# Patient Record
Sex: MALE | Race: BLACK OR AFRICAN AMERICAN | NOT HISPANIC OR LATINO | URBAN - METROPOLITAN AREA
[De-identification: names, ages, dates, MRNs, and addresses within clinical notes are randomized per-mention and may not be internally consistent; named-entity substitution may affect disease eponyms.]

---

## 2019-12-14 ENCOUNTER — INPATIENT (INPATIENT)
Facility: HOSPITAL | Age: 19
LOS: 4 days | Discharge: ANOTHER HOSPITAL | End: 2019-12-19
Attending: INTERNAL MEDICINE | Admitting: INTERNAL MEDICINE
Payer: MEDICAID

## 2019-12-14 VITALS
SYSTOLIC BLOOD PRESSURE: 128 MMHG | WEIGHT: 121.25 LBS | DIASTOLIC BLOOD PRESSURE: 72 MMHG | HEART RATE: 93 BPM | TEMPERATURE: 98 F | OXYGEN SATURATION: 93 % | RESPIRATION RATE: 20 BRPM

## 2019-12-14 LAB
ALBUMIN SERPL ELPH-MCNC: 4.2 G/DL — SIGNIFICANT CHANGE UP (ref 3.3–5)
ALP SERPL-CCNC: 68 U/L — SIGNIFICANT CHANGE UP (ref 40–120)
ALT FLD-CCNC: 29 U/L — SIGNIFICANT CHANGE UP (ref 12–78)
ANION GAP SERPL CALC-SCNC: 9 MMOL/L — SIGNIFICANT CHANGE UP (ref 5–17)
ANISOCYTOSIS BLD QL: SLIGHT — SIGNIFICANT CHANGE UP
APTT BLD: 31.8 SEC — SIGNIFICANT CHANGE UP (ref 28.5–37)
AST SERPL-CCNC: 203 U/L — HIGH (ref 15–37)
BASE EXCESS BLDA CALC-SCNC: -2.6 MMOL/L — LOW (ref -2–2)
BASOPHILS # BLD AUTO: 0 K/UL — SIGNIFICANT CHANGE UP (ref 0–0.2)
BASOPHILS NFR BLD AUTO: 0 % — SIGNIFICANT CHANGE UP (ref 0–2)
BILIRUB SERPL-MCNC: 4.2 MG/DL — HIGH (ref 0.2–1.2)
BLOOD GAS COMMENTS: SIGNIFICANT CHANGE UP
BLOOD GAS COMMENTS: SIGNIFICANT CHANGE UP
BLOOD GAS SOURCE: SIGNIFICANT CHANGE UP
BUN SERPL-MCNC: 18 MG/DL — SIGNIFICANT CHANGE UP (ref 7–23)
CALCIUM SERPL-MCNC: 9 MG/DL — SIGNIFICANT CHANGE UP (ref 8.5–10.1)
CHLORIDE SERPL-SCNC: 104 MMOL/L — SIGNIFICANT CHANGE UP (ref 96–108)
CK MB CFR SERPL CALC: <1 NG/ML — SIGNIFICANT CHANGE UP (ref 0.5–3.6)
CO2 SERPL-SCNC: 22 MMOL/L — SIGNIFICANT CHANGE UP (ref 22–31)
CREAT SERPL-MCNC: 0.66 MG/DL — SIGNIFICANT CHANGE UP (ref 0.5–1.3)
D DIMER BLD IA.RAPID-MCNC: HIGH NG/ML DDU
DACRYOCYTES BLD QL SMEAR: SLIGHT — SIGNIFICANT CHANGE UP
EOSINOPHIL # BLD AUTO: 0.18 K/UL — SIGNIFICANT CHANGE UP (ref 0–0.5)
EOSINOPHIL NFR BLD AUTO: 1 % — SIGNIFICANT CHANGE UP (ref 0–6)
GLUCOSE SERPL-MCNC: 88 MG/DL — SIGNIFICANT CHANGE UP (ref 70–99)
HCO3 BLDA-SCNC: 22 MMOL/L — SIGNIFICANT CHANGE UP (ref 21–29)
HCT VFR BLD CALC: 20.7 % — CRITICAL LOW (ref 39–50)
HGB BLD-MCNC: 7.7 G/DL — LOW (ref 13–17)
HOROWITZ INDEX BLDA+IHG-RTO: 28 — SIGNIFICANT CHANGE UP
HOWELL-JOLLY BOD BLD QL SMEAR: PRESENT — SIGNIFICANT CHANGE UP
INR BLD: 1.37 RATIO — HIGH (ref 0.88–1.16)
LYMPHOCYTES # BLD AUTO: 21 % — SIGNIFICANT CHANGE UP (ref 13–44)
LYMPHOCYTES # BLD AUTO: 3.74 K/UL — HIGH (ref 1–3.3)
MACROCYTES BLD QL: SLIGHT — SIGNIFICANT CHANGE UP
MANUAL SMEAR VERIFICATION: SIGNIFICANT CHANGE UP
MCHC RBC-ENTMCNC: 28.9 PG — SIGNIFICANT CHANGE UP (ref 27–34)
MCHC RBC-ENTMCNC: 37.2 GM/DL — HIGH (ref 32–36)
MCV RBC AUTO: 77.8 FL — LOW (ref 80–100)
MICROCYTES BLD QL: SLIGHT — SIGNIFICANT CHANGE UP
MONOCYTES # BLD AUTO: 0.89 K/UL — SIGNIFICANT CHANGE UP (ref 0–0.9)
MONOCYTES NFR BLD AUTO: 5 % — SIGNIFICANT CHANGE UP (ref 2–14)
NEUTROPHILS # BLD AUTO: 12.99 K/UL — HIGH (ref 1.8–7.4)
NEUTROPHILS NFR BLD AUTO: 73 % — SIGNIFICANT CHANGE UP (ref 43–77)
NRBC # BLD: 0 /100 — SIGNIFICANT CHANGE UP (ref 0–0)
NRBC # BLD: SIGNIFICANT CHANGE UP /100 WBCS (ref 0–0)
NT-PROBNP SERPL-SCNC: 65 PG/ML — SIGNIFICANT CHANGE UP (ref 0–125)
OVALOCYTES BLD QL SMEAR: SLIGHT — SIGNIFICANT CHANGE UP
PCO2 BLDA: 37 MMHG — SIGNIFICANT CHANGE UP (ref 32–46)
PH BLD: 7.38 — SIGNIFICANT CHANGE UP (ref 7.35–7.45)
PLAT MORPH BLD: NORMAL — SIGNIFICANT CHANGE UP
PLATELET # BLD AUTO: 209 K/UL — SIGNIFICANT CHANGE UP (ref 150–400)
PO2 BLDA: 133 MMHG — HIGH (ref 74–108)
POLYCHROMASIA BLD QL SMEAR: SLIGHT — SIGNIFICANT CHANGE UP
POTASSIUM SERPL-MCNC: 4.3 MMOL/L — SIGNIFICANT CHANGE UP (ref 3.5–5.3)
POTASSIUM SERPL-SCNC: 4.3 MMOL/L — SIGNIFICANT CHANGE UP (ref 3.5–5.3)
PROT SERPL-MCNC: 8.7 GM/DL — HIGH (ref 6–8.3)
PROTHROM AB SERPL-ACNC: 15.5 SEC — HIGH (ref 10–12.9)
RBC # BLD: 2.66 M/UL — LOW (ref 4.2–5.8)
RBC # FLD: 26.5 % — HIGH (ref 10.3–14.5)
RBC BLD AUTO: ABNORMAL
RETICS #: 137.5 K/UL — HIGH (ref 25–125)
RETICS/RBC NFR: 5.7 % — HIGH (ref 0.5–2.5)
SAO2 % BLDA: 98 % — HIGH (ref 92–96)
SICKLE CELLS BLD QL SMEAR: SIGNIFICANT CHANGE UP
SODIUM SERPL-SCNC: 135 MMOL/L — SIGNIFICANT CHANGE UP (ref 135–145)
TARGETS BLD QL SMEAR: SLIGHT — SIGNIFICANT CHANGE UP
TROPONIN I SERPL-MCNC: <.015 NG/ML — SIGNIFICANT CHANGE UP (ref 0.01–0.04)
WBC # BLD: 17.8 K/UL — HIGH (ref 3.8–10.5)
WBC # FLD AUTO: 17.8 K/UL — HIGH (ref 3.8–10.5)

## 2019-12-14 PROCEDURE — 71045 X-RAY EXAM CHEST 1 VIEW: CPT | Mod: 26

## 2019-12-14 PROCEDURE — 93010 ELECTROCARDIOGRAM REPORT: CPT

## 2019-12-14 PROCEDURE — 99291 CRITICAL CARE FIRST HOUR: CPT

## 2019-12-14 PROCEDURE — 71275 CT ANGIOGRAPHY CHEST: CPT | Mod: 26

## 2019-12-14 RX ORDER — MORPHINE SULFATE 50 MG/1
4 CAPSULE, EXTENDED RELEASE ORAL ONCE
Refills: 0 | Status: DISCONTINUED | OUTPATIENT
Start: 2019-12-14 | End: 2019-12-14

## 2019-12-14 RX ORDER — SODIUM CHLORIDE 9 MG/ML
1000 INJECTION, SOLUTION INTRAVENOUS ONCE
Refills: 0 | Status: COMPLETED | OUTPATIENT
Start: 2019-12-14 | End: 2019-12-14

## 2019-12-14 RX ORDER — SODIUM CHLORIDE 9 MG/ML
1000 INJECTION INTRAMUSCULAR; INTRAVENOUS; SUBCUTANEOUS ONCE
Refills: 0 | Status: COMPLETED | OUTPATIENT
Start: 2019-12-14 | End: 2019-12-14

## 2019-12-14 RX ORDER — ACETAMINOPHEN 500 MG
975 TABLET ORAL ONCE
Refills: 0 | Status: COMPLETED | OUTPATIENT
Start: 2019-12-14 | End: 2019-12-14

## 2019-12-14 RX ADMIN — MORPHINE SULFATE 4 MILLIGRAM(S): 50 CAPSULE, EXTENDED RELEASE ORAL at 22:00

## 2019-12-14 RX ADMIN — Medication 975 MILLIGRAM(S): at 20:20

## 2019-12-14 RX ADMIN — SODIUM CHLORIDE 1000 MILLILITER(S): 9 INJECTION, SOLUTION INTRAVENOUS at 22:56

## 2019-12-14 RX ADMIN — SODIUM CHLORIDE 1000 MILLILITER(S): 9 INJECTION INTRAMUSCULAR; INTRAVENOUS; SUBCUTANEOUS at 20:19

## 2019-12-14 RX ADMIN — SODIUM CHLORIDE 1000 MILLILITER(S): 9 INJECTION, SOLUTION INTRAVENOUS at 22:13

## 2019-12-14 RX ADMIN — Medication 975 MILLIGRAM(S): at 21:00

## 2019-12-14 RX ADMIN — SODIUM CHLORIDE 1000 MILLILITER(S): 9 INJECTION, SOLUTION INTRAVENOUS at 22:55

## 2019-12-14 RX ADMIN — MORPHINE SULFATE 4 MILLIGRAM(S): 50 CAPSULE, EXTENDED RELEASE ORAL at 23:25

## 2019-12-14 RX ADMIN — MORPHINE SULFATE 4 MILLIGRAM(S): 50 CAPSULE, EXTENDED RELEASE ORAL at 22:55

## 2019-12-14 RX ADMIN — SODIUM CHLORIDE 1000 MILLILITER(S): 9 INJECTION INTRAMUSCULAR; INTRAVENOUS; SUBCUTANEOUS at 21:52

## 2019-12-14 RX ADMIN — MORPHINE SULFATE 4 MILLIGRAM(S): 50 CAPSULE, EXTENDED RELEASE ORAL at 21:13

## 2019-12-14 RX ADMIN — SODIUM CHLORIDE 1000 MILLILITER(S): 9 INJECTION, SOLUTION INTRAVENOUS at 23:49

## 2019-12-14 NOTE — ED ADULT NURSE REASSESSMENT NOTE - NS ED NURSE REASSESS COMMENT FT1
patient complaining of CP increasing. RPT ecg ordered. Dr. Shipley made aware on multiple occasions. Awaiting to be seen. Cardiac monitor and O2LNC

## 2019-12-14 NOTE — ED PROVIDER NOTE - OBJECTIVE STATEMENT
20 yo M w/ acute cp, started yesterday, central, pt. also feels it in his ribs.  Pt. has a history of sickle cell, but states this is different from his sickle cell.  No inciting event, although pt. admits to recent return trip from [place]--seated on plane for extended period of time.  ROS: negative for fever, cough, headache, shortness of breath, abd pain, nausea, vomiting, diarrhea, rash, paresthesia, and weakness--all other systems reviewed are negative.   PMH: negative; Meds: Denies; SH: Denies smoking/drinking/drug use 20 yo M w/ acute cp, started yesterday, central, pt. also feels it in his ribs.  Pt. has a history of sickle cell, but states this is different from his sickle cell.  No inciting event, although pt. admits to recent return trip from [place]--seated on plane for extended period of time.  ROS: negative for fever, cough, headache, shortness of breath, abd pain, nausea, vomiting, diarrhea, rash, paresthesia, and weakness--all other systems reviewed are negative.   PMH: sickle cell; Meds: folic acid, b complex; SH: Denies smoking/drinking/drug use

## 2019-12-14 NOTE — ED PROVIDER NOTE - CARE PLAN
Principal Discharge DX:	Acute pulmonary embolism, unspecified pulmonary embolism type, unspecified whether acute cor pulmonale present Principal Discharge DX:	Hb-SS disease with crisis

## 2019-12-14 NOTE — ED ADULT TRIAGE NOTE - CHIEF COMPLAINT QUOTE
Patient states im having chest pain since yesterday, denies SOB. took pain killer and and antibiotics at 5pm. From [place], [place] in [place] on monday

## 2019-12-14 NOTE — ED PROVIDER NOTE - PRINCIPAL DIAGNOSIS
Acute pulmonary embolism, unspecified pulmonary embolism type, unspecified whether acute cor pulmonale present Hb-SS disease with crisis

## 2019-12-14 NOTE — ED PROVIDER NOTE - CLINICAL SUMMARY MEDICAL DECISION MAKING FREE TEXT BOX
20 yo M with acute chest pain, r/o PE, pneumo, doubt ACS, CHF  -basic labs, coags, trop, ckmb, dimer, ldh, retic, bnp, CTA chest, cxr, ekg, iv, bolus hydration, tylenol prn, morphine for pain, monitor  -f/u results, reeval

## 2019-12-14 NOTE — ED PROVIDER NOTE - PHYSICAL EXAMINATION
Vitals: hypoxic on RA in 80's, otherwise WNL  Gen: AAOx3, NAD, sitting uncomfortably in stretcher, non-toxic, family at bedside   Head: ncat, perrla, eomi b/l  Neck: supple, no lymphadenopathy, no midline deviation  Heart: rrr, no m/r/g  Lungs: CTA b/l, no rales/ronchi/wheezes  Abd: soft, nontender, non-distended, no rebound or guarding  Ext: no clubbing/cyanosis/edema  Neuro: sensation and muscle strength intact b/l, steady gait Vitals: hypoxic on RA in 80's, otherwise WNL  Gen: AAOx3, NAD, sitting uncomfortably in stretcher, non-toxic, family at bedside   Head: ncat, perrla, eomi b/l, mild scleral jaundice   Neck: supple, no lymphadenopathy, no midline deviation  Heart: rrr, no m/r/g  Lungs: CTA b/l, no rales/ronchi/wheezes  Abd: soft, nontender, non-distended, no rebound or guarding  Ext: no clubbing/cyanosis/edema  Neuro: sensation and muscle strength intact b/l, steady gait

## 2019-12-14 NOTE — ED ADULT NURSE NOTE - OBJECTIVE STATEMENT
Received in bed 14. AOX4. Patient states im having chest pain since yesterday, denies SOB. took pain killer and and antibiotics at 5pm. From [place], flight landed in  on monday

## 2019-12-15 DIAGNOSIS — D57.00 HB-SS DISEASE WITH CRISIS, UNSPECIFIED: ICD-10-CM

## 2019-12-15 DIAGNOSIS — Z29.9 ENCOUNTER FOR PROPHYLACTIC MEASURES, UNSPECIFIED: ICD-10-CM

## 2019-12-15 LAB
ALBUMIN SERPL ELPH-MCNC: 3.5 G/DL — SIGNIFICANT CHANGE UP (ref 3.3–5)
ALP SERPL-CCNC: 72 U/L — SIGNIFICANT CHANGE UP (ref 40–120)
ALT FLD-CCNC: 20 U/L — SIGNIFICANT CHANGE UP (ref 12–78)
ANION GAP SERPL CALC-SCNC: 8 MMOL/L — SIGNIFICANT CHANGE UP (ref 5–17)
AST SERPL-CCNC: 127 U/L — HIGH (ref 15–37)
BILIRUB SERPL-MCNC: 3.3 MG/DL — HIGH (ref 0.2–1.2)
BLD GP AB SCN SERPL QL: SIGNIFICANT CHANGE UP
BUN SERPL-MCNC: 7 MG/DL — SIGNIFICANT CHANGE UP (ref 7–23)
CALCIUM SERPL-MCNC: 9 MG/DL — SIGNIFICANT CHANGE UP (ref 8.5–10.1)
CHLORIDE SERPL-SCNC: 99 MMOL/L — SIGNIFICANT CHANGE UP (ref 96–108)
CO2 SERPL-SCNC: 23 MMOL/L — SIGNIFICANT CHANGE UP (ref 22–31)
CREAT SERPL-MCNC: 0.55 MG/DL — SIGNIFICANT CHANGE UP (ref 0.5–1.3)
FLU A RESULT: SIGNIFICANT CHANGE UP
FLU A RESULT: SIGNIFICANT CHANGE UP
FLUAV AG NPH QL: SIGNIFICANT CHANGE UP
FLUBV AG NPH QL: SIGNIFICANT CHANGE UP
GLUCOSE SERPL-MCNC: 111 MG/DL — HIGH (ref 70–99)
HCT VFR BLD CALC: 20.3 % — CRITICAL LOW (ref 39–50)
HGB BLD-MCNC: 7.6 G/DL — LOW (ref 13–17)
LDH SERPL L TO P-CCNC: 1419 U/L — HIGH (ref 50–242)
MCHC RBC-ENTMCNC: 29.8 PG — SIGNIFICANT CHANGE UP (ref 27–34)
MCHC RBC-ENTMCNC: 37.4 GM/DL — HIGH (ref 32–36)
MCV RBC AUTO: 79.6 FL — LOW (ref 80–100)
NRBC # BLD: 3 /100 WBCS — HIGH (ref 0–0)
PLATELET # BLD AUTO: 194 K/UL — SIGNIFICANT CHANGE UP (ref 150–400)
POTASSIUM SERPL-MCNC: 3.9 MMOL/L — SIGNIFICANT CHANGE UP (ref 3.5–5.3)
POTASSIUM SERPL-SCNC: 3.9 MMOL/L — SIGNIFICANT CHANGE UP (ref 3.5–5.3)
PROT SERPL-MCNC: 8 GM/DL — SIGNIFICANT CHANGE UP (ref 6–8.3)
RBC # BLD: 2.55 M/UL — LOW (ref 4.2–5.8)
RBC # BLD: 2.55 M/UL — LOW (ref 4.2–5.8)
RBC # FLD: 26.1 % — HIGH (ref 10.3–14.5)
RETICS #: 209 K/UL — HIGH (ref 25–125)
RETICS/RBC NFR: 8.2 % — HIGH (ref 0.5–2.5)
RSV RESULT: SIGNIFICANT CHANGE UP
RSV RNA RESP QL NAA+PROBE: SIGNIFICANT CHANGE UP
SODIUM SERPL-SCNC: 130 MMOL/L — LOW (ref 135–145)
WBC # BLD: 17.95 K/UL — HIGH (ref 3.8–10.5)
WBC # FLD AUTO: 17.95 K/UL — HIGH (ref 3.8–10.5)

## 2019-12-15 PROCEDURE — 99291 CRITICAL CARE FIRST HOUR: CPT

## 2019-12-15 PROCEDURE — 71045 X-RAY EXAM CHEST 1 VIEW: CPT | Mod: 26

## 2019-12-15 PROCEDURE — 99222 1ST HOSP IP/OBS MODERATE 55: CPT

## 2019-12-15 PROCEDURE — 12345: CPT | Mod: NC

## 2019-12-15 RX ORDER — CEFTRIAXONE 500 MG/1
1000 INJECTION, POWDER, FOR SOLUTION INTRAMUSCULAR; INTRAVENOUS EVERY 24 HOURS
Refills: 0 | Status: DISCONTINUED | OUTPATIENT
Start: 2019-12-15 | End: 2019-12-16

## 2019-12-15 RX ORDER — ENOXAPARIN SODIUM 100 MG/ML
40 INJECTION SUBCUTANEOUS DAILY
Refills: 0 | Status: DISCONTINUED | OUTPATIENT
Start: 2019-12-15 | End: 2019-12-19

## 2019-12-15 RX ORDER — ACETAMINOPHEN 500 MG
650 TABLET ORAL EVERY 6 HOURS
Refills: 0 | Status: DISCONTINUED | OUTPATIENT
Start: 2019-12-15 | End: 2019-12-19

## 2019-12-15 RX ORDER — MORPHINE SULFATE 50 MG/1
2 CAPSULE, EXTENDED RELEASE ORAL
Refills: 0 | Status: DISCONTINUED | OUTPATIENT
Start: 2019-12-15 | End: 2019-12-17

## 2019-12-15 RX ORDER — INFLUENZA VIRUS VACCINE 15; 15; 15; 15 UG/.5ML; UG/.5ML; UG/.5ML; UG/.5ML
0.5 SUSPENSION INTRAMUSCULAR ONCE
Refills: 0 | Status: DISCONTINUED | OUTPATIENT
Start: 2019-12-15 | End: 2019-12-19

## 2019-12-15 RX ORDER — ACETAMINOPHEN 500 MG
650 TABLET ORAL ONCE
Refills: 0 | Status: COMPLETED | OUTPATIENT
Start: 2019-12-15 | End: 2019-12-15

## 2019-12-15 RX ORDER — FOLIC ACID 0.8 MG
1 TABLET ORAL DAILY
Refills: 0 | Status: DISCONTINUED | OUTPATIENT
Start: 2019-12-15 | End: 2019-12-19

## 2019-12-15 RX ORDER — SODIUM CHLORIDE 9 MG/ML
1000 INJECTION, SOLUTION INTRAVENOUS
Refills: 0 | Status: DISCONTINUED | OUTPATIENT
Start: 2019-12-15 | End: 2019-12-16

## 2019-12-15 RX ADMIN — Medication 650 MILLIGRAM(S): at 05:52

## 2019-12-15 RX ADMIN — MORPHINE SULFATE 2 MILLIGRAM(S): 50 CAPSULE, EXTENDED RELEASE ORAL at 15:10

## 2019-12-15 RX ADMIN — Medication 650 MILLIGRAM(S): at 06:22

## 2019-12-15 RX ADMIN — MORPHINE SULFATE 2 MILLIGRAM(S): 50 CAPSULE, EXTENDED RELEASE ORAL at 19:53

## 2019-12-15 RX ADMIN — MORPHINE SULFATE 2 MILLIGRAM(S): 50 CAPSULE, EXTENDED RELEASE ORAL at 20:23

## 2019-12-15 RX ADMIN — MORPHINE SULFATE 2 MILLIGRAM(S): 50 CAPSULE, EXTENDED RELEASE ORAL at 14:55

## 2019-12-15 RX ADMIN — SODIUM CHLORIDE 150 MILLILITER(S): 9 INJECTION, SOLUTION INTRAVENOUS at 05:47

## 2019-12-15 RX ADMIN — MORPHINE SULFATE 2 MILLIGRAM(S): 50 CAPSULE, EXTENDED RELEASE ORAL at 08:05

## 2019-12-15 RX ADMIN — MORPHINE SULFATE 2 MILLIGRAM(S): 50 CAPSULE, EXTENDED RELEASE ORAL at 18:00

## 2019-12-15 RX ADMIN — MORPHINE SULFATE 2 MILLIGRAM(S): 50 CAPSULE, EXTENDED RELEASE ORAL at 04:39

## 2019-12-15 RX ADMIN — MORPHINE SULFATE 2 MILLIGRAM(S): 50 CAPSULE, EXTENDED RELEASE ORAL at 17:46

## 2019-12-15 RX ADMIN — MORPHINE SULFATE 2 MILLIGRAM(S): 50 CAPSULE, EXTENDED RELEASE ORAL at 07:49

## 2019-12-15 RX ADMIN — MORPHINE SULFATE 2 MILLIGRAM(S): 50 CAPSULE, EXTENDED RELEASE ORAL at 10:05

## 2019-12-15 RX ADMIN — MORPHINE SULFATE 2 MILLIGRAM(S): 50 CAPSULE, EXTENDED RELEASE ORAL at 09:50

## 2019-12-15 RX ADMIN — SODIUM CHLORIDE 150 MILLILITER(S): 9 INJECTION, SOLUTION INTRAVENOUS at 12:43

## 2019-12-15 RX ADMIN — SODIUM CHLORIDE 150 MILLILITER(S): 9 INJECTION, SOLUTION INTRAVENOUS at 18:33

## 2019-12-15 RX ADMIN — Medication 1 MILLIGRAM(S): at 18:33

## 2019-12-15 RX ADMIN — Medication 650 MILLIGRAM(S): at 15:18

## 2019-12-15 RX ADMIN — ENOXAPARIN SODIUM 40 MILLIGRAM(S): 100 INJECTION SUBCUTANEOUS at 18:32

## 2019-12-15 RX ADMIN — MORPHINE SULFATE 2 MILLIGRAM(S): 50 CAPSULE, EXTENDED RELEASE ORAL at 23:45

## 2019-12-15 RX ADMIN — MORPHINE SULFATE 2 MILLIGRAM(S): 50 CAPSULE, EXTENDED RELEASE ORAL at 03:35

## 2019-12-15 RX ADMIN — CEFTRIAXONE 100 MILLIGRAM(S): 500 INJECTION, POWDER, FOR SOLUTION INTRAMUSCULAR; INTRAVENOUS at 17:47

## 2019-12-15 RX ADMIN — MORPHINE SULFATE 2 MILLIGRAM(S): 50 CAPSULE, EXTENDED RELEASE ORAL at 12:39

## 2019-12-15 RX ADMIN — MORPHINE SULFATE 2 MILLIGRAM(S): 50 CAPSULE, EXTENDED RELEASE ORAL at 12:26

## 2019-12-15 RX ADMIN — Medication 650 MILLIGRAM(S): at 16:20

## 2019-12-15 NOTE — H&P ADULT - HISTORY OF PRESENT ILLNESS
Pt is a 18 y/o male w/sickle cell no pror acute chest and cant recall last crisis, had recently traveled to [place] returned 5 days ago, comes w/complain of chest and bone pain. pt doesn't report any sob, states bones hurt, did have decreased po intake for few days with travels.  pt denies any fever, chills, sob, palpitations, n/v/d/c/. in ed had CTA that was non contributory

## 2019-12-15 NOTE — H&P ADULT - NSHPLABSRESULTS_GEN_ALL_CORE
LABS:                        7.7    17.80 )-----------( 209      ( 14 Dec 2019 20:34 )             20.7     12-14    135  |  104  |  18  ----------------------------<  88  4.3   |  22  |  0.66    Ca    9.0      14 Dec 2019 20:34    TPro  8.7<H>  /  Alb  4.2  /  TBili  4.2<H>  /  DBili  x   /  AST  203<H>  /  ALT  29  /  AlkPhos  68  12-14    PT/INR - ( 14 Dec 2019 20:34 )   PT: 15.5 sec;   INR: 1.37 ratio         PTT - ( 14 Dec 2019 20:34 )  PTT:31.8 sec    CAPILLARY BLOOD GLUCOSE            RADIOLOGY & ADDITIONAL TESTS:    Imaging Personally Reviewed:  [ X] YES  [ ] NO

## 2019-12-15 NOTE — CHART NOTE - NSCHARTNOTEFT_GEN_A_CORE
RRT called for persistent fevers.  Pt seen at bedside, complains of chest pain with SOB.  Very anxious.  Vital signs stable during RRT; O2 sat 97-99% on O2 via VC.  Lung exam unremarkable.          Vital Signs Last 24 Hrs  T(C): 39.5 (15 Dec 2019 17:56), Max: 39.7 (15 Dec 2019 15:30)  T(F): 103.1 (15 Dec 2019 17:56), Max: 103.4 (15 Dec 2019 15:30)  HR: 115 (15 Dec 2019 17:56) (84 - 115)  BP: 133/79 (15 Dec 2019 17:56) (120/68 - 139/64)  BP(mean): --  RR: 24 (15 Dec 2019 17:56) (19 - 30)  SpO2: 96% (15 Dec 2019 17:56) (84% - 100%)          LABS:                        7.7    17.80 )-----------( 209      ( 14 Dec 2019 20:34 )             20.7     12-14    135  |  104  |  18  ----------------------------<  88  4.3   |  22  |  0.66    Ca    9.0      14 Dec 2019 20:34    TPro  8.7<H>  /  Alb  4.2  /  TBili  4.2<H>  /  DBili  x   /  AST  203<H>  /  ALT  29  /  AlkPhos  68  12-14    PT/INR - ( 14 Dec 2019 20:34 )   PT: 15.5 sec;   INR: 1.37 ratio         PTT - ( 14 Dec 2019 20:34 )  PTT:31.8 sec      RADIOLOGY & ADDITIONAL STUDIES:    CTA chest:  FINDINGS:   Pulmonary arteries: Normal. No pulmonary emboli.   Aorta: Unremarkable. No aortic aneurysm. No aortic dissection.   Lungs: Unremarkable. No consolidation. No masses.   Pleural space: Unremarkable. No pneumothorax. No pleural effusion.   Heart: Unremarkable. No cardiomegaly. No pericardial effusion.   Lymph nodes: Unremarkable. No enlarged lymph nodes.   Bones/joints: Unremarkable. No acute fracture.   Soft tissues: Unremarkable.     Plan:  -CTA imaging done withi last 24h.  Will repeat CXR now to ensure this is not evolving acute chest syndrome  -Rocephin started today by attending hospitalist  -Apply cooling blanket  -Follow blood Cx    Total critical care time spent: 30 minutes.

## 2019-12-15 NOTE — PATIENT PROFILE ADULT - NSPROGENSOURCEINFO_GEN_A_NUR
Vaccine Information Sheet, \"Influenza - Inactivated\"  given to Anoop Vargas, or parent/legal guardian of  Anoop Vargas and verbalized understanding. Patient responses:    Have you ever had a reaction to a flu vaccine? No  Are you able to eat eggs without adverse effects? Yes  Do you have any current illness? No  Have you ever had Guillian [place] Syndrome? No    Flu vaccine given per order. Please see immunization tab.
family/patient

## 2019-12-15 NOTE — H&P ADULT - NSHPPHYSICALEXAM_GEN_ALL_CORE
Vital Signs Last 24 Hrs  T(C): 38.2 (15 Dec 2019 03:29), Max: 38.2 (15 Dec 2019 03:29)  T(F): 100.7 (15 Dec 2019 03:29), Max: 100.7 (15 Dec 2019 03:29)  HR: 89 (15 Dec 2019 03:29) (84 - 95)  BP: 131/69 (15 Dec 2019 03:29) (120/81 - 131/69)  BP(mean): --  RR: 19 (15 Dec 2019 03:29) (19 - 30)  SpO2: 99% (15 Dec 2019 03:29) (84% - 100%)    PHYSICAL EXAM:    GENERAL: NAD, well-groomed, well-developed  HEAD:  Atraumatic, Normocephalic  EYES: EOMI, PERRLA, conjunctiva and sclera clear  ENMT: No tonsillar erythema, exudates, or enlargement; Moist mucous membranes, No lesions  NECK: Supple, No JVD, Normal thyroid  NERVOUS SYSTEM:  Alert & Oriented X3, Good concentration; Motor Strength 5/5 B/L upper and lower extremities; DTRs 2+ intact and symmetric  CHEST/LUNG: Clear to percussion bilaterally; No rales, rhonchi, wheezing, or rubs, mild point tenderness diffuse  HEART: Regular rate and rhythm; No rubs, or gallops, +S1,S2  ABDOMEN: Soft, Nontender, Nondistended; Bowel sounds present  EXTREMITIES:  2+ Peripheral Pulses, No clubbing, cyanosis, or edema  LYMPH: No cervical adenopathy  RECTAL: deferred  BREAST: No palpatble masses, skin no lesions   : deferred  SKIN: No rashes or lesions    IMPROVE VTE Individual Risk Assessment        RISK                                                          Points  [  ] Previous VTE                                                3  [  ] Thrombophilia                                             2  [  ] Lower limb paralysis                                    2        (unable to hold up >15 seconds)    [  ] Current Cancer                                             2         (within 6 months)  [x  ] Immobilization > 24 hrs                              1  [  ] ICU/CCU stay > 24 hours                            1  [  ] Age > 60                                                    1  IMPROVE VTE Score __1_______

## 2019-12-15 NOTE — PATIENT PROFILE ADULT - NSTRANSFERBELONGINGSDISPO_GEN_A_NUR
with patient FREE:[LAST:[Primary Care Physician],PHONE:[(   )    -],FAX:[(   )    -],ADDRESS:[[phone]]]

## 2019-12-15 NOTE — CHART NOTE - NSCHARTNOTEFT_GEN_A_CORE
18 y/o male w/sickle cell no prior acute chest and cant recall last crisis, had recently traveled to [place] returned 5 days ago, comes w/complain of chest and bone pain. pt doesn't report any sob, states bones hurt, did have decreased po intake for few days with travels.  pt denies any fever, chills, sob, palpitations, n/v/d/c/. in ed had CTA that was non contributory.  No PE, no infiltrate in CT  Now febrile  Blood cultures  Start Rocephin  Morphine for pain management  IVF

## 2019-12-16 LAB
ALBUMIN SERPL ELPH-MCNC: 2.5 G/DL — LOW (ref 3.3–5)
ALBUMIN SERPL ELPH-MCNC: 3.3 G/DL — SIGNIFICANT CHANGE UP (ref 3.3–5)
ALP SERPL-CCNC: 54 U/L — SIGNIFICANT CHANGE UP (ref 40–120)
ALP SERPL-CCNC: 67 U/L — SIGNIFICANT CHANGE UP (ref 40–120)
ALT FLD-CCNC: 16 U/L — SIGNIFICANT CHANGE UP (ref 12–78)
ALT FLD-CCNC: 18 U/L — SIGNIFICANT CHANGE UP (ref 12–78)
ANION GAP SERPL CALC-SCNC: 8 MMOL/L — SIGNIFICANT CHANGE UP (ref 5–17)
ANION GAP SERPL CALC-SCNC: 9 MMOL/L — SIGNIFICANT CHANGE UP (ref 5–17)
AST SERPL-CCNC: 100 U/L — HIGH (ref 15–37)
AST SERPL-CCNC: 68 U/L — HIGH (ref 15–37)
BASE EXCESS BLDA CALC-SCNC: -1 MMOL/L — SIGNIFICANT CHANGE UP (ref -2–2)
BASE EXCESS BLDA CALC-SCNC: -1.3 MMOL/L — SIGNIFICANT CHANGE UP (ref -2–2)
BASOPHILS # BLD AUTO: 0.05 K/UL — SIGNIFICANT CHANGE UP (ref 0–0.2)
BASOPHILS NFR BLD AUTO: 0.2 % — SIGNIFICANT CHANGE UP (ref 0–2)
BILIRUB SERPL-MCNC: 5 MG/DL — HIGH (ref 0.2–1.2)
BILIRUB SERPL-MCNC: 5.1 MG/DL — HIGH (ref 0.2–1.2)
BLD GP AB SCN SERPL QL: SIGNIFICANT CHANGE UP
BLOOD GAS COMMENTS: SIGNIFICANT CHANGE UP
BLOOD GAS SOURCE: SIGNIFICANT CHANGE UP
BUN SERPL-MCNC: 10 MG/DL — SIGNIFICANT CHANGE UP (ref 7–23)
BUN SERPL-MCNC: 15 MG/DL — SIGNIFICANT CHANGE UP (ref 7–23)
CALCIUM SERPL-MCNC: 7.5 MG/DL — LOW (ref 8.5–10.1)
CALCIUM SERPL-MCNC: 8.5 MG/DL — SIGNIFICANT CHANGE UP (ref 8.5–10.1)
CHLORIDE SERPL-SCNC: 106 MMOL/L — SIGNIFICANT CHANGE UP (ref 96–108)
CHLORIDE SERPL-SCNC: 98 MMOL/L — SIGNIFICANT CHANGE UP (ref 96–108)
CK MB BLD-MCNC: <1.4 % — SIGNIFICANT CHANGE UP (ref 0–3.5)
CK MB BLD-MCNC: <1.6 % — SIGNIFICANT CHANGE UP (ref 0–3.5)
CK MB CFR SERPL CALC: <1 NG/ML — SIGNIFICANT CHANGE UP (ref 0.5–3.6)
CK MB CFR SERPL CALC: <1 NG/ML — SIGNIFICANT CHANGE UP (ref 0.5–3.6)
CK SERPL-CCNC: 62 U/L — SIGNIFICANT CHANGE UP (ref 26–308)
CK SERPL-CCNC: 71 U/L — SIGNIFICANT CHANGE UP (ref 26–308)
CO2 SERPL-SCNC: 21 MMOL/L — LOW (ref 22–31)
CO2 SERPL-SCNC: 23 MMOL/L — SIGNIFICANT CHANGE UP (ref 22–31)
CREAT SERPL-MCNC: 0.6 MG/DL — SIGNIFICANT CHANGE UP (ref 0.5–1.3)
CREAT SERPL-MCNC: 0.62 MG/DL — SIGNIFICANT CHANGE UP (ref 0.5–1.3)
CULTURE RESULTS: SIGNIFICANT CHANGE UP
EOSINOPHIL # BLD AUTO: 0 K/UL — SIGNIFICANT CHANGE UP (ref 0–0.5)
EOSINOPHIL NFR BLD AUTO: 0 % — SIGNIFICANT CHANGE UP (ref 0–6)
GLUCOSE SERPL-MCNC: 116 MG/DL — HIGH (ref 70–99)
GLUCOSE SERPL-MCNC: 130 MG/DL — HIGH (ref 70–99)
HCO3 BLDA-SCNC: 21 MMOL/L — SIGNIFICANT CHANGE UP (ref 21–29)
HCO3 BLDA-SCNC: 22 MMOL/L — SIGNIFICANT CHANGE UP (ref 21–29)
HCO3 BLDA-SCNC: 22 MMOL/L — SIGNIFICANT CHANGE UP (ref 21–29)
HCT VFR BLD CALC: 19.6 % — CRITICAL LOW (ref 39–50)
HCT VFR BLD CALC: 21.4 % — LOW (ref 39–50)
HGB BLD-MCNC: 7.3 G/DL — LOW (ref 13–17)
HGB BLD-MCNC: 7.9 G/DL — LOW (ref 13–17)
HOROWITZ INDEX BLDA+IHG-RTO: 0.6 — SIGNIFICANT CHANGE UP
HOROWITZ INDEX BLDA+IHG-RTO: 100 — SIGNIFICANT CHANGE UP
HOROWITZ INDEX BLDA+IHG-RTO: 100 — SIGNIFICANT CHANGE UP
IMM GRANULOCYTES NFR BLD AUTO: 1 % — SIGNIFICANT CHANGE UP (ref 0–1.5)
LACTATE SERPL-SCNC: 1 MMOL/L — SIGNIFICANT CHANGE UP (ref 0.7–2)
LDH SERPL L TO P-CCNC: 1249 U/L — HIGH (ref 50–242)
LYMPHOCYTES # BLD AUTO: 2.46 K/UL — SIGNIFICANT CHANGE UP (ref 1–3.3)
LYMPHOCYTES # BLD AUTO: 9.6 % — LOW (ref 13–44)
MAGNESIUM SERPL-MCNC: 1.8 MG/DL — SIGNIFICANT CHANGE UP (ref 1.6–2.6)
MCHC RBC-ENTMCNC: 29.6 PG — SIGNIFICANT CHANGE UP (ref 27–34)
MCHC RBC-ENTMCNC: 29.6 PG — SIGNIFICANT CHANGE UP (ref 27–34)
MCHC RBC-ENTMCNC: 36.9 GM/DL — HIGH (ref 32–36)
MCHC RBC-ENTMCNC: 37.2 GM/DL — HIGH (ref 32–36)
MCV RBC AUTO: 79.4 FL — LOW (ref 80–100)
MCV RBC AUTO: 80.1 FL — SIGNIFICANT CHANGE UP (ref 80–100)
MONOCYTES # BLD AUTO: 2.4 K/UL — HIGH (ref 0–0.9)
MONOCYTES NFR BLD AUTO: 9.4 % — SIGNIFICANT CHANGE UP (ref 2–14)
NEUTROPHILS # BLD AUTO: 20.34 K/UL — HIGH (ref 1.8–7.4)
NEUTROPHILS NFR BLD AUTO: 79.8 % — HIGH (ref 43–77)
NRBC # BLD: 0 /100 WBCS — SIGNIFICANT CHANGE UP (ref 0–0)
NRBC # BLD: 2 /100 WBCS — HIGH (ref 0–0)
NT-PROBNP SERPL-SCNC: 62 PG/ML — SIGNIFICANT CHANGE UP (ref 0–125)
PCO2 BLDA: 32 MMHG — SIGNIFICANT CHANGE UP (ref 32–46)
PCO2 BLDA: 36 MMHG — SIGNIFICANT CHANGE UP (ref 32–46)
PCO2 BLDA: 38 MMHG — SIGNIFICANT CHANGE UP (ref 32–46)
PH BLD: 7.37 — SIGNIFICANT CHANGE UP (ref 7.35–7.45)
PH BLD: 7.41 — SIGNIFICANT CHANGE UP (ref 7.35–7.45)
PH BLD: 7.45 — SIGNIFICANT CHANGE UP (ref 7.35–7.45)
PHOSPHATE SERPL-MCNC: 2.4 MG/DL — LOW (ref 2.5–4.5)
PLATELET # BLD AUTO: 142 K/UL — LOW (ref 150–400)
PLATELET # BLD AUTO: 167 K/UL — SIGNIFICANT CHANGE UP (ref 150–400)
PO2 BLDA: 48 MMHG — CRITICAL LOW (ref 74–108)
PO2 BLDA: 77 MMHG — SIGNIFICANT CHANGE UP (ref 74–108)
PO2 BLDA: 84 MMHG — SIGNIFICANT CHANGE UP (ref 74–108)
POTASSIUM SERPL-MCNC: 3.9 MMOL/L — SIGNIFICANT CHANGE UP (ref 3.5–5.3)
POTASSIUM SERPL-MCNC: 4.2 MMOL/L — SIGNIFICANT CHANGE UP (ref 3.5–5.3)
POTASSIUM SERPL-SCNC: 3.9 MMOL/L — SIGNIFICANT CHANGE UP (ref 3.5–5.3)
POTASSIUM SERPL-SCNC: 4.2 MMOL/L — SIGNIFICANT CHANGE UP (ref 3.5–5.3)
PROCALCITONIN SERPL-MCNC: 3.86 NG/ML — HIGH (ref 0.02–0.1)
PROT SERPL-MCNC: 6.3 GM/DL — SIGNIFICANT CHANGE UP (ref 6–8.3)
PROT SERPL-MCNC: 7.9 GM/DL — SIGNIFICANT CHANGE UP (ref 6–8.3)
RAPID RVP RESULT: SIGNIFICANT CHANGE UP
RBC # BLD: 2.47 M/UL — LOW (ref 4.2–5.8)
RBC # BLD: 2.67 M/UL — LOW (ref 4.2–5.8)
RBC # FLD: 23 % — HIGH (ref 10.3–14.5)
RBC # FLD: 25.2 % — HIGH (ref 10.3–14.5)
SAO2 % BLDA: 65 % — LOW (ref 92–96)
SAO2 % BLDA: 91 % — LOW (ref 92–96)
SAO2 % BLDA: 93 % — SIGNIFICANT CHANGE UP (ref 92–96)
SODIUM SERPL-SCNC: 130 MMOL/L — LOW (ref 135–145)
SODIUM SERPL-SCNC: 135 MMOL/L — SIGNIFICANT CHANGE UP (ref 135–145)
SPECIMEN SOURCE: SIGNIFICANT CHANGE UP
TROPONIN I SERPL-MCNC: 0.12 NG/ML — HIGH (ref 0.01–0.04)
TROPONIN I SERPL-MCNC: 0.41 NG/ML — HIGH (ref 0.01–0.04)
WBC # BLD: 20.78 K/UL — HIGH (ref 3.8–10.5)
WBC # BLD: 25.51 K/UL — HIGH (ref 3.8–10.5)
WBC # FLD AUTO: 20.78 K/UL — HIGH (ref 3.8–10.5)
WBC # FLD AUTO: 25.51 K/UL — HIGH (ref 3.8–10.5)

## 2019-12-16 PROCEDURE — 99253 IP/OBS CNSLTJ NEW/EST LOW 45: CPT

## 2019-12-16 PROCEDURE — 99291 CRITICAL CARE FIRST HOUR: CPT | Mod: 25

## 2019-12-16 PROCEDURE — 71045 X-RAY EXAM CHEST 1 VIEW: CPT | Mod: 26,77

## 2019-12-16 PROCEDURE — 83020 HEMOGLOBIN ELECTROPHORESIS: CPT | Mod: 26

## 2019-12-16 PROCEDURE — 36556 INSERT NON-TUNNEL CV CATH: CPT

## 2019-12-16 PROCEDURE — 93010 ELECTROCARDIOGRAM REPORT: CPT

## 2019-12-16 PROCEDURE — 99233 SBSQ HOSP IP/OBS HIGH 50: CPT

## 2019-12-16 PROCEDURE — 71045 X-RAY EXAM CHEST 1 VIEW: CPT | Mod: 26

## 2019-12-16 RX ORDER — KETOROLAC TROMETHAMINE 30 MG/ML
30 SYRINGE (ML) INJECTION ONCE
Refills: 0 | Status: DISCONTINUED | OUTPATIENT
Start: 2019-12-16 | End: 2019-12-16

## 2019-12-16 RX ORDER — FENTANYL CITRATE 50 UG/ML
25 INJECTION INTRAVENOUS ONCE
Refills: 0 | Status: DISCONTINUED | OUTPATIENT
Start: 2019-12-16 | End: 2019-12-16

## 2019-12-16 RX ORDER — PIPERACILLIN AND TAZOBACTAM 4; .5 G/20ML; G/20ML
3.38 INJECTION, POWDER, LYOPHILIZED, FOR SOLUTION INTRAVENOUS EVERY 8 HOURS
Refills: 0 | Status: DISCONTINUED | OUTPATIENT
Start: 2019-12-16 | End: 2019-12-17

## 2019-12-16 RX ORDER — SODIUM CHLORIDE 9 MG/ML
1000 INJECTION, SOLUTION INTRAVENOUS
Refills: 0 | Status: DISCONTINUED | OUTPATIENT
Start: 2019-12-16 | End: 2019-12-16

## 2019-12-16 RX ORDER — IPRATROPIUM BROMIDE 0.2 MG/ML
500 SOLUTION, NON-ORAL INHALATION ONCE
Refills: 0 | Status: COMPLETED | OUTPATIENT
Start: 2019-12-16 | End: 2019-12-16

## 2019-12-16 RX ORDER — AZITHROMYCIN 500 MG/1
500 TABLET, FILM COATED ORAL EVERY 24 HOURS
Refills: 0 | Status: DISCONTINUED | OUTPATIENT
Start: 2019-12-17 | End: 2019-12-19

## 2019-12-16 RX ORDER — SODIUM CHLORIDE 9 MG/ML
1000 INJECTION INTRAMUSCULAR; INTRAVENOUS; SUBCUTANEOUS
Refills: 0 | Status: DISCONTINUED | OUTPATIENT
Start: 2019-12-16 | End: 2019-12-16

## 2019-12-16 RX ORDER — MAGNESIUM SULFATE 500 MG/ML
2 VIAL (ML) INJECTION ONCE
Refills: 0 | Status: COMPLETED | OUTPATIENT
Start: 2019-12-16 | End: 2019-12-16

## 2019-12-16 RX ORDER — ASPIRIN/CALCIUM CARB/MAGNESIUM 324 MG
162 TABLET ORAL ONCE
Refills: 0 | Status: COMPLETED | OUTPATIENT
Start: 2019-12-16 | End: 2019-12-16

## 2019-12-16 RX ORDER — PIPERACILLIN AND TAZOBACTAM 4; .5 G/20ML; G/20ML
3.38 INJECTION, POWDER, LYOPHILIZED, FOR SOLUTION INTRAVENOUS ONCE
Refills: 0 | Status: COMPLETED | OUTPATIENT
Start: 2019-12-16 | End: 2019-12-16

## 2019-12-16 RX ORDER — ASPIRIN/CALCIUM CARB/MAGNESIUM 324 MG
81 TABLET ORAL DAILY
Refills: 0 | Status: DISCONTINUED | OUTPATIENT
Start: 2019-12-17 | End: 2019-12-17

## 2019-12-16 RX ORDER — PANTOPRAZOLE SODIUM 20 MG/1
40 TABLET, DELAYED RELEASE ORAL DAILY
Refills: 0 | Status: DISCONTINUED | OUTPATIENT
Start: 2019-12-16 | End: 2019-12-18

## 2019-12-16 RX ORDER — IPRATROPIUM/ALBUTEROL SULFATE 18-103MCG
3 AEROSOL WITH ADAPTER (GRAM) INHALATION ONCE
Refills: 0 | Status: COMPLETED | OUTPATIENT
Start: 2019-12-16 | End: 2019-12-16

## 2019-12-16 RX ORDER — VANCOMYCIN HCL 1 G
VIAL (EA) INTRAVENOUS
Refills: 0 | Status: DISCONTINUED | OUTPATIENT
Start: 2019-12-16 | End: 2019-12-17

## 2019-12-16 RX ORDER — SODIUM CHLORIDE 9 MG/ML
1000 INJECTION INTRAMUSCULAR; INTRAVENOUS; SUBCUTANEOUS ONCE
Refills: 0 | Status: COMPLETED | OUTPATIENT
Start: 2019-12-16 | End: 2019-12-16

## 2019-12-16 RX ORDER — VANCOMYCIN HCL 1 G
1000 VIAL (EA) INTRAVENOUS ONCE
Refills: 0 | Status: COMPLETED | OUTPATIENT
Start: 2019-12-16 | End: 2019-12-16

## 2019-12-16 RX ORDER — VANCOMYCIN HCL 1 G
1000 VIAL (EA) INTRAVENOUS EVERY 8 HOURS
Refills: 0 | Status: DISCONTINUED | OUTPATIENT
Start: 2019-12-16 | End: 2019-12-17

## 2019-12-16 RX ORDER — KETOROLAC TROMETHAMINE 30 MG/ML
15 SYRINGE (ML) INJECTION EVERY 6 HOURS
Refills: 0 | Status: DISCONTINUED | OUTPATIENT
Start: 2019-12-16 | End: 2019-12-17

## 2019-12-16 RX ORDER — POTASSIUM PHOSPHATE, MONOBASIC POTASSIUM PHOSPHATE, DIBASIC 236; 224 MG/ML; MG/ML
15 INJECTION, SOLUTION INTRAVENOUS ONCE
Refills: 0 | Status: COMPLETED | OUTPATIENT
Start: 2019-12-16 | End: 2019-12-16

## 2019-12-16 RX ORDER — AZITHROMYCIN 500 MG/1
500 TABLET, FILM COATED ORAL ONCE
Refills: 0 | Status: COMPLETED | OUTPATIENT
Start: 2019-12-16 | End: 2019-12-16

## 2019-12-16 RX ORDER — AZITHROMYCIN 500 MG/1
TABLET, FILM COATED ORAL
Refills: 0 | Status: DISCONTINUED | OUTPATIENT
Start: 2019-12-16 | End: 2019-12-19

## 2019-12-16 RX ORDER — SODIUM CHLORIDE 9 MG/ML
1000 INJECTION, SOLUTION INTRAVENOUS ONCE
Refills: 0 | Status: COMPLETED | OUTPATIENT
Start: 2019-12-16 | End: 2019-12-16

## 2019-12-16 RX ORDER — FENTANYL CITRATE 50 UG/ML
50 INJECTION INTRAVENOUS ONCE
Refills: 0 | Status: DISCONTINUED | OUTPATIENT
Start: 2019-12-16 | End: 2019-12-16

## 2019-12-16 RX ORDER — CHLORHEXIDINE GLUCONATE 213 G/1000ML
1 SOLUTION TOPICAL
Refills: 0 | Status: DISCONTINUED | OUTPATIENT
Start: 2019-12-16 | End: 2019-12-19

## 2019-12-16 RX ADMIN — Medication 30 MILLIGRAM(S): at 07:45

## 2019-12-16 RX ADMIN — Medication 650 MILLIGRAM(S): at 05:17

## 2019-12-16 RX ADMIN — PIPERACILLIN AND TAZOBACTAM 200 GRAM(S): 4; .5 INJECTION, POWDER, LYOPHILIZED, FOR SOLUTION INTRAVENOUS at 06:49

## 2019-12-16 RX ADMIN — SODIUM CHLORIDE 150 MILLILITER(S): 9 INJECTION INTRAMUSCULAR; INTRAVENOUS; SUBCUTANEOUS at 11:29

## 2019-12-16 RX ADMIN — FENTANYL CITRATE 50 MICROGRAM(S): 50 INJECTION INTRAVENOUS at 18:13

## 2019-12-16 RX ADMIN — Medication 650 MILLIGRAM(S): at 23:22

## 2019-12-16 RX ADMIN — Medication 3 MILLILITER(S): at 16:44

## 2019-12-16 RX ADMIN — FENTANYL CITRATE 50 MICROGRAM(S): 50 INJECTION INTRAVENOUS at 18:08

## 2019-12-16 RX ADMIN — MORPHINE SULFATE 2 MILLIGRAM(S): 50 CAPSULE, EXTENDED RELEASE ORAL at 00:00

## 2019-12-16 RX ADMIN — PIPERACILLIN AND TAZOBACTAM 25 GRAM(S): 4; .5 INJECTION, POWDER, LYOPHILIZED, FOR SOLUTION INTRAVENOUS at 14:23

## 2019-12-16 RX ADMIN — MORPHINE SULFATE 2 MILLIGRAM(S): 50 CAPSULE, EXTENDED RELEASE ORAL at 02:25

## 2019-12-16 RX ADMIN — POTASSIUM PHOSPHATE, MONOBASIC POTASSIUM PHOSPHATE, DIBASIC 62.5 MILLIMOLE(S): 236; 224 INJECTION, SOLUTION INTRAVENOUS at 18:34

## 2019-12-16 RX ADMIN — MORPHINE SULFATE 2 MILLIGRAM(S): 50 CAPSULE, EXTENDED RELEASE ORAL at 05:10

## 2019-12-16 RX ADMIN — Medication 250 MILLIGRAM(S): at 21:04

## 2019-12-16 RX ADMIN — SODIUM CHLORIDE 2000 MILLILITER(S): 9 INJECTION INTRAMUSCULAR; INTRAVENOUS; SUBCUTANEOUS at 07:06

## 2019-12-16 RX ADMIN — PIPERACILLIN AND TAZOBACTAM 25 GRAM(S): 4; .5 INJECTION, POWDER, LYOPHILIZED, FOR SOLUTION INTRAVENOUS at 22:05

## 2019-12-16 RX ADMIN — MORPHINE SULFATE 2 MILLIGRAM(S): 50 CAPSULE, EXTENDED RELEASE ORAL at 04:40

## 2019-12-16 RX ADMIN — FENTANYL CITRATE 25 MICROGRAM(S): 50 INJECTION INTRAVENOUS at 19:10

## 2019-12-16 RX ADMIN — Medication 650 MILLIGRAM(S): at 04:47

## 2019-12-16 RX ADMIN — MORPHINE SULFATE 2 MILLIGRAM(S): 50 CAPSULE, EXTENDED RELEASE ORAL at 02:58

## 2019-12-16 RX ADMIN — Medication 250 MILLIGRAM(S): at 08:16

## 2019-12-16 RX ADMIN — ENOXAPARIN SODIUM 40 MILLIGRAM(S): 100 INJECTION SUBCUTANEOUS at 11:26

## 2019-12-16 RX ADMIN — Medication 162 MILLIGRAM(S): at 19:54

## 2019-12-16 RX ADMIN — Medication 15 MILLIGRAM(S): at 23:47

## 2019-12-16 RX ADMIN — SODIUM CHLORIDE 1000 MILLILITER(S): 9 INJECTION, SOLUTION INTRAVENOUS at 23:29

## 2019-12-16 RX ADMIN — MORPHINE SULFATE 2 MILLIGRAM(S): 50 CAPSULE, EXTENDED RELEASE ORAL at 13:20

## 2019-12-16 RX ADMIN — AZITHROMYCIN 255 MILLIGRAM(S): 500 TABLET, FILM COATED ORAL at 16:58

## 2019-12-16 RX ADMIN — Medication 250 MILLIGRAM(S): at 13:15

## 2019-12-16 RX ADMIN — MORPHINE SULFATE 2 MILLIGRAM(S): 50 CAPSULE, EXTENDED RELEASE ORAL at 15:15

## 2019-12-16 RX ADMIN — SODIUM CHLORIDE 150 MILLILITER(S): 9 INJECTION INTRAMUSCULAR; INTRAVENOUS; SUBCUTANEOUS at 06:20

## 2019-12-16 RX ADMIN — Medication 50 GRAM(S): at 18:11

## 2019-12-16 RX ADMIN — SODIUM CHLORIDE 150 MILLILITER(S): 9 INJECTION, SOLUTION INTRAVENOUS at 02:25

## 2019-12-16 RX ADMIN — Medication 30 MILLIGRAM(S): at 06:31

## 2019-12-16 RX ADMIN — Medication 30 MILLIGRAM(S): at 15:44

## 2019-12-16 RX ADMIN — Medication 650 MILLIGRAM(S): at 14:23

## 2019-12-16 RX ADMIN — Medication 30 MILLIGRAM(S): at 15:14

## 2019-12-16 RX ADMIN — Medication 100 MILLIGRAM(S): at 11:25

## 2019-12-16 RX ADMIN — Medication 1 MILLIGRAM(S): at 11:26

## 2019-12-16 RX ADMIN — MORPHINE SULFATE 2 MILLIGRAM(S): 50 CAPSULE, EXTENDED RELEASE ORAL at 15:07

## 2019-12-16 RX ADMIN — SODIUM CHLORIDE 2000 MILLILITER(S): 9 INJECTION INTRAMUSCULAR; INTRAVENOUS; SUBCUTANEOUS at 15:20

## 2019-12-16 RX ADMIN — SODIUM CHLORIDE 1000 MILLILITER(S): 9 INJECTION, SOLUTION INTRAVENOUS at 16:59

## 2019-12-16 RX ADMIN — SODIUM CHLORIDE 1 MILLILITER(S): 9 INJECTION INTRAMUSCULAR; INTRAVENOUS; SUBCUTANEOUS at 15:30

## 2019-12-16 RX ADMIN — PANTOPRAZOLE SODIUM 40 MILLIGRAM(S): 20 TABLET, DELAYED RELEASE ORAL at 17:45

## 2019-12-16 RX ADMIN — MORPHINE SULFATE 2 MILLIGRAM(S): 50 CAPSULE, EXTENDED RELEASE ORAL at 00:15

## 2019-12-16 RX ADMIN — Medication 15 MILLIGRAM(S): at 23:22

## 2019-12-16 RX ADMIN — MORPHINE SULFATE 2 MILLIGRAM(S): 50 CAPSULE, EXTENDED RELEASE ORAL at 13:01

## 2019-12-16 RX ADMIN — Medication 650 MILLIGRAM(S): at 14:30

## 2019-12-16 NOTE — CHART NOTE - NSCHARTNOTEFT_GEN_A_CORE
[facility]    Called by KARLOS Shaw that patient oxygen saturation was between 82-88%. Patient seen and examined at bedside, sitting comfortably, in no acute distress. ABG showed O2 saturation at 91%. Respiratory therapist spoke with Dr. Rodney who said patient will not keep bipap mask on, and to continue high flow oxygen, and to closely monitor oxygen saturation.

## 2019-12-16 NOTE — CHART NOTE - NSCHARTNOTEFT_GEN_A_CORE
Rapid response called overhead  Upon arrival patient tachycardic and tachypneic  Rapid response called for temperature 104    Patient is a 20 YO M who initially presented for sickle cell crisis  Patient complains of cough and chest pain, coughs up green mucus  Patient 91% on 4L O2 via NC, increased oxygen to 8 with good response.  Patient refusing to wear cooling blanket.  Had RRT called yesterday for similar complaints    Physical exam:  General: patient in no acute distress,  Cardio: S1/S2 +ve, rapid rate and regular rhythm, no M/G/R  Resp: decreased breath sounds on lower left side  GI: abdomen soft, nontender, non distended, no guarding, BS +ve x 4  Ext: no significant pedal edema  Neuro: CN 2-12 intact, no significant motor or sensory deficits.     Recent Vitals  T(C): 39.9 (12-16-19 @ 06:17), Max: 40.2 (12-15-19 @ 17:54)  HR: 120 (12-16-19 @ 06:17) (93 - 120)  BP: 141/84 (12-16-19 @ 06:17) (120/68 - 143/79)  RR: 18 (12-16-19 @ 06:17) (18 - 26)  SpO2: 97% (12-16-19 @ 06:17) (96% - 100%)                        7.6    17.95 )-----------( 194      ( 15 Dec 2019 20:03 )             20.3     12-15    130<L>  |  99  |  7   ----------------------------<  111<H>  3.9   |  23  |  0.55    Ca    9.0      15 Dec 2019 20:03    TPro  8.0  /  Alb  3.5  /  TBili  3.3<H>  /  DBili  x   /  AST  127<H>  /  ALT  20  /  AlkPhos  72  12-15    PT/INR - ( 14 Dec 2019 20:34 )   PT: 15.5 sec;   INR: 1.37 ratio         PTT - ( 14 Dec 2019 20:34 )  PTT:31.8 sec  LIVER FUNCTIONS - ( 15 Dec 2019 20:03 )  Alb: 3.5 g/dL / Pro: 8.0 gm/dL / ALK PHOS: 72 U/L / ALT: 20 U/L / AST: 127 U/L / GGT: x               Home Medications:        Sickle Cell Crisis - r/o acute chest syndrome    Patient continues to refuse cooling blanket, will attempt ice packs  Chest CT scan negative yesterday - will get stat CXR to assess for new opacities  Toradol given, am labs drawn  Received ceftriaxone yesterday, will start Vanco/Zosyn to cover HCAP

## 2019-12-16 NOTE — CONSULT NOTE ADULT - SUBJECTIVE AND OBJECTIVE BOX
Patient is a 19y old  Male who presents with a chief complaint of cp (16 Dec 2019 18:28)      HPI:  Pt is a 18 y/o male w/sickle cell no pror acute chest and cant recall last crisis, had recently traveled to [place] returned 5 days ago, comes w/complain of chest and bone pain. pt doesn't report any sob, states bones hurt, did have decreased po intake for few days with travels.  pt denies any fever, chills, sob, palpitations, n/v/d/c/. in ed had CTA that was non contributory (15 Dec 2019 03:43)      Allergies    No Known Allergies    Intolerances        MEDICATIONS  NEURO  Meds: acetaminophen   Tablet .. 650 milliGRAM(s) Oral every 6 hours PRN Temp greater or equal to 38C (100.4F), Mild Pain (1 - 3)  morphine  - Injectable 2 milliGRAM(s) IV Push every 2 hours PRN Moderate Pain (4 - 6)    RESPIRATORY  ABG - ( 16 Dec 2019 16:53 )  pH: x     /  pCO2: x     /  pO2: x     / HCO3: x     / Base Excess: x     /  SaO2: x       Lactate: 1.0              Meds: guaiFENesin   Syrup  (Sugar-Free) 100 milliGRAM(s) Oral every 6 hours PRN Cough    CARDIOVASCULAR  Meds:   GI/NUTRITION  Meds: pantoprazole  Injectable 40 milliGRAM(s) IV Push daily    GENITOURINARY  Meds: folic acid 1 milliGRAM(s) Oral daily  sodium chloride 0.9%. 1000 milliLiter(s) IV Continuous <Continuous>    HEMATOLOGIC  Meds: enoxaparin Injectable 40 milliGRAM(s) SubCutaneous daily    [x] VTE Prophylaxis  INFECTIOUS DISEASES  Meds: azithromycin  IVPB      influenza   Vaccine 0.5 milliLiter(s) IntraMuscular once  piperacillin/tazobactam IVPB.. 3.375 Gram(s) IV Intermittent every 8 hours  vancomycin  IVPB      vancomycin  IVPB 1000 milliGRAM(s) IV Intermittent every 8 hours    ENDOCRINE  CAPILLARY BLOOD GLUCOSE      POCT Blood Glucose.: 149 mg/dL (16 Dec 2019 14:58)  POCT Blood Glucose.: 119 mg/dL (16 Dec 2019 06:02)    Meds:   OTHER MEDICATIONS:  chlorhexidine 4% Liquid 1 Application(s) Topical <User Schedule>  :    Drug Dosing Weight    Weight (kg): 58.4 (15 Dec 2019 03:29)    PAST MEDICAL & SURGICAL HISTORY:  Sickle-cell disease  No significant past surgical history      FAMILY HISTORY:  No pertinent family history in first degree relatives      SOCIAL HISTORY:    ADVANCE DIRECTIVES:    REVIEW OF SYSTEMS:    CONSTITUTIONAL: No fever, weight loss, or fatigue  EYES: No eye pain, visual disturbances, or discharge  ENMT:  No difficulty hearing, tinnitus, vertigo; No sinus or throat pain  NECK: No pain or stiffness  BREASTS: No pain, masses, or nipple discharge  RESPIRATORY: No cough, wheezing, chills or hemoptysis; No shortness of breath  CARDIOVASCULAR: No chest pain, palpitations, dizziness, or leg swelling  GASTROINTESTINAL: No abdominal or epigastric pain. No nausea, vomiting, or hematemesis; No diarrhea or constipation. No melena or hematochezia.  GENITOURINARY: No dysuria, frequency, hematuria, or incontinence  NEUROLOGICAL: No headaches, memory loss, loss of strength, numbness, or tremors  SKIN: No itching, burning, rashes, or lesions   LYMPH NODES: No enlarged glands  ENDOCRINE: No heat or cold intolerance; No hair loss  MUSCULOSKELETAL: No joint pain or swelling; No muscle, back, or extremity pain  PSYCHIATRIC: No depression, anxiety, mood swings, or difficulty sleeping  HEME/LYMPH: No easy bruising, or bleeding gums  ALLERGY AND IMMUNOLOGIC: No hives or eczema      ICU Vital Signs Last 24 Hrs  T(C): 37.7 (16 Dec 2019 19:05), Max: 40 (16 Dec 2019 06:20)  T(F): 99.9 (16 Dec 2019 19:05), Max: 104 (16 Dec 2019 06:20)  HR: 112 (16 Dec 2019 19:13) (94 - 172)  BP: 132/81 (16 Dec 2019 19:00) (112/66 - 143/79)  BP(mean): 91 (16 Dec 2019 19:00) (78 - 96)  ABP: --  ABP(mean): --  RR: 24 (16 Dec 2019 19:13) (18 - 41)  SpO2: 94% (16 Dec 2019 19:13) (81% - 100%)      ABG - ( 16 Dec 2019 15:33 )  pH, Arterial: x     pH, Blood: 7.37  /  pCO2: 38    /  pO2: 48    / HCO3: 21    / Base Excess: x     /  SaO2: 65                  I&O's Detail    15 Dec 2019 07:01  -  16 Dec 2019 07:00  --------------------------------------------------------  IN:  Total IN: 0 mL    OUT:    Voided: 800 mL  Total OUT: 800 mL    Total NET: -800 mL      16 Dec 2019 07:01  -  16 Dec 2019 19:19  --------------------------------------------------------  IN:    IV PiggyBack: 550 mL    Lactated Ringers IV Bolus: 1000 mL    Oral Fluid: 720 mL    sodium chloride 0.9%.: 1000 mL  Total IN: 3270 mL    OUT:    Voided: 1600 mL  Total OUT: 1600 mL    Total NET: 1670 mL          PHYSICAL EXAM:    GENERAL: NAD, well-groomed, well-developed  HEAD:  Atraumatic, Normocephalic  EYES: EOMI, PERRLA, conjunctiva and sclera clear  ENMT: No tonsillar erythema, exudates, or enlargement; Moist mucous membranes, Good dentition, No lesions  NECK: Supple, No JVD, Normal thyroid  NERVOUS SYSTEM:  Alert & Oriented X3, Good concentration; Motor Strength 5/5 B/L upper and lower extremities; DTRs 2+ intact and symmetric  CHEST/LUNG: Clear to percussion bilaterally; No rales, rhonchi, wheezing, or rubs  HEART: Regular rate and rhythm; No murmurs, rubs, or gallops  ABDOMEN: Soft, Nontender, Nondistended; Bowel sounds present  EXTREMITIES:  2+ Peripheral Pulses, No clubbing, cyanosis, or edema  LYMPH: No lymphadenopathy noted  SKIN: No rashes or lesions    LABS:  CBC Full  -  ( 16 Dec 2019 16:53 )  WBC Count : 25.51 K/uL  RBC Count : 2.47 M/uL  Hemoglobin : 7.3 g/dL  Hematocrit : 19.6 %  Platelet Count - Automated : 142 K/uL  Mean Cell Volume : 79.4 fl  Mean Cell Hemoglobin : 29.6 pg  Mean Cell Hemoglobin Concentration : 37.2 gm/dL  Auto Neutrophil # : 20.34 K/uL  Auto Lymphocyte # : 2.46 K/uL  Auto Monocyte # : 2.40 K/uL  Auto Eosinophil # : 0.00 K/uL  Auto Basophil # : 0.05 K/uL  Auto Neutrophil % : 79.8 %  Auto Lymphocyte % : 9.6 %  Auto Monocyte % : 9.4 %  Auto Eosinophil % : 0.0 %  Auto Basophil % : 0.2 %    12-16    135  |  106  |  15  ----------------------------<  130<H>  3.9   |  21<L>  |  0.60    Ca    7.5<L>      16 Dec 2019 16:53  Phos  2.4     12-16  Mg     1.8     12-16    TPro  6.3  /  Alb  2.5<L>  /  TBili  5.0<H>  /  DBili  x   /  AST  68<H>  /  ALT  16  /  AlkPhos  54  12-16    CAPILLARY BLOOD GLUCOSE      POCT Blood Glucose.: 149 mg/dL (16 Dec 2019 14:58)    PT/INR - ( 14 Dec 2019 20:34 )   PT: 15.5 sec;   INR: 1.37 ratio         PTT - ( 14 Dec 2019 20:34 )  PTT:31.8 sec      EKG:    ECHO, US:    RADIOLOGY:    CRITICAL CARE TIME SPENT: Patient is a 19y old  Male who presents with a chief complaint of cp (16 Dec 2019 18:28)      HPI:  Pt is a 18 y/o male w/sickle cell no pror acute chest and cant recall last crisis, had recently traveled to [place] returned 5 days ago, comes w/complain of chest and bone pain. pt doesn't report any sob, states bones hurt, did have decreased po intake for few days with travels.  pt denies any fever, chills, sob, palpitations, n/v/d/c/. in ed had CTA that was non contributory (15 Dec 2019 03:43)    Interval events:   As per patient he lives in [place] and is visiting the [place] for the holidays.  Noted to be only on folic acid and B6 complex and antimalaria medications at home.  Noted to have no history of blood transfusions, no history of acute chest.  Reports last time hospitalized was 2 years ago with shortness of breath and diffuse pain.  Pt now with worsening infiltrate and s/p RRT this afternoon, now requiring hi flow O2 with increasing pain in the chest. Patient brought to ICU for exchange transfusion for acute chest syndrome.      Allergies    No Known Allergies    Intolerances  MEDICATIONS  NEURO  Meds: acetaminophen   Tablet .. 650 milliGRAM(s) Oral every 6 hours PRN Temp greater or equal to 38C (100.4F), Mild Pain (1 - 3)  morphine  - Injectable 2 milliGRAM(s) IV Push every 2 hours PRN Moderate Pain (4 - 6)    RESPIRATORY  ABG - ( 16 Dec 2019 16:53 )  pH: x     /  pCO2: x     /  pO2: x     / HCO3: x     / Base Excess: x     /  SaO2: x       Lactate: 1.0    Meds: guaiFENesin   Syrup  (Sugar-Free) 100 milliGRAM(s) Oral every 6 hours PRN Cough    CARDIOVASCULAR  Meds:   GI/NUTRITION  Meds: pantoprazole  Injectable 40 milliGRAM(s) IV Push daily    GENITOURINARY  Meds: folic acid 1 milliGRAM(s) Oral daily  sodium chloride 0.9%. 1000 milliLiter(s) IV Continuous <Continuous>    HEMATOLOGIC  Meds: enoxaparin Injectable 40 milliGRAM(s) SubCutaneous daily    [x] VTE Prophylaxis  INFECTIOUS DISEASES  Meds: azithromycin  IVPB      influenza   Vaccine 0.5 milliLiter(s) IntraMuscular once  piperacillin/tazobactam IVPB.. 3.375 Gram(s) IV Intermittent every 8 hours  vancomycin  IVPB      vancomycin  IVPB 1000 milliGRAM(s) IV Intermittent every 8 hours    ENDOCRINE  CAPILLARY BLOOD GLUCOSE    POCT Blood Glucose.: 149 mg/dL (16 Dec 2019 14:58)  POCT Blood Glucose.: 119 mg/dL (16 Dec 2019 06:02)    Meds:   OTHER MEDICATIONS:  chlorhexidine 4% Liquid 1 Application(s) Topical <User Schedule>  :    Drug Dosing Weight    Weight (kg): 58.4 (15 Dec 2019 03:29)    PAST MEDICAL & SURGICAL HISTORY:  Sickle-cell disease  No significant past surgical history      FAMILY HISTORY:  No pertinent family history in first degree relatives      SOCIAL HISTORY:    ADVANCE DIRECTIVES:    REVIEW OF SYSTEMS:    CONSTITUTIONAL: + fever, No weight loss, or fatigue  EYES: No eye pain, visual disturbances, or discharge  ENMT:  No difficulty hearing, tinnitus, vertigo; No sinus or throat pain  NECK: No pain or stiffness  BREASTS: No pain, masses, or nipple discharge  RESPIRATORY: + Shortness of breath, cough, wheezing, chills or hemoptysis;   CARDIOVASCULAR: + chest pain, No palpitations, dizziness, or leg swelling  GASTROINTESTINAL: No abdominal or epigastric pain. No nausea, vomiting, or hematemesis; No diarrhea or constipation. No melena or hematochezia.  NEUROLOGICAL: No headaches, memory loss, loss of strength, numbness, or tremors  SKIN: No itching, burning, rashes, or lesions   ENDOCRINE: No heat or cold intolerance; No hair loss  MUSCULOSKELETAL: No joint pain or swelling; No muscle, back, or extremity pain  PSYCHIATRIC: No depression, anxiety, mood swings, or difficulty sleeping  HEME/LYMPH: No easy bruising, or bleeding gums  ALLERGY AND IMMUNOLOGIC: No hives or eczema      ICU Vital Signs Last 24 Hrs  T(C): 37.7 (16 Dec 2019 19:05), Max: 40 (16 Dec 2019 06:20)  T(F): 99.9 (16 Dec 2019 19:05), Max: 104 (16 Dec 2019 06:20)  HR: 112 (16 Dec 2019 19:13) (94 - 172)  BP: 132/81 (16 Dec 2019 19:00) (112/66 - 143/79)  BP(mean): 91 (16 Dec 2019 19:00) (78 - 96)  ABP: --  ABP(mean): --  RR: 24 (16 Dec 2019 19:13) (18 - 41)  SpO2: 94% (16 Dec 2019 19:13) (81% - 100%)      ABG - ( 16 Dec 2019 15:33 )  pH, Arterial: x     pH, Blood: 7.37  /  pCO2: 38    /  pO2: 48    / HCO3: 21    / Base Excess: x     /  SaO2: 65        I&O's Detail    15 Dec 2019 07:01  -  16 Dec 2019 07:00  --------------------------------------------------------  IN:  Total IN: 0 mL    OUT:    Voided: 800 mL  Total OUT: 800 mL    Total NET: -800 mL      16 Dec 2019 07:01  -  16 Dec 2019 19:19  --------------------------------------------------------  IN:    IV PiggyBack: 550 mL    Lactated Ringers IV Bolus: 1000 mL    Oral Fluid: 720 mL    sodium chloride 0.9%.: 1000 mL  Total IN: 3270 mL    OUT:    Voided: 1600 mL  Total OUT: 1600 mL    Total NET: 1670 mL    PHYSICAL EXAM:    GENERAL: NAD, well-groomed, well-developed  EYES: EOMI, PERRLA, conjunctiva and sclera icteric  NECK: Supple, No JVD, Normal thyroid  CHEST/LUNG: Decreased breath sounds at bilateral bases, rales at bases.  Remains on Hi Flow 50L /50%  HEART: Regular rate and rhythm; No murmurs, rubs, or gallops  ABDOMEN: Soft, Nontender, Nondistended; Bowel sounds present  EXTREMITIES:  2+ Peripheral Pulses, No clubbing, cyanosis, or edema  LYMPH: No lymphadenopathy noted  SKIN: No rashes or lesions  NERVOUS SYSTEM:  Alert & Oriented X3, Good concentration; Motor Strength 5/5 B/L upper and lower extremities; DTRs 2+ intact and symmetric    LABS:  CBC Full  -  ( 16 Dec 2019 16:53 )  WBC Count : 25.51 K/uL  RBC Count : 2.47 M/uL  Hemoglobin : 7.3 g/dL  Hematocrit : 19.6 %  Platelet Count - Automated : 142 K/uL  Mean Cell Volume : 79.4 fl  Mean Cell Hemoglobin : 29.6 pg  Mean Cell Hemoglobin Concentration : 37.2 gm/dL  Auto Neutrophil # : 20.34 K/uL  Auto Lymphocyte # : 2.46 K/uL  Auto Monocyte # : 2.40 K/uL  Auto Eosinophil # : 0.00 K/uL  Auto Basophil # : 0.05 K/uL  Auto Neutrophil % : 79.8 %  Auto Lymphocyte % : 9.6 %  Auto Monocyte % : 9.4 %  Auto Eosinophil % : 0.0 %  Auto Basophil % : 0.2 %        135  |  106  |  15  ----------------------------<  130<H>  3.9   |  21<L>  |  0.60    Ca    7.5<L>      16 Dec 2019 16:53  Phos  2.4     12  Mg     1.8         TPro  6.3  /  Alb  2.5<L>  /  TBili  5.0<H>  /  DBili  x   /  AST  68<H>  /  ALT  16  /  AlkPhos  54      CAPILLARY BLOOD GLUCOSE      POCT Blood Glucose.: 149 mg/dL (16 Dec 2019 14:58)    PT/INR - ( 14 Dec 2019 20:34 )   PT: 15.5 sec;   INR: 1.37 ratio   PTT - ( 14 Dec 2019 20:34 )  PTT:31.8 sec  D-Dimer Assay, Quantitative (19 @ 20:34)    D-Dimer Assay, Quantitative: >00599: TYPE:(C=Critical, N=Notification, A=Abnormal) N    EK NSR incomplete bundle branch    ECHO, US:  bilateral lower dense infiltrates  LV grossly within normal limits; LV >RV size ~2:1;     RADIOLOGY:  < from: Xray Chest 1 View-PORTABLE IMMEDIATE (19 @ 19:37) >    EXAM:  XR CHEST PORTABLE IMMED 1V                          PROCEDURE DATE:  2019    INTERPRETATION:  Single AP view of the chest    Comparison:2019    Clinical Indication:s/p HD catheter    FINDINGS/  IMPRESSION:There is a right-sided hemodialysis catheter with the tip in   the SVC. There are increased moderate bilateral pleural effusions as well   as bilateral patchy airspace opacities, right greater than left. Heart   size cannot be accurately evaluated in this projection.    SUBHA BENOIT M.D., ATTENDING RADIOLOGIST  This document has been electronically signed. Dec 16 2019  7:51PM    < end of copied text >      CRITICAL CARE TIME SPENT: 70 Minutes

## 2019-12-16 NOTE — CONSULT NOTE ADULT - ASSESSMENT
Neuro:  CV:  Pulm:  GI:  Renal/Metabolic:  ID:  Heme:  Endo:  Skin:  Dispo: 19 year old male with sickle cell disease, on folic acid, and vitamin B1, from [place] for vacation arrived with increasing pain in chest and bones, noted to develop increasing cough, fever and worsening infiltrates on radiograph concerning for acute chest.  Noted to have rapid response for acute hypoxic respiratory failure now on hi flow O2.     Neuro: No acute issues, AAOx3.    CV: Troponemia - likely demand related given fever and increased work of breathing, will start Aspirin.  Trend trops, official echo in AM.    Pulm: Acute hypoxic respiratory failure 2/2 acute chest syndrome - awaiting exchange transfusion.  Pt requiring hi flow O2 50L and 50% at this time.  Titrate to SpO2 >92%.  Continue antibiotics currently.    GI: NPO  Renal/Metabolic: No acute issues.  K>4, Mg>2, Phos>3  ID: Multifocal pneumonia - Escalated to Vanc, Zosyn and Azithro, pending legionella, notable travel history, will require eval for malaria, dengue, HIV, which is currently pending.  Reviewed ID recs  Heme: Acute Chest Syndrome and Sickle Cell Crisis - continue to trend cbc and retic count, LDH elevated, with d-dimer severely elevated.  Pending exchange transfusion.  Discussed with [place] [facility] - currently awaiting for electrophoresis.  6units ordered for exchange transfusion.  Pain control with morphine  Endo: No acute issues  Dispo: Admit to ICU for acute chest syndrome requiring exchange transfusion and hi flow O2.    Attending Critical Care Time 60 Minutes

## 2019-12-16 NOTE — CHART NOTE - NSCHARTNOTEFT_GEN_A_CORE
18 y/o male w/sickle cell no prior acute chest and cant recall last crisis, had recently traveled to [place] returned 5 days ago, comes w/complain of chest and bone pain. pt doesn't report any sob, states bones hurt, did have decreased po intake for few days with travels. Pt denies any fever, chills, sob, palpitations, n/v/d/c/. in ed had CTA that was non contributory.    RRT called for high fever, tachy, desaturation.    Vaso- occlusive crisis:  - IVF  - Morphine Q 2 hrs  - Febrile  - No PE, no infiltrate in CT in ED, repeat CXR with B/L infiltrate   - Now hypoxic, place on 100 non re-breather.   - Continue Abx  - Follow Clx  - Tx to ICU for acute chest syndrome

## 2019-12-16 NOTE — PROGRESS NOTE ADULT - ASSESSMENT
18 y/o male w/sickle cell no prior acute chest and cant recall last crisis, had recently traveled to [place] returned 5 days ago, comes w/complain of chest and bone pain. pt doesn't report any sob, states bones hurt, did have decreased po intake for few days with travels. Pt denies any fever, chills, sob, palpitations, n/v/d/c/. in ed had CTA that was non contributory.      Vaso- occlusive crisis:  - IVF  - Morphine Q 2 hrs  - Febrile  - No PE, no infiltrate in CT in ED, repeat CXR with B/L infiltrate   - Now hypoxic, place on 100 non re-breather.   - Continue Abx  - Follow Clx  - Tx to ICU for acute chest syndrome

## 2019-12-16 NOTE — PROGRESS NOTE ADULT - SUBJECTIVE AND OBJECTIVE BOX
Patient is a 19y old  Male who presents with a chief complaint of cp (16 Dec 2019 16:36)      INTERVAL HPI/OVERNIGHT EVENTS:  Pt was seen and examined.  Pt in acute hypoxic resp failure, acute chest syndrome, tx to ICU.    MEDICATIONS  (STANDING):  azithromycin  IVPB      chlorhexidine 4% Liquid 1 Application(s) Topical <User Schedule>  enoxaparin Injectable 40 milliGRAM(s) SubCutaneous daily  folic acid 1 milliGRAM(s) Oral daily  influenza   Vaccine 0.5 milliLiter(s) IntraMuscular once  pantoprazole  Injectable 40 milliGRAM(s) IV Push daily  piperacillin/tazobactam IVPB.. 3.375 Gram(s) IV Intermittent every 8 hours  potassium phosphate IVPB 15 milliMole(s) IV Intermittent once  sodium chloride 0.9%. 1000 milliLiter(s) (150 mL/Hr) IV Continuous <Continuous>  vancomycin  IVPB      vancomycin  IVPB 1000 milliGRAM(s) IV Intermittent every 8 hours    MEDICATIONS  (PRN):  acetaminophen   Tablet .. 650 milliGRAM(s) Oral every 6 hours PRN Temp greater or equal to 38C (100.4F), Mild Pain (1 - 3)  guaiFENesin   Syrup  (Sugar-Free) 100 milliGRAM(s) Oral every 6 hours PRN Cough  morphine  - Injectable 2 milliGRAM(s) IV Push every 2 hours PRN Moderate Pain (4 - 6)      Allergies  No Known Allergies        Vital Signs Last 24 Hrs  T(C): 39.8 (16 Dec 2019 16:42), Max: 40 (16 Dec 2019 06:20)  T(F): 103.7 (16 Dec 2019 16:42), Max: 104 (16 Dec 2019 06:20)  HR: 125 (16 Dec 2019 18:00) (94 - 172)  BP: 142/79 (16 Dec 2019 18:00) (112/66 - 143/79)  BP(mean): 90 (16 Dec 2019 18:00) (78 - 96)  RR: 31 (16 Dec 2019 18:00) (18 - 41)  SpO2: 97% (16 Dec 2019 18:00) (81% - 100%)      PHYSICAL EXAM:  GENERAL: resp ditress  HEAD:  atraumatic  EYES: PERRLA  NERVOUS SYSTEM:  A, O x 3, non focal  CHEST/LUNG: + wheezing   HEART: tachycardia, regular  ABDOMEN: soft, non tender  EXTREMITIES:  no edema      LABS:                        7.3    25.51 )-----------( 142      ( 16 Dec 2019 16:53 )             19.6     12-16    135  |  106  |  15  ----------------------------<  130<H>  3.9   |  21<L>  |  0.60    Ca    7.5<L>      16 Dec 2019 16:53  Phos  2.4     12-16  Mg     1.8     12-16    TPro  6.3  /  Alb  2.5<L>  /  TBili  5.0<H>  /  DBili  x   /  AST  68<H>  /  ALT  16  /  AlkPhos  54  12-16    PT/INR - ( 14 Dec 2019 20:34 )   PT: 15.5 sec;   INR: 1.37 ratio         PTT - ( 14 Dec 2019 20:34 )  PTT:31.8 sec    CAPILLARY BLOOD GLUCOSE      POCT Blood Glucose.: 149 mg/dL (16 Dec 2019 14:58)  POCT Blood Glucose.: 119 mg/dL (16 Dec 2019 06:02)      Culture - Blood (collected 15 Dec 2019 11:58)  Source: .Blood Blood  Preliminary Report (16 Dec 2019 12:00):    No growth to date.    Culture - Blood (collected 15 Dec 2019 11:58)  Source: .Blood Blood  Preliminary Report (16 Dec 2019 12:00):    No growth to date.      RADIOLOGY & ADDITIONAL TESTS:    Imaging Personally Reviewed:  [ ] YES  [ ] NO    Consultant(s) Notes Reviewed:  [ ] YES  [ ] NO    Care Discussed with Consultants/Other Providers [ ] YES  [ ] NO

## 2019-12-16 NOTE — PROCEDURE NOTE - SUPERVISORY STATEMENT
Gonoscar: I have seen and examined the patient face to face.  I was present during the above procedure and  have reviewed and addended the procedure note.

## 2019-12-16 NOTE — CONSULT NOTE ADULT - SUBJECTIVE AND OBJECTIVE BOX
Infectious Diseases - Attending at Dr. Carey    HPI:  Pt is a 20 y/o male w/sickle cell no prior acute chest and cant recall last crisis, had recently traveled to [place] returned 5 days ago, comes w/complain of chest and bone pain. pt doesn't report any sob, states bones hurt, did have decreased po intake for few days with travels.  pt denies any fever, chills, sob, palpitations, n/v/d/c/. in ed had CTA that was non contributory (15 Dec 2019 03:43)  pt having high fever since yesterday and multiple RRTs called for fever with chills. Now coughing up yellow sputum c/o substernal chest pain .CXR shows worsening infiltrates p[t travelled from [place] last week ,no known sick contacts      PAST MEDICAL & SURGICAL HISTORY:  Sickle-cell disease  No significant past surgical history      Allergies    No Known Allergies    Intolerances        FAMILY HISTORY:  No h/o sickle cell in mother,father      SOCIAL HISTORY: no smoker    REVIEW OF SYSTEMS:    Constitutional: +fever/chills, fatigue  Eyes: No eye pain, visual disturbances, or discharge  ENT:  No difficulty hearing, tinnitus, vertigo; No sinus or throat pain  Neck: No pain or stiffness  Respiratory:+sputum/ cough,No  wheezing, hemoptysis  Cardiovascular: +chest pain, palpitations, shortness of breath, no dizziness or leg swelling  Gastrointestinal: No abdominal or epigastric pain. No nausea, vomiting or hematemesis; No diarrhea or constipation. No melena or hematochezia.  Genitourinary: No dysuria, frequency, hematuria or incontinence  Neurological: No headaches, memory loss, loss of strength, numbness or tremors  Skin: No itching, burning, rashes or lesions       MEDICATIONS  (STANDING):  albuterol/ipratropium for Nebulization. 3 milliLiter(s) Nebulizer once  chlorhexidine 4% Liquid 1 Application(s) Topical <User Schedule>  enoxaparin Injectable 40 milliGRAM(s) SubCutaneous daily  folic acid 1 milliGRAM(s) Oral daily  influenza   Vaccine 0.5 milliLiter(s) IntraMuscular once  ipratropium  for Nebulization. 500 MICROGram(s) Nebulizer once  lactated ringers. 1000 milliLiter(s) (999 mL/Hr) IV Continuous <Continuous>  pantoprazole  Injectable 40 milliGRAM(s) IV Push daily  piperacillin/tazobactam IVPB.. 3.375 Gram(s) IV Intermittent every 8 hours  sodium chloride 0.9%. 1000 milliLiter(s) (150 mL/Hr) IV Continuous <Continuous>  vancomycin  IVPB      vancomycin  IVPB 1000 milliGRAM(s) IV Intermittent every 8 hours    MEDICATIONS  (PRN):  acetaminophen   Tablet .. 650 milliGRAM(s) Oral every 6 hours PRN Temp greater or equal to 38C (100.4F), Mild Pain (1 - 3)  guaiFENesin   Syrup  (Sugar-Free) 100 milliGRAM(s) Oral every 6 hours PRN Cough  morphine  - Injectable 2 milliGRAM(s) IV Push every 2 hours PRN Moderate Pain (4 - 6)      Vital Signs Last 24 Hrs  T(C): 39.3 (16 Dec 2019 15:01), Max: 40.2 (15 Dec 2019 17:54)  T(F): 102.7 (16 Dec 2019 15:01), Max: 104.3 (15 Dec 2019 17:54)  HR: 137 (16 Dec 2019 15:17) (94 - 172)  BP: 128/67 (16 Dec 2019 15:17) (123/51 - 143/79)  BP(mean): --  RR: 23 (16 Dec 2019 15:17) (18 - 41)  SpO2: 100% (16 Dec 2019 15:17) (81% - 100%)    PHYSICAL EXAM:    Constitutional: NAD, well-groomed, well-developed  HEENT: PERRLA, EOMI, Normal Hearing, MMM  Neck: No LAD, No JVD  Back: Normal spine flexure, No CVA tenderness  Respiratory: CTAB/L  Cardiovascular: S1 and S2, RRR, no M/G/R  Gastrointestinal: BS+, soft, NT/ND  Extremities: No peripheral edema  Vascular: 2+ peripheral pulses  Neurological: A/O x 3, no focal deficits  Skin: No rashes      LABS:                        7.9    20.78 )-----------( 167      ( 16 Dec 2019 06:29 )             21.4     12-16    130<L>  |  98  |  10  ----------------------------<  116<H>  4.2   |  23  |  0.62    Ca    8.5      16 Dec 2019 06:29    TPro  7.9  /  Alb  3.3  /  TBili  5.1<H>  /  DBili  x   /  AST  100<H>  /  ALT  18  /  AlkPhos  67  12-16    PT/INR - ( 14 Dec 2019 20:34 )   PT: 15.5 sec;   INR: 1.37 ratio         PTT - ( 14 Dec 2019 20:34 )  PTT:31.8 sec      MICROBIOLOGY:  RECENT CULTURES:  12-15 .Blood Blood XXXX XXXX   No growth to date.          RADIOLOGY & ADDITIONAL STUDIES:  FINDINGS:   Pulmonary arteries: Normal. No pulmonary emboli.   Aorta: Unremarkable. No aortic aneurysm. No aortic dissection.   Lungs: Unremarkable. No consolidation. No masses.   Pleural space: Unremarkable. No pneumothorax. No pleural effusion.   Heart: Unremarkable. No cardiomegaly. No pericardial effusion.   Lymph nodes: Unremarkable. No enlarged lymph nodes.   Bones/joints: Unremarkable. No acute fracture.   Soft tissues: Unremarkable.     IMPRESSION:     No acute findings.    CXR  IMPRESSION: Developing moderate bilateral mid to lower lung zone airspace   disease and/or effusion.

## 2019-12-16 NOTE — CONSULT NOTE ADULT - ASSESSMENT
20 y/o male w/sickle cell no prior acute chest and cant recall last crisis, had recently traveled to [place] returned 5 days ago, comes w/complain of chest and bone pain. Pt seen today with   1.HIgh fever /sepsis : developing multilobar pneumonia throwing up and yellow sputum   CXR shows worsening B/.L lung infiltrates  also has acute chest syndrome due to sickle which is worsening   Ok to start vanco and zoysn   add legionella coverage with azithro   send sputum cs   legionella antigen  agree with transfer to ICU for acute chest   IV fluids  follow up on blood c/s   malaria HIV and dengue w/u as recently travelled from [place]    2.Sickle cell diseases with crisis and acute chest syndrome   management per ICU

## 2019-12-17 DIAGNOSIS — D57.1 SICKLE-CELL DISEASE WITHOUT CRISIS: ICD-10-CM

## 2019-12-17 LAB
ALBUMIN SERPL ELPH-MCNC: 1.9 G/DL — LOW (ref 3.3–5)
ALBUMIN SERPL ELPH-MCNC: 1.9 G/DL — LOW (ref 3.3–5)
ALBUMIN SERPL ELPH-MCNC: 2.1 G/DL — LOW (ref 3.3–5)
ALP SERPL-CCNC: 47 U/L — SIGNIFICANT CHANGE UP (ref 40–120)
ALP SERPL-CCNC: 56 U/L — SIGNIFICANT CHANGE UP (ref 40–120)
ALP SERPL-CCNC: 59 U/L — SIGNIFICANT CHANGE UP (ref 40–120)
ALT FLD-CCNC: 13 U/L — SIGNIFICANT CHANGE UP (ref 12–78)
ALT FLD-CCNC: 15 U/L — SIGNIFICANT CHANGE UP (ref 12–78)
ALT FLD-CCNC: 15 U/L — SIGNIFICANT CHANGE UP (ref 12–78)
ANION GAP SERPL CALC-SCNC: 3 MMOL/L — LOW (ref 5–17)
ANION GAP SERPL CALC-SCNC: 5 MMOL/L — SIGNIFICANT CHANGE UP (ref 5–17)
ANION GAP SERPL CALC-SCNC: 5 MMOL/L — SIGNIFICANT CHANGE UP (ref 5–17)
ANISOCYTOSIS BLD QL: SLIGHT — SIGNIFICANT CHANGE UP
APTT BLD: 30 SEC — SIGNIFICANT CHANGE UP (ref 27.5–36.3)
AST SERPL-CCNC: 53 U/L — HIGH (ref 15–37)
AST SERPL-CCNC: 61 U/L — HIGH (ref 15–37)
AST SERPL-CCNC: 69 U/L — HIGH (ref 15–37)
BASE EXCESS BLDA CALC-SCNC: -4 MMOL/L — LOW (ref -2–2)
BASE EXCESS BLDA CALC-SCNC: -4.1 MMOL/L — LOW (ref -2–2)
BASE EXCESS BLDA CALC-SCNC: -4.1 MMOL/L — LOW (ref -2–2)
BASE EXCESS BLDA CALC-SCNC: -5 MMOL/L — LOW (ref -2–2)
BASE EXCESS BLDA CALC-SCNC: -8.4 MMOL/L — LOW (ref -2–2)
BASOPHILS # BLD AUTO: 0 K/UL — SIGNIFICANT CHANGE UP (ref 0–0.2)
BASOPHILS NFR BLD AUTO: 0 % — SIGNIFICANT CHANGE UP (ref 0–2)
BILIRUB SERPL-MCNC: 4 MG/DL — HIGH (ref 0.2–1.2)
BILIRUB SERPL-MCNC: 4.4 MG/DL — HIGH (ref 0.2–1.2)
BILIRUB SERPL-MCNC: 4.5 MG/DL — HIGH (ref 0.2–1.2)
BLOOD GAS COMMENTS: SIGNIFICANT CHANGE UP
BLOOD GAS SOURCE: SIGNIFICANT CHANGE UP
BUN SERPL-MCNC: 15 MG/DL — SIGNIFICANT CHANGE UP (ref 7–23)
BUN SERPL-MCNC: 22 MG/DL — SIGNIFICANT CHANGE UP (ref 7–23)
BUN SERPL-MCNC: 26 MG/DL — HIGH (ref 7–23)
CALCIUM SERPL-MCNC: 7.3 MG/DL — LOW (ref 8.5–10.1)
CALCIUM SERPL-MCNC: 7.5 MG/DL — LOW (ref 8.5–10.1)
CALCIUM SERPL-MCNC: 7.7 MG/DL — LOW (ref 8.5–10.1)
CHLORIDE SERPL-SCNC: 106 MMOL/L — SIGNIFICANT CHANGE UP (ref 96–108)
CHLORIDE SERPL-SCNC: 107 MMOL/L — SIGNIFICANT CHANGE UP (ref 96–108)
CHLORIDE SERPL-SCNC: 107 MMOL/L — SIGNIFICANT CHANGE UP (ref 96–108)
CK MB BLD-MCNC: 1.2 % — SIGNIFICANT CHANGE UP (ref 0–3.5)
CK MB BLD-MCNC: <1.1 % — SIGNIFICANT CHANGE UP (ref 0–3.5)
CK MB CFR SERPL CALC: 1.1 NG/ML — SIGNIFICANT CHANGE UP (ref 0.5–3.6)
CK MB CFR SERPL CALC: <1 NG/ML — SIGNIFICANT CHANGE UP (ref 0.5–3.6)
CK SERPL-CCNC: 87 U/L — SIGNIFICANT CHANGE UP (ref 26–308)
CK SERPL-CCNC: 91 U/L — SIGNIFICANT CHANGE UP (ref 26–308)
CO2 SERPL-SCNC: 22 MMOL/L — SIGNIFICANT CHANGE UP (ref 22–31)
CO2 SERPL-SCNC: 24 MMOL/L — SIGNIFICANT CHANGE UP (ref 22–31)
CO2 SERPL-SCNC: 25 MMOL/L — SIGNIFICANT CHANGE UP (ref 22–31)
CREAT SERPL-MCNC: 0.89 MG/DL — SIGNIFICANT CHANGE UP (ref 0.5–1.3)
CREAT SERPL-MCNC: 1.64 MG/DL — HIGH (ref 0.5–1.3)
CREAT SERPL-MCNC: 1.65 MG/DL — HIGH (ref 0.5–1.3)
D DIMER BLD IA.RAPID-MCNC: 3049 NG/ML DDU — HIGH
ELLIPTOCYTES BLD QL SMEAR: SLIGHT — SIGNIFICANT CHANGE UP
EOSINOPHIL # BLD AUTO: 0 K/UL — SIGNIFICANT CHANGE UP (ref 0–0.5)
EOSINOPHIL NFR BLD AUTO: 0 % — SIGNIFICANT CHANGE UP (ref 0–6)
GLUCOSE SERPL-MCNC: 101 MG/DL — HIGH (ref 70–99)
GLUCOSE SERPL-MCNC: 105 MG/DL — HIGH (ref 70–99)
GLUCOSE SERPL-MCNC: 128 MG/DL — HIGH (ref 70–99)
GRAM STN FLD: SIGNIFICANT CHANGE UP
HCO3 BLDA-SCNC: 19 MMOL/L — LOW (ref 21–29)
HCO3 BLDA-SCNC: 20 MMOL/L — LOW (ref 21–29)
HCO3 BLDA-SCNC: 21 MMOL/L — SIGNIFICANT CHANGE UP (ref 21–29)
HCO3 BLDA-SCNC: 22 MMOL/L — SIGNIFICANT CHANGE UP (ref 21–29)
HCO3 BLDA-SCNC: 23 MMOL/L — SIGNIFICANT CHANGE UP (ref 21–29)
HCT VFR BLD CALC: 23.2 % — LOW (ref 39–50)
HCT VFR BLD CALC: 25.3 % — LOW (ref 39–50)
HCT VFR BLD CALC: 25.5 % — LOW (ref 39–50)
HGB A MFR BLD: 0 % — LOW (ref 95.8–98)
HGB A2 MFR BLD: 3.6 % — HIGH (ref 2–3.2)
HGB BLD-MCNC: 8.2 G/DL — LOW (ref 13–17)
HGB BLD-MCNC: 8.5 G/DL — LOW (ref 13–17)
HGB BLD-MCNC: 8.7 G/DL — LOW (ref 13–17)
HGB F MFR BLD: 2.2 % — HIGH (ref 0–1)
HGB S BLD QL: POSITIVE
HGB S MFR BLD: 94.2 % — HIGH
HIV 1+2 AB+HIV1 P24 AG SERPL QL IA: SIGNIFICANT CHANGE UP
HOROWITZ INDEX BLDA+IHG-RTO: 0.1 — SIGNIFICANT CHANGE UP
HOROWITZ INDEX BLDA+IHG-RTO: 100 — SIGNIFICANT CHANGE UP
HOROWITZ INDEX BLDA+IHG-RTO: 60 — SIGNIFICANT CHANGE UP
HYPOCHROMIA BLD QL: SLIGHT — SIGNIFICANT CHANGE UP
INR BLD: 1.26 RATIO — HIGH (ref 0.88–1.16)
INR BLD: 1.3 RATIO — HIGH (ref 0.88–1.16)
LACTATE SERPL-SCNC: 1.6 MMOL/L — SIGNIFICANT CHANGE UP (ref 0.7–2)
LEGIONELLA AG UR QL: NEGATIVE — SIGNIFICANT CHANGE UP
LYMPHOCYTES # BLD AUTO: 28 % — SIGNIFICANT CHANGE UP (ref 13–44)
LYMPHOCYTES # BLD AUTO: 5.42 K/UL — HIGH (ref 1–3.3)
MAGNESIUM SERPL-MCNC: 2.4 MG/DL — SIGNIFICANT CHANGE UP (ref 1.6–2.6)
MAGNESIUM SERPL-MCNC: 2.5 MG/DL — SIGNIFICANT CHANGE UP (ref 1.6–2.6)
MANUAL SMEAR VERIFICATION: SIGNIFICANT CHANGE UP
MCHC RBC-ENTMCNC: 27.5 PG — SIGNIFICANT CHANGE UP (ref 27–34)
MCHC RBC-ENTMCNC: 27.8 PG — SIGNIFICANT CHANGE UP (ref 27–34)
MCHC RBC-ENTMCNC: 28 PG — SIGNIFICANT CHANGE UP (ref 27–34)
MCHC RBC-ENTMCNC: 33.6 GM/DL — SIGNIFICANT CHANGE UP (ref 32–36)
MCHC RBC-ENTMCNC: 34.1 GM/DL — SIGNIFICANT CHANGE UP (ref 32–36)
MCHC RBC-ENTMCNC: 35.3 GM/DL — SIGNIFICANT CHANGE UP (ref 32–36)
MCV RBC AUTO: 78.6 FL — LOW (ref 80–100)
MCV RBC AUTO: 81.9 FL — SIGNIFICANT CHANGE UP (ref 80–100)
MCV RBC AUTO: 82 FL — SIGNIFICANT CHANGE UP (ref 80–100)
MONOCYTES # BLD AUTO: 0.58 K/UL — SIGNIFICANT CHANGE UP (ref 0–0.9)
MONOCYTES NFR BLD AUTO: 3 % — SIGNIFICANT CHANGE UP (ref 2–14)
MRSA PCR RESULT.: DETECTED
NEUTROPHILS # BLD AUTO: 13.36 K/UL — HIGH (ref 1.8–7.4)
NEUTROPHILS NFR BLD AUTO: 69 % — SIGNIFICANT CHANGE UP (ref 43–77)
NRBC # BLD: 0 /100 WBCS — SIGNIFICANT CHANGE UP (ref 0–0)
NRBC # BLD: 0 /100 WBCS — SIGNIFICANT CHANGE UP (ref 0–0)
NRBC # BLD: 0 /100 — SIGNIFICANT CHANGE UP (ref 0–0)
NRBC # BLD: SIGNIFICANT CHANGE UP /100 WBCS (ref 0–0)
PCO2 BLDA: 40 MMHG — SIGNIFICANT CHANGE UP (ref 32–46)
PCO2 BLDA: 44 MMHG — SIGNIFICANT CHANGE UP (ref 32–46)
PCO2 BLDA: 49 MMHG — HIGH (ref 32–46)
PCO2 BLDA: 51 MMHG — HIGH (ref 32–46)
PCO2 BLDA: 54 MMHG — HIGH (ref 32–46)
PH BLD: 7.19 — CRITICAL LOW (ref 7.35–7.45)
PH BLD: 7.25 — LOW (ref 7.35–7.45)
PH BLD: 7.28 — LOW (ref 7.35–7.45)
PH BLD: 7.31 — LOW (ref 7.35–7.45)
PH BLD: 7.32 — LOW (ref 7.35–7.45)
PHOSPHATE SERPL-MCNC: 2.9 MG/DL — SIGNIFICANT CHANGE UP (ref 2.5–4.5)
PHOSPHATE SERPL-MCNC: 4.3 MG/DL — SIGNIFICANT CHANGE UP (ref 2.5–4.5)
PLAT MORPH BLD: NORMAL — SIGNIFICANT CHANGE UP
PLATELET # BLD AUTO: 124 K/UL — LOW (ref 150–400)
PLATELET # BLD AUTO: 139 K/UL — LOW (ref 150–400)
PLATELET # BLD AUTO: 77 K/UL — LOW (ref 150–400)
PO2 BLDA: 110 MMHG — HIGH (ref 74–108)
PO2 BLDA: 60 MMHG — LOW (ref 74–108)
PO2 BLDA: 63 MMHG — LOW (ref 74–108)
PO2 BLDA: 67 MMHG — LOW (ref 74–108)
PO2 BLDA: 77 MMHG — SIGNIFICANT CHANGE UP (ref 74–108)
POIKILOCYTOSIS BLD QL AUTO: SLIGHT — SIGNIFICANT CHANGE UP
POLYCHROMASIA BLD QL SMEAR: SLIGHT — SIGNIFICANT CHANGE UP
POTASSIUM SERPL-MCNC: 5.3 MMOL/L — SIGNIFICANT CHANGE UP (ref 3.5–5.3)
POTASSIUM SERPL-MCNC: 5.6 MMOL/L — HIGH (ref 3.5–5.3)
POTASSIUM SERPL-MCNC: 5.6 MMOL/L — HIGH (ref 3.5–5.3)
POTASSIUM SERPL-SCNC: 5.3 MMOL/L — SIGNIFICANT CHANGE UP (ref 3.5–5.3)
POTASSIUM SERPL-SCNC: 5.6 MMOL/L — HIGH (ref 3.5–5.3)
POTASSIUM SERPL-SCNC: 5.6 MMOL/L — HIGH (ref 3.5–5.3)
PROCALCITONIN SERPL-MCNC: 4.69 NG/ML — HIGH (ref 0.02–0.1)
PROT SERPL-MCNC: 5.1 GM/DL — LOW (ref 6–8.3)
PROT SERPL-MCNC: 5.5 GM/DL — LOW (ref 6–8.3)
PROT SERPL-MCNC: 5.7 GM/DL — LOW (ref 6–8.3)
PROTHROM AB SERPL-ACNC: 14.2 SEC — HIGH (ref 10–12.9)
PROTHROM AB SERPL-ACNC: 14.7 SEC — HIGH (ref 10–12.9)
RBC # BLD: 2.95 M/UL — LOW (ref 4.2–5.8)
RBC # BLD: 2.95 M/UL — LOW (ref 4.2–5.8)
RBC # BLD: 3.09 M/UL — LOW (ref 4.2–5.8)
RBC # BLD: 3.11 M/UL — LOW (ref 4.2–5.8)
RBC # FLD: 17.5 % — HIGH (ref 10.3–14.5)
RBC # FLD: 18.5 % — HIGH (ref 10.3–14.5)
RBC # FLD: 18.8 % — HIGH (ref 10.3–14.5)
RBC BLD AUTO: ABNORMAL
RETICS #: 166.4 K/UL — HIGH (ref 25–125)
RETICS/RBC NFR: 5.6 % — HIGH (ref 0.5–2.5)
S AUREUS DNA NOSE QL NAA+PROBE: DETECTED
S PNEUM AG SER QL: SIGNIFICANT CHANGE UP
SAO2 % BLDA: 78 % — LOW (ref 92–96)
SAO2 % BLDA: 86 % — LOW (ref 92–96)
SAO2 % BLDA: 87 % — LOW (ref 92–96)
SAO2 % BLDA: 92 % — SIGNIFICANT CHANGE UP (ref 92–96)
SAO2 % BLDA: 97 % — HIGH (ref 92–96)
SCHISTOCYTES BLD QL AUTO: SLIGHT — SIGNIFICANT CHANGE UP
SICKLE CELLS BLD QL SMEAR: SLIGHT — SIGNIFICANT CHANGE UP
SMUDGE CELLS # BLD: PRESENT — SIGNIFICANT CHANGE UP
SODIUM SERPL-SCNC: 134 MMOL/L — LOW (ref 135–145)
SODIUM SERPL-SCNC: 134 MMOL/L — LOW (ref 135–145)
SODIUM SERPL-SCNC: 136 MMOL/L — SIGNIFICANT CHANGE UP (ref 135–145)
SOLUBILITY: POSITIVE
SPECIMEN SOURCE: SIGNIFICANT CHANGE UP
TROPONIN I SERPL-MCNC: 0.12 NG/ML — HIGH (ref 0.01–0.04)
TSH SERPL-MCNC: 4.62 UU/ML — HIGH (ref 0.36–3.74)
VANCOMYCIN TROUGH SERPL-MCNC: 7.7 UG/ML — LOW (ref 10–20)
WBC # BLD: 17.74 K/UL — HIGH (ref 3.8–10.5)
WBC # BLD: 19.36 K/UL — HIGH (ref 3.8–10.5)
WBC # BLD: 23.15 K/UL — HIGH (ref 3.8–10.5)
WBC # FLD AUTO: 17.74 K/UL — HIGH (ref 3.8–10.5)
WBC # FLD AUTO: 19.36 K/UL — HIGH (ref 3.8–10.5)
WBC # FLD AUTO: 23.15 K/UL — HIGH (ref 3.8–10.5)

## 2019-12-17 PROCEDURE — 99233 SBSQ HOSP IP/OBS HIGH 50: CPT

## 2019-12-17 PROCEDURE — 99292 CRITICAL CARE ADDL 30 MIN: CPT | Mod: 25

## 2019-12-17 PROCEDURE — 99291 CRITICAL CARE FIRST HOUR: CPT | Mod: 25

## 2019-12-17 PROCEDURE — 76937 US GUIDE VASCULAR ACCESS: CPT | Mod: 26

## 2019-12-17 PROCEDURE — 32556 INSERT CATH PLEURA W/O IMAGE: CPT

## 2019-12-17 PROCEDURE — 36556 INSERT NON-TUNNEL CV CATH: CPT

## 2019-12-17 PROCEDURE — 36620 INSERTION CATHETER ARTERY: CPT

## 2019-12-17 PROCEDURE — 83020 HEMOGLOBIN ELECTROPHORESIS: CPT | Mod: 26

## 2019-12-17 PROCEDURE — 71045 X-RAY EXAM CHEST 1 VIEW: CPT | Mod: 26

## 2019-12-17 PROCEDURE — 71045 X-RAY EXAM CHEST 1 VIEW: CPT | Mod: 26,77

## 2019-12-17 PROCEDURE — 31500 INSERT EMERGENCY AIRWAY: CPT

## 2019-12-17 RX ORDER — PROPOFOL 10 MG/ML
15 INJECTION, EMULSION INTRAVENOUS
Qty: 1000 | Refills: 0 | Status: DISCONTINUED | OUTPATIENT
Start: 2019-12-17 | End: 2019-12-19

## 2019-12-17 RX ORDER — SUCCINYLCHOLINE CHLORIDE 100 MG/5ML
100 SYRINGE (ML) INTRAVENOUS ONCE
Refills: 0 | Status: COMPLETED | OUTPATIENT
Start: 2019-12-17 | End: 2019-12-17

## 2019-12-17 RX ORDER — SODIUM CHLORIDE 9 MG/ML
500 INJECTION, SOLUTION INTRAVENOUS ONCE
Refills: 0 | Status: COMPLETED | OUTPATIENT
Start: 2019-12-17 | End: 2019-12-17

## 2019-12-17 RX ORDER — NOREPINEPHRINE BITARTRATE/D5W 8 MG/250ML
0.05 PLASTIC BAG, INJECTION (ML) INTRAVENOUS
Qty: 16 | Refills: 0 | Status: DISCONTINUED | OUTPATIENT
Start: 2019-12-17 | End: 2019-12-19

## 2019-12-17 RX ORDER — NOREPINEPHRINE BITARTRATE/D5W 8 MG/250ML
0.05 PLASTIC BAG, INJECTION (ML) INTRAVENOUS
Qty: 8 | Refills: 0 | Status: DISCONTINUED | OUTPATIENT
Start: 2019-12-17 | End: 2019-12-17

## 2019-12-17 RX ORDER — VANCOMYCIN HCL 1 G
1250 VIAL (EA) INTRAVENOUS EVERY 8 HOURS
Refills: 0 | Status: DISCONTINUED | OUTPATIENT
Start: 2019-12-17 | End: 2019-12-18

## 2019-12-17 RX ORDER — FENTANYL CITRATE 50 UG/ML
50 INJECTION INTRAVENOUS ONCE
Refills: 0 | Status: DISCONTINUED | OUTPATIENT
Start: 2019-12-17 | End: 2019-12-17

## 2019-12-17 RX ORDER — FENTANYL CITRATE 50 UG/ML
0.5 INJECTION INTRAVENOUS
Qty: 2500 | Refills: 0 | Status: DISCONTINUED | OUTPATIENT
Start: 2019-12-17 | End: 2019-12-19

## 2019-12-17 RX ORDER — ETOMIDATE 2 MG/ML
20 INJECTION INTRAVENOUS ONCE
Refills: 0 | Status: COMPLETED | OUTPATIENT
Start: 2019-12-17 | End: 2019-12-17

## 2019-12-17 RX ORDER — PROPOFOL 10 MG/ML
50 INJECTION, EMULSION INTRAVENOUS ONCE
Refills: 0 | Status: COMPLETED | OUTPATIENT
Start: 2019-12-17 | End: 2019-12-17

## 2019-12-17 RX ORDER — CHLORHEXIDINE GLUCONATE 213 G/1000ML
15 SOLUTION TOPICAL EVERY 12 HOURS
Refills: 0 | Status: DISCONTINUED | OUTPATIENT
Start: 2019-12-17 | End: 2019-12-19

## 2019-12-17 RX ORDER — SODIUM CHLORIDE 9 MG/ML
10 INJECTION INTRAMUSCULAR; INTRAVENOUS; SUBCUTANEOUS
Refills: 0 | Status: DISCONTINUED | OUTPATIENT
Start: 2019-12-17 | End: 2019-12-19

## 2019-12-17 RX ORDER — PROPOFOL 10 MG/ML
20 INJECTION, EMULSION INTRAVENOUS ONCE
Refills: 0 | Status: COMPLETED | OUTPATIENT
Start: 2019-12-17 | End: 2019-12-17

## 2019-12-17 RX ORDER — CEFEPIME 1 G/1
2000 INJECTION, POWDER, FOR SOLUTION INTRAMUSCULAR; INTRAVENOUS EVERY 8 HOURS
Refills: 0 | Status: DISCONTINUED | OUTPATIENT
Start: 2019-12-17 | End: 2019-12-19

## 2019-12-17 RX ADMIN — Medication 650 MILLIGRAM(S): at 09:15

## 2019-12-17 RX ADMIN — SODIUM CHLORIDE 1000 MILLILITER(S): 9 INJECTION, SOLUTION INTRAVENOUS at 02:15

## 2019-12-17 RX ADMIN — CHLORHEXIDINE GLUCONATE 15 MILLILITER(S): 213 SOLUTION TOPICAL at 05:39

## 2019-12-17 RX ADMIN — CEFEPIME 100 MILLIGRAM(S): 1 INJECTION, POWDER, FOR SOLUTION INTRAMUSCULAR; INTRAVENOUS at 16:05

## 2019-12-17 RX ADMIN — Medication 100 MILLIGRAM(S): at 00:48

## 2019-12-17 RX ADMIN — PROPOFOL 50 MILLIGRAM(S): 10 INJECTION, EMULSION INTRAVENOUS at 01:30

## 2019-12-17 RX ADMIN — FENTANYL CITRATE 50 MICROGRAM(S): 50 INJECTION INTRAVENOUS at 01:05

## 2019-12-17 RX ADMIN — Medication 5.47 MICROGRAM(S)/KG/MIN: at 02:42

## 2019-12-17 RX ADMIN — MORPHINE SULFATE 2 MILLIGRAM(S): 50 CAPSULE, EXTENDED RELEASE ORAL at 00:25

## 2019-12-17 RX ADMIN — ENOXAPARIN SODIUM 40 MILLIGRAM(S): 100 INJECTION SUBCUTANEOUS at 15:04

## 2019-12-17 RX ADMIN — PANTOPRAZOLE SODIUM 40 MILLIGRAM(S): 20 TABLET, DELAYED RELEASE ORAL at 15:04

## 2019-12-17 RX ADMIN — FENTANYL CITRATE 50 MICROGRAM(S): 50 INJECTION INTRAVENOUS at 00:50

## 2019-12-17 RX ADMIN — CEFEPIME 100 MILLIGRAM(S): 1 INJECTION, POWDER, FOR SOLUTION INTRAMUSCULAR; INTRAVENOUS at 23:34

## 2019-12-17 RX ADMIN — Medication 166.67 MILLIGRAM(S): at 23:35

## 2019-12-17 RX ADMIN — Medication 650 MILLIGRAM(S): at 00:30

## 2019-12-17 RX ADMIN — SODIUM CHLORIDE 1000 MILLILITER(S): 9 INJECTION, SOLUTION INTRAVENOUS at 01:30

## 2019-12-17 RX ADMIN — PROPOFOL 5.26 MICROGRAM(S)/KG/MIN: 10 INJECTION, EMULSION INTRAVENOUS at 23:35

## 2019-12-17 RX ADMIN — FENTANYL CITRATE 2.92 MICROGRAM(S)/KG/HR: 50 INJECTION INTRAVENOUS at 01:27

## 2019-12-17 RX ADMIN — ETOMIDATE 20 MILLIGRAM(S): 2 INJECTION INTRAVENOUS at 09:44

## 2019-12-17 RX ADMIN — AZITHROMYCIN 255 MILLIGRAM(S): 500 TABLET, FILM COATED ORAL at 18:14

## 2019-12-17 RX ADMIN — CHLORHEXIDINE GLUCONATE 1 APPLICATION(S): 213 SOLUTION TOPICAL at 08:50

## 2019-12-17 RX ADMIN — PIPERACILLIN AND TAZOBACTAM 25 GRAM(S): 4; .5 INJECTION, POWDER, LYOPHILIZED, FOR SOLUTION INTRAVENOUS at 05:39

## 2019-12-17 RX ADMIN — FENTANYL CITRATE 2.92 MICROGRAM(S)/KG/HR: 50 INJECTION INTRAVENOUS at 16:10

## 2019-12-17 RX ADMIN — PROPOFOL 5.26 MICROGRAM(S)/KG/MIN: 10 INJECTION, EMULSION INTRAVENOUS at 01:27

## 2019-12-17 RX ADMIN — Medication 2.74 MICROGRAM(S)/KG/MIN: at 23:34

## 2019-12-17 RX ADMIN — MORPHINE SULFATE 2 MILLIGRAM(S): 50 CAPSULE, EXTENDED RELEASE ORAL at 00:40

## 2019-12-17 RX ADMIN — FENTANYL CITRATE 50 MICROGRAM(S): 50 INJECTION INTRAVENOUS at 01:25

## 2019-12-17 RX ADMIN — FENTANYL CITRATE 50 MICROGRAM(S): 50 INJECTION INTRAVENOUS at 01:10

## 2019-12-17 RX ADMIN — Medication 166.67 MILLIGRAM(S): at 16:35

## 2019-12-17 RX ADMIN — Medication 650 MILLIGRAM(S): at 08:50

## 2019-12-17 RX ADMIN — CHLORHEXIDINE GLUCONATE 15 MILLILITER(S): 213 SOLUTION TOPICAL at 18:16

## 2019-12-17 RX ADMIN — PROPOFOL 50 MILLIGRAM(S): 10 INJECTION, EMULSION INTRAVENOUS at 00:50

## 2019-12-17 RX ADMIN — Medication 1 MILLIGRAM(S): at 15:06

## 2019-12-17 RX ADMIN — ETOMIDATE 20 MILLIGRAM(S): 2 INJECTION INTRAVENOUS at 00:49

## 2019-12-17 RX ADMIN — Medication 2.74 MICROGRAM(S)/KG/MIN: at 15:05

## 2019-12-17 RX ADMIN — PROPOFOL 20 MILLIGRAM(S): 10 INJECTION, EMULSION INTRAVENOUS at 01:30

## 2019-12-17 RX ADMIN — PROPOFOL 5.26 MICROGRAM(S)/KG/MIN: 10 INJECTION, EMULSION INTRAVENOUS at 01:30

## 2019-12-17 RX ADMIN — SODIUM CHLORIDE 10 MILLILITER(S): 9 INJECTION INTRAMUSCULAR; INTRAVENOUS; SUBCUTANEOUS at 15:32

## 2019-12-17 RX ADMIN — Medication 250 MILLIGRAM(S): at 05:39

## 2019-12-17 NOTE — PROGRESS NOTE ADULT - SUBJECTIVE AND OBJECTIVE BOX
Patient is a 19y old  Male who presents with a chief complaint of cp (17 Dec 2019 09:05)      INTERVAL HPI / OVERNIGHT EVENTS: events noted ,pt now in ICU s/p intubation and exchange transfusion     From ICU note: worsening tachypnea and shortness of breath at approx 0015.  Patient wanted to take BIPAP off, noted to become more anxious requesting hi flow instead.  Upon placing patient on hi flow, he was noted to have paradoxical breathing pattern with increasing use of accessory muscles and tachypnea up to 35 - 40 with saturations between 88-94.  Patient reports feeling more tired and becoming more fatigued.  Decision to intubate was made.    MEDICATIONS  (STANDING):  aspirin  chewable 81 milliGRAM(s) Oral daily  azithromycin  IVPB 500 milliGRAM(s) IV Intermittent every 24 hours  azithromycin  IVPB      cefepime   IVPB 2000 milliGRAM(s) IV Intermittent every 8 hours  chlorhexidine 0.12% Liquid 15 milliLiter(s) Oral Mucosa every 12 hours  chlorhexidine 4% Liquid 1 Application(s) Topical <User Schedule>  enoxaparin Injectable 40 milliGRAM(s) SubCutaneous daily  fentaNYL   Infusion. 0.5 MICROgram(s)/kG/Hr (2.92 mL/Hr) IV Continuous <Continuous>  folic acid 1 milliGRAM(s) Oral daily  influenza   Vaccine 0.5 milliLiter(s) IntraMuscular once  ketorolac   Injectable 15 milliGRAM(s) IV Push every 6 hours  norepinephrine Infusion 0.05 MICROgram(s)/kG/Min (2.737 mL/Hr) IV Continuous <Continuous>  pantoprazole  Injectable 40 milliGRAM(s) IV Push daily  propofol Infusion 15 MICROgram(s)/kG/Min (5.256 mL/Hr) IV Continuous <Continuous>  vancomycin  IVPB 1250 milliGRAM(s) IV Intermittent every 8 hours    MEDICATIONS  (PRN):  acetaminophen   Tablet .. 650 milliGRAM(s) Oral every 6 hours PRN Temp greater or equal to 38C (100.4F), Mild Pain (1 - 3)  guaiFENesin   Syrup  (Sugar-Free) 100 milliGRAM(s) Oral every 6 hours PRN Cough  sodium chloride 0.9% lock flush 10 milliLiter(s) IV Push every 1 hour PRN Pre/post blood products, medications, blood draw, and to maintain line patency      Vital Signs Last 24 Hrs  T(C): 36.4 (17 Dec 2019 15:27), Max: 39.8 (16 Dec 2019 16:42)  T(F): 97.6 (17 Dec 2019 15:27), Max: 103.7 (16 Dec 2019 16:42)  HR: 92 (17 Dec 2019 15:45) (79 - 175)  BP: 119/60 (17 Dec 2019 15:00) (74/58 - 173/109)  BP(mean): 73 (17 Dec 2019 15:00) (39 - 125)  RR: 30 (17 Dec 2019 15:45) (15 - 40)  SpO2: 100% (17 Dec 2019 15:00) (80% - 100%)    Review of systems:  cant be obtained as intubated and sedated        PHYSICAL EXAM:  General :NAD  Constitutional: thin built  Respiratory: CTAB/L  Cardiovascular: S1 and S2, RRR, no M/G/R  Gastrointestinal: BS+, soft, NT/ND  Extremities: No peripheral edema  Vascular: 2+ peripheral pulses  Skin: No rashes      LABS:                        8.7    19.36 )-----------( 124      ( 17 Dec 2019 13:21 )             25.5     12-17    136  |  106  |  22  ----------------------------<  105<H>  5.6<H>   |  25  |  1.65<H>    Ca    7.5<L>      17 Dec 2019 13:21  Phos  4.3     12-17  Mg     2.5     12-17    TPro  5.7<L>  /  Alb  2.1<L>  /  TBili  4.4<H>  /  DBili  x   /  AST  69<H>  /  ALT  15  /  AlkPhos  59  12-17          MICROBIOLOGY:  RECENT CULTURES:  12-16 .Blood peripheral blood smear XXXX XXXX   No Blood Parasites observed by giemsa stain  One negative set of blood smears does not rule out  the possibility of a parasitic infection.  A minimum of 3  specimens should be collected, at least 12-24 hours apart,  over a 36 hour time period.    12-15 .Blood Blood XXXX XXXX   No growth to date.          RADIOLOGY & ADDITIONAL STUDIES:    XRay   Heart magnified by technique.    Diffuse advanced infiltration throughout all lung fields again noted.    There is subcutaneous emphysema again seen in the neck.    Bilateral catheter chest tubes are noted which are new since study   earlier in the day. There is no visible pneumothorax at this time. Small   bilateral pneumothoraces were seen earlier.    Endotracheal tube, nasogastric tube and right jugular line remain similar   to prior.    Additionally there is a left subclavian line also now present.    IMPRESSION: As above.

## 2019-12-17 NOTE — PROGRESS NOTE ADULT - SUBJECTIVE AND OBJECTIVE BOX
Event note    Interval events:     Patient noted to have worsening tachypnea and shortness of breath at approx 0015.  Patient wanted to take BIPAP off, noted to become more anxious requesting hi flow instead.  Upon placing patient on hi flow, he was noted to have paradoxical breathing pattern with increasing use of accessory muscles and tachypnea up to 35 - 40 with saturations between 88-94.  Patient reports feeling more tired and becoming more fatigued.  Decision to intubate was made.  Of note, patient was pending PRBC exchange transfusion for acute chest syndrome after being admitted for sickle cell crisis.  This may possibly be triggered due to recent flight from [place] 4 days prior causing sickle crisis.  Pt never had been intubated prior or required transfusions previously, last sickle crisis was 2-3 years ago.  Patient and family updated on the need for intubation.  Patient was placed on max hiflow settings to pre-oxygenate, though notable to desaturate quickly with any exertion.  Patient was successfully intubated with two notable desaturations during intubation, with successful re-oxygenation.  Patient was placed on vent.     Called [facility] to follow up on pending PRBCs and exchange transfusion.  Currently pending PRBC from off site. [facility] team aware of need for exchange transfusion.      ICU Vital Signs Last 24 Hrs  T(C): 38.5 (17 Dec 2019 00:30), Max: 40 (16 Dec 2019 06:20)  T(F): 101.3 (17 Dec 2019 00:30), Max: 104 (16 Dec 2019 06:20)  HR: 136 (17 Dec 2019 00:30) (110 - 172)  BP: 145/88 (17 Dec 2019 00:25) (112/66 - 146/88)  BP(mean): 99 (17 Dec 2019 00:25) (78 - 99)  RR: 21 (17 Dec 2019 00:30) (18 - 41)  SpO2: 93% (17 Dec 2019 00:30) (81% - 100%)    Gen: intubated and sedated  HEENT: Intubated with ETT  CARD -s1s2, tachycardi, no M,G,R;   PULM - Diffuse coarse breath sounds  b/l, symmetric breath sounds; using accessory muscles prior to intubation  ABD -  +BS, ND, NT, soft, no guarding, no rebound, no masses;   BACK - no CVA tenderness, Normal  spine;   EXT - symmetric pulses, 2+ dp, capillary refill < 2 seconds, no clubbing, no cyanosis, no edema  NEURO - no focal neuro deficits, no slurred speech                          7.3    25.51 )-----------( 142      ( 16 Dec 2019 16:53 )             19.6   12-16    135  |  106  |  15  ----------------------------<  130<H>  3.9   |  21<L>  |  0.60    Ca    7.5<L>      16 Dec 2019 16:53  Phos  2.4     12-16  Mg     1.8     12-16    TPro  6.3  /  Alb  2.5<L>  /  TBili  5.0<H>  /  DBili  x   /  AST  68<H>  /  ALT  16  /  AlkPhos  54  12-16    ABG - ( 17 Dec 2019 02:19 ) Post intubation  pH, Arterial: x     pH, Blood: 7.32  /  pCO2: 40    /  pO2: 110   / HCO3: 20    / Base Excess: -5.0  /  SaO2: 97          19 year old male with sickle cell disease arrived with sickle cell crisis, noted to have acute decompensation and RRT notable for worsening infiltrates, brought to ICU for acute chest syndrome and acute hypoxic respiratory failure now failed hiflow and bipap requiring intubation with post intubation, P/F ratio notable for 110.      Plan:    - P/F ratio remains 110, concerning for ARDS.  Will institute low tidal volume and lung protective strategies  6cc/kg; increase PEEP as tolerated.  - Propofol and fentanyl for sedation; titrate to RASS -1 to -2 given work of breathing  - Will consider paralytics if work of breathing remains elevated despite sedation  - Pending exchange transfusion    Critical Care Time Spent 40 minutes

## 2019-12-17 NOTE — PROCEDURAL SAFETY CHECKLIST WITH OR WITHOUT SEDATION - NSPX2BRECORDED_GEN_ALL_CORE
insertion of arterial line
left subclavian triple lumen catheter
Intubation
placement of b/l chest tubes
insertion of central line catheter for exchange transfusion

## 2019-12-17 NOTE — PROCEDURE NOTE - NSTRACHPOSTINTU_RESP_A_CORE
Appropriate capnography/Chest excursion noted/Positive end tidal Co2 noted/Breath sounds bilateral/Breath sounds equal/Chest X-Ray
Positive end tidal Co2 noted/Breath sounds equal/Appropriate capnography/Breath sounds bilateral/Chest excursion noted/Chest X-Ray

## 2019-12-17 NOTE — CONSULT NOTE ADULT - PROBLEM SELECTOR RECOMMENDATION 9
- s/p exchange transfusion  - ICU care - weaning  - Antibiotics  - watch blood counts  - DVT prophylaxsis

## 2019-12-17 NOTE — PROGRESS NOTE ADULT - ASSESSMENT
18 y/o male w/sickle cell no prior acute chest and cant recall last crisis, had recently traveled to [place] returned 5 days ago, comes w/complain of chest and bone pain. Pt seen today with :    1.High fever /sepsis : developing multilobar pneumonia throwing up and yellow sputum   CXR shows worsening B/L lung infiltrates  also has acute chest syndrome due to sickle which is worsening   MRSA PCR + cont vanco ,increase the dose   change zosyn to cefepime as not likely to have anaerobic infection   cont azithro for now   f/u sputum c/s   legionella antigen  follow up on blood c/s so far neg  malaria neg  HIV neg  and dengue w/u as recently travelled from [place]    2.Sickle cell diseases with crisis and acute chest syndrome   s/p exchange transfusion       3.S/p B/L chest tube for pneumothoraces

## 2019-12-17 NOTE — CONSULT NOTE ADULT - SUBJECTIVE AND OBJECTIVE BOX
Reason for Consultation: SCD    HPI: Patient is a 19y Male seen on consultatioin for the evaluation and management of SCD    20 y/o male w/sickle cell no prior acute chest and cant recall last crisis, had recently traveled to [place] returned 5 days ago, comes w/complain of chest and bone pain. pt doesn't report any sob, states bones hurt, did have decreased po intake for few days with travels. Pt denies any fever, chills, sob, palpitations, n/v/d/c/. in ed had CTA that was non contributory.  Patient became hypoxic transferred to ICU and s/p Exchange Transfusion for presumed Acute Chest Syndrome    PAST MEDICAL & SURGICAL HISTORY:  Sickle-cell disease  No significant past surgical history        MEDICATIONS  (STANDING):  aspirin  chewable 81 milliGRAM(s) Oral daily  azithromycin  IVPB 500 milliGRAM(s) IV Intermittent every 24 hours  azithromycin  IVPB      chlorhexidine 0.12% Liquid 15 milliLiter(s) Oral Mucosa every 12 hours  chlorhexidine 4% Liquid 1 Application(s) Topical <User Schedule>  enoxaparin Injectable 40 milliGRAM(s) SubCutaneous daily  fentaNYL   Infusion. 0.5 MICROgram(s)/kG/Hr (2.92 mL/Hr) IV Continuous <Continuous>  folic acid 1 milliGRAM(s) Oral daily  influenza   Vaccine 0.5 milliLiter(s) IntraMuscular once  ketorolac   Injectable 15 milliGRAM(s) IV Push every 6 hours  norepinephrine Infusion 0.05 MICROgram(s)/kG/Min (5.475 mL/Hr) IV Continuous <Continuous>  pantoprazole  Injectable 40 milliGRAM(s) IV Push daily  piperacillin/tazobactam IVPB.. 3.375 Gram(s) IV Intermittent every 8 hours  propofol Infusion 15 MICROgram(s)/kG/Min (5.256 mL/Hr) IV Continuous <Continuous>  vancomycin  IVPB      vancomycin  IVPB 1000 milliGRAM(s) IV Intermittent every 8 hours    MEDICATIONS  (PRN):  acetaminophen   Tablet .. 650 milliGRAM(s) Oral every 6 hours PRN Temp greater or equal to 38C (100.4F), Mild Pain (1 - 3)  guaiFENesin   Syrup  (Sugar-Free) 100 milliGRAM(s) Oral every 6 hours PRN Cough  morphine  - Injectable 2 milliGRAM(s) IV Push every 2 hours PRN Moderate Pain (4 - 6)      Allergies    No Known Allergies    Intolerances        SOCIAL HISTORY:    Smoking Status: cannot ascertain  Alcohol: cannot ascertain  Marital Status: cannot ascertain  Occupation: cannot ascertain    FAMILY HISTORY:  No pertinent family history in first degree relatives      Vital Signs Last 24 Hrs  T(C): 38 (17 Dec 2019 07:45), Max: 39.8 (16 Dec 2019 16:42)  T(F): 100.4 (17 Dec 2019 07:45), Max: 103.7 (16 Dec 2019 16:42)  HR: 113 (17 Dec 2019 08:32) (110 - 175)  BP: 109/54 (17 Dec 2019 07:45) (75/45 - 173/109)  BP(mean): 66 (17 Dec 2019 07:45) (49 - 125)  RR: 26 (17 Dec 2019 07:45) (18 - 41)  SpO2: 100% (17 Dec 2019 08:32) (81% - 100%)    PHYSICAL EXAM:    general - intubated on ventilator  HEENT - ETT +  CVS - RRR  RS - AE B/L, decreased at bases  Abd - soft, NT  Ext - Pulses +        LABS:                        8.5    17.74 )-----------( 77       ( 17 Dec 2019 05:24 )             25.3     12-17    134<L>  |  107  |  15  ----------------------------<  128<H>  5.6<H>   |  24  |  0.89    Ca    7.3<L>      17 Dec 2019 05:24  Phos  2.9     12-17  Mg     2.4     12-17    TPro  5.1<L>  /  Alb  1.9<L>  /  TBili  4.5<H>  /  DBili  x   /  AST  53<H>  /  ALT  13  /  AlkPhos  47  12-17          Culture - Blood (collected 15 Dec 2019 11:58)  Source: .Blood Blood  Preliminary Report (16 Dec 2019 12:00):    No growth to date.    Culture - Blood (collected 15 Dec 2019 11:58)  Source: .Blood Blood  Preliminary Report (16 Dec 2019 12:00):    No growth to date.        RADIOLOGY & ADDITIONAL STUDIES:

## 2019-12-17 NOTE — PROCEDURE NOTE - NSPROCDETAILS_GEN_ALL_CORE
patient pre-oxygenated, tube inserted, placement confirmed
patient pre-oxygenated, tube inserted, placement confirmed/connected to ventilator
Seldinger technique/dressing applied/secured in place/thoracostomy tube placed percutaneously
sterile technique, catheter placed/sterile dressing applied/ultrasound guidance/guidewire recovered/lumen(s) aspirated and flushed
connected to a pressurized flush line/hemostasis with direct pressure, dressing applied/sutured in place/location identified, draped/prepped, sterile technique used, needle inserted/introduced/positive blood return obtained via catheter/Seldinger technique/all materials/supplies accounted for at end of procedure/ultrasound guidance
lumen(s) aspirated and flushed/sterile technique, catheter placed/ultrasound guidance/sterile dressing applied/guidewire recovered

## 2019-12-17 NOTE — PROCEDURE NOTE - ADDITIONAL PROCEDURE DETAILS
CXR: b/l pigtail cath with lung expansion
Patient in acute chest syndrome with bilateral pneumonia with acute hypoxic respiratory failure.  Patient was noted to have acute hypoxic episodes requiring hiflo with pO2 low previously on ABG.  Patient was noted to have increasing work of breathing.  Placed on hiflo prior to intubation max settings prior to intubation.  Patient was given fentanyl 50mcg, followed by propofol 50mg and subsequently etomidate 20mg and succinylcholine 100mg.  Glidescope was introduced and visualized cords, noted to be unable to pass ETT on first attempt.  Pt was noted to desaturated to 50s for <10secs, patient was bagged back to 100% within 30 sec and second attempt was made noted to quickly desaturate to 60s for < 5 sec, able to bag back up quickly to 100% within 15 seconds.  Third attempt made with 7.0 ETT and 4.0 glidescope blade, successfully with positive end tidal color change and bilateral breath sounds.  Chest xray ordered.  Propofol 50mg and fentanyl 50mcg given for sedation.  Pt placed on vent

## 2019-12-17 NOTE — PROCEDURE NOTE - NSPOSTCAREGUIDE_GEN_A_CORE
Verbal/written post procedure instructions were given to patient/caregiver
Care for catheter as per unit/ICU protocols
Care for catheter as per unit/ICU protocols
Verbal/written post procedure instructions were given to patient/caregiver
Care for catheter as per unit/ICU protocols
Care for catheter as per unit/ICU protocols

## 2019-12-17 NOTE — PROGRESS NOTE ADULT - ASSESSMENT
Pt is 18 yo M who lives in [facility] and is visiting the [place] for the holidays with h/o sickle cell disease (allegedly asymptomatic and never had blood transfusion) and malaria. Pt on Folic acid, B6 complex and antimalaria medications at home.  Pt presented 2 to chest and bone pain. Admitted 12/15 with sickle cell crisis. Initial CXR no infiltrate and then on 12/16 pt with bibasilar iniltrates on CXR.  In the afternoon RRT called 2 to fever, tachy and desaturation; pt transferred to the ICU. 12/17 early am pt tachypneic using accessory muscles and desat'ing; pt intubated. Post intubation/sedation/MV pt required to be started on Levophed. Shortly thereafter pt s/p exchange transfusion for working dx of acute chest syndrome. Repeat CXR c/w ARDS. This am on rounds pt biting (clenching down) his ETT and HR started to decrease. Bite block placed and pt difficult to bag; decision made to remove ETT and re-intubate pt. Pt desat'd and my 1st attempt was esophagus, 2nd attempt ETT placed in trach. Post intubation sats in the low 80s and CXR revealed b/l pnmotx; b/l pigtail cath's placed with re-expansion of the lungs. Initially pt on FiO2 100% PEEP 14; currently pt sat 98% on FiO2 60% PEEP 14. ICU dx: 1) Sickle cell anemia complicated by acute chest syndrome 2) ARDS 3) b/l pnmotx 4) Septic shock vs hypotension caused by sedation/PPV 5) SIABELL 2 to ATN    Resp: ARDS management/ Decrease TV and increase RR/ Increase PEEP and decrease FiO2/ Make adjusts based on ABG/ Follow pigtail drainage and on CXR  ID: Recommendations as per ID f/u  CVS: Levophed to maintain  Heme: s/p exchange transfusion/ F/u as per Hem  FEN: Hyperkalemia; follow K/ If pt cont to clinical improve start enteral feeds  Renal: Follow BUN/Cr and UO  Neuro: Cont sedation with Propofol and Fentanyl to keep pt in synch with MV  Social: Multiple family members updated by myself today    CCT: 70 min Pt is 18 yo M who lives in [facility] and is visiting the [place] for the holidays with h/o sickle cell disease (allegedly asymptomatic and never had blood transfusion) and malaria. Pt on Folic acid, B6 complex and antimalaria medications at home.  Pt presented 2 to chest and bone pain. Admitted 12/15 with sickle cell crisis. Initial CXR no infiltrate and then on 12/16 pt with bibasilar iniltrates on CXR.  In the afternoon RRT called 2 to fever, tachy and desaturation; pt transferred to the ICU. 12/17 early am pt tachypneic using accessory muscles and desat'ing; pt intubated. Post intubation/sedation/MV pt required to be started on Levophed. Shortly thereafter pt s/p exchange transfusion for working dx of acute chest syndrome. Repeat CXR c/w ARDS. This am on rounds pt biting (clenching down) his ETT and HR started to decrease. Bite block placed and pt difficult to bag; decision made to remove ETT and re-intubate pt. Pt desat'd and my 1st attempt was esophagus, 2nd attempt ETT placed in trach. Post intubation sats in the low 80s and CXR revealed b/l pnmotx; b/l pigtail cath's placed with re-expansion of the lungs. Initially pt on FiO2 100% PEEP 14; currently pt sat 98% on FiO2 60% PEEP 14. ICU dx: 1) Sickle cell anemia complicated by acute chest syndrome 2) ARDS 3) b/l pnmotx 4) Septic shock vs hypotension caused by sedation/PPV 5) ISABELL 2 to ATN    Resp: ARDS management/ Decrease TV and increase RR/ Increase PEEP and decrease FiO2/ Make adjusts based on ABG/ Follow pigtail drainage and on CXR  ID: Recommendations as per ID f/u  CVS: Levophed to maintain MAP >65  Heme: s/p exchange transfusion/ F/u as per Heme  FEN: Hyperkalemia; follow K/ If pt cont to clinically improve start enteral feeds  Renal: Follow BUN/Cr and UO  Neuro: Cont sedation with Propofol and Fentanyl to keep pt in synch with MV  Social: Multiple family members updated by myself today    CCT: 70 min

## 2019-12-17 NOTE — PROCEDURE NOTE - NSINFORMCONSENT_GEN_A_CORE
This was an emergent procedure.
Benefits, risks, and possible complications of procedure explained to patient/caregiver who verbalized understanding and gave written consent.
This was an emergent procedure.

## 2019-12-17 NOTE — PROGRESS NOTE ADULT - SUBJECTIVE AND OBJECTIVE BOX
HPI:  Pt is 18 yo M who lives in [place] and is visiting the [place] for the holidays with h/o sickle cell disease (allegedly asymptomatic and never had blood transfusion) and malaria. Pt on Folic acid, B6 complex and antimalaria medications at home.  Pt presented 2 to chest and bone pain. Admitted 12/15 with sickle cell crisis. Initial CXR no infiltrate and then on 12/16 pt with bibasilar iniltrates on CXR.  In the afternoon RRT called 2 to fever, tachy and desaturation; pt transferred to the ICU. 12/17 early am pt tachypneic using accessory muscles and desat'ing; pt intubated. Post intubation/sedation/MV pt required to be started on Levophed. Shortly thereafter pt s/p exchange transfusion for working dx of acute chest syndrome. Repeat CXR c/w ARDS. This am on rounds pt biting (clenching down) his ETT and HR started to decrease. Bite block placed and pt difficult to bag; decision made to remove ETT and re-intubate pt. Pt desat'd and my 1st attempt was esophagus, 2nd attempt ETT placed in trach. Post intubation sats in the low 80s and CXR revealed b/l pnmotx; b/l pigtail cath's placed with re-expansion of the lungs. Initially pt on FiO2 100% PEEP 14; currently pt sat 98% on FiO2 60% PEEP 14    ## Labs:  CBC:                        8.7    19.36 )-----------( 124      ( 17 Dec 2019 13:21 )             25.5     Chem:  12-17    136  |  106  |  22  ----------------------------<  105<H>  5.6<H>   |  25  |  1.65<H>    Ca    7.5<L>      17 Dec 2019 13:21  Phos  4.3     12-17  Mg     2.5     12-17    TPro  5.7<L>  /  Alb  2.1<L>  /  TBili  4.4<H>  /  DBili  x   /  AST  69<H>  /  ALT  15  /  AlkPhos  59  12-17    Coags:  PT/INR - ( 17 Dec 2019 13:21 )   PT: 14.7 sec;   INR: 1.30 ratio         PTT - ( 17 Dec 2019 13:21 )  PTT:30.0 sec  culture blood:  --  12-16 @ 17:08            culture sputum:     No Blood Parasites observed by giemsa stain  One negative set of blood smears does not rule out  the possibility of a parasitic infection.  A minimum of 3  specimens should be collected, at least 12-24 hours apart,  over a 36 hour time period.           culture urine:  -- 12-16 @ 17:08        ## Imaging:    ## Medications:  azithromycin  IVPB 500 milliGRAM(s) IV Intermittent every 24 hours  azithromycin  IVPB      cefepime   IVPB 2000 milliGRAM(s) IV Intermittent every 8 hours  vancomycin  IVPB 1250 milliGRAM(s) IV Intermittent every 8 hours    norepinephrine Infusion 0.05 MICROgram(s)/kG/Min IV Continuous <Continuous>    guaiFENesin   Syrup  (Sugar-Free) 100 milliGRAM(s) Oral every 6 hours PRN      enoxaparin Injectable 40 milliGRAM(s) SubCutaneous daily    pantoprazole  Injectable 40 milliGRAM(s) IV Push daily    acetaminophen   Tablet .. 650 milliGRAM(s) Oral every 6 hours PRN  fentaNYL   Infusion. 0.5 MICROgram(s)/kG/Hr IV Continuous <Continuous>  propofol Infusion 15 MICROgram(s)/kG/Min IV Continuous <Continuous>      ## Vitals:  T(C): 37.5 (12-17-19 @ 17:45), Max: 38.5 (12-17-19 @ 00:30)  HR: 97 (12-17-19 @ 17:45) (79 - 175)  BP: 120/60 (12-17-19 @ 17:00) (74/58 - 173/109)  BP(mean): 74 (12-17-19 @ 17:00) (39 - 125)  RR: 30 (12-17-19 @ 17:45) (15 - 40)  SpO2: 98% (12-17-19 @ 17:45) (80% - 100%)  Wt(kg): --  Vent: Mode: AC/ CMV (Assist Control/ Continuous Mandatory Ventilation), RR (machine): 30, RR (patient): 30, TV (machine): 360, FiO2: 60, PEEP: 14, PIP: 39  ABG: ABG - ( 17 Dec 2019 12:59 )  pH, Arterial: x     pH, Blood: 7.28  /  pCO2: 49    /  pO2: 63    / HCO3: 22    / Base Excess: -4.0  /  SaO2: 86                    12-16 @ 07:01  - 12-17 @ 07:00  --------------------------------------------------------  IN: 6543.1 mL / OUT: 3235 mL / NET: 3308.1 mL    12-17 @ 07:01  - 12-17 @ 18:01  --------------------------------------------------------  IN: 1923.5 mL / OUT: 215 mL / NET: 1708.5 mL          ## P/E:  Gen: lying comfortably in bed in no apparent distress  Mouth: (+) ETT  Neck: sq emphysema  Lungs: Coarse BS  Heart: Tachy  Abd: Soft/+BS  Ext: No edema  Neuro: Sedated    CENTRAL LINE: [x] YES [ ] NO  LOCATION: L subclav  DATE INSERTED: 12/17  REMOVE: [ ] YES [ ] NO      BRISA: [ ] YES [ ] NO    DATE INSERTED:  REMOVE:  [ ] YES [ ] NO      A-LINE:  [ x] YES [ ] NO  LOCATION:  L axillary DATE INSERTED: 12/17  REMOVE:  [ ] YES [ ] NO  EXPLAIN:    CODE STATUS: [x ] full code  [ ] DNR  [ ] DNI  [ ] [facility]  Goals of care discussion: [ ] yes

## 2019-12-17 NOTE — PROVIDER CONTACT NOTE (EICU) - ACTION/TREATMENT ORDERED:
-have placed order for chest x-ray post intubation as requested by bedsdie team. Bedside team to follow up images once x-ray complete.

## 2019-12-17 NOTE — PROCEDURE NOTE - NSSITEPREP_SKIN_A_CORE
Adherence to aseptic technique: hand hygiene prior to donning barriers (gown, gloves), don cap and mask, sterile drape over patient/chlorhexidine
chlorhexidine

## 2019-12-17 NOTE — CONSULT NOTE ADULT - REASON FOR ADMISSION
CP
T: 97.6 /95 HR 72 RR 25 SpO2 98 on BiPAP on Tele: multiple PVCs 8-13 beats of V Tach (polymorphic)

## 2019-12-17 NOTE — CHART NOTE - NSCHARTNOTEFT_GEN_A_CORE
Blood bank called at approx 0130 to inform us that matched blood has arrived at the hospital. [facility] called at [phone] at ~0131 to follow up re: transfusion nurse. Call back at 0149, writer informed that nurse will arrive within the hour.

## 2019-12-17 NOTE — PROCEDURE NOTE - NSINDICATIONS_GEN_A_CORE
pneumothorax/bilateral
respiratory distress/airway protection
respiratory distress/respiratory failure
Exchange transfusion; SCC/ACS tx/critical illness
arterial puncture to obtain ABG's/blood sampling/critical patient/monitoring purposes
venous access/emergency venous access/hemodynamic monitoring/hypertonic/irritant infusion/volume resuscitation/critical illness

## 2019-12-17 NOTE — PROCEDURE NOTE - NSTRACHINTUBMED_RESP_A_CORE
etomidate injectable/propofol injectable
propofol infusion/fentaNYL injectable/succinylcholine injectable

## 2019-12-18 LAB
ALBUMIN SERPL ELPH-MCNC: 2.4 G/DL — LOW (ref 3.3–5)
ALP SERPL-CCNC: 64 U/L — SIGNIFICANT CHANGE UP (ref 40–120)
ALT FLD-CCNC: 21 U/L — SIGNIFICANT CHANGE UP (ref 12–78)
ANION GAP SERPL CALC-SCNC: 6 MMOL/L — SIGNIFICANT CHANGE UP (ref 5–17)
AST SERPL-CCNC: 43 U/L — HIGH (ref 15–37)
BASE EXCESS BLDA CALC-SCNC: 0.2 MMOL/L — SIGNIFICANT CHANGE UP (ref -2–2)
BASOPHILS # BLD AUTO: 0.07 K/UL — SIGNIFICANT CHANGE UP (ref 0–0.2)
BASOPHILS NFR BLD AUTO: 0.3 % — SIGNIFICANT CHANGE UP (ref 0–2)
BILIRUB SERPL-MCNC: 3 MG/DL — HIGH (ref 0.2–1.2)
BLOOD GAS COMMENTS: SIGNIFICANT CHANGE UP
BLOOD GAS SOURCE: SIGNIFICANT CHANGE UP
BUN SERPL-MCNC: 23 MG/DL — SIGNIFICANT CHANGE UP (ref 7–23)
CALCIUM SERPL-MCNC: 7.7 MG/DL — LOW (ref 8.5–10.1)
CHLORIDE SERPL-SCNC: 104 MMOL/L — SIGNIFICANT CHANGE UP (ref 96–108)
CO2 SERPL-SCNC: 26 MMOL/L — SIGNIFICANT CHANGE UP (ref 22–31)
CREAT SERPL-MCNC: 1.3 MG/DL — SIGNIFICANT CHANGE UP (ref 0.5–1.3)
EOSINOPHIL # BLD AUTO: 0.58 K/UL — HIGH (ref 0–0.5)
EOSINOPHIL NFR BLD AUTO: 2.4 % — SIGNIFICANT CHANGE UP (ref 0–6)
GLUCOSE SERPL-MCNC: 111 MG/DL — HIGH (ref 70–99)
HAPTOGLOB SERPL-MCNC: 28 MG/DL — LOW (ref 34–200)
HAPTOGLOB SERPL-MCNC: <20 MG/DL — LOW (ref 34–200)
HCO3 BLDA-SCNC: 25 MMOL/L — SIGNIFICANT CHANGE UP (ref 21–29)
HCT VFR BLD CALC: 21.7 % — LOW (ref 39–50)
HGB BLD-MCNC: 7.6 G/DL — LOW (ref 13–17)
HOROWITZ INDEX BLDA+IHG-RTO: 50 — SIGNIFICANT CHANGE UP
IMM GRANULOCYTES NFR BLD AUTO: 0.8 % — SIGNIFICANT CHANGE UP (ref 0–1.5)
LDH SERPL L TO P-CCNC: 578 U/L — HIGH (ref 50–242)
LDH SERPL L TO P-CCNC: 813 U/L — HIGH (ref 50–242)
LYMPHOCYTES # BLD AUTO: 18 % — SIGNIFICANT CHANGE UP (ref 13–44)
LYMPHOCYTES # BLD AUTO: 4.3 K/UL — HIGH (ref 1–3.3)
MAGNESIUM SERPL-MCNC: 2.4 MG/DL — SIGNIFICANT CHANGE UP (ref 1.6–2.6)
MCHC RBC-ENTMCNC: 27.8 PG — SIGNIFICANT CHANGE UP (ref 27–34)
MCHC RBC-ENTMCNC: 35 GM/DL — SIGNIFICANT CHANGE UP (ref 32–36)
MCV RBC AUTO: 79.5 FL — LOW (ref 80–100)
MONOCYTES # BLD AUTO: 1.49 K/UL — HIGH (ref 0–0.9)
MONOCYTES NFR BLD AUTO: 6.2 % — SIGNIFICANT CHANGE UP (ref 2–14)
NEUTROPHILS # BLD AUTO: 17.32 K/UL — HIGH (ref 1.8–7.4)
NEUTROPHILS NFR BLD AUTO: 72.3 % — SIGNIFICANT CHANGE UP (ref 43–77)
NRBC # BLD: 0 /100 WBCS — SIGNIFICANT CHANGE UP (ref 0–0)
PCO2 BLDA: 44 MMHG — SIGNIFICANT CHANGE UP (ref 32–46)
PH BLD: 7.38 — SIGNIFICANT CHANGE UP (ref 7.35–7.45)
PHOSPHATE SERPL-MCNC: 3.5 MG/DL — SIGNIFICANT CHANGE UP (ref 2.5–4.5)
PLATELET # BLD AUTO: 150 K/UL — SIGNIFICANT CHANGE UP (ref 150–400)
PO2 BLDA: 85 MMHG — SIGNIFICANT CHANGE UP (ref 74–108)
POTASSIUM SERPL-MCNC: 4.7 MMOL/L — SIGNIFICANT CHANGE UP (ref 3.5–5.3)
POTASSIUM SERPL-SCNC: 4.7 MMOL/L — SIGNIFICANT CHANGE UP (ref 3.5–5.3)
PROT SERPL-MCNC: 5.4 GM/DL — LOW (ref 6–8.3)
RBC # BLD: 2.73 M/UL — LOW (ref 4.2–5.8)
RBC # BLD: 2.73 M/UL — LOW (ref 4.2–5.8)
RBC # FLD: 19 % — HIGH (ref 10.3–14.5)
RETICS #: 145.8 K/UL — HIGH (ref 25–125)
RETICS/RBC NFR: 5.3 % — HIGH (ref 0.5–2.5)
SAO2 % BLDA: 95 % — SIGNIFICANT CHANGE UP (ref 92–96)
SODIUM SERPL-SCNC: 136 MMOL/L — SIGNIFICANT CHANGE UP (ref 135–145)
T3 SERPL-MCNC: 38 NG/DL — LOW (ref 80–200)
T4 AB SER-ACNC: 4.2 UG/DL — LOW (ref 4.6–12)
VANCOMYCIN TROUGH SERPL-MCNC: 22.5 UG/ML — HIGH (ref 10–20)
WBC # BLD: 23.94 K/UL — HIGH (ref 3.8–10.5)
WBC # FLD AUTO: 23.94 K/UL — HIGH (ref 3.8–10.5)

## 2019-12-18 PROCEDURE — 71045 X-RAY EXAM CHEST 1 VIEW: CPT | Mod: 26

## 2019-12-18 PROCEDURE — 99291 CRITICAL CARE FIRST HOUR: CPT

## 2019-12-18 PROCEDURE — 99233 SBSQ HOSP IP/OBS HIGH 50: CPT

## 2019-12-18 RX ORDER — PANTOPRAZOLE SODIUM 20 MG/1
40 TABLET, DELAYED RELEASE ORAL DAILY
Refills: 0 | Status: DISCONTINUED | OUTPATIENT
Start: 2019-12-18 | End: 2019-12-19

## 2019-12-18 RX ORDER — CALCIUM GLUCONATE 100 MG/ML
2 VIAL (ML) INTRAVENOUS ONCE
Refills: 0 | Status: COMPLETED | OUTPATIENT
Start: 2019-12-18 | End: 2019-12-18

## 2019-12-18 RX ORDER — CALCIUM GLUCONATE 100 MG/ML
2 VIAL (ML) INTRAVENOUS ONCE
Refills: 0 | Status: DISCONTINUED | OUTPATIENT
Start: 2019-12-18 | End: 2019-12-18

## 2019-12-18 RX ADMIN — CHLORHEXIDINE GLUCONATE 15 MILLILITER(S): 213 SOLUTION TOPICAL at 17:52

## 2019-12-18 RX ADMIN — CEFEPIME 100 MILLIGRAM(S): 1 INJECTION, POWDER, FOR SOLUTION INTRAMUSCULAR; INTRAVENOUS at 23:29

## 2019-12-18 RX ADMIN — FENTANYL CITRATE 2.92 MICROGRAM(S)/KG/HR: 50 INJECTION INTRAVENOUS at 15:55

## 2019-12-18 RX ADMIN — PANTOPRAZOLE SODIUM 40 MILLIGRAM(S): 20 TABLET, DELAYED RELEASE ORAL at 12:00

## 2019-12-18 RX ADMIN — CEFEPIME 100 MILLIGRAM(S): 1 INJECTION, POWDER, FOR SOLUTION INTRAMUSCULAR; INTRAVENOUS at 08:48

## 2019-12-18 RX ADMIN — Medication 166.67 MILLIGRAM(S): at 08:51

## 2019-12-18 RX ADMIN — Medication 1 MILLIGRAM(S): at 12:00

## 2019-12-18 RX ADMIN — Medication 2.74 MICROGRAM(S)/KG/MIN: at 04:03

## 2019-12-18 RX ADMIN — CHLORHEXIDINE GLUCONATE 15 MILLILITER(S): 213 SOLUTION TOPICAL at 05:19

## 2019-12-18 RX ADMIN — FENTANYL CITRATE 2.92 MICROGRAM(S)/KG/HR: 50 INJECTION INTRAVENOUS at 03:31

## 2019-12-18 RX ADMIN — CHLORHEXIDINE GLUCONATE 1 APPLICATION(S): 213 SOLUTION TOPICAL at 06:56

## 2019-12-18 RX ADMIN — Medication 166.67 MILLIGRAM(S): at 16:03

## 2019-12-18 RX ADMIN — PROPOFOL 5.26 MICROGRAM(S)/KG/MIN: 10 INJECTION, EMULSION INTRAVENOUS at 14:44

## 2019-12-18 RX ADMIN — PROPOFOL 5.26 MICROGRAM(S)/KG/MIN: 10 INJECTION, EMULSION INTRAVENOUS at 04:00

## 2019-12-18 RX ADMIN — CEFEPIME 100 MILLIGRAM(S): 1 INJECTION, POWDER, FOR SOLUTION INTRAMUSCULAR; INTRAVENOUS at 15:54

## 2019-12-18 RX ADMIN — AZITHROMYCIN 255 MILLIGRAM(S): 500 TABLET, FILM COATED ORAL at 17:52

## 2019-12-18 RX ADMIN — PROPOFOL 5.26 MICROGRAM(S)/KG/MIN: 10 INJECTION, EMULSION INTRAVENOUS at 19:55

## 2019-12-18 RX ADMIN — Medication 100 GRAM(S): at 15:23

## 2019-12-18 RX ADMIN — Medication 2.74 MICROGRAM(S)/KG/MIN: at 16:03

## 2019-12-18 RX ADMIN — ENOXAPARIN SODIUM 40 MILLIGRAM(S): 100 INJECTION SUBCUTANEOUS at 12:00

## 2019-12-18 NOTE — PROGRESS NOTE ADULT - SUBJECTIVE AND OBJECTIVE BOX
HPI:  Pt is 18 yo M who lives in [facility] and is visiting the [place] with h/o sickle cell disease (allegedly asymptomatic and never had blood transfusion) and malaria. Pt on Folic acid, B6 complex and antimalaria medications at home.  Pt presented 2 to chest and bone pain. Admitted 12/15 with sickle cell crisis. Initial CXR no infiltrate and then on 12/16 pt with bibasilar iniltrates on CXR.  In the afternoon RRT called 2 to fever, tachy and desaturation; pt transferred to the ICU. 12/17 early am pt tachypneic using accessory muscles and desat'ing; pt intubated. Post intubation/sedation/MV pt required to be started on Levophed. Shortly thereafter pt s/p exchange transfusion for working dx of acute chest syndrome. Repeat CXR c/w ARDS. This am on rounds pt biting (clenching down) his ETT and HR started to decrease. Bite block placed and pt difficult to bag; decision made to remove ETT and re-intubate pt. Pt desat'd and my 1st attempt was esophagus, 2nd attempt ETT placed in trach. Post intubation sats in the low 80s and CXR revealed b/l pnmotx; b/l pigtail cath's placed with re-expansion of the lungs. Initially pt on FiO2 100% PEEP 14; currently pt sat 98% on FiO2 60% PEEP 14. ICU dx: 1) Sickle cell anemia complicated by acute chest syndrome 2) ARDS 3) b/l pnmotx 4) Septic shock vs hypotension caused by sedation/PPV 5) ISABELL 2 to ATN. Clinically pt is improving      ## Labs:  CBC:                        7.6    23.94 )-----------( 150      ( 18 Dec 2019 03:52 )             21.7     Chem:  12-18    136  |  104  |  23  ----------------------------<  111<H>  4.7   |  26  |  1.30    Ca    7.7<L>      18 Dec 2019 03:52  Phos  3.5     12-18  Mg     2.4     12-18    TPro  5.4<L>  /  Alb  2.4<L>  /  TBili  3.0<H>  /  DBili  x   /  AST  43<H>  /  ALT  21  /  AlkPhos  64  12-18    Coags:  PT/INR - ( 17 Dec 2019 21:13 )   PT: 14.2 sec;   INR: 1.26 ratio         PTT - ( 17 Dec 2019 13:21 )  PTT:30.0 sec  culture blood:  --  12-17 @ 11:36            culture sputum:     No growth           culture urine:  -- 12-17 @ 11:36        ## Imaging:    ## Medications:  azithromycin  IVPB 500 milliGRAM(s) IV Intermittent every 24 hours  azithromycin  IVPB      cefepime   IVPB 2000 milliGRAM(s) IV Intermittent every 8 hours  vancomycin  IVPB 1250 milliGRAM(s) IV Intermittent every 8 hours    norepinephrine Infusion 0.05 MICROgram(s)/kG/Min IV Continuous <Continuous>        enoxaparin Injectable 40 milliGRAM(s) SubCutaneous daily    pantoprazole   Suspension 40 milliGRAM(s) Oral daily    acetaminophen   Tablet .. 650 milliGRAM(s) Oral every 6 hours PRN  fentaNYL   Infusion. 0.5 MICROgram(s)/kG/Hr IV Continuous <Continuous>  propofol Infusion 15 MICROgram(s)/kG/Min IV Continuous <Continuous>      ## Vitals:  T(C): 36.1 (12-18-19 @ 15:43), Max: 38.2 (12-18-19 @ 04:45)  HR: 118 (12-18-19 @ 17:00) (86 - 118)  BP: 120/64 (12-18-19 @ 08:00) (111/53 - 134/73)  BP(mean): 75 (12-18-19 @ 08:00) (66 - 87)  RR: 30 (12-18-19 @ 17:00) (24 - 30)  SpO2: 90% (12-18-19 @ 17:00) (90% - 100%)  Wt(kg): --  Vent: Mode: AC/ CMV (Assist Control/ Continuous Mandatory Ventilation), RR (machine): 30, RR (patient): 30, TV (machine): 360, FiO2: 40, PEEP: 12, PIP: 40  ABG: ABG - ( 18 Dec 2019 07:56 )  pH, Arterial: x     pH, Blood: 7.38  /  pCO2: 44    /  pO2: 85    / HCO3: 25    / Base Excess: 0.2   /  SaO2: 95                    12-17 @ 07:01 - 12-18 @ 07:00  --------------------------------------------------------  IN: 6683.8 mL / OUT: 2123 mL / NET: 4560.8 mL    12-18 @ 07:01  - 12-18 @ 17:32  --------------------------------------------------------  IN: 1334.6 mL / OUT: 730 mL / NET: 604.6 mL          ## P/E:  Gen: lying comfortably in bed in no apparent distress  Mouth: (+) ETT  Neck: sq emphysema  Lungs: CTA/ Chest b/l pigtail caths  Heart: RRR  Abd: Soft/+BS  Ext: No edema  Neuro: Sedated    CENTRAL LINE: [x] YES [ ] NO  LOCATION: L subclav  DATE INSERTED: 12/17  REMOVE: [ ] YES [ ] NO      LEDEZMA: [ ] YES [ ] NO    DATE INSERTED:  REMOVE:  [ ] YES [ ] NO      A-LINE:  [ x] YES [ ] NO  LOCATION:  L axillary DATE INSERTED: 12/17  REMOVE:  [ ] YES [ ] NO  EXPLAIN:    CODE STATUS: [x ] full code  [ ] DNR  [ ] DNI  [ ] [facility]  Goals of care discussion: [ ] yes

## 2019-12-18 NOTE — ACUTE INTERFACILITY TRANSFER NOTE - HOSPITAL COURSE
19 year old male with HbSS from [place], (arrived 4 days prior to admission from [facility] for the holidays), arrived on 12/14/19 to [facility] for acute chest pain, reported to be different from his typical sickle cell pain.  Patient was noted to have chest xray and ct chest angio which were negative for acute findings.  Patient was admitted to telemetry for acute pain crisis and chest pain.  Patient was noted to have progressive fevers, and increasing respiratory distress with increasing oxygen requirements.      ICU was consulted after two rapid responses on  12/16/19 with increasing O2 requirements requiring hiflow O2 and BIPAP with concern for acute chest syndrome and chest xray now showing bilateral diffuse infiltrates.  While awaiting for red cell exchange transfusion, patient was noted to have increasing work of breathing with persistent hypoxia and increasing anxiety.  Patient was intubated on 12/16/19 prior to red cell exchange and started on lung protective strategies with low tidal volume and high PEEP.  Patient successfully received exchange transfusion with notable initial Hb electrophoresis of HbS of 94.2%.  Post red cell exchange transfusion HbS is currently pending.      On the morning of 12/17/19 during rounds, patient was biting (clenching down) his ETT and HR started to decrease. Bite block placed and pt difficult to bag; decision made to remove ETT and re-intubate pt. Pt desaturated and 1st attempt was esophagus, 2nd attempt ETT placed in trach. Post intubation saturated in the low 80s and CXR revealed bilateral pneumothorax and subsequent bilateral pigtail catheters placed with re-expansion of the lungs. Initially pt on FiO2 100% PEEP 14, which was weaned to FiO2 60% PEEP 14 with SpO2 98%.  Patient received plasma exchange on 12/17/19 starting at 2200, and tolerated procedure well with LDH, haptoglobin, d-dimer, bilirubin and Creatinine improving.  Given patient's persistent hypoxia and acute respiratory distress syndrome, remaining vent dependent,  plasma exchange on 12/18/19 was repeated.  Today patient is on RR 30, , PEEP 10, FIO2 60% with an ABG noted to be 7.38/44/85/95% on FiO2 50% at 0756 on 12/18/19.

## 2019-12-18 NOTE — PROGRESS NOTE ADULT - PROBLEM SELECTOR PLAN 1
- s/p exchange transfusion  - Patient remains ill d/w sister at bedside  - ICU care  - watch blood counts transfuse as necessary

## 2019-12-18 NOTE — DIETITIAN INITIAL EVALUATION ADULT. - ENTERAL
Start NGT feeding when medically feasible Vital AF 1.2 @ 30ml/hr to goal rate 60ml/ei=1569qf, 1728kcal & 108gm protein

## 2019-12-18 NOTE — PROGRESS NOTE ADULT - ASSESSMENT
Pt is 20 yo M who lives in [place] and is visiting the [place] for the holidays with h/o sickle cell disease (allegedly asymptomatic and never had blood transfusion) and malaria. Pt on Folic acid, B6 complex and antimalaria medications at home.  Pt presented 2 to chest and bone pain. Admitted 12/15 with sickle cell crisis. Initial CXR no infiltrate and then on 12/16 pt with bibasilar iniltrates on CXR.  In the afternoon RRT called 2 to fever, tachy and desaturation; pt transferred to the ICU. 12/17 early am pt tachypneic using accessory muscles and desat'ing; pt intubated. Post intubation/sedation/MV pt required to be started on Levophed. Shortly thereafter pt s/p exchange transfusion for working dx of acute chest syndrome. Repeat CXR c/w ARDS. This am on rounds pt biting (clenching down) his ETT and HR started to decrease. Bite block placed and pt difficult to bag; decision made to remove ETT and re-intubate pt. Pt desat'd and my 1st attempt was esophagus, 2nd attempt ETT placed in trach. Post intubation sats in the low 80s and CXR revealed b/l pnmotx; b/l pigtail cath's placed with re-expansion of the lungs. Initially pt on FiO2 100% PEEP 14; currently pt sat 98% on FiO2 60% PEEP 14. ICU dx: 1) Sickle cell anemia complicated by acute chest syndrome 2) ARDS 3) b/l pnmotx 4) Septic shock vs hypotension caused by sedation/PPV 5) ISABELL 2 to ATN. Clinically pt is improving    Resp: Cont ARDS management with current TV and RR/ PEEP decreased to 10 and FiO2 decreased to 40%/ Follow pigtail drainage and on CXR  ID: Recommendations as per ID f/u  CVS: Levophed requirement decreasing  Heme: s/p exchange transfusion and plasmapheresis/ F/u as per Heme  FEN: Start enteral feeds  Renal: Follow BUN/Cr and UO/ Cr decreasing and UO picking up  Neuro: Cont sedation with Propofol and Fentanyl to keep pt in synch with MV  Social: Sister and father at the bedside and updated/ Probable transfer to [facility] for co-management of pt's sickle cell anemia    CCT: 60 min

## 2019-12-18 NOTE — DIETITIAN INITIAL EVALUATION ADULT. - OTHER INFO
Pt visiting from [facility] & lives with family. Pt followed Regular diet PTA; Pt with good appetite & po intake PTA. Last BM ?  Diet hx; Breakfast; bread & eggs  Lunch & Dinner; ricer, beef or chicken stew (dislikes fish)    Pt with sickle cell presents with small henrietta pneumothorax and pneumomediastinum s/p henrietta chest tubes & NGT LWS. Pt with acute hypoxic resp failure intubated on mechanical vent support 12/17. Pt visiting from [facility] & lives with family. Pt followed Regular diet PTA; Pt with good appetite & po intake PTA. Last BM ?  Diet hx; Breakfast; bread & eggs  Lunch & Dinner; ricer, beef or chicken stew (dislikes fish)    Pt with sickle cell presents with small henrietta pneumothorax and pneumomediastinum s/p henrietta chest tubes & NGT LWS=50ml (12/18) Pt with acute hypoxic resp failure intubated on mechanical vent support 12/17. Would consider temporary nutrition support via NGT feeding when medically feasible.

## 2019-12-18 NOTE — DIETITIAN INITIAL EVALUATION ADULT. - PERTINENT MEDS FT
acetaminophen   Tablet .. PRN  azithromycin  IVPB  azithromycin  IVPB  cefepime   IVPB  chlorhexidine 0.12% Liquid  chlorhexidine 4% Liquid  enoxaparin Injectable  fentaNYL   Infusion.  folic acid  guaiFENesin   Syrup  (Sugar-Free) PRN  influenza   Vaccine  norepinephrine Infusion  pantoprazole  Injectable  propofol Infusion  sodium chloride 0.9% lock flush PRN  vancomycin  IVPB

## 2019-12-18 NOTE — DIETITIAN INITIAL EVALUATION ADULT. - PHYSICAL APPEARANCE
appears thin frame; BMI=19.3/other (specify) Nutrition focused physical exam conducted: Subcutaneous fat loss: [WNL ] Orbital fat pads region, [unable ]Buccal fat region, [WNL ]Triceps region,  [WNL ]Ribs region.  Muscle wasting: [WNL ]Temples region, [WNL ]Clavicle region, [WNL]Shoulder region, [unable ]Scapula region, [WNL ]Interosseous region,  [WNL ]thigh region, [WNL ]Calf region

## 2019-12-18 NOTE — DIETITIAN INITIAL EVALUATION ADULT. - PERTINENT LABORATORY DATA
12-18 Na 136 mmol/L Glu 111 mg/dL<H> K+ 4.7 mmol/L Cr 1.30 mg/dL BUN 23 mg/dL Phos 3.5 mg/dL Alb 2.4 g/dL<L> PAB n/a   Hgb 7.6 g/dL<L> Hct 21.7 %<L> HgA1C n/a    Glucose, Serum: 111 mg/dL <H>, WBC=23.94(  Glucose, Serum: 101 mg/dL <H>  Glucose, Serum: 105 mg/dL <H>   24Hr FS:

## 2019-12-18 NOTE — ACUTE INTERFACILITY TRANSFER NOTE - NS MD CONDITION AT TRANSFER FT
patient intubated, with bilateral chest tube in place( to suction) . With ongoing drips, Levophed, Fentanyl and Propofol. Sister at bedside.  Patient arousable to touch.

## 2019-12-18 NOTE — PROGRESS NOTE ADULT - SUBJECTIVE AND OBJECTIVE BOX
lying in bed, intubated on ventilator with Chest Tubes, sister at bedside    Vital Signs Last 24 Hrs  T(C): 37.6 (18 Dec 2019 07:00), Max: 38.3 (17 Dec 2019 13:00)  T(F): 99.7 (18 Dec 2019 07:00), Max: 100.9 (17 Dec 2019 13:00)  HR: 99 (18 Dec 2019 08:49) (79 - 158)  BP: 111/53 (18 Dec 2019 04:00) (74/58 - 134/73)  BP(mean): 66 (18 Dec 2019 04:00) (39 - 99)  RR: 30 (18 Dec 2019 07:00) (12 - 31)  SpO2: 100% (18 Dec 2019 08:49) (80% - 100%)    PHYSICAL EXAM:    general - intubated on ventilator  HEENT - ETT  CVS - RRR  RS - AE B/L  Abd - soft, NT  Ext - Pulses +        LABS:                        7.6    23.94 )-----------( 150      ( 18 Dec 2019 03:52 )             21.7     12-18    136  |  104  |  23  ----------------------------<  111<H>  4.7   |  26  |  1.30    Ca    7.7<L>      18 Dec 2019 03:52  Phos  3.5     12-18  Mg     2.4     12-18    TPro  5.4<L>  /  Alb  2.4<L>  /  TBili  3.0<H>  /  DBili  x   /  AST  43<H>  /  ALT  21  /  AlkPhos  64  12-18    PT/INR - ( 17 Dec 2019 21:13 )   PT: 14.2 sec;   INR: 1.26 ratio         PTT - ( 17 Dec 2019 13:21 )  PTT:30.0 sec      Culture - Sputum (collected 17 Dec 2019 11:36)  Source: .Sputum Sputum  Gram Stain (17 Dec 2019 16:40):    Few polymorphonuclear leukocytes per low power field    Rare Squamous epithelial cells per low power field    Rare Gram Negative Rods seen per oil power field    Culture - Blood (collected 15 Dec 2019 11:58)  Source: .Blood Blood  Preliminary Report (16 Dec 2019 12:00):    No growth to date.    Culture - Blood (collected 15 Dec 2019 11:58)  Source: .Blood Blood  Preliminary Report (16 Dec 2019 12:00):    No growth to date.        RADIOLOGY & ADDITIONAL STUDIES:

## 2019-12-18 NOTE — PROGRESS NOTE ADULT - ASSESSMENT
18 y/o male w/sickle cell no prior acute chest and cant recall last crisis, had recently traveled to [place] returned 5 days ago, comes w/complain of chest and bone pain. Pt seen today with :    1.High fever /sepsis : developing multilobar pneumonia throwing up and yellow sputum   CXR shows worsening B/L lung infiltrates  also has acute chest syndrome due to sickle which is worsening   MRSA PCR + cont vanco ,increase the dose   cont cefepime,vanco   cont azithro day 3 of 5  sputum c/s neg   legionella antigenneg  follow up on blood c/s so far neg  malaria neg  HIV neg  and dengue w/u as recently travelled from [place]    2.Sickle cell diseases with crisis and acute chest syndrome   s/p exchange transfusion       3.S/p B/L chest tube for pneumothoraces

## 2019-12-18 NOTE — PROGRESS NOTE ADULT - SUBJECTIVE AND OBJECTIVE BOX
Patient is a 19y old  Male who presents with a chief complaint of cp (18 Dec 2019 08:56)      INTERVAL HPI / OVERNIGHT EVENTS: s/p chest tube,intubated and sedated    MEDICATIONS  (STANDING):  azithromycin  IVPB 500 milliGRAM(s) IV Intermittent every 24 hours  azithromycin  IVPB      cefepime   IVPB 2000 milliGRAM(s) IV Intermittent every 8 hours  chlorhexidine 0.12% Liquid 15 milliLiter(s) Oral Mucosa every 12 hours  chlorhexidine 4% Liquid 1 Application(s) Topical <User Schedule>  enoxaparin Injectable 40 milliGRAM(s) SubCutaneous daily  fentaNYL   Infusion. 0.5 MICROgram(s)/kG/Hr (2.92 mL/Hr) IV Continuous <Continuous>  folic acid 1 milliGRAM(s) Oral daily  influenza   Vaccine 0.5 milliLiter(s) IntraMuscular once  norepinephrine Infusion 0.05 MICROgram(s)/kG/Min (2.737 mL/Hr) IV Continuous <Continuous>  pantoprazole   Suspension 40 milliGRAM(s) Oral daily  propofol Infusion 15 MICROgram(s)/kG/Min (5.256 mL/Hr) IV Continuous <Continuous>  vancomycin  IVPB 1250 milliGRAM(s) IV Intermittent every 8 hours    MEDICATIONS  (PRN):  acetaminophen   Tablet .. 650 milliGRAM(s) Oral every 6 hours PRN Temp greater or equal to 38C (100.4F), Mild Pain (1 - 3)  sodium chloride 0.9% lock flush 10 milliLiter(s) IV Push every 1 hour PRN Pre/post blood products, medications, blood draw, and to maintain line patency      Vital Signs Last 24 Hrs  T(C): 36.1 (18 Dec 2019 15:43), Max: 38.2 (18 Dec 2019 04:45)  T(F): 96.9 (18 Dec 2019 15:43), Max: 100.7 (18 Dec 2019 04:45)  HR: 115 (18 Dec 2019 16:29) (86 - 115)  BP: 120/64 (18 Dec 2019 08:00) (111/53 - 134/73)  BP(mean): 75 (18 Dec 2019 08:00) (66 - 87)  RR: 30 (18 Dec 2019 15:30) (27 - 30)  SpO2: 96% (18 Dec 2019 16:29) (94% - 100%)    Review of systems:  cant be obtained         PHYSICAL EXAM:  General :NAD  Constitutional:thin built  Respiratory: CTAB/L  Cardiovascular: S1 and S2, RRR, no M/G/R  Gastrointestinal: BS+, soft, NT/ND  Extremities: No peripheral edema  Vascular: 2+ peripheral pulses  Skin: both sides chest tube Left SCV central line      LABS:                        7.6    23.94 )-----------( 150      ( 18 Dec 2019 03:52 )             21.7     12-18    136  |  104  |  23  ----------------------------<  111<H>  4.7   |  26  |  1.30    Ca    7.7<L>      18 Dec 2019 03:52  Phos  3.5     12-18  Mg     2.4     12-18    TPro  5.4<L>  /  Alb  2.4<L>  /  TBili  3.0<H>  /  DBili  x   /  AST  43<H>  /  ALT  21  /  AlkPhos  64  12-18          MICROBIOLOGY:  RECENT CULTURES:  12-17 .Sputum Sputum XXXX   Few polymorphonuclear leukocytes per low power field  Rare Squamous epithelial cells per low power field  Rare Gram Negative Rods seen per oil power field   No growth    12-16 .Blood peripheral blood smear XXXX XXXX   No Blood Parasites observed by giemsa stain  One negative set of blood smears does not rule out  the possibility of a parasitic infection.  A minimum of 3  specimens should be collected, at least 12-24 hours apart,  over a 36 hour time period.    12-15 .Blood Blood XXXX XXXX   No growth to date.          RADIOLOGY & ADDITIONAL STUDIES:

## 2019-12-19 ENCOUNTER — INPATIENT (INPATIENT)
Facility: HOSPITAL | Age: 19
LOS: 11 days | Discharge: HOME | End: 2019-12-31
Attending: INTERNAL MEDICINE | Admitting: INTERNAL MEDICINE
Payer: MEDICAID

## 2019-12-19 VITALS — HEART RATE: 83 BPM | RESPIRATION RATE: 30 BRPM

## 2019-12-19 VITALS — OXYGEN SATURATION: 99 % | HEART RATE: 94 BPM

## 2019-12-19 DIAGNOSIS — D57.1 SICKLE-CELL DISEASE WITHOUT CRISIS: ICD-10-CM

## 2019-12-19 LAB
ALBUMIN SERPL ELPH-MCNC: 2.4 G/DL — LOW (ref 3.3–5)
ALBUMIN SERPL ELPH-MCNC: 2.8 G/DL — LOW (ref 3.3–5)
ALP SERPL-CCNC: 68 U/L — SIGNIFICANT CHANGE UP (ref 40–120)
ALP SERPL-CCNC: 80 U/L — SIGNIFICANT CHANGE UP (ref 40–120)
ALT FLD-CCNC: 11 U/L — SIGNIFICANT CHANGE UP (ref 4–41)
ALT FLD-CCNC: 17 U/L — SIGNIFICANT CHANGE UP (ref 12–78)
ANION GAP SERPL CALC-SCNC: 11 MMO/L — SIGNIFICANT CHANGE UP (ref 7–14)
ANION GAP SERPL CALC-SCNC: 7 MMOL/L — SIGNIFICANT CHANGE UP (ref 5–17)
ANISOCYTOSIS BLD QL: SLIGHT — SIGNIFICANT CHANGE UP
APTT BLD: 27 SEC — LOW (ref 27.5–36.3)
AST SERPL-CCNC: 34 U/L — SIGNIFICANT CHANGE UP (ref 4–40)
AST SERPL-CCNC: 39 U/L — HIGH (ref 15–37)
BASE EXCESS BLDA CALC-SCNC: 3.2 MMOL/L — SIGNIFICANT CHANGE UP
BASE EXCESS BLDA CALC-SCNC: 3.7 MMOL/L — SIGNIFICANT CHANGE UP
BASOPHILS # BLD AUTO: 0.02 K/UL — SIGNIFICANT CHANGE UP (ref 0–0.2)
BASOPHILS # BLD AUTO: 0.03 K/UL — SIGNIFICANT CHANGE UP (ref 0–0.2)
BASOPHILS NFR BLD AUTO: 0.1 % — SIGNIFICANT CHANGE UP (ref 0–2)
BASOPHILS NFR BLD AUTO: 0.2 % — SIGNIFICANT CHANGE UP (ref 0–2)
BASOPHILS NFR SPEC: 0 % — SIGNIFICANT CHANGE UP (ref 0–2)
BILIRUB DIRECT SERPL-MCNC: 1.47 MG/DL — HIGH (ref 0.05–0.2)
BILIRUB INDIRECT FLD-MCNC: 0.8 MG/DL — SIGNIFICANT CHANGE UP (ref 0.2–1)
BILIRUB SERPL-MCNC: 2.2 MG/DL — HIGH (ref 0.2–1.2)
BILIRUB SERPL-MCNC: 2.3 MG/DL — HIGH (ref 0.2–1.2)
BLASTS # FLD: 0 % — SIGNIFICANT CHANGE UP (ref 0–0)
BLD GP AB SCN SERPL QL: NEGATIVE — SIGNIFICANT CHANGE UP
BLOOD GAS ARTERIAL - FIO2: 50 — SIGNIFICANT CHANGE UP
BLOOD GAS ARTERIAL - FIO2: 50 — SIGNIFICANT CHANGE UP
BUN SERPL-MCNC: 16 MG/DL — SIGNIFICANT CHANGE UP (ref 7–23)
BUN SERPL-MCNC: 20 MG/DL — SIGNIFICANT CHANGE UP (ref 7–23)
CALCIUM SERPL-MCNC: 7.9 MG/DL — LOW (ref 8.5–10.1)
CALCIUM SERPL-MCNC: 8.1 MG/DL — LOW (ref 8.4–10.5)
CHLORIDE BLDA-SCNC: 104 MMOL/L — SIGNIFICANT CHANGE UP (ref 96–108)
CHLORIDE BLDA-SCNC: 105 MMOL/L — SIGNIFICANT CHANGE UP (ref 96–108)
CHLORIDE SERPL-SCNC: 101 MMOL/L — SIGNIFICANT CHANGE UP (ref 98–107)
CHLORIDE SERPL-SCNC: 104 MMOL/L — SIGNIFICANT CHANGE UP (ref 96–108)
CO2 SERPL-SCNC: 24 MMOL/L — SIGNIFICANT CHANGE UP (ref 22–31)
CO2 SERPL-SCNC: 28 MMOL/L — SIGNIFICANT CHANGE UP (ref 22–31)
CREAT SERPL-MCNC: 0.85 MG/DL — SIGNIFICANT CHANGE UP (ref 0.5–1.3)
CREAT SERPL-MCNC: 1 MG/DL — SIGNIFICANT CHANGE UP (ref 0.5–1.3)
CULTURE RESULTS: NO GROWTH — SIGNIFICANT CHANGE UP
CULTURE RESULTS: SIGNIFICANT CHANGE UP
EOSINOPHIL # BLD AUTO: 0.39 K/UL — SIGNIFICANT CHANGE UP (ref 0–0.5)
EOSINOPHIL # BLD AUTO: 0.54 K/UL — HIGH (ref 0–0.5)
EOSINOPHIL NFR BLD AUTO: 2.2 % — SIGNIFICANT CHANGE UP (ref 0–6)
EOSINOPHIL NFR BLD AUTO: 2.7 % — SIGNIFICANT CHANGE UP (ref 0–6)
EOSINOPHIL NFR FLD: 5.3 % — SIGNIFICANT CHANGE UP (ref 0–6)
FIBRINOGEN PPP-MCNC: 643.4 MG/DL — HIGH (ref 350–510)
FIBRINOGEN PPP-MCNC: 666.8 MG/DL — HIGH (ref 350–510)
GIANT PLATELETS BLD QL SMEAR: PRESENT — SIGNIFICANT CHANGE UP
GLUCOSE BLDA-MCNC: 90 MG/DL — SIGNIFICANT CHANGE UP (ref 70–99)
GLUCOSE BLDA-MCNC: 97 MG/DL — SIGNIFICANT CHANGE UP (ref 70–99)
GLUCOSE BLDC GLUCOMTR-MCNC: 85 MG/DL — SIGNIFICANT CHANGE UP (ref 70–99)
GLUCOSE SERPL-MCNC: 87 MG/DL — SIGNIFICANT CHANGE UP (ref 70–99)
GLUCOSE SERPL-MCNC: 89 MG/DL — SIGNIFICANT CHANGE UP (ref 70–99)
GRAM STN FLD: SIGNIFICANT CHANGE UP
GRAM STN SPT: SIGNIFICANT CHANGE UP
HCO3 BLDA-SCNC: 27 MMOL/L — HIGH (ref 22–26)
HCO3 BLDA-SCNC: 28 MMOL/L — HIGH (ref 22–26)
HCT VFR BLD CALC: 19.4 % — CRITICAL LOW (ref 39–50)
HCT VFR BLD CALC: 19.7 % — CRITICAL LOW (ref 39–50)
HCT VFR BLDA CALC: 19.8 % — CRITICAL LOW (ref 39–51)
HCT VFR BLDA CALC: 20.2 % — CRITICAL LOW (ref 39–51)
HEMOGLOBIN INTERPRETATION: SIGNIFICANT CHANGE UP
HGB A MFR BLD: 56.7 % — LOW
HGB A2 MFR BLD: 3.3 % — SIGNIFICANT CHANGE UP (ref 2.4–3.5)
HGB BLD-MCNC: 6.5 G/DL — CRITICAL LOW (ref 13–17)
HGB BLD-MCNC: 6.6 G/DL — CRITICAL LOW (ref 13–17)
HGB BLDA-MCNC: 6.3 G/DL — CRITICAL LOW (ref 13–17)
HGB BLDA-MCNC: 6.4 G/DL — CRITICAL LOW (ref 13–17)
HGB ELECT COMMENT: SIGNIFICANT CHANGE UP
HGB F MFR BLD: < 1 % — SIGNIFICANT CHANGE UP (ref 0–1.5)
HGB S BLD QL: POSITIVE — SIGNIFICANT CHANGE UP
HGB S MFR BLD: 39.2 % — HIGH (ref 0–0)
HOWELL-JOLLY BOD BLD QL SMEAR: PRESENT — SIGNIFICANT CHANGE UP
IMM GRANULOCYTES NFR BLD AUTO: 0.8 % — SIGNIFICANT CHANGE UP (ref 0–1.5)
IMM GRANULOCYTES NFR BLD AUTO: 1.2 % — SIGNIFICANT CHANGE UP (ref 0–1.5)
INR BLD: 1.04 — SIGNIFICANT CHANGE UP (ref 0.88–1.17)
LACTATE BLDA-SCNC: 0.9 MMOL/L — SIGNIFICANT CHANGE UP (ref 0.5–2)
LACTATE BLDA-SCNC: 0.9 MMOL/L — SIGNIFICANT CHANGE UP (ref 0.5–2)
LDH SERPL L TO P-CCNC: 366 U/L — HIGH (ref 135–225)
LYMPHOCYTES # BLD AUTO: 18.6 % — SIGNIFICANT CHANGE UP (ref 13–44)
LYMPHOCYTES # BLD AUTO: 19 % — SIGNIFICANT CHANGE UP (ref 13–44)
LYMPHOCYTES # BLD AUTO: 3.42 K/UL — HIGH (ref 1–3.3)
LYMPHOCYTES # BLD AUTO: 3.68 K/UL — HIGH (ref 1–3.3)
LYMPHOCYTES NFR SPEC AUTO: 18.7 % — SIGNIFICANT CHANGE UP (ref 13–44)
MACROCYTES BLD QL: SLIGHT — SIGNIFICANT CHANGE UP
MAGNESIUM SERPL-MCNC: 2.7 MG/DL — HIGH (ref 1.6–2.6)
MAGNESIUM SERPL-MCNC: 2.8 MG/DL — HIGH (ref 1.6–2.6)
MCHC RBC-ENTMCNC: 27.5 PG — SIGNIFICANT CHANGE UP (ref 27–34)
MCHC RBC-ENTMCNC: 27.8 PG — SIGNIFICANT CHANGE UP (ref 27–34)
MCHC RBC-ENTMCNC: 33.5 % — SIGNIFICANT CHANGE UP (ref 32–36)
MCHC RBC-ENTMCNC: 33.5 GM/DL — SIGNIFICANT CHANGE UP (ref 32–36)
MCV RBC AUTO: 82.1 FL — SIGNIFICANT CHANGE UP (ref 80–100)
MCV RBC AUTO: 82.9 FL — SIGNIFICANT CHANGE UP (ref 80–100)
METAMYELOCYTES # FLD: 0 % — SIGNIFICANT CHANGE UP (ref 0–1)
MICROCYTES BLD QL: SLIGHT — SIGNIFICANT CHANGE UP
MONOCYTES # BLD AUTO: 0.85 K/UL — SIGNIFICANT CHANGE UP (ref 0–0.9)
MONOCYTES # BLD AUTO: 0.85 K/UL — SIGNIFICANT CHANGE UP (ref 0–0.9)
MONOCYTES NFR BLD AUTO: 4.3 % — SIGNIFICANT CHANGE UP (ref 2–14)
MONOCYTES NFR BLD AUTO: 4.7 % — SIGNIFICANT CHANGE UP (ref 2–14)
MONOCYTES NFR BLD: 0 % — LOW (ref 2–9)
MYELOCYTES NFR BLD: 0 % — SIGNIFICANT CHANGE UP (ref 0–0)
NEUTROPHIL AB SER-ACNC: 68 % — SIGNIFICANT CHANGE UP (ref 43–77)
NEUTROPHILS # BLD AUTO: 13.1 K/UL — HIGH (ref 1.8–7.4)
NEUTROPHILS # BLD AUTO: 14.55 K/UL — HIGH (ref 1.8–7.4)
NEUTROPHILS NFR BLD AUTO: 72.8 % — SIGNIFICANT CHANGE UP (ref 43–77)
NEUTROPHILS NFR BLD AUTO: 73.4 % — SIGNIFICANT CHANGE UP (ref 43–77)
NEUTS BAND # BLD: 8 % — HIGH (ref 0–6)
NRBC # BLD: 2 /100 WBCS — HIGH (ref 0–0)
NRBC # BLD: 8 /100WBC — SIGNIFICANT CHANGE UP
NRBC # FLD: 0.32 K/UL — SIGNIFICANT CHANGE UP (ref 0–0)
NRBC FLD-RTO: 1.8 — SIGNIFICANT CHANGE UP
OTHER - HEMATOLOGY %: 0 — SIGNIFICANT CHANGE UP
PCO2 BLDA: 39 MMHG — SIGNIFICANT CHANGE UP (ref 35–48)
PCO2 BLDA: 44 MMHG — SIGNIFICANT CHANGE UP (ref 35–48)
PH BLDA: 7.42 PH — SIGNIFICANT CHANGE UP (ref 7.35–7.45)
PH BLDA: 7.46 PH — HIGH (ref 7.35–7.45)
PHOSPHATE SERPL-MCNC: 2.4 MG/DL — LOW (ref 2.5–4.5)
PHOSPHATE SERPL-MCNC: 2.9 MG/DL — SIGNIFICANT CHANGE UP (ref 2.5–4.5)
PLATELET # BLD AUTO: 177 K/UL — SIGNIFICANT CHANGE UP (ref 150–400)
PLATELET # BLD AUTO: 182 K/UL — SIGNIFICANT CHANGE UP (ref 150–400)
PLATELET COUNT - ESTIMATE: NORMAL — SIGNIFICANT CHANGE UP
PMV BLD: 10.6 FL — SIGNIFICANT CHANGE UP (ref 7–13)
PO2 BLDA: 139 MMHG — HIGH (ref 83–108)
PO2 BLDA: 99 MMHG — SIGNIFICANT CHANGE UP (ref 83–108)
POIKILOCYTOSIS BLD QL AUTO: SIGNIFICANT CHANGE UP
POLYCHROMASIA BLD QL SMEAR: SIGNIFICANT CHANGE UP
POTASSIUM BLDA-SCNC: 3.5 MMOL/L — SIGNIFICANT CHANGE UP (ref 3.4–4.5)
POTASSIUM BLDA-SCNC: 3.7 MMOL/L — SIGNIFICANT CHANGE UP (ref 3.4–4.5)
POTASSIUM SERPL-MCNC: 3.8 MMOL/L — SIGNIFICANT CHANGE UP (ref 3.5–5.3)
POTASSIUM SERPL-MCNC: 4.3 MMOL/L — SIGNIFICANT CHANGE UP (ref 3.5–5.3)
POTASSIUM SERPL-SCNC: 3.8 MMOL/L — SIGNIFICANT CHANGE UP (ref 3.5–5.3)
POTASSIUM SERPL-SCNC: 4.3 MMOL/L — SIGNIFICANT CHANGE UP (ref 3.5–5.3)
PROMYELOCYTES # FLD: 0 % — SIGNIFICANT CHANGE UP (ref 0–0)
PROT SERPL-MCNC: 5.4 G/DL — LOW (ref 6–8.3)
PROT SERPL-MCNC: 5.9 GM/DL — LOW (ref 6–8.3)
PROTHROM AB SERPL-ACNC: 11.6 SEC — SIGNIFICANT CHANGE UP (ref 9.8–13.1)
RBC # BLD: 2.34 M/UL — LOW (ref 4.2–5.8)
RBC # BLD: 2.4 M/UL — LOW (ref 4.2–5.8)
RBC # FLD: 20.5 % — HIGH (ref 10.3–14.5)
RBC # FLD: 20.7 % — HIGH (ref 10.3–14.5)
RETICS #: 280 K/UL — HIGH (ref 25–125)
RETICS/RBC NFR: 12.7 % — HIGH (ref 0.5–2.5)
RH IG SCN BLD-IMP: POSITIVE — SIGNIFICANT CHANGE UP
RH IG SCN BLD-IMP: POSITIVE — SIGNIFICANT CHANGE UP
SAO2 % BLDA: 97.6 % — SIGNIFICANT CHANGE UP (ref 95–99)
SAO2 % BLDA: 99.4 % — HIGH (ref 95–99)
SICKLE CELLS BLD QL SMEAR: SLIGHT — SIGNIFICANT CHANGE UP
SMUDGE CELLS # BLD: PRESENT — SIGNIFICANT CHANGE UP
SODIUM BLDA-SCNC: 137 MMOL/L — SIGNIFICANT CHANGE UP (ref 136–146)
SODIUM BLDA-SCNC: 138 MMOL/L — SIGNIFICANT CHANGE UP (ref 136–146)
SODIUM SERPL-SCNC: 136 MMOL/L — SIGNIFICANT CHANGE UP (ref 135–145)
SODIUM SERPL-SCNC: 139 MMOL/L — SIGNIFICANT CHANGE UP (ref 135–145)
SOLUBILITY: POSITIVE — SIGNIFICANT CHANGE UP
SPECIMEN SOURCE: SIGNIFICANT CHANGE UP
TARGETS BLD QL SMEAR: SIGNIFICANT CHANGE UP
VANCOMYCIN FLD-MCNC: 12.1 UG/ML — SIGNIFICANT CHANGE UP
VANCOMYCIN FLD-MCNC: 17.7 UG/ML — SIGNIFICANT CHANGE UP
VARIANT LYMPHS # BLD: 0 % — SIGNIFICANT CHANGE UP
WBC # BLD: 17.99 K/UL — HIGH (ref 3.8–10.5)
WBC # BLD: 19.81 K/UL — HIGH (ref 3.8–10.5)
WBC # FLD AUTO: 17.99 K/UL — HIGH (ref 3.8–10.5)
WBC # FLD AUTO: 19.81 K/UL — HIGH (ref 3.8–10.5)

## 2019-12-19 PROCEDURE — 93306 TTE W/DOPPLER COMPLETE: CPT | Mod: 26

## 2019-12-19 PROCEDURE — 99253 IP/OBS CNSLTJ NEW/EST LOW 45: CPT | Mod: GC

## 2019-12-19 PROCEDURE — 99291 CRITICAL CARE FIRST HOUR: CPT

## 2019-12-19 RX ORDER — CEFEPIME 1 G/1
2000 INJECTION, POWDER, FOR SOLUTION INTRAMUSCULAR; INTRAVENOUS EVERY 8 HOURS
Refills: 0 | Status: DISCONTINUED | OUTPATIENT
Start: 2019-12-19 | End: 2019-12-22

## 2019-12-19 RX ORDER — CHLORHEXIDINE GLUCONATE 213 G/1000ML
15 SOLUTION TOPICAL EVERY 12 HOURS
Refills: 0 | Status: DISCONTINUED | OUTPATIENT
Start: 2019-12-19 | End: 2019-12-22

## 2019-12-19 RX ORDER — VANCOMYCIN HCL 1 G
VIAL (EA) INTRAVENOUS
Refills: 0 | Status: DISCONTINUED | OUTPATIENT
Start: 2019-12-19 | End: 2019-12-22

## 2019-12-19 RX ORDER — FENTANYL CITRATE 50 UG/ML
3 INJECTION INTRAVENOUS
Qty: 2500 | Refills: 0 | Status: DISCONTINUED | OUTPATIENT
Start: 2019-12-19 | End: 2019-12-22

## 2019-12-19 RX ORDER — AZITHROMYCIN 500 MG/1
500 TABLET, FILM COATED ORAL DAILY
Refills: 0 | Status: DISCONTINUED | OUTPATIENT
Start: 2019-12-19 | End: 2019-12-20

## 2019-12-19 RX ORDER — VANCOMYCIN HCL 1 G
1250 VIAL (EA) INTRAVENOUS EVERY 12 HOURS
Refills: 0 | Status: DISCONTINUED | OUTPATIENT
Start: 2019-12-19 | End: 2019-12-22

## 2019-12-19 RX ORDER — PROPOFOL 10 MG/ML
45 INJECTION, EMULSION INTRAVENOUS
Qty: 1000 | Refills: 0 | Status: DISCONTINUED | OUTPATIENT
Start: 2019-12-19 | End: 2019-12-19

## 2019-12-19 RX ORDER — POTASSIUM PHOSPHATE, MONOBASIC POTASSIUM PHOSPHATE, DIBASIC 236; 224 MG/ML; MG/ML
15 INJECTION, SOLUTION INTRAVENOUS ONCE
Refills: 0 | Status: COMPLETED | OUTPATIENT
Start: 2019-12-19 | End: 2019-12-19

## 2019-12-19 RX ORDER — MIDAZOLAM HYDROCHLORIDE 1 MG/ML
4 INJECTION, SOLUTION INTRAMUSCULAR; INTRAVENOUS ONCE
Refills: 0 | Status: DISCONTINUED | OUTPATIENT
Start: 2019-12-19 | End: 2019-12-19

## 2019-12-19 RX ORDER — CHLORHEXIDINE GLUCONATE 213 G/1000ML
1 SOLUTION TOPICAL
Refills: 0 | Status: DISCONTINUED | OUTPATIENT
Start: 2019-12-19 | End: 2019-12-24

## 2019-12-19 RX ORDER — HEPARIN SODIUM 5000 [USP'U]/ML
5000 INJECTION INTRAVENOUS; SUBCUTANEOUS EVERY 8 HOURS
Refills: 0 | Status: DISCONTINUED | OUTPATIENT
Start: 2019-12-19 | End: 2019-12-31

## 2019-12-19 RX ORDER — SODIUM CHLORIDE 9 MG/ML
1000 INJECTION, SOLUTION INTRAVENOUS
Refills: 0 | Status: DISCONTINUED | OUTPATIENT
Start: 2019-12-19 | End: 2019-12-19

## 2019-12-19 RX ORDER — PROPOFOL 10 MG/ML
45 INJECTION, EMULSION INTRAVENOUS
Qty: 1000 | Refills: 0 | Status: DISCONTINUED | OUTPATIENT
Start: 2019-12-19 | End: 2019-12-22

## 2019-12-19 RX ORDER — ENOXAPARIN SODIUM 100 MG/ML
40 INJECTION SUBCUTANEOUS DAILY
Refills: 0 | Status: DISCONTINUED | OUTPATIENT
Start: 2019-12-19 | End: 2019-12-19

## 2019-12-19 RX ORDER — PROPOFOL 10 MG/ML
45 INJECTION, EMULSION INTRAVENOUS
Qty: 500 | Refills: 0 | Status: DISCONTINUED | OUTPATIENT
Start: 2019-12-19 | End: 2019-12-19

## 2019-12-19 RX ORDER — ACETAMINOPHEN 500 MG
1000 TABLET ORAL ONCE
Refills: 0 | Status: COMPLETED | OUTPATIENT
Start: 2019-12-19 | End: 2019-12-19

## 2019-12-19 RX ORDER — HEPARIN SODIUM 5000 [USP'U]/ML
5000 INJECTION INTRAVENOUS; SUBCUTANEOUS EVERY 8 HOURS
Refills: 0 | Status: DISCONTINUED | OUTPATIENT
Start: 2019-12-19 | End: 2019-12-19

## 2019-12-19 RX ORDER — SODIUM CHLORIDE 9 MG/ML
1000 INJECTION, SOLUTION INTRAVENOUS
Refills: 0 | Status: DISCONTINUED | OUTPATIENT
Start: 2019-12-19 | End: 2019-12-20

## 2019-12-19 RX ORDER — NOREPINEPHRINE BITARTRATE/D5W 8 MG/250ML
0.18 PLASTIC BAG, INJECTION (ML) INTRAVENOUS
Qty: 16 | Refills: 0 | Status: DISCONTINUED | OUTPATIENT
Start: 2019-12-19 | End: 2019-12-21

## 2019-12-19 RX ORDER — VANCOMYCIN HCL 1 G
1250 VIAL (EA) INTRAVENOUS ONCE
Refills: 0 | Status: COMPLETED | OUTPATIENT
Start: 2019-12-19 | End: 2019-12-19

## 2019-12-19 RX ADMIN — AZITHROMYCIN 255 MILLIGRAM(S): 500 TABLET, FILM COATED ORAL at 17:21

## 2019-12-19 RX ADMIN — CHLORHEXIDINE GLUCONATE 1 APPLICATION(S): 213 SOLUTION TOPICAL at 13:17

## 2019-12-19 RX ADMIN — Medication 10.41 MICROGRAM(S)/KG/MIN: at 20:27

## 2019-12-19 RX ADMIN — FENTANYL CITRATE 100 MICROGRAM(S): 50 INJECTION INTRAVENOUS at 20:56

## 2019-12-19 RX ADMIN — POTASSIUM PHOSPHATE, MONOBASIC POTASSIUM PHOSPHATE, DIBASIC 62.5 MILLIMOLE(S): 236; 224 INJECTION, SOLUTION INTRAVENOUS at 09:29

## 2019-12-19 RX ADMIN — SODIUM CHLORIDE 75 MILLILITER(S): 9 INJECTION, SOLUTION INTRAVENOUS at 09:29

## 2019-12-19 RX ADMIN — FENTANYL CITRATE 18.51 MICROGRAM(S)/KG/HR: 50 INJECTION INTRAVENOUS at 20:26

## 2019-12-19 RX ADMIN — HEPARIN SODIUM 5000 UNIT(S): 5000 INJECTION INTRAVENOUS; SUBCUTANEOUS at 22:08

## 2019-12-19 RX ADMIN — Medication 400 MILLIGRAM(S): at 20:27

## 2019-12-19 RX ADMIN — CHLORHEXIDINE GLUCONATE 15 MILLILITER(S): 213 SOLUTION TOPICAL at 17:20

## 2019-12-19 RX ADMIN — MIDAZOLAM HYDROCHLORIDE 4 MILLIGRAM(S): 1 INJECTION, SOLUTION INTRAMUSCULAR; INTRAVENOUS at 20:28

## 2019-12-19 RX ADMIN — FENTANYL CITRATE 100 MICROGRAM(S): 50 INJECTION INTRAVENOUS at 20:26

## 2019-12-19 RX ADMIN — CEFEPIME 100 MILLIGRAM(S): 1 INJECTION, POWDER, FOR SOLUTION INTRAMUSCULAR; INTRAVENOUS at 09:28

## 2019-12-19 RX ADMIN — HEPARIN SODIUM 5000 UNIT(S): 5000 INJECTION INTRAVENOUS; SUBCUTANEOUS at 13:17

## 2019-12-19 RX ADMIN — Medication 166.67 MILLIGRAM(S): at 22:08

## 2019-12-19 RX ADMIN — CEFEPIME 100 MILLIGRAM(S): 1 INJECTION, POWDER, FOR SOLUTION INTRAMUSCULAR; INTRAVENOUS at 17:20

## 2019-12-19 RX ADMIN — Medication 166.67 MILLIGRAM(S): at 10:51

## 2019-12-19 NOTE — CONSULT NOTE ADULT - SUBJECTIVE AND OBJECTIVE BOX
Hematology Consult Note    HPI:  20 y/o male w/sickle cell no prior acute chest, last crisis 2-3 years ago, transferred from [facility] to [facility] on 12/18/19 for management of acute chest syndrome 2/2 sickle cell disease. History obtained from chart (has two MRNs) and from primary team as patient was intubated. Per chart, Mr. Morrison initially presented on 12/14/19, had recently returned from [facility] ~12/9/19, complaining of chest and bone pain to the ER provider. Denied any SOB, stating that his bones hurt; did endorse decreased PO intake for few days with travels. Denied any fevers, chills, SOB, palpitations, N/V/D/C; CTA in ED was negative for PE or consolidation. Patient was admitted to the medicine service and placed on telemetry for acute pain crisis and chest pain.  Patient was noted to have progressive fevers, and increasing respiratory distress with increasing oxygen requirements.  The MICU was consulted after two rapid responses on  12/16/19 with increasing O2 requirements requiring hi-flow O2 and BIPAP with concern for acute chest syndrome and chest xray now showing bilateral diffuse infiltrates.  While awaiting for red cell exchange transfusion, patient was noted to have increasing work of breathing with persistent hypoxia and increasing anxiety.  Patient was intubated on 12/16/19 prior to red cell exchange and started on lung protective strategies with low tidal volume and high PEEP.  Patient successfully received exchange transfusion with notable initial Hb electrophoresis of HbS of 94.2%.     On the morning of 12/17/19 during rounds, patient was biting (clenching down) his ETT and HR started to decrease. Bite block placed and pt difficult to bag; decision made to remove ETT and re-intubate pt. Pt desaturated and 1st attempt was esophagus, 2nd attempt ETT placed in trach. Post intubation saturated in the low 80s and CXR revealed bilateral pneumothorax and subsequent bilateral pigtail catheters placed with re-expansion of the lungs. Initially pt on FiO2 100% PEEP 14, which was weaned to FiO2 60% PEEP 14 with SpO2 98%.  Patient received plasma exchange on 12/17/19 starting at 2200, and tolerated procedure well with LDH, haptoglobin, d-dimer, bilirubin and Creatinine improving.  Given patient's persistent hypoxia and acute respiratory distress syndrome, remaining vent dependent, plasma exchange on 12/18/19 was repeated.  Transfer to [facility] was arranged for continuing management of acute chest and pneumothorax. On day of transfer (12/18/19) vitals were RR 30, , PEEP 10, FIO2 60% with an ABG noted to be 7.38/44/85/95% on FiO2 50% at ~8:00 AM.    PAST MEDICAL & SURGICAL HISTORY:  Sickle cell disease      FAMILY HISTORY: Unable to obtain from patient (intubated)    MEDICATIONS  (STANDING):  azithromycin  IVPB 500 milliGRAM(s) IV Intermittent daily  cefepime   IVPB 2000 milliGRAM(s) IV Intermittent every 8 hours  chlorhexidine 0.12% Liquid 15 milliLiter(s) Oral Mucosa every 12 hours  chlorhexidine 4% Liquid 1 Application(s) Topical <User Schedule>  fentaNYL   Infusion 3 MICROgram(s)/kG/Hr (18.51 mL/Hr) IV Continuous <Continuous>  heparin  Injectable 5000 Unit(s) SubCutaneous every 8 hours  lactated ringers. 1000 milliLiter(s) (75 mL/Hr) IV Continuous <Continuous>  norepinephrine Infusion 0.18 MICROgram(s)/kG/Min (10.412 mL/Hr) IV Continuous <Continuous>  propofol Infusion 45 MICROgram(s)/kG/Min (16.659 mL/Hr) IV Continuous <Continuous>  vancomycin  IVPB 1250 milliGRAM(s) IV Intermittent every 12 hours  vancomycin  IVPB        MEDICATIONS  (PRN):      Allergies: No Known Allergies / Intolerances    SOCIAL HISTORY: unable to obtain    REVIEW OF SYSTEMS: unable to obtain    Height (cm): 180.3 (12-19 @ 05:01)  Weight (kg): 61.7 (12-19 @ 05:01)  BMI (kg/m2): 19 (12-19 @ 05:01)  BSA (m2): 1.79 (12-19 @ 05:01)      ICU Vital Signs Last 24 Hrs  T(C): 37.6 (19 Dec 2019 08:00), Max: 37.7 (19 Dec 2019 05:01)  T(F): 99.7 (19 Dec 2019 08:00), Max: 99.8 (19 Dec 2019 05:01)  HR: 87 (19 Dec 2019 11:00) (83 - 100)  BP: 113/64 (19 Dec 2019 07:00) (113/64 - 129/80)  BP(mean): 78 (19 Dec 2019 07:00) (78 - 94)  ABP: 107/63 (19 Dec 2019 11:00) (101/57 - 122/67)  ABP(mean): 80 (19 Dec 2019 11:00) (74 - 87)  RR: 30 (19 Dec 2019 11:00) (30 - 30)  SpO2: 100% (19 Dec 2019 11:00) (99% - 100%)      GENERAL: Intubated, on sedation but arousable, follows simple commands but unable to communicate  HEAD:  Atraumatic, Normocephalic  EYES: conjunctiva and sclera clear  NECK: Supple, No JVD  CHEST/LUNG: Clear to auscultation bilaterally; No wheeze; palpable subcutaneous emphysema near chest tube site  HEART: Regular rate and rhythm; No murmurs, rubs, or gallops  ABDOMEN: Soft, Nontender, Nondistended; Bowel sounds present  EXTREMITIES:  2+ Peripheral Pulses, No clubbing, cyanosis, or edema  NEUROLOGY: non-focal  SKIN: No rashes or lesions                          6.5    17.99 )-----------( 182      ( 19 Dec 2019 06:30 )             19.4       12-19    136  |  101  |  16  ----------------------------<  89  3.8   |  24  |  0.85    Ca    8.1<L>      19 Dec 2019 06:30  Phos  2.4     12-19  Mg     2.7     12-19    TPro  5.4<L>  /  Alb  2.8<L>  /  TBili  2.2<H>  /  DBili  x   /  AST  34  /  ALT  11  /  AlkPhos  68  12-19      Phosphorus Level, Serum: 2.4 mg/dL (12-19 @ 06:30)  Magnesium, Serum: 2.7 mg/dL (12-19 @ 06:30)  Lactate Dehydrogenase, Serum: 366 U/L (12-19 @ 06:30)      Labs w/u From [place] reviewed:    Hemoglobin electrophoresis on 12/17/19  H A%  0.0  H A2% 3.6  H F%  2.2  H S%  94.2    Initial CBC on 12/14/10:  WBC 17.8 (12.99 N), Hgb 7.7, plt 209; MCV 77.8; RBC 2.66  Peripheral smear showed: Moderate sickle cells; Murry-Jolly Bodies; Slight target cells; Slight tear drops    Reticulocyte count 137.5 (5.7%)    Blood gas on 12/14/19: 7.38 / 37 / 133 / 22 / 98%    Blood cultures from 12/15/19 negative    RVP negative  MRSA screen +    HIV negative  ?Dengue work up pending  Malaria negative    [place] [facility]:    CTA on 12/14/19: no acute findings (no pulmonary emboli, no consolidation, no masses, no effusions)    CXR 12/15/19: Developing moderate bilateral mid to lower lung zone airspace   disease and/or effusion.     CXR 12/16/19: Developing moderate bilateral mid to lower lung zone airspace   disease and/or effusion.     CXR 12/16/19: There is a right-sided hemodialysis catheter with the tip in   the SVC. There are increased moderate bilateral pleural effusions as well   as bilateral patchy airspace opacities, right greater than left. Heart   size cannot be accurately evaluated in this projection.    CXR 12/17/19:  There is an endotracheal tube with the tip seen two vertebral   bodies above the aishwarya. There is an NG tube seen with the tip in the   stomach. There is a right internal jugular central line with the tip in   the region of the SVC. There are bilateral diffuse patchy airspace   opacities as well as moderate bilateral pleural effusions, right greater   than left, with associated atelectasis and/or pneumonia. Multiple   air-containing small bowel loops in the left hemiabdomen.    CXR 12/17/19: There are new small bilateral pneumothoraces as well as   pneumomediastinum. Air is seen tracking up into the subcutaneous tissues   of the neck. Subcutaneous emphysema overlies the clavicles bilaterally.   There are increased bilateral patchy airspace opacities with near   complete opacification of the lungs. Small bilateral pleural effusions.   Lines and tubes are unchanged.    CXR 12/17/19: Patient has sepsis and ARDS. Patient has bilateral chest tubes.  Heart magnified by technique.  Diffuse advanced infiltration throughout all lung fields again noted.  There is subcutaneous emphysema again seen in the neck.  Bilateral catheter chest tubes are noted which are new since study   earlier in the day. There is no visible pneumothorax at this time. Small   bilateral pneumothoraces were seen earlier.  Endotracheal tube, nasogastric tube and right jugular line remain similar   to prior.  Additionally there is a left subclavian line also now present.    CXR 12/18/19: Right lung infiltration is stable.  Small right pleural effusion now present.  Minimal improvement in left lung infiltration.

## 2019-12-19 NOTE — CONSULT NOTE ADULT - ATTENDING COMMENTS
Pt seen in consultation. Transferred from [facility] after exchange transfusion for acute chest, admitted with painful crisis, the developed ACS. HGB EP shows 30% plus S, has sickle beta 0 thal. Intubated and sedated, family at bedside. Reviewed smear, has many blister cells and may have some hemolysis from G6PD deficiency. I agree with Dr Burnham' assessment and plan., except I don't feel another exchange is required, simple exchange will get the HGB s to <30%, the goal. He has bilateral pigtail chest tubes for pneumothorax.

## 2019-12-19 NOTE — H&P ADULT - ASSESSMENT
20 y/o M from [facility] visiting [place] the [place] with PMH sickle cell disease (prior asymptomatic, no hx of blood transfusion) and malaria on anti-malarials at home who p/w chest and bone pain c/b acute chest syndrome s/p intubation for hypoxic respiratory failure s/p RBC exchange transfusion x1 and plasma exchange x2 c/b b/l PTX/pneumomediastinum s/p b/l pigtail catheters txferred to [facility] for further management and heme consultation.   #Neuro  -Intubated and sedated    #Resp  Hypoxic resp failure intubated found to have b/l pneumothoraces s/p b/l pigtail catheters  -MRSA PCR; vanc 1g q12  -c/w cefipime and azithro for acute chest  -pneumomediastinum; will monitor and consult surg if necessary    #CV  -mild elevated trops likely due to demand ischemia  -will cont to monitor    #GI  -NPO  -no active issues    #ID  -c/w cefipime and azithro for acute chest    #Renal    #Heme    #Endo    #DVT PPx    #GOC/Ethics 18 y/o M from [facility] [place] the [place] with PMH sickle cell disease (prior asymptomatic, no hx of blood transfusion) and malaria on anti-malarials at home who p/w chest and bone pain c/b acute chest syndrome s/p intubation for hypoxic respiratory failure s/p RBC exchange transfusion x1 and plasma exchange x2 c/b b/l PTX/pneumomediastinum s/p b/l pigtail catheters txferred to [facility] for further management and heme consultation.   #Neuro  -Intubated and sedated    #Resp  Hypoxic resp failure intubated found to have b/l pneumothoraces s/p b/l pigtail catheters  -MRSA PCR; vanc 1g q12  -c/w cefipime and azithro for acute chest  -pneumomediastinum; will monitor and consult surg if necessary    #CV  - on levo  -mild elevated trops likely due to demand ischemia  -will cont to monitor    #GI  -NPO  -no active issues    #ID  -c/w cefipime and azithro for acute chest    #Renal  Mild ISABELL likely pre renal  -SCr 1 and trending down; baseline 0.6      #Heme  Sickle cell anemia c/b acute chest   -Hb 6.6; baseline 7-8, pending exchange transfusion  -heme consulted  -will cont to monitor    #Endo    #DVT PPx  -SQH    #GOC/Ethics 20 y/o M from [facility] [place] the [place] with PMH sickle cell disease (prior asymptomatic, no hx of blood transfusion) and malaria on anti-malarials at home who p/w chest and bone pain c/b acute chest syndrome s/p intubation for hypoxic respiratory failure s/p RBC exchange transfusion x1 and plasma exchange x2 c/b b/l PTX/pneumomediastinum s/p b/l pigtail catheters txferred to [facility] for further management and heme consultation.   #Neuro  -Intubated and sedated    #Resp  Hypoxic resp failure intubated found to have b/l pneumothoraces s/p b/l pigtail catheters  -MRSA PCR; vanc 1g q12  -c/w cefipime and azithro for acute chest  -pneumomediastinum; will monitor and consult surg if necessary    #CV  - on levo  -mild elevated trops likely due to demand ischemia  -will cont to monitor    #GI  -NPO  -no active issues    #ID  -c/w cefipime and azithro for acute chest    #Renal  Mild ISABELL likely pre renal  -SCr 1 and trending down; baseline 0.6    #Heme  Sickle cell anemia c/b acute chest   -IVF, folic acid   -Hb 6.6; baseline 7-8, pending exchange transfusion  -heme consulted  -will cont to monitor    #Endo  -No active issues    #DVT PPx  -SQH    #GOC/Ethics 18 y/o M from [facility] visiting [place] the [place] with PMH sickle cell disease (prior asymptomatic, no hx of blood transfusion) and malaria on anti-malarials at home who p/w chest and bone pain c/b acute chest syndrome s/p intubation for hypoxic respiratory failure s/p RBC exchange transfusion x1 and plasma exchange x2 c/b b/l PTX/pneumomediastinum s/p b/l pigtail catheters txferred to [facility] for further management and heme consultation.   #Neuro  -Intubated and sedated    #Resp  Hypoxic resp failure intubated found to have b/l pneumothoraces s/p b/l pigtail catheters  -MRSA PCR; vanc 1g q12  -c/w cefipime and azithro for acute chest  -pneumomediastinum; will monitor and consult surg if necessary    #CV  - on levo  -mild elevated trops likely due to demand ischemia  -will cont to monitor    #GI  -NPO  -no active issues    #ID  -c/w cefipime and azithro for acute chest  -Leukocytosis;12/15 blood cx neg    #Renal  Mild ISABELL likely pre renal  -SCr 1 and trending down; baseline 0.6    #Heme  Sickle cell anemia c/b acute chest   -IVF, folic acid   -Hb 6.6; baseline 7-8, pending exchange transfusion  - blood smear negative  -heme consulted  -will cont to monitor    #Endo  -No active issues    #DVT PPx  -SQH    #GOC/Ethics 18 y/o M from [facility] [place] the [place] with PMH sickle cell disease (prior asymptomatic, no hx of blood transfusion) and malaria on anti-malarials at home who p/w chest and bone pain c/b acute chest syndrome s/p intubation for hypoxic respiratory failure s/p RBC exchange transfusion x1 and plasma exchange x2 c/b b/l PTX/pneumomediastinum s/p b/l pigtail catheters txferred to [facility] for further management and heme consultation.   #Neuro  -Intubated and sedated    #Resp  Hypoxic resp failure intubated found to have b/l pneumothoraces s/p b/l pigtail catheters  -c/w vanc 1g [id number] and azithro for acute chest  -clamp trial  - f/u abg and adjust as needed  -pneumomediastinum; will monitor and consult surg if necessary    #CV  Hypotension 2/2 sepsis vs vasoplegia due to sedative meds  -on levo  -mild elevated trops likely due to demand ischemia  -will cont to monitor    #GI  -NPO  -no active issues    #ID  -c/w vanc, cefipime and azithro for acute chest  -Leukocytosis;12/15 blood cx neg  - f/u blood cx    #Renal  Mild ISABELL likely pre renal  -SCr 1 and trending down; baseline 0.6    #Heme  Sickle cell anemia c/b acute chest s/p 1rbc and 2 plasma exchange transfusions w/ improvement in HbS%  -IVF, folic acid   -Hb 6.6; baseline 7-8, pending exchange transfusion  - blood smear negative  -heme consulted  -will cont to monitor    #Endo  -No active issues    #DVT PPx  -SQH    #GOC/Ethics
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
## 2019-12-19 NOTE — H&P ADULT - NSHPPHYSICALEXAM_GEN_ALL_CORE
General appearance: intubated and sedated    Eyes: anicteric sclerae, PERRLA   HENT: Atraumatic; oropharynx clear with moist mucous membranes    Neck: Trachea midline   Pulm: Lungs clear to auscultation bilaterally   CV: Regular Rhythm and Rate; Normal S1, S2; No murmurs, rubs, or gallops   Abdomen: soft, mildly firm, crepitus on palpation diffusely   Extremities: No peripheral edema    Skin: Normal temperature, turgor and texture; no rash, ulcers or subcutaneous nodules

## 2019-12-19 NOTE — CONSULT NOTE ADULT - ASSESSMENT
20 y/o male w/sickle cell no prior acute chest transferred from [facility] to [facility] on 12/18/19 for management of acute chest syndrome 2/2 sickle cell disease, course complicated by bilateral pneumothoraces.    #Sickle cell disease c/b acute chest  -monitor CBC w/ differential, reticulocyte count, bilirubin, haptoglobin  -please consult blood bank; Hgb 6.5, may require additional exchange transfusions vs simple transfusion  -please repeat Hgb electrophoresis STAT  -Suspect will likely need repeat exchange transfusion given degree of pulmonary involvement; per primary team, blood bank consulted, following; Hgb S% goal <30%. Would have goal for Hgb of at least 10 g/dL, would consider a simple transfusion if unable to get exchange transfusion  -will review peripheral blood smear, request from lab  -judicious IV hydration with 1/2 NS in setting of possible superimposed ARDS, monitor for fluid overload  -when extubated, please order and encourage incentive spirometry use, ensure adequate pain control  -blood cultures from outside hospital negative, and no consolidation found on initial CT of the chest, so suspicion for underlying lower respiratory infection low; however, would continue to treat empirically for PNA at this time  -DVT ppx (consider lovenox instead of HepSQ as renal function allows)    Will follow    Bharathi Burnham MD, MPH  Hematology / Oncology Fellow, PGY5   p  20 y/o male w/sickle cell no prior acute chest transferred from [facility] to [facility] on 12/18/19 for management of acute chest syndrome 2/2 sickle cell disease, course complicated by bilateral pneumothoraces.    #Sickle cell disease c/b acute chest  -monitor CBC w/ differential, reticulocyte count, bilirubin, haptoglobin  -please consult blood bank; Hgb 6.5, may require additional exchange transfusions vs simple transfusion  -please repeat Hgb electrophoresis STAT  -Suspect will likely need repeat exchange transfusion given degree of pulmonary involvement; per primary team, blood bank consulted, following; Hgb S% goal <30%. Would have goal for Hgb of at least 10 g/dL, would consider a simple transfusion if unable to get exchange transfusion  -peripheral blood smear reviewed; showing multiple sickle cells, microspherocytes, blister cells, and some target cells. 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  -judicious IV hydration with 1/2 NS in setting of possible superimposed ARDS, monitor for fluid overload  -when extubated, please order and encourage incentive spirometry use, ensure adequate pain control  -blood cultures from outside hospital negative, and no consolidation found on initial CT of the chest, so suspicion for underlying lower respiratory infection low; however, would continue to treat empirically for PNA at this time  -DVT ppx (consider lovenox instead of HepSQ as renal function allows)    Will follow    Bharathi Burnham MD, MPH  Hematology / Oncology Fellow, PGY5   p

## 2019-12-19 NOTE — H&P ADULT - HISTORY OF PRESENT ILLNESS
20 y/o male w/sickle cell no prior acute chest, last crises 2-3 years ago had recently traveled to [facility] [place] returned 5 days ago, presented to [place] ED w/complain of chest and bone pain. Patient doesn't report any sob, states bones hurt, did have decreased po intake for few days with travels.  Patient denied any fever, chills, sob, palpitations, n/v/d/c/. Of note, Pt never had been intubated prior or required transfusions previously, last sickle crisis was 2-3 years ago.    In ED, patient had a CTA that was noncontributory. RRT on 12/16, patient found to be in hypoxic resp failure, sat 91% on 4L NC. Initial CT chest on admission neg, was broadened to Vanc/zosyn from CTX with CXR showing worsening bilateral infiltrates c/f acute chest. On night of 12/16, Patient developed fever tachycardia further desat requiring NRB c/f vaso-occlusive crises. Patient was transferred to CCU for management of acute chest. Patient became hypoxic despite hiflow and bipap and was intubated on 12/17 AM. Initially pt on FiO2 100% PEEP 14; currently pt sat 98% on FiO2 60% PEEP 14.  Patient subsequently received plasma exchange on 12/17, and tolerated procedure well with LDH, haptoglobin, d-dimer, bilirubin and Creatinine improving. P/F ratio found to be 110, concerning for ARDS. After initial intubation, on am rounds pt biting (clenching down) his ETT and HR started to decrease. Bite block placed and patient was difficult to bag; decision made to remove ETT and re-intubate pt. Pt desat'd and 1st attempt was esophagus, 2nd attempt ETT placed in trach. Post intubation sats in the low 80s and CXR revealed b/l ptx; b/l pigtail catheters placed with re-expansion of the lungs. Given patient's persistent hypoxia and acute respiratory distress syndrome, remaining vent dependent, plasma exchange on 12/18/19 was repeated.  Patient transferred to [facility] for further management of sickle cell crisis.

## 2019-12-19 NOTE — H&P ADULT - ATTENDING COMMENTS
20 yo with sickle cell dz, no h/o acute chest, last pain crisis 3y ago, traveled from [place] recently and presented with acute pain crisis at OSH.  Progressed to acute chest with acute hypoxic respiratory failure requiring intubation, bilat infiltrates, ARDS.  Received RBC exchange on 12/17 and plasma exchanges 12/17 and 12/18.  HgS 94% prior to RBC exchange.  Repeat post RBC exchange is pending.  Bilat Chest tubes in place for bilat PTX on 12/18 - to suction  CTA negative for PE.  Being treated with norepi, propofol, fentanyl, vanco, azithro, cefipime.  PCR MRSA+ nares  Currently not paralyzed, vent A/C 30/30, 360/50%, +12  ABG pending  chest tubes to suction, minimal output over last 12 hours  POCUS L ant small area of no lung slide anteriorly. R completely lung sliding    Sickle cell Acute chest  - f/u post RBC exchange HgbS %; further RBC exchange depends on results  - for poss plasma exchange today     acute hypoxic respiratory failure, ARDS improving, shock  - f/u ABG  - continue lung protective vent settings  - continue cefipime, vanco, azithro  - norepi    bilat PTX; R side fully expanded, L with small area without lung sliding  - cxr this morning  - CTubes to suction    very guarded  cc time 50 min excluding time spent on POCUS

## 2019-12-20 LAB
ALBUMIN SERPL ELPH-MCNC: 2.6 G/DL — LOW (ref 3.3–5)
ALP SERPL-CCNC: 78 U/L — SIGNIFICANT CHANGE UP (ref 40–120)
ALT FLD-CCNC: 11 U/L — SIGNIFICANT CHANGE UP (ref 4–41)
ANION GAP SERPL CALC-SCNC: 14 MMO/L — SIGNIFICANT CHANGE UP (ref 7–14)
APTT BLD: 31.6 SEC — SIGNIFICANT CHANGE UP (ref 27.5–36.3)
AST SERPL-CCNC: 40 U/L — SIGNIFICANT CHANGE UP (ref 4–40)
BASE EXCESS BLDA CALC-SCNC: 1.6 MMOL/L — SIGNIFICANT CHANGE UP
BASOPHILS # BLD AUTO: 0.02 K/UL — SIGNIFICANT CHANGE UP (ref 0–0.2)
BASOPHILS NFR BLD AUTO: 0.1 % — SIGNIFICANT CHANGE UP (ref 0–2)
BILIRUB DIRECT SERPL-MCNC: 1.4 MG/DL — HIGH (ref 0.1–0.2)
BILIRUB SERPL-MCNC: 1.7 MG/DL — HIGH (ref 0.2–1.2)
BILIRUB SERPL-MCNC: 2 MG/DL — HIGH (ref 0.2–1.2)
BUN SERPL-MCNC: 11 MG/DL — SIGNIFICANT CHANGE UP (ref 7–23)
CALCIUM SERPL-MCNC: 8.1 MG/DL — LOW (ref 8.4–10.5)
CHLORIDE BLDA-SCNC: 107 MMOL/L — SIGNIFICANT CHANGE UP (ref 96–108)
CHLORIDE SERPL-SCNC: 105 MMOL/L — SIGNIFICANT CHANGE UP (ref 98–107)
CO2 SERPL-SCNC: 21 MMOL/L — LOW (ref 22–31)
CREAT SERPL-MCNC: 0.69 MG/DL — SIGNIFICANT CHANGE UP (ref 0.5–1.3)
CULTURE RESULTS: SIGNIFICANT CHANGE UP
CULTURE RESULTS: SIGNIFICANT CHANGE UP
DAT C3-SP REAG RBC QL: NEGATIVE — SIGNIFICANT CHANGE UP
DAT POLY-SP REAG RBC QL: NEGATIVE — SIGNIFICANT CHANGE UP
DENV IGG+IGM PNL SER IA: <1 ISR — SIGNIFICANT CHANGE UP
DENV IGM SER-ACNC: 1.07 ISR — SIGNIFICANT CHANGE UP
DIRECT COOMBS IGG: NEGATIVE — SIGNIFICANT CHANGE UP
EOSINOPHIL # BLD AUTO: 0.5 K/UL — SIGNIFICANT CHANGE UP (ref 0–0.5)
EOSINOPHIL NFR BLD AUTO: 2.9 % — SIGNIFICANT CHANGE UP (ref 0–6)
FIBRINOGEN PPP-MCNC: 741.3 MG/DL — HIGH (ref 350–510)
GLUCOSE BLDA-MCNC: 95 MG/DL — SIGNIFICANT CHANGE UP (ref 70–99)
GLUCOSE SERPL-MCNC: 94 MG/DL — SIGNIFICANT CHANGE UP (ref 70–99)
HAPTOGLOB SERPL-MCNC: 27 MG/DL — LOW (ref 34–200)
HCO3 BLDA-SCNC: 26 MMOL/L — SIGNIFICANT CHANGE UP (ref 22–26)
HCT VFR BLD CALC: 24.3 % — LOW (ref 39–50)
HCT VFR BLDA CALC: 25 % — LOW (ref 39–51)
HEMOGLOBIN INTERPRETATION: SIGNIFICANT CHANGE UP
HGB A MFR BLD: 62.9 % — LOW (ref 95.8–98)
HGB A MFR BLD: 64.6 % — LOW
HGB A2 MFR BLD: 3 % — SIGNIFICANT CHANGE UP (ref 2–3.2)
HGB A2 MFR BLD: 3.1 % — SIGNIFICANT CHANGE UP (ref 2.4–3.5)
HGB BLD-MCNC: 8.1 G/DL — LOW (ref 13–17)
HGB BLDA-MCNC: 8 G/DL — LOW (ref 13–17)
HGB F MFR BLD: < 1 % — SIGNIFICANT CHANGE UP (ref 0–1.5)
HGB S MFR BLD: 31.6 % — HIGH (ref 0–0)
HGB S MFR BLD: 34.1 % — HIGH
IMM GRANULOCYTES NFR BLD AUTO: 0.6 % — SIGNIFICANT CHANGE UP (ref 0–1.5)
INR BLD: 1.04 — SIGNIFICANT CHANGE UP (ref 0.88–1.17)
LACTATE BLDA-SCNC: 0.7 MMOL/L — SIGNIFICANT CHANGE UP (ref 0.5–2)
LYMPHOCYTES # BLD AUTO: 24 % — SIGNIFICANT CHANGE UP (ref 13–44)
LYMPHOCYTES # BLD AUTO: 4.18 K/UL — HIGH (ref 1–3.3)
MCHC RBC-ENTMCNC: 26.6 PG — LOW (ref 27–34)
MCHC RBC-ENTMCNC: 33.3 % — SIGNIFICANT CHANGE UP (ref 32–36)
MCV RBC AUTO: 79.9 FL — LOW (ref 80–100)
MONOCYTES # BLD AUTO: 0.9 K/UL — SIGNIFICANT CHANGE UP (ref 0–0.9)
MONOCYTES NFR BLD AUTO: 5.2 % — SIGNIFICANT CHANGE UP (ref 2–14)
NEUTROPHILS # BLD AUTO: 11.7 K/UL — HIGH (ref 1.8–7.4)
NEUTROPHILS NFR BLD AUTO: 67.2 % — SIGNIFICANT CHANGE UP (ref 43–77)
NRBC # FLD: 0.29 K/UL — SIGNIFICANT CHANGE UP (ref 0–0)
NRBC FLD-RTO: 1.7 — SIGNIFICANT CHANGE UP
PCO2 BLDA: 37 MMHG — SIGNIFICANT CHANGE UP (ref 35–48)
PH BLDA: 7.45 PH — SIGNIFICANT CHANGE UP (ref 7.35–7.45)
PLATELET # BLD AUTO: 186 K/UL — SIGNIFICANT CHANGE UP (ref 150–400)
PMV BLD: 10.1 FL — SIGNIFICANT CHANGE UP (ref 7–13)
PO2 BLDA: 135 MMHG — HIGH (ref 83–108)
POTASSIUM BLDA-SCNC: 3.1 MMOL/L — LOW (ref 3.4–4.5)
POTASSIUM SERPL-MCNC: 3.2 MMOL/L — LOW (ref 3.5–5.3)
POTASSIUM SERPL-SCNC: 3.2 MMOL/L — LOW (ref 3.5–5.3)
PROT SERPL-MCNC: 5.6 G/DL — LOW (ref 6–8.3)
PROTHROM AB SERPL-ACNC: 11.6 SEC — SIGNIFICANT CHANGE UP (ref 9.8–13.1)
RBC # BLD: 3.04 M/UL — LOW (ref 4.2–5.8)
RBC # FLD: 19.6 % — HIGH (ref 10.3–14.5)
RETICS #: 269 K/UL — HIGH (ref 25–125)
RETICS/RBC NFR: 8.9 % — HIGH (ref 0.5–2.5)
SAO2 % BLDA: 99.7 % — HIGH (ref 95–99)
SODIUM BLDA-SCNC: 140 MMOL/L — SIGNIFICANT CHANGE UP (ref 136–146)
SODIUM SERPL-SCNC: 140 MMOL/L — SIGNIFICANT CHANGE UP (ref 135–145)
SPECIMEN SOURCE: SIGNIFICANT CHANGE UP
SPECIMEN SOURCE: SIGNIFICANT CHANGE UP
VANCOMYCIN TROUGH SERPL-MCNC: 13.6 UG/ML — SIGNIFICANT CHANGE UP (ref 10–20)
WBC # BLD: 17.41 K/UL — HIGH (ref 3.8–10.5)
WBC # FLD AUTO: 17.41 K/UL — HIGH (ref 3.8–10.5)

## 2019-12-20 PROCEDURE — 99232 SBSQ HOSP IP/OBS MODERATE 35: CPT | Mod: GC

## 2019-12-20 PROCEDURE — 99291 CRITICAL CARE FIRST HOUR: CPT

## 2019-12-20 RX ORDER — POTASSIUM CHLORIDE 20 MEQ
10 PACKET (EA) ORAL
Refills: 0 | Status: COMPLETED | OUTPATIENT
Start: 2019-12-20 | End: 2019-12-20

## 2019-12-20 RX ORDER — POTASSIUM CHLORIDE 20 MEQ
40 PACKET (EA) ORAL ONCE
Refills: 0 | Status: COMPLETED | OUTPATIENT
Start: 2019-12-20 | End: 2019-12-20

## 2019-12-20 RX ORDER — SODIUM CHLORIDE 9 MG/ML
1000 INJECTION, SOLUTION INTRAVENOUS
Refills: 0 | Status: COMPLETED | OUTPATIENT
Start: 2019-12-20 | End: 2019-12-21

## 2019-12-20 RX ORDER — FENTANYL CITRATE 50 UG/ML
100 INJECTION INTRAVENOUS ONCE
Refills: 0 | Status: DISCONTINUED | OUTPATIENT
Start: 2019-12-20 | End: 2019-12-19

## 2019-12-20 RX ORDER — MIDAZOLAM HYDROCHLORIDE 1 MG/ML
4 INJECTION, SOLUTION INTRAMUSCULAR; INTRAVENOUS ONCE
Refills: 0 | Status: DISCONTINUED | OUTPATIENT
Start: 2019-12-20 | End: 2019-12-20

## 2019-12-20 RX ORDER — FENTANYL CITRATE 50 UG/ML
50 INJECTION INTRAVENOUS ONCE
Refills: 0 | Status: DISCONTINUED | OUTPATIENT
Start: 2019-12-20 | End: 2019-12-20

## 2019-12-20 RX ORDER — POLYETHYLENE GLYCOL 3350 17 G/17G
17 POWDER, FOR SOLUTION ORAL
Refills: 0 | Status: DISCONTINUED | OUTPATIENT
Start: 2019-12-20 | End: 2019-12-26

## 2019-12-20 RX ADMIN — Medication 166.67 MILLIGRAM(S): at 22:34

## 2019-12-20 RX ADMIN — Medication 100 MILLIEQUIVALENT(S): at 14:50

## 2019-12-20 RX ADMIN — FENTANYL CITRATE 50 MICROGRAM(S): 50 INJECTION INTRAVENOUS at 23:11

## 2019-12-20 RX ADMIN — PROPOFOL 16.66 MICROGRAM(S)/KG/MIN: 10 INJECTION, EMULSION INTRAVENOUS at 22:35

## 2019-12-20 RX ADMIN — POLYETHYLENE GLYCOL 3350 17 GRAM(S): 17 POWDER, FOR SOLUTION ORAL at 17:34

## 2019-12-20 RX ADMIN — CHLORHEXIDINE GLUCONATE 1 APPLICATION(S): 213 SOLUTION TOPICAL at 14:54

## 2019-12-20 RX ADMIN — CEFEPIME 100 MILLIGRAM(S): 1 INJECTION, POWDER, FOR SOLUTION INTRAMUSCULAR; INTRAVENOUS at 09:38

## 2019-12-20 RX ADMIN — CEFEPIME 100 MILLIGRAM(S): 1 INJECTION, POWDER, FOR SOLUTION INTRAMUSCULAR; INTRAVENOUS at 03:52

## 2019-12-20 RX ADMIN — CEFEPIME 100 MILLIGRAM(S): 1 INJECTION, POWDER, FOR SOLUTION INTRAMUSCULAR; INTRAVENOUS at 17:34

## 2019-12-20 RX ADMIN — HEPARIN SODIUM 5000 UNIT(S): 5000 INJECTION INTRAVENOUS; SUBCUTANEOUS at 14:54

## 2019-12-20 RX ADMIN — HEPARIN SODIUM 5000 UNIT(S): 5000 INJECTION INTRAVENOUS; SUBCUTANEOUS at 05:20

## 2019-12-20 RX ADMIN — MIDAZOLAM HYDROCHLORIDE 4 MILLIGRAM(S): 1 INJECTION, SOLUTION INTRAMUSCULAR; INTRAVENOUS at 06:07

## 2019-12-20 RX ADMIN — Medication 100 MILLIEQUIVALENT(S): at 15:38

## 2019-12-20 RX ADMIN — CHLORHEXIDINE GLUCONATE 15 MILLILITER(S): 213 SOLUTION TOPICAL at 05:20

## 2019-12-20 RX ADMIN — FENTANYL CITRATE 18.51 MICROGRAM(S)/KG/HR: 50 INJECTION INTRAVENOUS at 22:35

## 2019-12-20 RX ADMIN — AZITHROMYCIN 255 MILLIGRAM(S): 500 TABLET, FILM COATED ORAL at 17:34

## 2019-12-20 RX ADMIN — CHLORHEXIDINE GLUCONATE 15 MILLILITER(S): 213 SOLUTION TOPICAL at 17:34

## 2019-12-20 RX ADMIN — Medication 1000 MILLIGRAM(S): at 00:00

## 2019-12-20 RX ADMIN — Medication 10.41 MICROGRAM(S)/KG/MIN: at 22:35

## 2019-12-20 RX ADMIN — Medication 166.67 MILLIGRAM(S): at 09:38

## 2019-12-20 RX ADMIN — SODIUM CHLORIDE 75 MILLILITER(S): 9 INJECTION, SOLUTION INTRAVENOUS at 22:36

## 2019-12-20 RX ADMIN — SODIUM CHLORIDE 75 MILLILITER(S): 9 INJECTION, SOLUTION INTRAVENOUS at 05:19

## 2019-12-20 RX ADMIN — FENTANYL CITRATE 50 MICROGRAM(S): 50 INJECTION INTRAVENOUS at 22:45

## 2019-12-20 RX ADMIN — Medication 100 MILLIEQUIVALENT(S): at 13:52

## 2019-12-20 RX ADMIN — HEPARIN SODIUM 5000 UNIT(S): 5000 INJECTION INTRAVENOUS; SUBCUTANEOUS at 22:35

## 2019-12-20 NOTE — PROGRESS NOTE ADULT - ASSESSMENT
20 y/o M from [facility] [place] the [place] the [place] with PMH sickle cell disease (prior asymptomatic, no hx of blood transfusion) and malaria on anti-malarials at home who p/w chest and bone pain c/b acute chest syndrome s/p intubation for hypoxic respiratory failure s/p RBC exchange transfusion x1 and plasma exchange x2 c/b b/l PTX/pneumomediastinum s/p b/l pigtail catheters txferred to [facility] for further management and heme consultation.   #Neuro  - AOx3 at baseline, no hx of CVA/TIA, seizure, or structural lesions  -Intubated and sedated  - Fentanyl 3mcg/kg/hr and Propofol 45mcg/kg/hr  - RASS goal of -3 to -4    #Resp  Hypoxic resp failure intubated found to have b/l pneumothoraces s/p b/l pigtail catheters  - Current vent settings: , RR 30, FiO2 50, PEEP 10  - Repeat ABG 7.46/39/139  -c/w vanc 1g q12 cefipime and azithro for acute chest  - bilateral chest tubes to suction   -pneumomediastinum, continue to monitor    #CV  Hypotension 2/2 sepsis vs vasoplegia due to sedative meds  -on levo  -mild elevated trops likely due to demand ischemia  -will cont to monitor    #GI  -NPO  -no active issues    #ID  -c/w vanc, cefipime and azithro for acute chest  -Leukocytosis;12/15 blood cx neg  - f/u blood cx    #Renal  Mild ISABELL likely pre renal  -SCr 1 and trending down; baseline 0.6    #Heme  Sickle cell anemia c/b acute chest s/p 1rbc and 2 plasma exchange transfusions w/ improvement in HbS%  -IVF, folic acid   -Hb 6.6; baseline 7-8, pending exchange transfusion  - blood smear negative  -heme consulted  -will cont to monitor    #Endo  -No active issues    #DVT PPx  -SQH    #GOC/Ethics

## 2019-12-20 NOTE — PROGRESS NOTE ADULT - ASSESSMENT
18 y/o male w/sickle cell no prior acute chest transferred from [facility] to [facility] on 12/18/19 for management of acute chest syndrome 2/2 sickle cell disease, course complicated by bilateral pneumothoraces.    #Sickle cell disease c/b acute chest  -monitor CBC w/ differential, reticulocyte count, bilirubin, haptoglobin  -Hgb improved, would not transfuse additional blood at this time. HgbS near target goal  -peripheral blood smear reviewed on 12/19/19; showing multiple sickle cells, microspherocytes, blister cells, and some target cells. 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  -judicious IV hydration with 1/2 NS in setting of possible superimposed ARDS, monitor for fluid overload  -when extubated, please order and encourage incentive spirometry use, ensure adequate pain control  -blood cultures from outside hospital negative, and no consolidation found on initial CT of the chest, so suspicion for underlying lower respiratory infection low; however, would continue to treat empirically for PNA at this time  -DVT ppx (consider lovenox instead of HepSQ as renal function allows)    Case discussed with MICU. Please call back with any questions.    Bharathi Burnham MD, MPH  Hematology / Oncology Fellow, PGY5   p

## 2019-12-20 NOTE — PROGRESS NOTE ADULT - ATTENDING COMMENTS
Mr. Morrison was seen during hematology consult followup rounds today. He continues on sedation and is intubated. He has bilateral pigtail catheter was for pneumothoraces. He is currently on 35% oxygen. His hemoglobin S. level was 39% before arriving here and he has received at least 2 units of blood. It is likely that his hemoglobin S. level is below 30% which is the goal for exchange transfusion. I agree with the assessment and plan as stated above the note from Dr. Burnham.

## 2019-12-20 NOTE — PROGRESS NOTE ADULT - SUBJECTIVE AND OBJECTIVE BOX
COVERING RESIDENT: LILLIANA MILLER (PGY-1) | [facility] [phone] | [facility] [phone]    INTERVAL HPI/OVERNIGHT EVENTS:    SUBJECTIVE: Patient seen and examined at bedside.     CONSTITUTIONAL: No weakness, fevers or chills  EYES/ENT: No visual changes;  No vertigo or throat pain   NECK: No pain or stiffness  RESPIRATORY: No cough, wheezing, hemoptysis; No shortness of breath  CARDIOVASCULAR: No chest pain or palpitations  GASTROINTESTINAL: No abdominal or epigastric pain. No nausea, vomiting, or hematemesis; No diarrhea or constipation. No melena or hematochezia.  GENITOURINARY: No dysuria, frequency or hematuria  NEUROLOGICAL: No numbness or weakness  SKIN: No itching, rashes    OBJECTIVE:    VITAL SIGNS:  ICU Vital Signs Last 24 Hrs  T(C): 36.6 (20 Dec 2019 03:00), Max: 38.8 (19 Dec 2019 20:00)  T(F): 97.8 (20 Dec 2019 03:00), Max: 101.8 (19 Dec 2019 20:00)  HR: 75 (20 Dec 2019 06:51) (64 - 106)  BP: --  BP(mean): --  ABP: 118/80 (20 Dec 2019 06:00) (98/56 - 152/92)  ABP(mean): 97 (20 Dec 2019 06:00) (71 - 116)  RR: 30 (20 Dec 2019 06:00) (30 - 30)  SpO2: 100% (20 Dec 2019 06:51) (97% - 100%)    Mode: AC/ CMV (Assist Control/ Continuous Mandatory Ventilation), RR (machine): 30, TV (machine): 360, FiO2: 40, PEEP: 10, MAP: 16, PIP: 31    12-19 @ 07:01  -  12-20 @ 07:00  --------------------------------------------------------  IN: 4518.3 mL / OUT: 2675 mL / NET: 1843.3 mL      CAPILLARY BLOOD GLUCOSE          PHYSICAL EXAM:    General: NAD  HEENT: NC/AT; PERRL, clear conjunctiva  Neck: supple  Respiratory: CTA b/l  Cardiovascular: +S1/S2; RRR  Abdomen: soft, NT/ND; +BS x4  Extremities: WWP, 2+ peripheral pulses b/l; no LE edema  Skin: normal color and turgor; no rash  Neurological:    MEDICATIONS:  MEDICATIONS  (STANDING):  azithromycin  IVPB 500 milliGRAM(s) IV Intermittent daily  cefepime   IVPB 2000 milliGRAM(s) IV Intermittent every 8 hours  chlorhexidine 0.12% Liquid 15 milliLiter(s) Oral Mucosa every 12 hours  chlorhexidine 4% Liquid 1 Application(s) Topical <User Schedule>  fentaNYL   Infusion 3 MICROgram(s)/kG/Hr (18.51 mL/Hr) IV Continuous <Continuous>  heparin  Injectable 5000 Unit(s) SubCutaneous every 8 hours  norepinephrine Infusion 0.18 MICROgram(s)/kG/Min (10.412 mL/Hr) IV Continuous <Continuous>  propofol Infusion 45 MICROgram(s)/kG/Min (16.659 mL/Hr) IV Continuous <Continuous>  sodium chloride 0.45%. 1000 milliLiter(s) (75 mL/Hr) IV Continuous <Continuous>  vancomycin  IVPB 1250 milliGRAM(s) IV Intermittent every 12 hours  vancomycin  IVPB        MEDICATIONS  (PRN):      ALLERGIES:  Allergies    No Known Allergies    Intolerances        LABS:                        8.1    17.41 )-----------( 186      ( 20 Dec 2019 03:40 )             24.3     12-19    136  |  101  |  16  ----------------------------<  89  3.8   |  24  |  0.85    Ca    8.1<L>      19 Dec 2019 06:30  Phos  2.4     12-19  Mg     2.7     12-19    TPro  x   /  Alb  x   /  TBili  2.0<H>  /  DBili  1.4<H>  /  AST  x   /  ALT  x   /  AlkPhos  x   12-20    PT/INR - ( 20 Dec 2019 03:40 )   PT: 11.6 SEC;   INR: 1.04          PTT - ( 20 Dec 2019 03:40 )  PTT:31.6 SEC      RADIOLOGY & ADDITIONAL TESTS: Reviewed.

## 2019-12-20 NOTE — PROGRESS NOTE ADULT - ATTENDING COMMENTS
Agree with above.  Patient seen and examined. Chart reviewed.    19 year old man sickle cell crisis, acute chest, hypoxic respiratory failure, ARDS, bilateral pneumothorax s/p chest tube bilateral chest tubes, ISABELL that this improving transferred from [facility] s/p 1 red cell exchange and 2 plasma exchanges so far.  Hgb S 31% down from 39% yesterday will transfuse 1 unit of packed cells    - responds and follows commands with lightened sedation but becomes discordant with vent  - ARDS much improved  - POCUS shows right pneumothorax resolved, will put chest tube to water seal. Left chest has lung point anteriorly will leave chest tube to suction  - continue ABx follow up cultures    case discussed with sister at bedside Agree with above.  Patient seen and examined. Chart reviewed.    19 year old man sickle cell crisis, acute chest, hypoxic respiratory failure, ARDS, bilateral pneumothorax s/p chest tube bilateral chest tubes, ISABELL that this improving transferred from [facility] s/p 1 red cell exchange and 2 plasma exchanges so far.  Hgb S 31% down from 39% yesterday and 94% upon admission at [place] will transfuse 1 unit of packed cells    - responds and follows commands with lightened sedation but becomes discordant with vent  - ARDS much improved  - POCUS shows right pneumothorax resolved, will put chest tube to water seal. Left chest has lung point anteriorly will leave chest tube to suction  - continue ABx follow up cultures    case discussed with sister at bedside

## 2019-12-20 NOTE — PROGRESS NOTE ADULT - SUBJECTIVE AND OBJECTIVE BOX
Hematology Oncology Follow-up    INTERVAL HPI/OVERNIGHT EVENTS:  Unable to get ROS, patient intubated, sedated.    Review of Systems: Unable to get ROS, patient intubated.    VITAL SIGNS:  T(F): 98.8 (12-20-19 @ 16:00)  HR: 70 (12-20-19 @ 16:00)  BP: --  RR: 30 (12-20-19 @ 16:00)  SpO2: 100% (12-20-19 @ 16:00)  Wt(kg): --    12-19-19 @ 07:01  -  12-20-19 @ 07:00  --------------------------------------------------------  IN: 4518.3 mL / OUT: 2675 mL / NET: 1843.3 mL    12-20-19 @ 07:01  -  12-20-19 @ 17:33  --------------------------------------------------------  IN: 2370 mL / OUT: 940 mL / NET: 1430 mL        PHYSICAL EXAM:    GENERAL: Intubated, on sedation, less arousable, unable to communicate or follow commands  HEAD:  Atraumatic, Normocephalic  EYES: conjunctiva and sclera clear  NECK: Supple, No JVD  CHEST/LUNG: Clear to auscultation bilaterally; No wheeze; palpable subcutaneous emphysema near chest tube site  HEART: Regular rate and rhythm; No murmurs, rubs, or gallops; ?pericardial rub  ABDOMEN: Soft, Nontender, Nondistended; Bowel sounds present  EXTREMITIES:  2+ Peripheral Pulses, No clubbing, cyanosis, or edema  NEUROLOGY: non-focal  SKIN: No rashes or lesions    MEDICATIONS  (STANDING):  azithromycin  IVPB 500 milliGRAM(s) IV Intermittent daily  cefepime   IVPB 2000 milliGRAM(s) IV Intermittent every 8 hours  chlorhexidine 0.12% Liquid 15 milliLiter(s) Oral Mucosa every 12 hours  chlorhexidine 4% Liquid 1 Application(s) Topical <User Schedule>  fentaNYL   Infusion 3 MICROgram(s)/kG/Hr (18.51 mL/Hr) IV Continuous <Continuous>  heparin  Injectable 5000 Unit(s) SubCutaneous every 8 hours  norepinephrine Infusion 0.18 MICROgram(s)/kG/Min (10.412 mL/Hr) IV Continuous <Continuous>  polyethylene glycol 3350 17 Gram(s) Oral two times a day  propofol Infusion 45 MICROgram(s)/kG/Min (16.659 mL/Hr) IV Continuous <Continuous>  sodium chloride 0.45%. 1000 milliLiter(s) (75 mL/Hr) IV Continuous <Continuous>  vancomycin  IVPB 1250 milliGRAM(s) IV Intermittent every 12 hours  vancomycin  IVPB        MEDICATIONS  (PRN):      No Known Allergies      LABS:                        8.1    17.41 )-----------( 186      ( 20 Dec 2019 03:40 )             24.3     12-20    140  |  105  |  11  ----------------------------<  94  3.2<L>   |  21<L>  |  0.69    Ca    8.1<L>      20 Dec 2019 08:25  Phos  2.4     12-19  Mg     2.7     12-19    TPro  5.6<L>  /  Alb  2.6<L>  /  TBili  1.7<H>  /  DBili  x   /  AST  40  /  ALT  11  /  AlkPhos  78  12-20    PT/INR - ( 20 Dec 2019 03:40 )   PT: 11.6 SEC;   INR: 1.04          PTT - ( 20 Dec 2019 03:40 )  PTT:31.6 SEC Haptoglobin, Serum: 27 mg/dL (12-20 @ 03:40)  Fibrinogen Assay: 741.3 mg/dL (12-20 @ 03:40)    Bilirubin Direct, Serum: 1.4 <H> (12-20-19 @ 03:40)    RADIOLOGY & ADDITIONAL TESTS:  Reviewed

## 2019-12-21 LAB
ALBUMIN SERPL ELPH-MCNC: 2.5 G/DL — LOW (ref 3.3–5)
ALP SERPL-CCNC: 106 U/L — SIGNIFICANT CHANGE UP (ref 40–120)
ALT FLD-CCNC: 10 U/L — SIGNIFICANT CHANGE UP (ref 4–41)
ANION GAP SERPL CALC-SCNC: 13 MMO/L — SIGNIFICANT CHANGE UP (ref 7–14)
APPEARANCE UR: CLEAR — SIGNIFICANT CHANGE UP
AST SERPL-CCNC: 42 U/L — HIGH (ref 4–40)
BASE EXCESS BLDA CALC-SCNC: -0.4 MMOL/L — SIGNIFICANT CHANGE UP
BASOPHILS # BLD AUTO: 0.03 K/UL — SIGNIFICANT CHANGE UP (ref 0–0.2)
BASOPHILS NFR BLD AUTO: 0.3 % — SIGNIFICANT CHANGE UP (ref 0–2)
BILIRUB DIRECT SERPL-MCNC: 0.7 MG/DL — HIGH (ref 0.1–0.2)
BILIRUB SERPL-MCNC: 1.2 MG/DL — SIGNIFICANT CHANGE UP (ref 0.2–1.2)
BILIRUB SERPL-MCNC: 1.2 MG/DL — SIGNIFICANT CHANGE UP (ref 0.2–1.2)
BILIRUB UR-MCNC: NEGATIVE — SIGNIFICANT CHANGE UP
BLOOD UR QL VISUAL: NEGATIVE — SIGNIFICANT CHANGE UP
BUN SERPL-MCNC: 9 MG/DL — SIGNIFICANT CHANGE UP (ref 7–23)
CALCIUM SERPL-MCNC: 8.1 MG/DL — LOW (ref 8.4–10.5)
CHLORIDE BLDA-SCNC: 106 MMOL/L — SIGNIFICANT CHANGE UP (ref 96–108)
CHLORIDE SERPL-SCNC: 105 MMOL/L — SIGNIFICANT CHANGE UP (ref 98–107)
CO2 SERPL-SCNC: 19 MMOL/L — LOW (ref 22–31)
COLOR SPEC: YELLOW — SIGNIFICANT CHANGE UP
CREAT SERPL-MCNC: 0.69 MG/DL — SIGNIFICANT CHANGE UP (ref 0.5–1.3)
EOSINOPHIL # BLD AUTO: 0.54 K/UL — HIGH (ref 0–0.5)
EOSINOPHIL NFR BLD AUTO: 4.5 % — SIGNIFICANT CHANGE UP (ref 0–6)
FIBRINOGEN PPP-MCNC: 720 MG/DL — HIGH (ref 350–510)
GLUCOSE BLDA-MCNC: 92 MG/DL — SIGNIFICANT CHANGE UP (ref 70–99)
GLUCOSE SERPL-MCNC: 95 MG/DL — SIGNIFICANT CHANGE UP (ref 70–99)
GLUCOSE UR-MCNC: NEGATIVE — SIGNIFICANT CHANGE UP
HAPTOGLOB SERPL-MCNC: < 20 MG/DL — LOW (ref 34–200)
HCO3 BLDA-SCNC: 24 MMOL/L — SIGNIFICANT CHANGE UP (ref 22–26)
HCT VFR BLD CALC: 25.5 % — LOW (ref 39–50)
HCT VFR BLDA CALC: 26.8 % — LOW (ref 39–51)
HGB BLD-MCNC: 8.6 G/DL — LOW (ref 13–17)
HGB BLDA-MCNC: 8.6 G/DL — LOW (ref 13–17)
IMM GRANULOCYTES NFR BLD AUTO: 0.6 % — SIGNIFICANT CHANGE UP (ref 0–1.5)
KETONES UR-MCNC: NEGATIVE — SIGNIFICANT CHANGE UP
LACTATE BLDA-SCNC: 0.9 MMOL/L — SIGNIFICANT CHANGE UP (ref 0.5–2)
LDH SERPL L TO P-CCNC: 367 U/L — HIGH (ref 135–225)
LEUKOCYTE ESTERASE UR-ACNC: NEGATIVE — SIGNIFICANT CHANGE UP
LYMPHOCYTES # BLD AUTO: 26.1 % — SIGNIFICANT CHANGE UP (ref 13–44)
LYMPHOCYTES # BLD AUTO: 3.12 K/UL — SIGNIFICANT CHANGE UP (ref 1–3.3)
MAGNESIUM SERPL-MCNC: 2.5 MG/DL — SIGNIFICANT CHANGE UP (ref 1.6–2.6)
MCHC RBC-ENTMCNC: 27.1 PG — SIGNIFICANT CHANGE UP (ref 27–34)
MCHC RBC-ENTMCNC: 33.7 % — SIGNIFICANT CHANGE UP (ref 32–36)
MCV RBC AUTO: 80.4 FL — SIGNIFICANT CHANGE UP (ref 80–100)
MONOCYTES # BLD AUTO: 1.05 K/UL — HIGH (ref 0–0.9)
MONOCYTES NFR BLD AUTO: 8.8 % — SIGNIFICANT CHANGE UP (ref 2–14)
NEUTROPHILS # BLD AUTO: 7.16 K/UL — SIGNIFICANT CHANGE UP (ref 1.8–7.4)
NEUTROPHILS NFR BLD AUTO: 59.7 % — SIGNIFICANT CHANGE UP (ref 43–77)
NITRITE UR-MCNC: NEGATIVE — SIGNIFICANT CHANGE UP
NRBC # FLD: 0.35 K/UL — SIGNIFICANT CHANGE UP (ref 0–0)
NRBC FLD-RTO: 2.9 — SIGNIFICANT CHANGE UP
PCO2 BLDA: 41 MMHG — SIGNIFICANT CHANGE UP (ref 35–48)
PH BLDA: 7.39 PH — SIGNIFICANT CHANGE UP (ref 7.35–7.45)
PH UR: 6.5 — SIGNIFICANT CHANGE UP (ref 5–8)
PHOSPHATE SERPL-MCNC: 4.9 MG/DL — HIGH (ref 2.5–4.5)
PLATELET # BLD AUTO: 220 K/UL — SIGNIFICANT CHANGE UP (ref 150–400)
PMV BLD: 10.9 FL — SIGNIFICANT CHANGE UP (ref 7–13)
PO2 BLDA: 105 MMHG — SIGNIFICANT CHANGE UP (ref 83–108)
POTASSIUM BLDA-SCNC: 3.7 MMOL/L — SIGNIFICANT CHANGE UP (ref 3.4–4.5)
POTASSIUM SERPL-MCNC: 3.8 MMOL/L — SIGNIFICANT CHANGE UP (ref 3.5–5.3)
POTASSIUM SERPL-SCNC: 3.8 MMOL/L — SIGNIFICANT CHANGE UP (ref 3.5–5.3)
PROT SERPL-MCNC: 5.9 G/DL — LOW (ref 6–8.3)
PROT UR-MCNC: 10 — SIGNIFICANT CHANGE UP
RBC # BLD: 3.17 M/UL — LOW (ref 4.2–5.8)
RBC # FLD: 19.5 % — HIGH (ref 10.3–14.5)
RETICS #: 279 K/UL — HIGH (ref 25–125)
RETICS/RBC NFR: 8.8 % — HIGH (ref 0.5–2.5)
SAO2 % BLDA: 97.9 % — SIGNIFICANT CHANGE UP (ref 95–99)
SODIUM BLDA-SCNC: 139 MMOL/L — SIGNIFICANT CHANGE UP (ref 136–146)
SODIUM SERPL-SCNC: 137 MMOL/L — SIGNIFICANT CHANGE UP (ref 135–145)
SP GR SPEC: 1.01 — SIGNIFICANT CHANGE UP (ref 1–1.04)
UROBILINOGEN FLD QL: NORMAL — SIGNIFICANT CHANGE UP
WBC # BLD: 11.97 K/UL — HIGH (ref 3.8–10.5)
WBC # FLD AUTO: 11.97 K/UL — HIGH (ref 3.8–10.5)

## 2019-12-21 PROCEDURE — 99291 CRITICAL CARE FIRST HOUR: CPT

## 2019-12-21 RX ORDER — FENTANYL CITRATE 50 UG/ML
1 INJECTION INTRAVENOUS
Refills: 0 | Status: DISCONTINUED | OUTPATIENT
Start: 2019-12-21 | End: 2019-12-22

## 2019-12-21 RX ORDER — ACETAMINOPHEN 500 MG
1000 TABLET ORAL ONCE
Refills: 0 | Status: DISCONTINUED | OUTPATIENT
Start: 2019-12-21 | End: 2019-12-21

## 2019-12-21 RX ORDER — SODIUM CHLORIDE 9 MG/ML
1000 INJECTION, SOLUTION INTRAVENOUS
Refills: 0 | Status: DISCONTINUED | OUTPATIENT
Start: 2019-12-21 | End: 2019-12-23

## 2019-12-21 RX ORDER — ACETAMINOPHEN 500 MG
650 TABLET ORAL EVERY 6 HOURS
Refills: 0 | Status: DISCONTINUED | OUTPATIENT
Start: 2019-12-21 | End: 2019-12-22

## 2019-12-21 RX ORDER — IBUPROFEN 200 MG
600 TABLET ORAL ONCE
Refills: 0 | Status: COMPLETED | OUTPATIENT
Start: 2019-12-21 | End: 2019-12-21

## 2019-12-21 RX ORDER — DEXMEDETOMIDINE HYDROCHLORIDE IN 0.9% SODIUM CHLORIDE 4 UG/ML
0.2 INJECTION INTRAVENOUS
Qty: 200 | Refills: 0 | Status: DISCONTINUED | OUTPATIENT
Start: 2019-12-21 | End: 2019-12-22

## 2019-12-21 RX ADMIN — FENTANYL CITRATE 1 PATCH: 50 INJECTION INTRAVENOUS at 19:20

## 2019-12-21 RX ADMIN — Medication 650 MILLIGRAM(S): at 20:09

## 2019-12-21 RX ADMIN — POLYETHYLENE GLYCOL 3350 17 GRAM(S): 17 POWDER, FOR SOLUTION ORAL at 05:30

## 2019-12-21 RX ADMIN — FENTANYL CITRATE 3 MICROGRAM(S)/KG/HR: 50 INJECTION INTRAVENOUS at 05:00

## 2019-12-21 RX ADMIN — Medication 650 MILLIGRAM(S): at 21:00

## 2019-12-21 RX ADMIN — FENTANYL CITRATE 3 MICROGRAM(S)/KG/HR: 50 INJECTION INTRAVENOUS at 20:17

## 2019-12-21 RX ADMIN — SODIUM CHLORIDE 75 MILLILITER(S): 9 INJECTION, SOLUTION INTRAVENOUS at 21:42

## 2019-12-21 RX ADMIN — HEPARIN SODIUM 5000 UNIT(S): 5000 INJECTION INTRAVENOUS; SUBCUTANEOUS at 21:39

## 2019-12-21 RX ADMIN — SODIUM CHLORIDE 75 MILLILITER(S): 9 INJECTION, SOLUTION INTRAVENOUS at 20:11

## 2019-12-21 RX ADMIN — Medication 166.67 MILLIGRAM(S): at 10:19

## 2019-12-21 RX ADMIN — FENTANYL CITRATE 1 PATCH: 50 INJECTION INTRAVENOUS at 17:04

## 2019-12-21 RX ADMIN — CEFEPIME 100 MILLIGRAM(S): 1 INJECTION, POWDER, FOR SOLUTION INTRAMUSCULAR; INTRAVENOUS at 03:29

## 2019-12-21 RX ADMIN — PROPOFOL 16.66 MICROGRAM(S)/KG/MIN: 10 INJECTION, EMULSION INTRAVENOUS at 20:10

## 2019-12-21 RX ADMIN — Medication 10.41 MICROGRAM(S)/KG/MIN: at 09:43

## 2019-12-21 RX ADMIN — CHLORHEXIDINE GLUCONATE 15 MILLILITER(S): 213 SOLUTION TOPICAL at 17:06

## 2019-12-21 RX ADMIN — SODIUM CHLORIDE 75 MILLILITER(S): 9 INJECTION, SOLUTION INTRAVENOUS at 09:43

## 2019-12-21 RX ADMIN — CEFEPIME 100 MILLIGRAM(S): 1 INJECTION, POWDER, FOR SOLUTION INTRAMUSCULAR; INTRAVENOUS at 09:42

## 2019-12-21 RX ADMIN — FENTANYL CITRATE 18.51 MICROGRAM(S)/KG/HR: 50 INJECTION INTRAVENOUS at 20:11

## 2019-12-21 RX ADMIN — CEFEPIME 100 MILLIGRAM(S): 1 INJECTION, POWDER, FOR SOLUTION INTRAMUSCULAR; INTRAVENOUS at 17:36

## 2019-12-21 RX ADMIN — DEXMEDETOMIDINE HYDROCHLORIDE IN 0.9% SODIUM CHLORIDE 3.08 MICROGRAM(S)/KG/HR: 4 INJECTION INTRAVENOUS at 17:00

## 2019-12-21 RX ADMIN — HEPARIN SODIUM 5000 UNIT(S): 5000 INJECTION INTRAVENOUS; SUBCUTANEOUS at 05:30

## 2019-12-21 RX ADMIN — Medication 600 MILLIGRAM(S): at 23:22

## 2019-12-21 RX ADMIN — DEXMEDETOMIDINE HYDROCHLORIDE IN 0.9% SODIUM CHLORIDE 3.08 MICROGRAM(S)/KG/HR: 4 INJECTION INTRAVENOUS at 20:10

## 2019-12-21 RX ADMIN — HEPARIN SODIUM 5000 UNIT(S): 5000 INJECTION INTRAVENOUS; SUBCUTANEOUS at 14:33

## 2019-12-21 RX ADMIN — FENTANYL CITRATE 18.51 MICROGRAM(S)/KG/HR: 50 INJECTION INTRAVENOUS at 09:42

## 2019-12-21 RX ADMIN — PROPOFOL 16.66 MICROGRAM(S)/KG/MIN: 10 INJECTION, EMULSION INTRAVENOUS at 09:43

## 2019-12-21 RX ADMIN — CHLORHEXIDINE GLUCONATE 15 MILLILITER(S): 213 SOLUTION TOPICAL at 05:30

## 2019-12-21 RX ADMIN — CHLORHEXIDINE GLUCONATE 1 APPLICATION(S): 213 SOLUTION TOPICAL at 14:33

## 2019-12-21 RX ADMIN — Medication 166.67 MILLIGRAM(S): at 22:08

## 2019-12-21 RX ADMIN — POLYETHYLENE GLYCOL 3350 17 GRAM(S): 17 POWDER, FOR SOLUTION ORAL at 17:05

## 2019-12-21 NOTE — PROGRESS NOTE ADULT - SUBJECTIVE AND OBJECTIVE BOX
COVERING RESIDENT: LILLIANA MILLER (PGY-1) | [facility] [phone] | [facility] [phone]    INTERVAL HPI/OVERNIGHT EVENTS:    SUBJECTIVE: Patient seen and examined at bedside.     CONSTITUTIONAL: No weakness, fevers or chills  EYES/ENT: No visual changes;  No vertigo or throat pain   NECK: No pain or stiffness  RESPIRATORY: No cough, wheezing, hemoptysis; No shortness of breath  CARDIOVASCULAR: No chest pain or palpitations  GASTROINTESTINAL: No abdominal or epigastric pain. No nausea, vomiting, or hematemesis; No diarrhea or constipation. No melena or hematochezia.  GENITOURINARY: No dysuria, frequency or hematuria  NEUROLOGICAL: No numbness or weakness  SKIN: No itching, rashes    OBJECTIVE:    VITAL SIGNS:  ICU Vital Signs Last 24 Hrs  T(C): 38.3 (21 Dec 2019 04:00), Max: 38.3 (21 Dec 2019 04:00)  T(F): 101 (21 Dec 2019 04:00), Max: 101 (21 Dec 2019 04:00)  HR: 75 (21 Dec 2019 06:55) (67 - 95)  BP: --  BP(mean): --  ABP: 107/67 (21 Dec 2019 06:00) (86/50 - 121/79)  ABP(mean): 83 (21 Dec 2019 06:00) (64 - 97)  RR: 30 (21 Dec 2019 06:00) (30 - 30)  SpO2: 98% (21 Dec 2019 06:55) (97% - 100%)    Mode: AC/ CMV (Assist Control/ Continuous Mandatory Ventilation), RR (machine): 30, TV (machine): 360, FiO2: 35, PEEP: 5, ITime: 1, MAP: 11, PIP: 25    12-20 @ 07:01  -  12-21 @ 07:00  --------------------------------------------------------  IN: 4772 mL / OUT: 2750 mL / NET: 2022 mL      CAPILLARY BLOOD GLUCOSE          PHYSICAL EXAM:    General: NAD  HEENT: NC/AT; PERRL, clear conjunctiva  Neck: supple  Respiratory: CTA b/l  Cardiovascular: +S1/S2; RRR  Abdomen: soft, NT/ND; +BS x4  Extremities: WWP, 2+ peripheral pulses b/l; no LE edema  Skin: normal color and turgor; no rash  Neurological:    MEDICATIONS:  MEDICATIONS  (STANDING):  cefepime   IVPB 2000 milliGRAM(s) IV Intermittent every 8 hours  chlorhexidine 0.12% Liquid 15 milliLiter(s) Oral Mucosa every 12 hours  chlorhexidine 4% Liquid 1 Application(s) Topical <User Schedule>  fentaNYL   Infusion 3 MICROgram(s)/kG/Hr (18.51 mL/Hr) IV Continuous <Continuous>  heparin  Injectable 5000 Unit(s) SubCutaneous every 8 hours  norepinephrine Infusion 0.18 MICROgram(s)/kG/Min (10.412 mL/Hr) IV Continuous <Continuous>  polyethylene glycol 3350 17 Gram(s) Oral two times a day  propofol Infusion 45 MICROgram(s)/kG/Min (16.659 mL/Hr) IV Continuous <Continuous>  sodium chloride 0.45%. 1000 milliLiter(s) (75 mL/Hr) IV Continuous <Continuous>  vancomycin  IVPB 1250 milliGRAM(s) IV Intermittent every 12 hours  vancomycin  IVPB        MEDICATIONS  (PRN):  acetaminophen    Suspension .. 650 milliGRAM(s) Oral every 6 hours PRN Temp greater or equal to 38C (100.4F)      ALLERGIES:  Allergies    No Known Allergies    Intolerances        LABS:                        8.6    11.97 )-----------( 220      ( 21 Dec 2019 03:00 )             25.5     12-21    137  |  105  |  9   ----------------------------<  95  3.8   |  19<L>  |  0.69    Ca    8.1<L>      21 Dec 2019 03:00  Phos  4.9     12-21  Mg     2.5     12-21    TPro  5.9<L>  /  Alb  2.5<L>  /  TBili  1.2  /  DBili  0.7<H>  /  AST  42<H>  /  ALT  10  /  AlkPhos  106  12-21    PT/INR - ( 20 Dec 2019 03:40 )   PT: 11.6 SEC;   INR: 1.04          PTT - ( 20 Dec 2019 03:40 )  PTT:31.6 SEC      RADIOLOGY & ADDITIONAL TESTS: Reviewed. COVERING RESIDENT: LILLIANA MILLER (PGY-1) | [facility] [phone] | [facility] [phone]    INTERVAL HPI/OVERNIGHT EVENTS: Extra 50mg Fentanyl given for intolerance on vent. Fever to 101, resolved with Tylenol.    SUBJECTIVE: Patient seen and examined at bedside. Patient opening eyes with slight eye tracking, but not following commands or moving extremities.  ROS: Unable to assess 2/2 sedation.    OBJECTIVE:    VITAL SIGNS:  ICU Vital Signs Last 24 Hrs  T(C): 38.3 (21 Dec 2019 04:00), Max: 38.3 (21 Dec 2019 04:00)  T(F): 101 (21 Dec 2019 04:00), Max: 101 (21 Dec 2019 04:00)  HR: 75 (21 Dec 2019 06:55) (67 - 95)  BP: --  BP(mean): --  ABP: 107/67 (21 Dec 2019 06:00) (86/50 - 121/79)  ABP(mean): 83 (21 Dec 2019 06:00) (64 - 97)  RR: 30 (21 Dec 2019 06:00) (30 - 30)  SpO2: 98% (21 Dec 2019 06:55) (97% - 100%)    Mode: AC/ CMV (Assist Control/ Continuous Mandatory Ventilation), RR (machine): 30, TV (machine): 360, FiO2: 35, PEEP: 5, ITime: 1, MAP: 11, PIP: 25    12-20 @ 07:01  -  12-21 @ 07:00  --------------------------------------------------------  IN: 4772 mL / OUT: 2750 mL / NET: 2022 mL      CAPILLARY BLOOD GLUCOSE          PHYSICAL EXAM:    General: NAD  HEENT: NC/AT; PERRL, clear conjunctiva  Neck: supple  Respiratory: CTA b/l  Cardiovascular: +S1/S2; RRR  Abdomen: soft, NT/ND; +BS x4  Extremities: WWP, 2+ peripheral pulses b/l; no LE edema  Skin: normal color and turgor; no rash  Neurological:    MEDICATIONS:  MEDICATIONS  (STANDING):  cefepime   IVPB 2000 milliGRAM(s) IV Intermittent every 8 hours  chlorhexidine 0.12% Liquid 15 milliLiter(s) Oral Mucosa every 12 hours  chlorhexidine 4% Liquid 1 Application(s) Topical <User Schedule>  fentaNYL   Infusion 3 MICROgram(s)/kG/Hr (18.51 mL/Hr) IV Continuous <Continuous>  heparin  Injectable 5000 Unit(s) SubCutaneous every 8 hours  norepinephrine Infusion 0.18 MICROgram(s)/kG/Min (10.412 mL/Hr) IV Continuous <Continuous>  polyethylene glycol 3350 17 Gram(s) Oral two times a day  propofol Infusion 45 MICROgram(s)/kG/Min (16.659 mL/Hr) IV Continuous <Continuous>  sodium chloride 0.45%. 1000 milliLiter(s) (75 mL/Hr) IV Continuous <Continuous>  vancomycin  IVPB 1250 milliGRAM(s) IV Intermittent every 12 hours  vancomycin  IVPB        MEDICATIONS  (PRN):  acetaminophen    Suspension .. 650 milliGRAM(s) Oral every 6 hours PRN Temp greater or equal to 38C (100.4F)      ALLERGIES:  Allergies    No Known Allergies    Intolerances        LABS:                        8.6    11.97 )-----------( 220      ( 21 Dec 2019 03:00 )             25.5     12-21    137  |  105  |  9   ----------------------------<  95  3.8   |  19<L>  |  0.69    Ca    8.1<L>      21 Dec 2019 03:00  Phos  4.9     12-21  Mg     2.5     12-21    TPro  5.9<L>  /  Alb  2.5<L>  /  TBili  1.2  /  DBili  0.7<H>  /  AST  42<H>  /  ALT  10  /  AlkPhos  106  12-21    PT/INR - ( 20 Dec 2019 03:40 )   PT: 11.6 SEC;   INR: 1.04          PTT - ( 20 Dec 2019 03:40 )  PTT:31.6 SEC      RADIOLOGY & ADDITIONAL TESTS: Reviewed.

## 2019-12-21 NOTE — PROGRESS NOTE ADULT - ATTENDING COMMENTS
Sickle cell crisis/acute chest syndrome/hypoxemic respiratory failure requiring intubation complicated by bilateral pneumothoraces with chest tubes placed bilaterally. Right chest tube on water seal - no air leak and no evidence of pneumothorax on POCUS. Left chest tube shows lung point in apex but with no air leak - placed on water seal with stable lung point. Weaning trials as tolerated.

## 2019-12-21 NOTE — PROGRESS NOTE ADULT - ASSESSMENT
20 y/o M from [facility] [place] the [place] the [place] with PMH sickle cell disease (prior asymptomatic, no hx of blood transfusion) and malaria on anti-malarials at home who p/w chest and bone pain c/b acute chest syndrome s/p intubation for hypoxic respiratory failure s/p RBC exchange transfusion x1 and plasma exchange x2 c/b b/l PTX/pneumomediastinum s/p b/l pigtail catheters txferred to [facility] for further management and heme consultation.   #Neuro  - AOx3 at baseline, no hx of CVA/TIA, seizure, or structural lesions  -Intubated and sedated  - Fentanyl 3mcg/kg/hr and Propofol 45mcg/kg/hr  - RASS goal of -3 to -4    #Resp  Hypoxic resp failure intubated found to have b/l pneumothoraces s/p b/l pigtail catheters  - Current vent settings: , RR 30, FiO2 50, PEEP 10  - Repeat ABG 7.46/39/139  -c/w vanc 1g q12 cefipime and azithro for acute chest  - bilateral chest tubes to suction   -pneumomediastinum, continue to monitor    #CV  Hypotension 2/2 sepsis vs vasoplegia due to sedative meds  -on levo  -mild elevated trops likely due to demand ischemia  -will cont to monitor    #GI  -NPO  -no active issues    #ID  -c/w vanc, cefipime and azithro for acute chest  -Leukocytosis;12/15 blood cx neg  - f/u blood cx    #Renal  Mild ISABELL likely pre renal  -SCr 1 and trending down; baseline 0.6    #Heme  Sickle cell anemia c/b acute chest s/p 1rbc and 2 plasma exchange transfusions w/ improvement in HbS%  -IVF, folic acid   -Hb 6.6; baseline 7-8, pending exchange transfusion  - blood smear negative  -heme consulted  -will cont to monitor    #Endo  -No active issues    #DVT PPx  -SQH    #GOC/Ethics 18 y/o M from [facility] visiting [place] with PMH sickle cell disease (prior asymptomatic, no hx of blood transfusion) and malaria on anti-malarials at home who p/w chest and bone pain c/b acute chest syndrome s/p intubation for hypoxic respiratory failure s/p RBC exchange transfusion x1 and plasma exchange x2 c/b b/l PTX/pneumomediastinum s/p b/l pigtail catheters txferred to [facility] for further management and heme consultation.   #Neuro  - AOx3 at baseline, no hx of CVA/TIA, seizure, or structural lesions  -Intubated and sedated  -Started on Precedex while weaning down on Propofol  - DC propofol when Precedex at max  - Fentanyl patch added for analgesia    #Resp  Hypoxic resp failure intubated found to have b/l pneumothoraces s/p b/l pigtail catheters  - Current vent settings: , RR 30, FiO2 35, PEEP 5  - Repeat ABG 7.39/41/105 (P:F ratio 300)  -c/w vanc 1g q12 cefipime 2gq8hr  - Azithro DC'd 12/20  - Both chest tubes now to water seal; POCUS showing resolved PTX on right, and minimal lung point on left; repeat US after left chest tube off suction still pending  - Pneumomediastinum improving, continue to monitor    #CV  Hypotension 2/2 sepsis vs vasoplegia due to sedative meds  - can DC A-line if good cuff pressure  -mild elevated trops likely due to demand ischemia  -will cont to monitor    #GI  -Tube feeds on hold for possible extubation  -no active issues    #ID  -c/w vanc, cefipime as above  -Leukocytosis improving 17-->12;\  - 12/15 blood cx NGTD (from [place] admission)  - urine legionella negative (from [facility] adm[facility])    #Renal  Mild ISABELL likely pre renal  -Improving  - trend BMP    #Heme  Sickle cell anemia c/b acute chest s/p 1rbc and 2 plasma exchange transfusions w/ improvement in HbS%  -IVF, folic acid   -Hb now 8.6 s/p 3U PRBC's  - Most recent electrophoresis with HgbS 31.6%; repeat pending  -Heme recs appreciated: HgbS goal < 30% as per Heme and [facility]  -Spoke with Dr. Singer from [facility] on 12/21 regarding need for future plasma exchange. Reviewed labs trends, including Bilirubin, platelet trend, and Hgb trend, and current HgbS trend on electrophoresis. Upon review of labs with Dr. Singer, patient deemed no longer in need of future transfusions, including plasma exchange. Cam Melgar now    #Endo  -No active issues    #DVT PPx  -SQH    #GOC/Ethics  - full code

## 2019-12-22 LAB
ALBUMIN SERPL ELPH-MCNC: 2.3 G/DL — LOW (ref 3.3–5)
ALP SERPL-CCNC: 121 U/L — HIGH (ref 40–120)
ALT FLD-CCNC: 11 U/L — SIGNIFICANT CHANGE UP (ref 4–41)
ANION GAP SERPL CALC-SCNC: 10 MMO/L — SIGNIFICANT CHANGE UP (ref 7–14)
AST SERPL-CCNC: 41 U/L — HIGH (ref 4–40)
BASOPHILS # BLD AUTO: 0.03 K/UL — SIGNIFICANT CHANGE UP (ref 0–0.2)
BASOPHILS NFR BLD AUTO: 0.3 % — SIGNIFICANT CHANGE UP (ref 0–2)
BILIRUB SERPL-MCNC: 0.9 MG/DL — SIGNIFICANT CHANGE UP (ref 0.2–1.2)
BUN SERPL-MCNC: 11 MG/DL — SIGNIFICANT CHANGE UP (ref 7–23)
CALCIUM SERPL-MCNC: 8.2 MG/DL — LOW (ref 8.4–10.5)
CHLORIDE SERPL-SCNC: 107 MMOL/L — SIGNIFICANT CHANGE UP (ref 98–107)
CO2 SERPL-SCNC: 20 MMOL/L — LOW (ref 22–31)
CREAT SERPL-MCNC: 0.71 MG/DL — SIGNIFICANT CHANGE UP (ref 0.5–1.3)
EOSINOPHIL # BLD AUTO: 0.66 K/UL — HIGH (ref 0–0.5)
EOSINOPHIL NFR BLD AUTO: 6.9 % — HIGH (ref 0–6)
GLUCOSE SERPL-MCNC: 96 MG/DL — SIGNIFICANT CHANGE UP (ref 70–99)
HCT VFR BLD CALC: 29.7 % — LOW (ref 39–50)
HGB BLD-MCNC: 9.7 G/DL — LOW (ref 13–17)
IMM GRANULOCYTES NFR BLD AUTO: 1 % — SIGNIFICANT CHANGE UP (ref 0–1.5)
LYMPHOCYTES # BLD AUTO: 3.03 K/UL — SIGNIFICANT CHANGE UP (ref 1–3.3)
LYMPHOCYTES # BLD AUTO: 31.6 % — SIGNIFICANT CHANGE UP (ref 13–44)
MAGNESIUM SERPL-MCNC: 2.3 MG/DL — SIGNIFICANT CHANGE UP (ref 1.6–2.6)
MCHC RBC-ENTMCNC: 27.1 PG — SIGNIFICANT CHANGE UP (ref 27–34)
MCHC RBC-ENTMCNC: 32.7 % — SIGNIFICANT CHANGE UP (ref 32–36)
MCV RBC AUTO: 83 FL — SIGNIFICANT CHANGE UP (ref 80–100)
MONOCYTES # BLD AUTO: 1.18 K/UL — HIGH (ref 0–0.9)
MONOCYTES NFR BLD AUTO: 12.3 % — SIGNIFICANT CHANGE UP (ref 2–14)
NEUTROPHILS # BLD AUTO: 4.59 K/UL — SIGNIFICANT CHANGE UP (ref 1.8–7.4)
NEUTROPHILS NFR BLD AUTO: 47.9 % — SIGNIFICANT CHANGE UP (ref 43–77)
NRBC # FLD: 0.45 K/UL — SIGNIFICANT CHANGE UP (ref 0–0)
NRBC FLD-RTO: 4.7 — SIGNIFICANT CHANGE UP
PHOSPHATE SERPL-MCNC: 4.6 MG/DL — HIGH (ref 2.5–4.5)
PLATELET # BLD AUTO: 313 K/UL — SIGNIFICANT CHANGE UP (ref 150–400)
PMV BLD: 10.1 FL — SIGNIFICANT CHANGE UP (ref 7–13)
POTASSIUM SERPL-MCNC: 4.2 MMOL/L — SIGNIFICANT CHANGE UP (ref 3.5–5.3)
POTASSIUM SERPL-SCNC: 4.2 MMOL/L — SIGNIFICANT CHANGE UP (ref 3.5–5.3)
PROT SERPL-MCNC: 5.8 G/DL — LOW (ref 6–8.3)
RBC # BLD: 3.58 M/UL — LOW (ref 4.2–5.8)
RBC # FLD: 19.7 % — HIGH (ref 10.3–14.5)
SODIUM SERPL-SCNC: 137 MMOL/L — SIGNIFICANT CHANGE UP (ref 135–145)
WBC # BLD: 9.59 K/UL — SIGNIFICANT CHANGE UP (ref 3.8–10.5)
WBC # FLD AUTO: 9.59 K/UL — SIGNIFICANT CHANGE UP (ref 3.8–10.5)

## 2019-12-22 PROCEDURE — 99291 CRITICAL CARE FIRST HOUR: CPT

## 2019-12-22 PROCEDURE — 74019 RADEX ABDOMEN 2 VIEWS: CPT | Mod: 26

## 2019-12-22 PROCEDURE — 71045 X-RAY EXAM CHEST 1 VIEW: CPT | Mod: 26

## 2019-12-22 RX ORDER — FENTANYL CITRATE 50 UG/ML
1 INJECTION INTRAVENOUS
Refills: 0 | Status: DISCONTINUED | OUTPATIENT
Start: 2019-12-22 | End: 2019-12-26

## 2019-12-22 RX ORDER — METOCLOPRAMIDE HCL 10 MG
10 TABLET ORAL ONCE
Refills: 0 | Status: COMPLETED | OUTPATIENT
Start: 2019-12-22 | End: 2019-12-22

## 2019-12-22 RX ORDER — LIDOCAINE 4 G/100G
1 CREAM TOPICAL DAILY
Refills: 0 | Status: DISCONTINUED | OUTPATIENT
Start: 2019-12-22 | End: 2019-12-30

## 2019-12-22 RX ORDER — NALOXEGOL OXALATE 12.5 MG/1
12.5 TABLET, FILM COATED ORAL DAILY
Refills: 0 | Status: DISCONTINUED | OUTPATIENT
Start: 2019-12-22 | End: 2019-12-26

## 2019-12-22 RX ORDER — PIPERACILLIN AND TAZOBACTAM 4; .5 G/20ML; G/20ML
3.38 INJECTION, POWDER, LYOPHILIZED, FOR SOLUTION INTRAVENOUS EVERY 8 HOURS
Refills: 0 | Status: COMPLETED | OUTPATIENT
Start: 2019-12-22 | End: 2019-12-27

## 2019-12-22 RX ORDER — SENNA PLUS 8.6 MG/1
2 TABLET ORAL AT BEDTIME
Refills: 0 | Status: DISCONTINUED | OUTPATIENT
Start: 2019-12-22 | End: 2019-12-23

## 2019-12-22 RX ORDER — KETOROLAC TROMETHAMINE 30 MG/ML
15 SYRINGE (ML) INJECTION ONCE
Refills: 0 | Status: DISCONTINUED | OUTPATIENT
Start: 2019-12-22 | End: 2019-12-22

## 2019-12-22 RX ORDER — LIDOCAINE 4 G/100G
2 CREAM TOPICAL DAILY
Refills: 0 | Status: DISCONTINUED | OUTPATIENT
Start: 2019-12-22 | End: 2019-12-22

## 2019-12-22 RX ORDER — PIPERACILLIN AND TAZOBACTAM 4; .5 G/20ML; G/20ML
3.38 INJECTION, POWDER, LYOPHILIZED, FOR SOLUTION INTRAVENOUS ONCE
Refills: 0 | Status: COMPLETED | OUTPATIENT
Start: 2019-12-22 | End: 2019-12-22

## 2019-12-22 RX ORDER — ONDANSETRON 8 MG/1
4 TABLET, FILM COATED ORAL ONCE
Refills: 0 | Status: COMPLETED | OUTPATIENT
Start: 2019-12-22 | End: 2019-12-22

## 2019-12-22 RX ORDER — ACETAMINOPHEN 500 MG
1000 TABLET ORAL EVERY 8 HOURS
Refills: 0 | Status: DISCONTINUED | OUTPATIENT
Start: 2019-12-22 | End: 2019-12-23

## 2019-12-22 RX ADMIN — LIDOCAINE 1 PATCH: 4 CREAM TOPICAL at 19:41

## 2019-12-22 RX ADMIN — DEXMEDETOMIDINE HYDROCHLORIDE IN 0.9% SODIUM CHLORIDE 3.08 MICROGRAM(S)/KG/HR: 4 INJECTION INTRAVENOUS at 07:43

## 2019-12-22 RX ADMIN — CHLORHEXIDINE GLUCONATE 15 MILLILITER(S): 213 SOLUTION TOPICAL at 05:43

## 2019-12-22 RX ADMIN — FENTANYL CITRATE 18.51 MICROGRAM(S)/KG/HR: 50 INJECTION INTRAVENOUS at 07:43

## 2019-12-22 RX ADMIN — HEPARIN SODIUM 5000 UNIT(S): 5000 INJECTION INTRAVENOUS; SUBCUTANEOUS at 22:00

## 2019-12-22 RX ADMIN — POLYETHYLENE GLYCOL 3350 17 GRAM(S): 17 POWDER, FOR SOLUTION ORAL at 05:43

## 2019-12-22 RX ADMIN — CEFEPIME 100 MILLIGRAM(S): 1 INJECTION, POWDER, FOR SOLUTION INTRAMUSCULAR; INTRAVENOUS at 10:03

## 2019-12-22 RX ADMIN — FENTANYL CITRATE 1 PATCH: 50 INJECTION INTRAVENOUS at 19:40

## 2019-12-22 RX ADMIN — Medication 600 MILLIGRAM(S): at 00:11

## 2019-12-22 RX ADMIN — HEPARIN SODIUM 5000 UNIT(S): 5000 INJECTION INTRAVENOUS; SUBCUTANEOUS at 13:34

## 2019-12-22 RX ADMIN — PROPOFOL 16.66 MICROGRAM(S)/KG/MIN: 10 INJECTION, EMULSION INTRAVENOUS at 07:43

## 2019-12-22 RX ADMIN — ONDANSETRON 4 MILLIGRAM(S): 8 TABLET, FILM COATED ORAL at 20:39

## 2019-12-22 RX ADMIN — Medication 15 MILLIGRAM(S): at 15:30

## 2019-12-22 RX ADMIN — CHLORHEXIDINE GLUCONATE 1 APPLICATION(S): 213 SOLUTION TOPICAL at 10:04

## 2019-12-22 RX ADMIN — LIDOCAINE 1 PATCH: 4 CREAM TOPICAL at 11:55

## 2019-12-22 RX ADMIN — Medication 1000 MILLIGRAM(S): at 20:42

## 2019-12-22 RX ADMIN — LIDOCAINE 1 PATCH: 4 CREAM TOPICAL at 22:58

## 2019-12-22 RX ADMIN — NALOXEGOL OXALATE 12.5 MILLIGRAM(S): 12.5 TABLET, FILM COATED ORAL at 22:58

## 2019-12-22 RX ADMIN — CEFEPIME 100 MILLIGRAM(S): 1 INJECTION, POWDER, FOR SOLUTION INTRAMUSCULAR; INTRAVENOUS at 02:19

## 2019-12-22 RX ADMIN — FENTANYL CITRATE 1 PATCH: 50 INJECTION INTRAVENOUS at 14:35

## 2019-12-22 RX ADMIN — HEPARIN SODIUM 5000 UNIT(S): 5000 INJECTION INTRAVENOUS; SUBCUTANEOUS at 05:43

## 2019-12-22 RX ADMIN — PIPERACILLIN AND TAZOBACTAM 25 GRAM(S): 4; .5 INJECTION, POWDER, LYOPHILIZED, FOR SOLUTION INTRAVENOUS at 22:30

## 2019-12-22 RX ADMIN — Medication 10 MILLIGRAM(S): at 13:34

## 2019-12-22 RX ADMIN — POLYETHYLENE GLYCOL 3350 17 GRAM(S): 17 POWDER, FOR SOLUTION ORAL at 17:50

## 2019-12-22 RX ADMIN — Medication 166.67 MILLIGRAM(S): at 10:03

## 2019-12-22 RX ADMIN — FENTANYL CITRATE 1 PATCH: 50 INJECTION INTRAVENOUS at 07:42

## 2019-12-22 RX ADMIN — PIPERACILLIN AND TAZOBACTAM 200 GRAM(S): 4; .5 INJECTION, POWDER, LYOPHILIZED, FOR SOLUTION INTRAVENOUS at 20:30

## 2019-12-22 RX ADMIN — FENTANYL CITRATE 1 PATCH: 50 INJECTION INTRAVENOUS at 14:30

## 2019-12-22 RX ADMIN — SODIUM CHLORIDE 75 MILLILITER(S): 9 INJECTION, SOLUTION INTRAVENOUS at 19:47

## 2019-12-22 RX ADMIN — Medication 15 MILLIGRAM(S): at 14:42

## 2019-12-22 RX ADMIN — Medication 1000 MILLIGRAM(S): at 20:13

## 2019-12-22 RX ADMIN — Medication 1 ENEMA: at 21:41

## 2019-12-22 RX ADMIN — SODIUM CHLORIDE 75 MILLILITER(S): 9 INJECTION, SOLUTION INTRAVENOUS at 07:44

## 2019-12-22 RX ADMIN — SENNA PLUS 2 TABLET(S): 8.6 TABLET ORAL at 21:41

## 2019-12-22 NOTE — PROGRESS NOTE ADULT - SUBJECTIVE AND OBJECTIVE BOX
INTERVAL HPI/OVERNIGHT EVENTS:    SUBJECTIVE: Patient seen and examined at bedside.     CONSTITUTIONAL: No weakness, fevers or chills  EYES/ENT: No visual changes;  No vertigo or throat pain   NECK: No pain or stiffness  RESPIRATORY: No cough, wheezing, hemoptysis; No shortness of breath  CARDIOVASCULAR: No chest pain or palpitations  GASTROINTESTINAL: No abdominal or epigastric pain. No nausea, vomiting, or hematemesis; No diarrhea or constipation. No melena or hematochezia.  GENITOURINARY: No dysuria, frequency or hematuria  NEUROLOGICAL: No numbness or weakness  SKIN: No itching, rashes    OBJECTIVE:    VITAL SIGNS:  ICU Vital Signs Last 24 Hrs  T(C): 37.7 (22 Dec 2019 04:00), Max: 39.1 (22 Dec 2019 00:00)  T(F): 99.9 (22 Dec 2019 04:00), Max: 102.4 (22 Dec 2019 00:00)  HR: 65 (22 Dec 2019 06:48) (65 - 94)  BP: 119/78 (22 Dec 2019 06:00) (103/71 - 123/75)  BP(mean): 90 (22 Dec 2019 06:00) (74 - 92)  ABP: 111/70 (21 Dec 2019 19:00) (102/62 - 125/84)  ABP(mean): 86 (21 Dec 2019 19:00) (77 - 102)  RR: 30 (22 Dec 2019 06:00) (24 - 30)  SpO2: 98% (22 Dec 2019 06:48) (95% - 100%)    Mode: AC/ CMV (Assist Control/ Continuous Mandatory Ventilation), RR (machine): 30, TV (machine): 360, FiO2: 35, PEEP: 5, MAP: 11, PIP: 25     @ 07:01  -   @ 07:00  --------------------------------------------------------  IN: 4026.6 mL / OUT: 3490 mL / NET: 536.6 mL      CAPILLARY BLOOD GLUCOSE          PHYSICAL EXAM:    General: NAD  HEENT: NC/AT; PERRL, clear conjunctiva  Neck: supple  Respiratory: CTA b/l  Cardiovascular: +S1/S2; RRR  Abdomen: soft, NT/ND; +BS x4  Extremities: WWP, 2+ peripheral pulses b/l; no LE edema  Skin: normal color and turgor; no rash  Neurological:    MEDICATIONS:  MEDICATIONS  (STANDING):  cefepime   IVPB 2000 milliGRAM(s) IV Intermittent every 8 hours  chlorhexidine 0.12% Liquid 15 milliLiter(s) Oral Mucosa every 12 hours  chlorhexidine 4% Liquid 1 Application(s) Topical <User Schedule>  dexMEDEtomidine Infusion 0.2 MICROgram(s)/kG/Hr (3.085 mL/Hr) IV Continuous <Continuous>  dextrose 5% + sodium chloride 0.45%. 1000 milliLiter(s) (75 mL/Hr) IV Continuous <Continuous>  fentaNYL   Infusion 3 MICROgram(s)/kG/Hr (18.51 mL/Hr) IV Continuous <Continuous>  fentaNYL   Patch  12 MICROgram(s)/Hr. 1 Patch Transdermal every 48 hours  heparin  Injectable 5000 Unit(s) SubCutaneous every 8 hours  polyethylene glycol 3350 17 Gram(s) Oral two times a day  propofol Infusion 45 MICROgram(s)/kG/Min (16.659 mL/Hr) IV Continuous <Continuous>  vancomycin  IVPB 1250 milliGRAM(s) IV Intermittent every 12 hours  vancomycin  IVPB        MEDICATIONS  (PRN):  acetaminophen    Suspension .. 650 milliGRAM(s) Oral every 6 hours PRN Temp greater or equal to 38C (100.4F)      ALLERGIES:  Allergies    No Known Allergies    Intolerances        LABS:                        9.7    9.59  )-----------( 313      ( 22 Dec 2019 04:40 )             29.7     -    137  |  107  |  11  ----------------------------<  96  4.2   |  20<L>  |  0.71    Ca    8.2<L>      22 Dec 2019 04:40  Phos  4.6       Mg     2.3         TPro  5.8<L>  /  Alb  2.3<L>  /  TBili  0.9  /  DBili  x   /  AST  41<H>  /  ALT  11  /  AlkPhos  121<H>        Urinalysis Basic - ( 21 Dec 2019 23:35 )    Color: YELLOW / Appearance: CLEAR / S.011 / pH: 6.5  Gluc: NEGATIVE / Ketone: NEGATIVE  / Bili: NEGATIVE / Urobili: NORMAL   Blood: NEGATIVE / Protein: 10 / Nitrite: NEGATIVE   Leuk Esterase: NEGATIVE / RBC: x / WBC x   Sq Epi: x / Non Sq Epi: x / Bacteria: x        RADIOLOGY & ADDITIONAL TESTS: Reviewed.    Authored by Dr. Davy SOTO [id number], [id number] INTERVAL HPI/OVERNIGHT EVENTS:    SUBJECTIVE: Patient seen and examined at bedside. Was febrile again overnight of 102 which was treated with IV tylenol. Sedation was weaned. Patient was extubated. Patient did vomit prior to extubation but adequately oxygenating at this time. Current ly able to move all extremities although may have fluctuating levels of awareness.      ROS: unable to assess    OBJECTIVE:    VITAL SIGNS:  ICU Vital Signs Last 24 Hrs  T(C): 37.7 (22 Dec 2019 04:00), Max: 39.1 (22 Dec 2019 00:00)  T(F): 99.9 (22 Dec 2019 04:00), Max: 102.4 (22 Dec 2019 00:00)  HR: 65 (22 Dec 2019 06:48) (65 - 94)  BP: 119/78 (22 Dec 2019 06:00) (103/71 - 123/75)  BP(mean): 90 (22 Dec 2019 06:00) (74 - 92)  ABP: 111/70 (21 Dec 2019 19:00) (102/62 - 125/84)  ABP(mean): 86 (21 Dec 2019 19:00) (77 - 102)  RR: 30 (22 Dec 2019 06:00) (24 - 30)  SpO2: 98% (22 Dec 2019 06:48) (95% - 100%)    Mode: AC/ CMV (Assist Control/ Continuous Mandatory Ventilation), RR (machine): 30, TV (machine): 360, FiO2: 35, PEEP: 5, MAP: 11, PIP: 25    12-21 @ 07:01  -  12-22 @ 07:00  --------------------------------------------------------  IN: 4026.6 mL / OUT: 3490 mL / NET: 536.6 mL      CAPILLARY BLOOD GLUCOSE          PHYSICAL EXAM:    General: NAD  HEENT: NC/AT; PERRL, clear conjunctiva  Neck: supple  Respiratory: CTA b/l, chest tube to water seal bilateral with adequate placement   Cardiovascular: +S1/S2; RRR  Abdomen: soft, NT/ND; +BS x4  Extremities: WWP, 2+ peripheral pulses b/l; no LE edema  Skin: normal color and turgor; no rash  Neurological: fluctuation in alertness   Skin: farmer in       MEDICATIONS:  MEDICATIONS  (STANDING):  cefepime   IVPB 2000 milliGRAM(s) IV Intermittent every 8 hours  chlorhexidine 0.12% Liquid 15 milliLiter(s) Oral Mucosa every 12 hours  chlorhexidine 4% Liquid 1 Application(s) Topical <User Schedule>  dexMEDEtomidine Infusion 0.2 MICROgram(s)/kG/Hr (3.085 mL/Hr) IV Continuous <Continuous>  dextrose 5% + sodium chloride 0.45%. 1000 milliLiter(s) (75 mL/Hr) IV Continuous <Continuous>  fentaNYL   Infusion 3 MICROgram(s)/kG/Hr (18.51 mL/Hr) IV Continuous <Continuous>  fentaNYL   Patch  12 MICROgram(s)/Hr. 1 Patch Transdermal every 48 hours  heparin  Injectable 5000 Unit(s) SubCutaneous every 8 hours  polyethylene glycol 3350 17 Gram(s) Oral two times a day  propofol Infusion 45 MICROgram(s)/kG/Min (16.659 mL/Hr) IV Continuous <Continuous>  vancomycin  IVPB 1250 milliGRAM(s) IV Intermittent every 12 hours  vancomycin  IVPB        MEDICATIONS  (PRN):  acetaminophen    Suspension .. 650 milliGRAM(s) Oral every 6 hours PRN Temp greater or equal to 38C (100.4F)      ALLERGIES:  Allergies    No Known Allergies    Intolerances        LABS:                        9.7    9.59  )-----------( 313      ( 22 Dec 2019 04:40 )             29.7         137  |  107  |  11  ----------------------------<  96  4.2   |  20<L>  |  0.71    Ca    8.2<L>      22 Dec 2019 04:40  Phos  4.6       Mg     2.3         TPro  5.8<L>  /  Alb  2.3<L>  /  TBili  0.9  /  DBili  x   /  AST  41<H>  /  ALT  11  /  AlkPhos  121<H>        Urinalysis Basic - ( 21 Dec 2019 23:35 )    Color: YELLOW / Appearance: CLEAR / S.011 / pH: 6.5  Gluc: NEGATIVE / Ketone: NEGATIVE  / Bili: NEGATIVE / Urobili: NORMAL   Blood: NEGATIVE / Protein: 10 / Nitrite: NEGATIVE   Leuk Esterase: NEGATIVE / RBC: x / WBC x   Sq Epi: x / Non Sq Epi: x / Bacteria: x        RADIOLOGY & ADDITIONAL TESTS: Reviewed.    Authored by Dr. Davy SOTO [id number], [phone]

## 2019-12-22 NOTE — PROGRESS NOTE ADULT - ATTENDING COMMENTS
Sickle cell crisis/acute chest syndrome/hypoxemic respiratory failure requiring intubation complicated by bilateral pneumothoraces with chest tubes placed bilaterally. Extubated this AM during rounds. Bilateral chest tubes on water seal with no air leak and no evidence of pneumothorax on POCUS and CXR. Continues to have drainage so will leave tube in until output decreased. Pain control. Acutely unable to move LUE after extubation. Check CT head. Sickle cell crisis/acute chest syndrome/hypoxemic respiratory failure requiring intubation complicated by bilateral pneumothoraces with chest tubes placed bilaterally. Extubated this AM during rounds. Bilateral chest tubes on water seal with no air leak and no evidence of pneumothorax on POCUS and CXR. Continues to have drainage so will leave tube in until output decreased. Pain control. Febrile overnight to 102.3 with no leukocytosis and  with peripheral eosinophils - ? drug fever. Will discontinue antibiotics.

## 2019-12-22 NOTE — PROGRESS NOTE ADULT - ASSESSMENT
20 y/o M from [facility] visiting the [place] the [place] with PMH sickle cell disease (prior asymptomatic, no hx of blood transfusion) and malaria on anti-malarials at home who p/w chest and bone pain c/b acute chest syndrome s/p intubation for hypoxic respiratory failure s/p RBC exchange transfusion x1 and plasma exchange x2 c/b b/l PTX/pneumomediastinum s/p b/l pigtail catheters txferred to [facility] for further management and heme consultation.   #Neuro  - AOx3 at baseline, no hx of CVA/TIA, seizure, or structural lesions  -Intubated and sedated  -Started on Precedex while weaning down on Propofol  - DC propofol when Precedex at max  - Fentanyl patch added for analgesia    #Resp  Hypoxic resp failure intubated found to have b/l pneumothoraces s/p b/l pigtail catheters  - Current vent settings: , RR 30, FiO2 35, PEEP 5  - Repeat ABG 7.39/41/105 (P:F ratio 300)  -c/w vanc 1g q12 cefipime 2gq8hr  - Azithro DC'd 12/20  - Both chest tubes now to water seal; POCUS showing resolved PTX on right, and minimal lung point on left; repeat US after left chest tube off suction still pending  - Pneumomediastinum improving, continue to monitor    #CV  Hypotension 2/2 sepsis vs vasoplegia due to sedative meds  - can DC A-line if good cuff pressure  -mild elevated trops likely due to demand ischemia  -will cont to monitor    #GI  -Tube feeds on hold for possible extubation  -no active issues    #ID  -c/w vanc, cefipime as above  -Leukocytosis improving 17-->12;\  - 12/15 blood cx NGTD (from [facility] admission)  - urine legionella negative (from [facility])    #Renal  Mild ISABELL likely pre renal  -Improving  - trend BMP    #Heme  Sickle cell anemia c/b acute chest s/p 1rbc and 2 plasma exchange transfusions w/ improvement in HbS%  -IVF, folic acid   -Hb now 8.6 s/p 3U PRBC's  - Most recent electrophoresis with HgbS 31.6%; repeat pending  -Heme recs appreciated: HgbS goal < 30% as per Heme and Blood Bank  -Spoke with Dr. Singer from [facility] on 12/21 regarding need for future plasma exchange. Reviewed labs trends, including Bilirubin, platelet trend, and Hgb trend, and current HgbS trend on electrophoresis. Upon review of labs with Dr. Singer, patient deemed no longer in need of future transfusions, including plasma exchange. Cam Melgar now    #Endo  -No active issues    #DVT PPx  -SQH    #GOC/Ethics  - full code 20 y/o M from [facility] visiting the [place] for the holidays with PMH sickle cell disease (prior asymptomatic, no hx of blood transfusion) and malaria on anti-malarials at home who p/w chest and bone pain c/b acute chest syndrome s/p intubation for hypoxic respiratory failure s/p RBC exchange transfusion x1 and plasma exchange x2 c/b b/l PTX/pneumomediastinum s/p b/l pigtail catheters txferred to [facility] for further management and heme consultation.     #Neuro  - AOx3 at baseline, no hx of CVA/TIA, seizure, or structural lesions. currently off sedation   - Fentanyl patch dosage added for analgesia, recieved toradol one time dose, added lidocaine patch, should continue pain control as needed  although patient was complaining of not being able to  move an upper extremity patient was able to switly recover without intervention so will not be pursuing CTH at this time     #Resp  Hypoxic resp failure intubated found to have b/l pneumothoraces s/p b/l pigtail catheters s/p extubation on 12/22.  -concern for possible aspiration as patient was vomiting prior to removing the tube and after, currently saturating well with no distress will continue to closely monitor, should maintain low suspicion to restart antibiotics to cover a new aspiration pneumonia  -c/w vanc 1g q12 cefipime 2gq8hr to be completed today, will d/v after last dose  - Azithro DC'd 12/20  - Both chest tubes now to water seal with no air leak, repeat chest xray at midnight did not show any incremental increase in pneumothoraxes; POCUS showing resolved PTX on right, and minimal lung point on left;   chest tube drainage is decreasing however will wait for output to decrease to under 100cc/day prior to pulling the tubes  - Pneumomediastinum improving, continue to monitor    #CV  Hypotension 2/2 sepsis vs vasoplegia due to sedative meds  - A line D/areli on 12/22  -mild elevated trops likely due to demand ischemia  -will cont to monitor    #GI  -currently NPO to monitor  continue bowel regimen   added reglan to assist with nausea and post tube feeding residuals   -no active issues    #ID  - vanc and cefepime to be completed today  -Leukocytosis improving 17-->12--. 9.59  - 12/15 blood cx NGTD (from [facility] admission)  - urine legionella negative (from [facility])    #Renal  Mild ISABELL likely pre renal  -Improving  - trend BMP    #Heme  Sickle cell anemia c/b acute chest s/p 1rbc and 2 plasma exchange transfusions w/ improvement in HbS%  -IVF, folic acid   -Hb now 8.6 s/p 3U PRBC's  - Most recent electrophoresis with HgbS 31.6%; repeat pending  -Heme recs appreciated: HgbS goal < 30% as per Heme and Blood Bank  -Spoke with Dr. Singer from [facility] on 12/21 regarding need for future plasma exchange. Reviewed labs trends, including Bilirubin, platelet trend, and Hgb trend, and current HgbS trend on electrophoresis. Upon review of labs with Dr. Singer, patient deemed no longer in need of future transfusions, including plasma exchange. d/areli andujar on 12/21    #Endo  -No active issues    #DVT PPx  -SQH    #GOC/Ethics  - full code

## 2019-12-23 DIAGNOSIS — J93.9 PNEUMOTHORAX, UNSPECIFIED: ICD-10-CM

## 2019-12-23 DIAGNOSIS — I10 ESSENTIAL (PRIMARY) HYPERTENSION: ICD-10-CM

## 2019-12-23 DIAGNOSIS — D57.01 HB-SS DISEASE WITH ACUTE CHEST SYNDROME: ICD-10-CM

## 2019-12-23 DIAGNOSIS — K59.00 CONSTIPATION, UNSPECIFIED: ICD-10-CM

## 2019-12-23 DIAGNOSIS — J69.0 PNEUMONITIS DUE TO INHALATION OF FOOD AND VOMIT: ICD-10-CM

## 2019-12-23 LAB
ALBUMIN SERPL ELPH-MCNC: 2.8 G/DL — LOW (ref 3.3–5)
ALP SERPL-CCNC: 119 U/L — SIGNIFICANT CHANGE UP (ref 40–120)
ALT FLD-CCNC: 11 U/L — SIGNIFICANT CHANGE UP (ref 4–41)
ANION GAP SERPL CALC-SCNC: 12 MMO/L — SIGNIFICANT CHANGE UP (ref 7–14)
ANISOCYTOSIS BLD QL: SLIGHT — SIGNIFICANT CHANGE UP
AST SERPL-CCNC: 45 U/L — HIGH (ref 4–40)
BACTERIA SPT RESP CULT: SIGNIFICANT CHANGE UP
BASOPHILS # BLD AUTO: 0.03 K/UL — SIGNIFICANT CHANGE UP (ref 0–0.2)
BASOPHILS NFR BLD AUTO: 0.3 % — SIGNIFICANT CHANGE UP (ref 0–2)
BASOPHILS NFR SPEC: 0 % — SIGNIFICANT CHANGE UP (ref 0–2)
BILIRUB SERPL-MCNC: 1.3 MG/DL — HIGH (ref 0.2–1.2)
BLASTS # FLD: 0 % — SIGNIFICANT CHANGE UP (ref 0–0)
BUN SERPL-MCNC: 13 MG/DL — SIGNIFICANT CHANGE UP (ref 7–23)
CALCIUM SERPL-MCNC: 8.6 MG/DL — SIGNIFICANT CHANGE UP (ref 8.4–10.5)
CHLORIDE SERPL-SCNC: 105 MMOL/L — SIGNIFICANT CHANGE UP (ref 98–107)
CO2 SERPL-SCNC: 21 MMOL/L — LOW (ref 22–31)
CREAT SERPL-MCNC: 0.7 MG/DL — SIGNIFICANT CHANGE UP (ref 0.5–1.3)
DACRYOCYTES BLD QL SMEAR: SLIGHT — SIGNIFICANT CHANGE UP
ELLIPTOCYTES BLD QL SMEAR: SLIGHT — SIGNIFICANT CHANGE UP
EOSINOPHIL # BLD AUTO: 0.08 K/UL — SIGNIFICANT CHANGE UP (ref 0–0.5)
EOSINOPHIL NFR BLD AUTO: 0.7 % — SIGNIFICANT CHANGE UP (ref 0–6)
EOSINOPHIL NFR FLD: 0.9 % — SIGNIFICANT CHANGE UP (ref 0–6)
GIANT PLATELETS BLD QL SMEAR: PRESENT — SIGNIFICANT CHANGE UP
GLUCOSE SERPL-MCNC: 108 MG/DL — HIGH (ref 70–99)
HCT VFR BLD CALC: 28.8 % — LOW (ref 39–50)
HGB A MFR BLD: 64.8 % — LOW
HGB A MFR BLD: 65 % — LOW
HGB A MFR BLD: 66 % — LOW
HGB A2 MFR BLD: 3.1 % — SIGNIFICANT CHANGE UP (ref 2.4–3.5)
HGB A2 MFR BLD: 3.2 % — SIGNIFICANT CHANGE UP (ref 2.4–3.5)
HGB A2 MFR BLD: 3.2 % — SIGNIFICANT CHANGE UP (ref 2.4–3.5)
HGB BLD-MCNC: 9.4 G/DL — LOW (ref 13–17)
HGB F MFR BLD: < 1 % — SIGNIFICANT CHANGE UP (ref 0–1.5)
HGB S MFR BLD: 30.1 % — HIGH (ref 0–0)
HGB S MFR BLD: 31.2 % — HIGH (ref 0–0)
HGB S MFR BLD: 31.3 % — HIGH (ref 0–0)
HYPOCHROMIA BLD QL: SLIGHT — SIGNIFICANT CHANGE UP
IMM GRANULOCYTES NFR BLD AUTO: 0.7 % — SIGNIFICANT CHANGE UP (ref 0–1.5)
LYMPHOCYTES # BLD AUTO: 1.89 K/UL — SIGNIFICANT CHANGE UP (ref 1–3.3)
LYMPHOCYTES # BLD AUTO: 16.9 % — SIGNIFICANT CHANGE UP (ref 13–44)
LYMPHOCYTES NFR SPEC AUTO: 11.3 % — LOW (ref 13–44)
MACROCYTES BLD QL: SLIGHT — SIGNIFICANT CHANGE UP
MAGNESIUM SERPL-MCNC: 2.2 MG/DL — SIGNIFICANT CHANGE UP (ref 1.6–2.6)
MCHC RBC-ENTMCNC: 26.4 PG — LOW (ref 27–34)
MCHC RBC-ENTMCNC: 32.6 % — SIGNIFICANT CHANGE UP (ref 32–36)
MCV RBC AUTO: 80.9 FL — SIGNIFICANT CHANGE UP (ref 80–100)
METAMYELOCYTES # FLD: 0 % — SIGNIFICANT CHANGE UP (ref 0–1)
MICROCYTES BLD QL: SLIGHT — SIGNIFICANT CHANGE UP
MONOCYTES # BLD AUTO: 1.38 K/UL — HIGH (ref 0–0.9)
MONOCYTES NFR BLD AUTO: 12.3 % — SIGNIFICANT CHANGE UP (ref 2–14)
MONOCYTES NFR BLD: 5.2 % — SIGNIFICANT CHANGE UP (ref 2–9)
MYELOCYTES NFR BLD: 0 % — SIGNIFICANT CHANGE UP (ref 0–0)
NEUTROPHIL AB SER-ACNC: 73.9 % — SIGNIFICANT CHANGE UP (ref 43–77)
NEUTROPHILS # BLD AUTO: 7.74 K/UL — HIGH (ref 1.8–7.4)
NEUTROPHILS NFR BLD AUTO: 69.1 % — SIGNIFICANT CHANGE UP (ref 43–77)
NEUTS BAND # BLD: 0.9 % — SIGNIFICANT CHANGE UP (ref 0–6)
NRBC # BLD: 5 /100WBC — SIGNIFICANT CHANGE UP
NRBC # FLD: 0.2 K/UL — SIGNIFICANT CHANGE UP (ref 0–0)
NRBC FLD-RTO: 1.8 — SIGNIFICANT CHANGE UP
OTHER - HEMATOLOGY %: 0 — SIGNIFICANT CHANGE UP
OVALOCYTES BLD QL SMEAR: SLIGHT — SIGNIFICANT CHANGE UP
PHOSPHATE SERPL-MCNC: 4.1 MG/DL — SIGNIFICANT CHANGE UP (ref 2.5–4.5)
PLATELET # BLD AUTO: 457 K/UL — HIGH (ref 150–400)
PLATELET COUNT - ESTIMATE: NORMAL — SIGNIFICANT CHANGE UP
PMV BLD: 10 FL — SIGNIFICANT CHANGE UP (ref 7–13)
POIKILOCYTOSIS BLD QL AUTO: SIGNIFICANT CHANGE UP
POLYCHROMASIA BLD QL SMEAR: SLIGHT — SIGNIFICANT CHANGE UP
POTASSIUM SERPL-MCNC: 4.3 MMOL/L — SIGNIFICANT CHANGE UP (ref 3.5–5.3)
POTASSIUM SERPL-SCNC: 4.3 MMOL/L — SIGNIFICANT CHANGE UP (ref 3.5–5.3)
PROMYELOCYTES # FLD: 0 % — SIGNIFICANT CHANGE UP (ref 0–0)
PROT SERPL-MCNC: 6.6 G/DL — SIGNIFICANT CHANGE UP (ref 6–8.3)
RBC # BLD: 3.56 M/UL — LOW (ref 4.2–5.8)
RBC # FLD: 19.2 % — HIGH (ref 10.3–14.5)
SCHISTOCYTES BLD QL AUTO: SLIGHT — SIGNIFICANT CHANGE UP
SICKLE CELLS BLD QL SMEAR: SLIGHT — SIGNIFICANT CHANGE UP
SODIUM SERPL-SCNC: 138 MMOL/L — SIGNIFICANT CHANGE UP (ref 135–145)
TARGETS BLD QL SMEAR: SIGNIFICANT CHANGE UP
VARIANT LYMPHS # BLD: 7.8 % — SIGNIFICANT CHANGE UP
WBC # BLD: 11.2 K/UL — HIGH (ref 3.8–10.5)
WBC # FLD AUTO: 11.2 K/UL — HIGH (ref 3.8–10.5)

## 2019-12-23 PROCEDURE — 99223 1ST HOSP IP/OBS HIGH 75: CPT

## 2019-12-23 PROCEDURE — 99291 CRITICAL CARE FIRST HOUR: CPT | Mod: 25

## 2019-12-23 PROCEDURE — 71045 X-RAY EXAM CHEST 1 VIEW: CPT | Mod: 26

## 2019-12-23 RX ORDER — HYDROMORPHONE HYDROCHLORIDE 2 MG/ML
1 INJECTION INTRAMUSCULAR; INTRAVENOUS; SUBCUTANEOUS ONCE
Refills: 0 | Status: DISCONTINUED | OUTPATIENT
Start: 2019-12-23 | End: 2019-12-23

## 2019-12-23 RX ORDER — HYDROMORPHONE HYDROCHLORIDE 2 MG/ML
30 INJECTION INTRAMUSCULAR; INTRAVENOUS; SUBCUTANEOUS
Refills: 0 | Status: DISCONTINUED | OUTPATIENT
Start: 2019-12-23 | End: 2019-12-23

## 2019-12-23 RX ORDER — HYDROMORPHONE HYDROCHLORIDE 2 MG/ML
1 INJECTION INTRAMUSCULAR; INTRAVENOUS; SUBCUTANEOUS EVERY 4 HOURS
Refills: 0 | Status: DISCONTINUED | OUTPATIENT
Start: 2019-12-23 | End: 2019-12-30

## 2019-12-23 RX ORDER — ACETAMINOPHEN 500 MG
1000 TABLET ORAL ONCE
Refills: 0 | Status: COMPLETED | OUTPATIENT
Start: 2019-12-23 | End: 2019-12-23

## 2019-12-23 RX ADMIN — Medication 1000 MILLIGRAM(S): at 12:00

## 2019-12-23 RX ADMIN — SODIUM CHLORIDE 75 MILLILITER(S): 9 INJECTION, SOLUTION INTRAVENOUS at 09:02

## 2019-12-23 RX ADMIN — POLYETHYLENE GLYCOL 3350 17 GRAM(S): 17 POWDER, FOR SOLUTION ORAL at 18:51

## 2019-12-23 RX ADMIN — FENTANYL CITRATE 1 PATCH: 50 INJECTION INTRAVENOUS at 19:23

## 2019-12-23 RX ADMIN — LIDOCAINE 1 PATCH: 4 CREAM TOPICAL at 22:56

## 2019-12-23 RX ADMIN — PIPERACILLIN AND TAZOBACTAM 25 GRAM(S): 4; .5 INJECTION, POWDER, LYOPHILIZED, FOR SOLUTION INTRAVENOUS at 22:24

## 2019-12-23 RX ADMIN — LIDOCAINE 1 PATCH: 4 CREAM TOPICAL at 19:23

## 2019-12-23 RX ADMIN — HEPARIN SODIUM 5000 UNIT(S): 5000 INJECTION INTRAVENOUS; SUBCUTANEOUS at 15:15

## 2019-12-23 RX ADMIN — HYDROMORPHONE HYDROCHLORIDE 1 MILLIGRAM(S): 2 INJECTION INTRAMUSCULAR; INTRAVENOUS; SUBCUTANEOUS at 01:20

## 2019-12-23 RX ADMIN — HYDROMORPHONE HYDROCHLORIDE 1 MILLIGRAM(S): 2 INJECTION INTRAMUSCULAR; INTRAVENOUS; SUBCUTANEOUS at 16:00

## 2019-12-23 RX ADMIN — LIDOCAINE 1 PATCH: 4 CREAM TOPICAL at 11:52

## 2019-12-23 RX ADMIN — HYDROMORPHONE HYDROCHLORIDE 1 MILLIGRAM(S): 2 INJECTION INTRAMUSCULAR; INTRAVENOUS; SUBCUTANEOUS at 20:30

## 2019-12-23 RX ADMIN — CHLORHEXIDINE GLUCONATE 1 APPLICATION(S): 213 SOLUTION TOPICAL at 18:52

## 2019-12-23 RX ADMIN — HYDROMORPHONE HYDROCHLORIDE 1 MILLIGRAM(S): 2 INJECTION INTRAMUSCULAR; INTRAVENOUS; SUBCUTANEOUS at 01:35

## 2019-12-23 RX ADMIN — HYDROMORPHONE HYDROCHLORIDE 1 MILLIGRAM(S): 2 INJECTION INTRAMUSCULAR; INTRAVENOUS; SUBCUTANEOUS at 14:04

## 2019-12-23 RX ADMIN — FENTANYL CITRATE 1 PATCH: 50 INJECTION INTRAVENOUS at 08:20

## 2019-12-23 RX ADMIN — HYDROMORPHONE HYDROCHLORIDE 1 MILLIGRAM(S): 2 INJECTION INTRAMUSCULAR; INTRAVENOUS; SUBCUTANEOUS at 20:45

## 2019-12-23 RX ADMIN — HEPARIN SODIUM 5000 UNIT(S): 5000 INJECTION INTRAVENOUS; SUBCUTANEOUS at 22:24

## 2019-12-23 RX ADMIN — Medication 400 MILLIGRAM(S): at 09:00

## 2019-12-23 RX ADMIN — POLYETHYLENE GLYCOL 3350 17 GRAM(S): 17 POWDER, FOR SOLUTION ORAL at 05:36

## 2019-12-23 RX ADMIN — NALOXEGOL OXALATE 12.5 MILLIGRAM(S): 12.5 TABLET, FILM COATED ORAL at 11:52

## 2019-12-23 RX ADMIN — HEPARIN SODIUM 5000 UNIT(S): 5000 INJECTION INTRAVENOUS; SUBCUTANEOUS at 05:36

## 2019-12-23 RX ADMIN — PIPERACILLIN AND TAZOBACTAM 25 GRAM(S): 4; .5 INJECTION, POWDER, LYOPHILIZED, FOR SOLUTION INTRAVENOUS at 05:36

## 2019-12-23 RX ADMIN — PIPERACILLIN AND TAZOBACTAM 25 GRAM(S): 4; .5 INJECTION, POWDER, LYOPHILIZED, FOR SOLUTION INTRAVENOUS at 15:16

## 2019-12-23 NOTE — CONSULT NOTE ADULT - PROBLEM SELECTOR RECOMMENDATION 9
Fentanyl 25mcg/hr is equivalent to Dilaudid IV 7-10mg per day.  Will start Dilaudid PCA 0.25mg per demand, 6 minute lockout, 4mg per 4 hour max.  Can titrate depending on patient use.  Continue VTE prophylaxis.  1/2NS for IVF. Fentanyl 25mcg/hr is equivalent to Dilaudid IV 7-10mg per day.  Will start Dilaudid PCA 0.25mg per demand, 6 minute lockout, 4mg per 4 hour max.  Can titrate depending on patient use.  Please discontinue fentanyl patch order and remove the patch once PCA is started.  Continue VTE prophylaxis.  1/2NS for IVF.

## 2019-12-23 NOTE — PROGRESS NOTE ADULT - SUBJECTIVE AND OBJECTIVE BOX
COVERING RESIDENT: LILLIANA MILLER (PGY-1) | [facility] PAGER [phone] | [facility] P[facility]ER [phone]    INTERVAL HPI/OVERNIGHT EVENTS: NAEON.    SUBJECTIVE: Patient seen and examined at bedside.     CONSTITUTIONAL: No weakness, fevers or chills  EYES/ENT: No visual changes;  No vertigo or throat pain   NECK: No pain or stiffness  RESPIRATORY: No cough, wheezing, hemoptysis; No shortness of breath  CARDIOVASCULAR: No chest pain or palpitations  GASTROINTESTINAL: No abdominal or epigastric pain. No nausea, vomiting, or hematemesis; No diarrhea or constipation. No melena or hematochezia.  GENITOURINARY: No dysuria, frequency or hematuria  NEUROLOGICAL: No numbness or weakness  SKIN: No itching, rashes    OBJECTIVE:    VITAL SIGNS:  ICU Vital Signs Last 24 Hrs  T(C): 37.9 (23 Dec 2019 04:00), Max: 38.7 (22 Dec 2019 20:00)  T(F): 100.2 (23 Dec 2019 04:00), Max: 101.6 (22 Dec 2019 20:00)  HR: 92 (23 Dec 2019 06:00) (68 - 124)  BP: 136/85 (23 Dec 2019 06:00) (118/76 - 140/88)  BP(mean): 99 (23 Dec 2019 06:00) (85 - 103)  ABP: --  ABP(mean): --  RR: 45 (23 Dec 2019 06:00) (24 - 54)  SpO2: 100% (23 Dec 2019 06:00) (88% - 100%)    Mode: CPAP with PS, FiO2: 35, PEEP: 5, PS: 5, MAP: 8, PIP: 14     @ 07:01  -   @ 07:00  --------------------------------------------------------  IN: 2141 mL / OUT: 4235 mL / NET: -2094 mL      CAPILLARY BLOOD GLUCOSE          PHYSICAL EXAM:    General: NAD  HEENT: NC/AT; PERRL, clear conjunctiva  Neck: supple  Respiratory: CTA b/l  Cardiovascular: +S1/S2; RRR  Abdomen: soft, NT/ND; +BS x4  Extremities: WWP, 2+ peripheral pulses b/l; no LE edema  Skin: normal color and turgor; no rash  Neurological:    MEDICATIONS:  MEDICATIONS  (STANDING):  chlorhexidine 4% Liquid 1 Application(s) Topical <User Schedule>  dextrose 5% + sodium chloride 0.45%. 1000 milliLiter(s) (75 mL/Hr) IV Continuous <Continuous>  fentaNYL   Patch  25 MICROgram(s)/Hr. 1 Patch Transdermal every 48 hours  heparin  Injectable 5000 Unit(s) SubCutaneous every 8 hours  lidocaine   Patch 1 Patch Transdermal daily  naloxegol 12.5 milliGRAM(s) Oral daily  piperacillin/tazobactam IVPB.. 3.375 Gram(s) IV Intermittent every 8 hours  polyethylene glycol 3350 17 Gram(s) Oral two times a day  senna 2 Tablet(s) Oral at bedtime    MEDICATIONS  (PRN):  acetaminophen   Tablet .. 1000 milliGRAM(s) Oral every 8 hours PRN Temp greater or equal to 38C (100.4F)      ALLERGIES:  Allergies    No Known Allergies    Intolerances        LABS:                        9.4    11.20 )-----------( 457      ( 23 Dec 2019 00:50 )             28.8     12-    138  |  105  |  13  ----------------------------<  108<H>  4.3   |  21<L>  |  0.70    Ca    8.6      23 Dec 2019 00:50  Phos  4.1     12-  Mg     2.2     -    TPro  6.6  /  Alb  2.8<L>  /  TBili  1.3<H>  /  DBili  x   /  AST  45<H>  /  ALT  11  /  AlkPhos  119  12-      Urinalysis Basic - ( 21 Dec 2019 23:35 )    Color: YELLOW / Appearance: CLEAR / S.011 / pH: 6.5  Gluc: NEGATIVE / Ketone: NEGATIVE  / Bili: NEGATIVE / Urobili: NORMAL   Blood: NEGATIVE / Protein: 10 / Nitrite: NEGATIVE   Leuk Esterase: NEGATIVE / RBC: x / WBC x   Sq Epi: x / Non Sq Epi: x / Bacteria: x        RADIOLOGY & ADDITIONAL TESTS: Reviewed.

## 2019-12-23 NOTE — PROGRESS NOTE ADULT - ASSESSMENT
20 y/o M from [facility] visiting the [place] the [place] with PMH sickle cell disease (prior asymptomatic, no hx of blood transfusion) and malaria on anti-malarials at home who p/w chest and bone pain c/b acute chest syndrome s/p intubation for hypoxic respiratory failure s/p RBC exchange transfusion x1 and plasma exchange x2 c/b b/l PTX/pneumomediastinum s/p b/l pigtail catheters txferred to [facility] for further management and heme consultation.     #Neuro  - AOx3 at baseline, no hx of CVA/TIA, seizure, or structural lesions. currently off sedation   - Fentanyl patch dosage added for analgesia, recieved toradol one time dose, added lidocaine patch, should continue pain control as needed  although patient was complaining of not being able to  move an upper extremity patient was able to switly recover without intervention so will not be pursuing CTH at this time     #Resp  Hypoxic resp failure intubated found to have b/l pneumothoraces s/p b/l pigtail catheters s/p extubation on 12/22.  -concern for possible aspiration as patient was vomiting prior to removing the tube and after, currently saturating well with no distress will continue to closely monitor, should maintain low suspicion to restart antibiotics to cover a new aspiration pneumonia  -c/w vanc 1g q12 cefipime 2gq8hr to be completed today, will d/v after last dose  - Azithro DC'd 12/20  - Both chest tubes now to water seal with no air leak, repeat chest xray at midnight did not show any incremental increase in pneumothoraxes; POCUS showing resolved PTX on right, and minimal lung point on left;   chest tube drainage is decreasing however will wait for output to decrease to under 100cc/day prior to pulling the tubes  - Pneumomediastinum improving, continue to monitor    #CV  Hypotension 2/2 sepsis vs vasoplegia due to sedative meds  - A line D/areli on 12/22  -mild elevated trops likely due to demand ischemia  -will cont to monitor    #GI  -currently NPO to monitor  continue bowel regimen   added reglan to assist with nausea and post tube feeding residuals   -no active issues    #ID  - vanc and cefepime to be completed today  -Leukocytosis improving 17-->12--. 9.59  - 12/15 blood cx NGTD (from [facility] admission)  - urine legionella negative (from [facility])    #Renal  Mild ISABELL likely pre renal  -Improving  - trend BMP    #Heme  Sickle cell anemia c/b acute chest s/p 1rbc and 2 plasma exchange transfusions w/ improvement in HbS%  -IVF, folic acid   -Hb now 8.6 s/p 3U PRBC's  - Most recent electrophoresis with HgbS 31.6%; repeat pending  -Heme recs appreciated: HgbS goal < 30% as per Heme and Blood Bank  -Spoke with Dr. Singer from [facility] on 12/21 regarding need for future plasma exchange. Reviewed labs trends, including Bilirubin, platelet trend, and Hgb trend, and current HgbS trend on electrophoresis. Upon review of labs with Dr. Singer, patient deemed no longer in need of future transfusions, including plasma exchange. d/areli andujar on 12/21    #Endo  -No active issues    #DVT PPx  -SQH    #GOC/Ethics  - full code

## 2019-12-23 NOTE — PROGRESS NOTE ADULT - ATTENDING COMMENTS
Patient with sickle cell disease presented to [facility] with acute chest syndrome, acute hypoxic respiratory failure requiring intubation c/b bilateral ptx and chest tubes, s/p RBC exchange and plasma exchange transferred here for further management, extubated over weekend.  Patient in generalized pain and states he is very weak overall, though it is not focal weakness.  Has lung sliding bilaterally, minimal output from chest tubes (<100 cc on right, none overnight on left), will remove chest tubes.  Does not need RBC exchange today.  Will try to start PCA pump for better analgesia.  c/w abx for now given fever.

## 2019-12-23 NOTE — CONSULT NOTE ADULT - SUBJECTIVE AND OBJECTIVE BOX
[facility] of Hospital Medicine  Dharmesh Blackmon MD  Pager (ANUPAM, 9J-5L): [phone]  Other Times:  [phone]    Patient is a 19y old  Male who presents with a chief complaint of sickle cell crisis (23 Dec 2019 07:02)    HPI:  19M with sickle cell disease and malaria admitted for sickle cell crisis with acute chest syndrome s/p intubation for hypoxic respiratory failure, RBC exchange transfusion, plasma exchange, c/b B/L PTX/pneumomediastinum s/p pigtail catheters and chest tube.  Currently extubated and clinically improving.  Minimally verbal but able to communicate by nods and other non-verbal cues. Sister by the bedside assisting in obtaining information.  No F/C, N/V, CP, SOB, Cough, lightheadedness, dizziness, abdominal pain, diarrhea, dysuria.  Continues to have difficulty with pain control - 10/10 in severity, generalized back/leg/arm pain, improved on PRN doses of dilaudid IV.  Patient able to hit the button for pain control - nodding in understanding.    Pain Symptoms if applicable:             	                         none	   mild         moderate         severe  Pain:	           10                 0	    1-3	     4-6	         7-10  Location:	diffuse  Modifying factors:	improved with pain meds  Associated symptoms:	    Allergies    No Known Allergies    Intolerances        HOME MEDICATIONS: Reviewed    MEDICATIONS  (STANDING):  chlorhexidine 4% Liquid 1 Application(s) Topical <User Schedule>  dextrose 5% + sodium chloride 0.45%. 1000 milliLiter(s) (75 mL/Hr) IV Continuous <Continuous>  fentaNYL   Patch  25 MICROgram(s)/Hr. 1 Patch Transdermal every 48 hours  heparin  Injectable 5000 Unit(s) SubCutaneous every 8 hours  lidocaine   Patch 1 Patch Transdermal daily  naloxegol 12.5 milliGRAM(s) Oral daily  piperacillin/tazobactam IVPB.. 3.375 Gram(s) IV Intermittent every 8 hours  polyethylene glycol 3350 17 Gram(s) Oral two times a day  senna 2 Tablet(s) Oral at bedtime    MEDICATIONS  (PRN):      PAST MEDICAL & SURGICAL HISTORY:  Sickle cell disease      SOCIAL HISTORY:  No tobacco/alcohol use/abuse.    FAMILY HISTORY:  None    REVIEW OF SYSTEMS: Limited due to minimal verbal status.    CONSTITUTIONAL: No fever, weight loss, or fatigue  ENMT:  No sinus or throat pain  NECK: No pain or stiffness  RESPIRATORY: No cough, wheezing, chills or hemoptysis  CARDIOVASCULAR: No chest pain, palpitations, dizziness, or leg swelling  GASTROINTESTINAL: No abdominal or epigastric pain. No nausea, vomiting, or hematemesis; No diarrhea or constipation. No melena or hematochezia.  MUSCULOSKELETAL: as above    Vital Signs Last 24 Hrs  T(C): 37.7 (23 Dec 2019 12:00), Max: 38.7 (22 Dec 2019 20:00)  T(F): 99.9 (23 Dec 2019 12:00), Max: 101.6 (22 Dec 2019 20:00)  HR: 97 (23 Dec 2019 15:00) (80 - 114)  BP: 141/89 (23 Dec 2019 15:00) (131/82 - 141/89)  BP(mean): 104 (23 Dec 2019 15:00) (95 - 106)  RR: 38 (23 Dec 2019 15:) (24 - 52)  SpO2: 94% (23 Dec 2019 15:00) (94% - 100%)  CAPILLARY BLOOD GLUCOSE          PHYSICAL EXAM:    GENERAL: NAD  HEAD:  Atraumatic, Normocephalic  EYES: EOMI, PERRLA, conjunctiva and sclera clear  ENMT: Moist mucous membranes, drooling  NECK: Supple, No JVD  RESPIRATORY: Clear to auscultation bilaterally; chest tubes in place  CARDIOVASCULAR: Mildly tachycardic, bounding pulse; No murmurs, rubs, or gallops  GASTROINTESTINAL: Soft, Nontender, Nondistended; Bowel sounds present  BACK: No tenderness  EXTREMITIES:  2+ Peripheral Pulses, No clubbing, cyanosis, or edema  NERVOUS SYSTEM:  Alert & Oriented; but minimally verbal; Moving all 4 extremities; No gross sensory deficits  PSYCH: Calm  HEME/LYMPH: No lymphadenopathy noted  SKIN: No rashes or lesions; Incisions C/D/I    LABS:                        9.4    11.20 )-----------( 457      ( 23 Dec 2019 00:50 )             28.8     12-    138  |  105  |  13  ----------------------------<  108<H>  4.3   |  21<L>  |  0.70    Ca    8.6      23 Dec 2019 00:50  Phos  4.1     12-  Mg     2.2     12-    TPro  6.6  /  Alb  2.8<L>  /  TBili  1.3<H>  /  DBili  x   /  AST  45<H>  /  ALT  11  /  AlkPhos  119  12-23      Urinalysis Basic - ( 21 Dec 2019 23:35 )    Color: YELLOW / Appearance: CLEAR / S.011 / pH: 6.5  Gluc: NEGATIVE / Ketone: NEGATIVE  / Bili: NEGATIVE / Urobili: NORMAL   Blood: NEGATIVE / Protein: 10 / Nitrite: NEGATIVE   Leuk Esterase: NEGATIVE / RBC: x / WBC x   Sq Epi: x / Non Sq Epi: x / Bacteria: x      CAPILLARY BLOOD GLUCOSE          RADIOLOGY & ADDITIONAL STUDIES:    Imaging:   Personally Reviewed:  [ ] YES               EKG:   Personally Reviewed:  [ ] YES       Care Discussed with Consultant(s)/Other Providers:  Care Discussed with Primary Team.      [] Increased delirium risk  [] Delirium and other risks can be reduced by:          -early ambulation          -minimizing "tethers" - IV, oxygen, catheters, etc          -avoiding hypnotics and sedatives          -maintaining hydration/nutrition          -avoid anticholinergics - diphenhydramine, etc          -pain control          -supportive environment

## 2019-12-23 NOTE — CONSULT NOTE ADULT - ASSESSMENT
19M with sickle cell crisis with acute chest syndrome with multiple complications.  Consult for pain management - transition from fentanyl patch to dilaudid PCA.

## 2019-12-24 DIAGNOSIS — R41.0 DISORIENTATION, UNSPECIFIED: ICD-10-CM

## 2019-12-24 LAB
ALBUMIN SERPL ELPH-MCNC: 3 G/DL — LOW (ref 3.3–5)
ALP SERPL-CCNC: 98 U/L — SIGNIFICANT CHANGE UP (ref 40–120)
ALT FLD-CCNC: 12 U/L — SIGNIFICANT CHANGE UP (ref 4–41)
AMMONIA BLD-MCNC: 64 UMOL/L — HIGH (ref 11–55)
ANION GAP SERPL CALC-SCNC: 11 MMO/L — SIGNIFICANT CHANGE UP (ref 7–14)
AST SERPL-CCNC: 47 U/L — HIGH (ref 4–40)
BILIRUB SERPL-MCNC: 1.2 MG/DL — SIGNIFICANT CHANGE UP (ref 0.2–1.2)
BLD GP AB SCN SERPL QL: NEGATIVE — SIGNIFICANT CHANGE UP
BUN SERPL-MCNC: 19 MG/DL — SIGNIFICANT CHANGE UP (ref 7–23)
CALCIUM SERPL-MCNC: 8.9 MG/DL — SIGNIFICANT CHANGE UP (ref 8.4–10.5)
CHLORIDE SERPL-SCNC: 106 MMOL/L — SIGNIFICANT CHANGE UP (ref 98–107)
CO2 SERPL-SCNC: 20 MMOL/L — LOW (ref 22–31)
CREAT SERPL-MCNC: 0.61 MG/DL — SIGNIFICANT CHANGE UP (ref 0.5–1.3)
GLUCOSE SERPL-MCNC: 100 MG/DL — HIGH (ref 70–99)
HCT VFR BLD CALC: 28.7 % — LOW (ref 39–50)
HGB BLD-MCNC: 9.4 G/DL — LOW (ref 13–17)
MAGNESIUM SERPL-MCNC: 2.2 MG/DL — SIGNIFICANT CHANGE UP (ref 1.6–2.6)
MCHC RBC-ENTMCNC: 26.9 PG — LOW (ref 27–34)
MCHC RBC-ENTMCNC: 32.8 % — SIGNIFICANT CHANGE UP (ref 32–36)
MCV RBC AUTO: 82.2 FL — SIGNIFICANT CHANGE UP (ref 80–100)
NRBC # FLD: 0.04 K/UL — SIGNIFICANT CHANGE UP (ref 0–0)
PHOSPHATE SERPL-MCNC: 3.5 MG/DL — SIGNIFICANT CHANGE UP (ref 2.5–4.5)
PLATELET # BLD AUTO: 570 K/UL — HIGH (ref 150–400)
PMV BLD: 9.4 FL — SIGNIFICANT CHANGE UP (ref 7–13)
POTASSIUM SERPL-MCNC: 4.4 MMOL/L — SIGNIFICANT CHANGE UP (ref 3.5–5.3)
POTASSIUM SERPL-SCNC: 4.4 MMOL/L — SIGNIFICANT CHANGE UP (ref 3.5–5.3)
PROT SERPL-MCNC: 6.7 G/DL — SIGNIFICANT CHANGE UP (ref 6–8.3)
RBC # BLD: 3.49 M/UL — LOW (ref 4.2–5.8)
RBC # FLD: 19.3 % — HIGH (ref 10.3–14.5)
RH IG SCN BLD-IMP: POSITIVE — SIGNIFICANT CHANGE UP
SODIUM SERPL-SCNC: 137 MMOL/L — SIGNIFICANT CHANGE UP (ref 135–145)
WBC # BLD: 10.8 K/UL — HIGH (ref 3.8–10.5)
WBC # FLD AUTO: 10.8 K/UL — HIGH (ref 3.8–10.5)

## 2019-12-24 PROCEDURE — 99291 CRITICAL CARE FIRST HOUR: CPT | Mod: 25

## 2019-12-24 PROCEDURE — 90792 PSYCH DIAG EVAL W/MED SRVCS: CPT

## 2019-12-24 PROCEDURE — 93970 EXTREMITY STUDY: CPT | Mod: 26

## 2019-12-24 PROCEDURE — 70450 CT HEAD/BRAIN W/O DYE: CPT | Mod: 26

## 2019-12-24 PROCEDURE — 74019 RADEX ABDOMEN 2 VIEWS: CPT | Mod: 26

## 2019-12-24 PROCEDURE — 71045 X-RAY EXAM CHEST 1 VIEW: CPT | Mod: 26

## 2019-12-24 PROCEDURE — 76700 US EXAM ABDOM COMPLETE: CPT | Mod: 26

## 2019-12-24 RX ORDER — ASPIRIN/CALCIUM CARB/MAGNESIUM 324 MG
81 TABLET ORAL DAILY
Refills: 0 | Status: DISCONTINUED | OUTPATIENT
Start: 2019-12-24 | End: 2019-12-31

## 2019-12-24 RX ORDER — SODIUM CHLORIDE 9 MG/ML
1000 INJECTION INTRAMUSCULAR; INTRAVENOUS; SUBCUTANEOUS
Refills: 0 | Status: DISCONTINUED | OUTPATIENT
Start: 2019-12-24 | End: 2019-12-25

## 2019-12-24 RX ORDER — HALOPERIDOL DECANOATE 100 MG/ML
2.5 INJECTION INTRAMUSCULAR ONCE
Refills: 0 | Status: COMPLETED | OUTPATIENT
Start: 2019-12-24 | End: 2019-12-24

## 2019-12-24 RX ORDER — DEXMEDETOMIDINE HYDROCHLORIDE IN 0.9% SODIUM CHLORIDE 4 UG/ML
0.2 INJECTION INTRAVENOUS
Qty: 200 | Refills: 0 | Status: DISCONTINUED | OUTPATIENT
Start: 2019-12-24 | End: 2019-12-25

## 2019-12-24 RX ORDER — ACETAMINOPHEN 500 MG
1000 TABLET ORAL ONCE
Refills: 0 | Status: COMPLETED | OUTPATIENT
Start: 2019-12-24 | End: 2019-12-24

## 2019-12-24 RX ORDER — ACETAMINOPHEN 500 MG
650 TABLET ORAL ONCE
Refills: 0 | Status: COMPLETED | OUTPATIENT
Start: 2019-12-24 | End: 2019-12-24

## 2019-12-24 RX ADMIN — Medication 650 MILLIGRAM(S): at 05:43

## 2019-12-24 RX ADMIN — FENTANYL CITRATE 1 PATCH: 50 INJECTION INTRAVENOUS at 07:45

## 2019-12-24 RX ADMIN — SODIUM CHLORIDE 100 MILLILITER(S): 9 INJECTION INTRAMUSCULAR; INTRAVENOUS; SUBCUTANEOUS at 20:10

## 2019-12-24 RX ADMIN — FENTANYL CITRATE 1 PATCH: 50 INJECTION INTRAVENOUS at 20:09

## 2019-12-24 RX ADMIN — HEPARIN SODIUM 5000 UNIT(S): 5000 INJECTION INTRAVENOUS; SUBCUTANEOUS at 22:15

## 2019-12-24 RX ADMIN — HALOPERIDOL DECANOATE 2.5 MILLIGRAM(S): 100 INJECTION INTRAMUSCULAR at 21:31

## 2019-12-24 RX ADMIN — HEPARIN SODIUM 5000 UNIT(S): 5000 INJECTION INTRAVENOUS; SUBCUTANEOUS at 14:24

## 2019-12-24 RX ADMIN — LIDOCAINE 1 PATCH: 4 CREAM TOPICAL at 20:09

## 2019-12-24 RX ADMIN — Medication 400 MILLIGRAM(S): at 22:00

## 2019-12-24 RX ADMIN — LIDOCAINE 1 PATCH: 4 CREAM TOPICAL at 12:02

## 2019-12-24 RX ADMIN — NALOXEGOL OXALATE 12.5 MILLIGRAM(S): 12.5 TABLET, FILM COATED ORAL at 12:02

## 2019-12-24 RX ADMIN — FENTANYL CITRATE 1 PATCH: 50 INJECTION INTRAVENOUS at 14:24

## 2019-12-24 RX ADMIN — HEPARIN SODIUM 5000 UNIT(S): 5000 INJECTION INTRAVENOUS; SUBCUTANEOUS at 05:43

## 2019-12-24 RX ADMIN — Medication 1000 MILLIGRAM(S): at 22:15

## 2019-12-24 RX ADMIN — POLYETHYLENE GLYCOL 3350 17 GRAM(S): 17 POWDER, FOR SOLUTION ORAL at 05:43

## 2019-12-24 RX ADMIN — Medication 650 MILLIGRAM(S): at 06:10

## 2019-12-24 RX ADMIN — FENTANYL CITRATE 1 PATCH: 50 INJECTION INTRAVENOUS at 14:30

## 2019-12-24 RX ADMIN — PIPERACILLIN AND TAZOBACTAM 25 GRAM(S): 4; .5 INJECTION, POWDER, LYOPHILIZED, FOR SOLUTION INTRAVENOUS at 14:24

## 2019-12-24 RX ADMIN — HYDROMORPHONE HYDROCHLORIDE 1 MILLIGRAM(S): 2 INJECTION INTRAMUSCULAR; INTRAVENOUS; SUBCUTANEOUS at 10:15

## 2019-12-24 RX ADMIN — PIPERACILLIN AND TAZOBACTAM 25 GRAM(S): 4; .5 INJECTION, POWDER, LYOPHILIZED, FOR SOLUTION INTRAVENOUS at 05:43

## 2019-12-24 RX ADMIN — HYDROMORPHONE HYDROCHLORIDE 1 MILLIGRAM(S): 2 INJECTION INTRAMUSCULAR; INTRAVENOUS; SUBCUTANEOUS at 10:05

## 2019-12-24 RX ADMIN — DEXMEDETOMIDINE HYDROCHLORIDE IN 0.9% SODIUM CHLORIDE 3.08 MICROGRAM(S)/KG/HR: 4 INJECTION INTRAVENOUS at 22:17

## 2019-12-24 NOTE — BEHAVIORAL HEALTH ASSESSMENT NOTE - HPI (INCLUDE ILLNESS QUALITY, SEVERITY, DURATION, TIMING, CONTEXT, MODIFYING FACTORS, ASSOCIATED SIGNS AND SYMPTOMS)
20 y/o [place] Male (visiting [place]), no PPH, PMH of sickle cell disease no prior acute chest, last sickle crisis 3 years ago presented to [facility] with sickle crisis, then had RRT for hypoxic respiratory failure and was intubated secondary to ARS, sent to [facility] MICU for further mgmt did not require ECMO, extubated 2d ago no longer on sedatives. Psych c/s for delirium vs. depression.    Patient minimally engaged on exam, able to follow 1 step commands cannot do 2+ step commands, AOx1 believes he is in [place] gives nonsensical digits for date; attention intact on CAMICU exercise but not on other attentional tasks, recall 0/3. Confusion and mood waxing/waning per staff and per family at bedside. Patient denies depression though has negative overtones to his otherwise errant statements. On exam c/o pain in "urinary area" and feeling that he needs to go to urinate. + possible VH of "red patches" on writer's face, no AH noted, no SI/I/P noted.    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     INCOMPLETE 20 y/o [place] Male ([place]), no PPH, PMH of sickle cell disease no prior acute chest, last sickle crisis 3 years ago presented to [facility] with sickle crisis, then had RRT for hypoxic respiratory failure and was intubated secondary to ARS, sent to [facility] MICU for further mgmt did not require ECMO, extubated 2d ago no longer on sedatives. Psych c/s for delirium vs. depression.    Patient minimally engaged on exam, able to follow 1 step commands cannot do 2+ step commands, AOx1 believes he is in [place] gives nonsensical digits for date; attention intact on CAMICU exercise but not on other attentional tasks, recall 0/3, abstraction poor, notable utilization behaviors present. Confusion and mood waxing/waning per staff and per family at bedside. Patient denies depression though has negative overtones to his otherwise errant statements. On exam c/o pain in "urinary area" and feeling that he needs to go to urinate. + possible VH of "red patches" on writer's face, no AH noted, no SI/I/P noted.    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 Did not note confusion at [facility] as patient was largely sedated. Here after extubation have noted that patient speaks about nonsensical matters irrelevant to current state or location.

## 2019-12-24 NOTE — BEHAVIORAL HEALTH ASSESSMENT NOTE - OTHER
weakness UE b/l, appears slightly worse on right side, patient unable to move head to right, no tremors or asterixis noted in bed impoverished thought na

## 2019-12-24 NOTE — BEHAVIORAL HEALTH ASSESSMENT NOTE - NSBHCONSULTMEDS_PSY_A_CORE FT
- no meds at this time  - f/u CT head  - f/u UA, TSH, T4, LFT's, GGT, b12, folate, PT/PTT/INR  - if persistent confusion consider neuro c/s, MRI, EEG  - psych will follow

## 2019-12-24 NOTE — BEHAVIORAL HEALTH ASSESSMENT NOTE - SUMMARY
20 y/o [place] Male (visiting [place] for holidays), no PPH, PMH of sickle cell disease no prior acute chest, last sickle crisis 3 years ago presented to [facility] with sickle crisis, then had RRT for hypoxic respiratory failure and was intubated secondary to ARS, sent to [facility] MICU for further mgmt did not require ECMO, extubated 2d ago no longer on sedatives. Psych c/s for delirium vs. depression.    Patient has waxing/waning confusional state since extubation 2d ago, possible VH, marked cognitive deficits in several domains including attention, orientation, recall, abstraction. No cognitive/behavioral problems at baseline per family. Cannot rule in MDD or other psych illness as these have generally more insidious courses and would be superceded by acute delirium.     Would avoid antipsychotic meds for now unless patient acutely agitated.     Ddx - delirium due to metabolic causes, hyperammonemia, residual effects from prior ARDS, residual effects from prior sedation    Ammonia level elevated though unclear etiology or significance as LFT's mostly benign, Cr, BUN wnl and no meds on list are known to cause this. Consider repeat ammonia level, if possible may consider arterial ammonia level instead of venous, f/u LFTs, GGT.     Urgent CT head ordered per MICU. Would consider MRI, EEG, neuro consult if confusion continues.     Given reports of groin pain, urinary fullness f/u U/A  MCV wnl but F/u folate levels given SCD and possible precipitous decline 20 y/o Citizen of [place] Male (visiting [place] for holidays), no PPH, PMH of sickle cell disease no prior acute chest, last sickle crisis 3 years ago presented to [facility] with sickle crisis, then had RRT for hypoxic respiratory failure and was intubated secondary to ARS, sent to [facility] MICU for further mgmt did not require ECMO, extubated 2d ago no longer on sedatives. Psych c/s for delirium vs. depression.    Patient has waxing/waning confusional state since extubation 2d ago, possible VH, marked cognitive deficits in several domains including attention, orientation, recall, abstraction. No cognitive/behavioral problems at baseline per family. Cannot rule in MDD or other psych illness as these have generally more insidious courses and would be superceded by acute delirium.     Would avoid antipsychotic meds for now unless patient acutely agitated.     Ddx - delirium due to metabolic causes, hyperammonemia, residual effects from prior ARDS, residual effects from prior sedation, folate deficit     Ammonia level elevated though unclear etiology or significance as LFT's mostly benign, Cr, BUN wnl and no meds on list are known to cause this. Consider repeat ammonia level, if possible may consider arterial ammonia level instead of venous, f/u LFTs, GGT.     Urgent CT head ordered per MICU. Would consider MRI, EEG, neuro consult if confusion continues.     Given reports of groin pain, urinary fullness f/u U/A    MCV wnl but RDW high would f/u folate levels given SCD and possible precipitous deficiency

## 2019-12-24 NOTE — CONSULT NOTE ADULT - ATTENDING COMMENTS
Patient seen and examined.  He is not speaking to me but brother states he has been speaking.  He tracks me and looks at me, variously follows commands but does not appear to be encephalopathic.  Not moving RUE as much as left, toes down.     CT head shows subacute right cerebellar infarct.  Agree with resident recommendations, will defer need and timing of PLEX to hematology.

## 2019-12-24 NOTE — PROGRESS NOTE ADULT - ASSESSMENT
20 y/o M from [facility] visiting the [place] for the [place] with PMH sickle cell disease (prior asymptomatic, no hx of blood transfusion) and malaria on anti-malarials at home who p/w chest and bone pain c/b acute chest syndrome s/p intubation for hypoxic respiratory failure s/p RBC exchange transfusion x1 and plasma exchange x2 c/b b/l PTX/pneumomediastinum s/p b/l pigtail catheters txferred to [facility] for further management and heme consultation.     #Neuro  - AOx3 at baseline, no hx of CVA/TIA, seizure, or structural lesions. currently off sedation   - Fentanyl patch dosage added for analgesia, recieved toradol one time dose, added lidocaine patch, should continue pain control as needed  although patient was complaining of not being able to  move an upper extremity patient was able to switly recover without intervention so will not be pursuing CTH at this time     #Resp  Hypoxic resp failure intubated found to have b/l pneumothoraces s/p b/l pigtail catheters s/p extubation on 12/22.  -concern for possible aspiration as patient was vomiting prior to removing the tube and after, currently saturating well with no distress will continue to closely monitor, should maintain low suspicion to restart antibiotics to cover a new aspiration pneumonia  -c/w vanc 1g q12 cefipime 2gq8hr to be completed today, will d/v after last dose  - Azithro DC'd 12/20  - Both chest tubes now to water seal with no air leak, repeat chest xray at midnight did not show any incremental increase in pneumothoraxes; POCUS showing resolved PTX on right, and minimal lung point on left;   chest tube drainage is decreasing however will wait for output to decrease to under 100cc/day prior to pulling the tubes  - Pneumomediastinum improving, continue to monitor    #CV  Hypotension 2/2 sepsis vs vasoplegia due to sedative meds  - A line D/areli on 12/22  -mild elevated trops likely due to demand ischemia  -will cont to monitor    #GI  -currently NPO to monitor  continue bowel regimen   added reglan to assist with nausea and post tube feeding residuals   -no active issues    #ID  - vanc and cefepime to be completed today  -Leukocytosis improving 17-->12--. 9.59  - 12/15 blood cx NGTD (from [facility] admission)  - urine legionella negative (from [facility] admis)    #Renal  Mild ISABELL likely pre renal  -Improving  - trend BMP    #Heme  Sickle cell anemia c/b acute chest s/p 1rbc and 2 plasma exchange transfusions w/ improvement in HbS%  -IVF, folic acid   -Hb now 8.6 s/p 3U PRBC's  - Most recent electrophoresis with HgbS 31.6%; repeat pending  -Heme recs appreciated: HgbS goal < 30% as per Heme and Blood Bank  -Spoke with Dr. Singer from [facility] on 12/21 regarding need for future plasma exchange. Reviewed labs trends, including Bilirubin, platelet trend, and Hgb trend, and current HgbS trend on electrophoresis. Upon review of labs with Dr. Singer, patient deemed no longer in need of future transfusions, including plasma exchange. d/areli andujar on 12/21    #Endo  -No active issues    #DVT PPx  -SQH    #GOC/Ethics  - full code ASSESSMENT & PLAN:  18 y/o M from [facility] visiting the [place] with PMH sickle cell disease (prior asymptomatic, no hx of blood transfusion) and malaria on anti-malarials at home who p/w chest and bone pain c/b acute chest syndrome s/p intubation for hypoxic respiratory failure s/p RBC exchange transfusion x1 and plasma exchange x2 c/b b/l PTX/pneumomediastinum s/p b/l pigtail catheters transferred to [facility] for further management and heme consultation.     Neuro  - AOx3 at baseline, no hx of CVA/TIA, seizure, or structural lesions. Currently off sedation   - Fentanyl and lidocaine patches added for analgesia, with additional pain control with Dilaudid 1mg q4hr PRN  - Attempted Dilaudid PCA, but patient unable to press button  - Patient with waxing/waning agitation and periods of being nonverbal to medical team. Also with depressive thoughts but w/o suicidal ideation. Family reports this is dramatic change from baseline and has not occurred before. Most likely 2/2 ICU delirium vs major depressive episode.  - Nonfocal exam morning of 12/24; however, patient with past exams concerning for unilateral weakness. CTH pending  - Psych consult placed on 12/24     Resp  #Hypoxic resp failure intubated found to have b/l pneumothoraces s/p b/l pigtail catheters at OSH  - Extubated on 12/22; pigtail catheters removed 12/23  - Initial post-pigtail removal CXR with trace R PTX; resolved on morning on 12/24  - Currently satting well, continue to monitor    #PNA  -concern for possible aspiration as patient was vomiting prior to removing the tube and after  -Vanc/Cefepime finished 12/22; started on Zosyn q8hr 12/22 - due to c/f aspiration  - Azithro DC'd 12/20    #Pneumomediastinum  - Found on imaging both at OSH and here, no acute intervention necessary  - Pneumomediastinum improving, continue to monitor (note: crepitus extends into abdomen, but is improving)    Cardiovascular  # Hypotension 2/2 sepsis vs vasoplegia due to sedative meds  - A line D/areli on 12/22  - resolved    #GI  - Regular diet as tolerated  - Continue bowel regimen; repeat Xray abdomen  - Added Reglan to assist with nausea and post tube feeding residuals  - No active issues  - RUQ ultrasound for TTP pending    #ID  - Zosyn as above for aspiration PNA  - Leukocytosis stable    #Renal  Mild ISABELL likely pre renal; unknown baseline  -Improving, presumed at baseline  -Trend BMP    #Heme  Sickle cell anemia c/b acute chest s/p 1rbc and 2 plasma exchange transfusions w/ improvement in HbS%  -s/p IVF, folic acid   - Hb now 9.4 s/p 3U PRBC's  - Electrophoresis stable at 31%  -Heme recs appreciated: HgbS goal ~ 30% as per Heme and Blood Bank  -Spoke with Dr. Singer from Blood [facility] on 12/21 regarding need for future plasma exchange. Reviewed labs trends, including Bilirubin, platelet trend, and Hgb trend, and current HgbS trend on electrophoresis. Upon review of labs with Dr. Singer, patient deemed no longer in need of future transfusions, including plasma exchange. d/areli andujar on 12/21    #Endo  -No active issues    #DVT PPx  -SQH    #GOC/Ethics  - full code

## 2019-12-24 NOTE — PHYSICAL THERAPY INITIAL EVALUATION ADULT - GENERAL OBSERVATIONS, REHAB EVAL
Patient received in chair position in bed , (+) tele monitor, (+) IV , siblings at bedside, patient is incoherent at this time and  is unable to follow commands.

## 2019-12-24 NOTE — PHYSICAL THERAPY INITIAL EVALUATION ADULT - MANUAL MUSCLE TESTING RESULTS, REHAB EVAL
due to impaired cognition , appears to be 3/5 on RUE/RLE , weakness noted on LUE/LLE - to be assessed/not tested due to

## 2019-12-24 NOTE — CHART NOTE - NSCHARTNOTEFT_GEN_A_CORE
Received phone call from primary team reporting a new cerebellar stroke on CT head. Discussed case with hematology team and Blood Bank attending. Given Hgb S near goal of ~30%, would not perform exchange transfusion; should give 1 unit PRBC x1 now; Hgb near goal of 10. Not currently on IV fluids, but would recommend IV fluid hydration with normal saline (avoid 1/2 NS saline in sickle cell stroke patients due to risk of cerebral edema). Ensure adequate pain control, and as mental status allows, would encourage incentive spirometry. Recommended to primary team to also obtain neurology evaluation and stroke workup; ASA is still recommended for ischemic stroke patients with sickle cell. Also would recommend DVT ppx.    Discussed with MICU resident. Please call back with any questions.    Bharathi Burnham MD, MPH  Hematology / Oncology, PGY5  p

## 2019-12-24 NOTE — PHYSICAL THERAPY INITIAL EVALUATION ADULT - LIVES WITH, PROFILE
parents/at home , steps to manage , recently came from [place], information obtained from patient's sister Gregoria and from chart review

## 2019-12-24 NOTE — CONSULT NOTE ADULT - SUBJECTIVE AND OBJECTIVE BOX
Neurology Consult Note    History obtained from EMR    HPI: 18 y/o male w/ past medical history of sickle cell disease transferred from [place] to [facility] for management of acute chest syndrome.   Patient recently traveled to [facility] returned 5 days prior to presenting [facility] w/complain of chest and bone pain with concern for possible sickle cell crisis and started on Ceftriaxone for possible pneumonia. On 12/16, patient had RRT and was found to be in hypoxic respiratory failure and which point patient was placed on supplemental oxygen and antimicrobial coverage was broadened to Vancomycin and Zosyn with CXR showing worsening bilateral infiltrates concerning for acute chest syndrome. Patient subsequently developed fever and tachycardia and further desaturated despite Hiflow nasal cannula and BiPAP was intubated on 12/17 AM.  Patient subsequently received plasma exchange on 12/17. After initial intubation, patient noted to be desaturating despite mechanical ventilation and was extubated and then re-intubated however, post intubation saturations were in the low 80s and CXR revealed b/l pneumothoraces requriing b/l pigtail catheters placed with re-expansion of the lungs.Plasma exchange repeated on 12/18/19.  Patient transferred to [facility] for further management of sickle cell crisis.    In [facility] MICU, patient received total of 3U of simple plasma exchange transfusion. Electrophoresis showed improvement in HgbS% form 90% in [facility] to 39% -->31%, where it has remained stable since 12/21. Respiratory status continued to improve and patient extubated on 12/22. Repeat CXR showing no evidence of pneumonia however, patient with persistent fevers requiring Tylenol with c/f aspiration and started on Zosyn. Patient placed on Dilaudid PCA with hospitalist management; however, patient found to be unable to self-administer medication so PCA discontinued.     During post-extubation MICU course, patient noted to have periods of agitation which wax and wane. CT head non-contrast performed revealing R Superior cerebellar acute to subacute infarct. Neurology consulted for this reason.     During bedside interview and examination patient notably combative and agitated often shouting profanities and spitting in response to questions. Patient also notably aggressive with words and gestures to suggest further profane thoughts. Unable to obtain history from patient as he refuses to participate in interviewing process.   Per patient's family, patient was cooperative immediately post-extubation on 12/22 where he was reportedly "speaking softly" but cooperating and answering questions. Per patient's RN, patient required assistance with feed, was unable to move to self-administer pain medication via PCA pump, and mostly non-verbal initially after intubation. Both family and MICU RN agree this behavior is a change from his baseline.       (Stroke only)  NIHSS:   MRS:   ICH:     REVIEW OF SYSTEMS  A 10-system ROS was performed and is negative except for those items noted above and/or in the HPI.    PAST MEDICAL & SURGICAL HISTORY:  Sickle cell disease    FAMILY HISTORY:    SOCIAL HISTORY:   T/E/D:   Occupation: Chemical Engineering student in [facility]  Lives with:     MEDICATIONS (HOME):  Home Medications:    MEDICATIONS  (STANDING):  fentaNYL   Patch  25 MICROgram(s)/Hr. 1 Patch Transdermal every 48 hours  heparin  Injectable 5000 Unit(s) SubCutaneous every 8 hours  lidocaine   Patch 1 Patch Transdermal daily  naloxegol 12.5 milliGRAM(s) Oral daily  piperacillin/tazobactam IVPB.. 3.375 Gram(s) IV Intermittent every 8 hours  polyethylene glycol 3350 17 Gram(s) Oral two times a day  sodium chloride 0.9%. 1000 milliLiter(s) (100 mL/Hr) IV Continuous <Continuous>    MEDICATIONS  (PRN):  HYDROmorphone  Injectable 1 milliGRAM(s) IV Push every 4 hours PRN Moderate Pain (4 - 6)    ALLERGIES/INTOLERANCES:  Allergies  No Known Allergies    Intolerances    VITALS & EXAMINATION:  Vital Signs Last 24 Hrs  T(C): 38.2 (24 Dec 2019 17:15), Max: 38.2 (24 Dec 2019 17:15)  T(F): 100.7 (24 Dec 2019 17:15), Max: 100.7 (24 Dec 2019 17:15)  HR: 99 (24 Dec 2019 19:00) (76 - 113)  BP: 140/82 (24 Dec 2019 19:00) (129/85 - 163/86)  BP(mean): 98 (24 Dec 2019 19:00) (95 - 108)  RR: 38 (24 Dec 2019 19:00) (25 - 49)  SpO2: 94% (24 Dec 2019 19:00) (83% - 100%)    General:  Constitutional: Male, appears stated age, agitated; consistently shouting profanities and uncooperative.   Head: Normocephalic & atraumatic.  Extremities: No cyanosis, clubbing, or edema.  Skin: No rashes, bruising, or discoloration.    Neurological (>12):  MS: Awake, alert, oriented to person, place, time but not situation (knows his name, age, and that he is in the hospital but doesn't know why). Irritable affect. Is able to follow all simple commands.     Language: Speech is clear and fluent but at times, nonsensical with perseveration. Unable to name objects. Not repeating.     CNs: PERRL (R = 5mm, L = 5mm). VFF grossly intact. EOMI no nystagmus,  No ovious facial asymmetry b/l, full eye closure strength b/l. Gag reflex deferred. R head turning weakness.    Fundoscopic: Unable to assess due to patient agitation       Motor: Normal muscle bulk & tone.               R	At least antigravity; moves spontaneously 	  L	At least antigravity; moves spontaneously    	  R	At least 2/5 proximally (HF/KF) and 3/5 distally (PF/DF); moves spontaneously  L	At least 2/5 proximally (HF/KF) and 3/5 distally (PF/DF); moves spontaneously	     Sensation: Unable to assess due to patient agitation       Cortical: Extinction on DSS (neglect): Unable to assess due to patient agitation      Reflexes:              Biceps(C5)       BR(C6)     Triceps(C7)               Patellar(L4)    Achilles(S1)    Plantar Resp  R	 Unable to assess due to patient agitation         2		    2		Down   L	 Unable to assess due to patient agitation         2		    2		Mute    Coordination: Unable to assess due to patient agitation      Gait: Unable to assess due to patient agitation      LABORATORY:  CBC                       9.4    10.80 )-----------( 570      ( 24 Dec 2019 02:44 )             28.7     Chem 12-24    137  |  106  |  19  ----------------------------<  100<H>  4.4   |  20<L>  |  0.61    Ca    8.9      24 Dec 2019 02:45  Phos  3.5     12-24  Mg     2.2     12-24    TPro  6.7  /  Alb  3.0<L>  /  TBili  1.2  /  DBili  x   /  AST  47<H>  /  ALT  12  /  AlkPhos  98  12-24    LFTs LIVER FUNCTIONS - ( 24 Dec 2019 02:45 )  Alb: 3.0 g/dL / Pro: 6.7 g/dL / ALK PHOS: 98 u/L / ALT: 12 u/L / AST: 47 u/L / GGT: x           Coagulopathy   Lipid Panel   A1c   Cardiac enzymes     U/A   CSF  Immunological  Other    STUDIES & IMAGING:  Studies (EKG, EEG, EMG, etc):     Radiology (XR, CT, MR, U/S, TTE/SHEKHAR):  < from: CT Head No Cont (12.24.19 @ 15:49) >  Impression: Abnormal area of low-attenuation involving the superior right cerebellar region as described above.    < end of copied text >

## 2019-12-24 NOTE — BEHAVIORAL HEALTH ASSESSMENT NOTE - NSBHCHARTREVIEWVS_PSY_A_CORE FT
Vital Signs Last 24 Hrs  T(C): 37.9 (24 Dec 2019 12:00), Max: 38.4 (23 Dec 2019 16:00)  T(F): 100.3 (24 Dec 2019 12:00), Max: 101.1 (23 Dec 2019 16:00)  HR: 90 (24 Dec 2019 14:34) (76 - 113)  BP: 133/84 (24 Dec 2019 14:34) (129/85 - 163/86)  BP(mean): 98 (24 Dec 2019 14:34) (95 - 108)  RR: 35 (24 Dec 2019 14:34) (26 - 49)  SpO2: 98% (24 Dec 2019 14:34) (83% - 100%)

## 2019-12-24 NOTE — PROGRESS NOTE ADULT - SUBJECTIVE AND OBJECTIVE BOX
COVERING RESIDENT: LILLIANA MILLER (PGY-1) | [facility] [phone] | [facility] [phone]    INTERVAL HPI/OVERNIGHT EVENTS:    SUBJECTIVE: Patient seen and examined at bedside.     CONSTITUTIONAL: No weakness, fevers or chills  EYES/ENT: No visual changes;  No vertigo or throat pain   NECK: No pain or stiffness  RESPIRATORY: No cough, wheezing, hemoptysis; No shortness of breath  CARDIOVASCULAR: No chest pain or palpitations  GASTROINTESTINAL: No abdominal or epigastric pain. No nausea, vomiting, or hematemesis; No diarrhea or constipation. No melena or hematochezia.  GENITOURINARY: No dysuria, frequency or hematuria  NEUROLOGICAL: No numbness or weakness  SKIN: No itching, rashes    OBJECTIVE:    VITAL SIGNS:  ICU Vital Signs Last 24 Hrs  T(C): 37.8 (24 Dec 2019 08:00), Max: 38.4 (23 Dec 2019 16:00)  T(F): 100.1 (24 Dec 2019 08:00), Max: 101.1 (23 Dec 2019 16:00)  HR: 105 (24 Dec 2019 11:00) (76 - 111)  BP: 138/84 (24 Dec 2019 11:00) (129/85 - 163/86)  BP(mean): 98 (24 Dec 2019 11:00) (95 - 106)  ABP: --  ABP(mean): --  RR: 35 (24 Dec 2019 11:00) (26 - 47)  SpO2: 95% (24 Dec 2019 11:00) (83% - 100%)        12-23 @ 07:01 - 12-24 @ 07:00  --------------------------------------------------------  IN: 800 mL / OUT: 1600 mL / NET: -800 mL    12-24 @ 07:01 - 12-24 @ 11:44  --------------------------------------------------------  IN: 0 mL / OUT: 1650 mL / NET: -1650 mL      CAPILLARY BLOOD GLUCOSE          PHYSICAL EXAM:    General: NAD  HEENT: NC/AT; PERRL, clear conjunctiva  Neck: supple  Respiratory: CTA b/l  Cardiovascular: +S1/S2; RRR  Abdomen: soft, NT/ND; +BS x4  Extremities: WWP, 2+ peripheral pulses b/l; no LE edema  Skin: normal color and turgor; no rash  Neurological:    MEDICATIONS:  MEDICATIONS  (STANDING):  chlorhexidine 4% Liquid 1 Application(s) Topical <User Schedule>  fentaNYL   Patch  25 MICROgram(s)/Hr. 1 Patch Transdermal every 48 hours  heparin  Injectable 5000 Unit(s) SubCutaneous every 8 hours  lidocaine   Patch 1 Patch Transdermal daily  naloxegol 12.5 milliGRAM(s) Oral daily  piperacillin/tazobactam IVPB.. 3.375 Gram(s) IV Intermittent every 8 hours  polyethylene glycol 3350 17 Gram(s) Oral two times a day    MEDICATIONS  (PRN):  HYDROmorphone  Injectable 1 milliGRAM(s) IV Push every 4 hours PRN Moderate Pain (4 - 6)      ALLERGIES:  Allergies    No Known Allergies    Intolerances        LABS:                        9.4    10.80 )-----------( 570      ( 24 Dec 2019 02:44 )             28.7     12-24    137  |  106  |  19  ----------------------------<  100<H>  4.4   |  20<L>  |  0.61    Ca    8.9      24 Dec 2019 02:45  Phos  3.5     12-24  Mg     2.2     12-24    TPro  6.7  /  Alb  3.0<L>  /  TBili  1.2  /  DBili  x   /  AST  47<H>  /  ALT  12  /  AlkPhos  98  12-24          RADIOLOGY & ADDITIONAL TESTS: Reviewed.

## 2019-12-24 NOTE — PHYSICAL THERAPY INITIAL EVALUATION ADULT - PERTINENT HX OF CURRENT PROBLEM, REHAB EVAL
This is a 20 y/o male w/sickle cell , had recently traveled to [facility] returned 5 days ago, presented to [place] ED w/complain of chest and bone pain.Progressed to acute chest with acute hypoxic respiratory failure requiring intubation, bilat infiltrates, ARDS.  Received RBC exchange on 12/17 and plasma exchanges 12/17 and 12/18.

## 2019-12-24 NOTE — BEHAVIORAL HEALTH ASSESSMENT NOTE - PRIVATE RESIDENCE
lives in [facility], in [facility]

## 2019-12-24 NOTE — BEHAVIORAL HEALTH ASSESSMENT NOTE - NSBHCHARTREVIEWLAB_PSY_A_CORE FT
9.4    10.80 )-----------( 570      ( 24 Dec 2019 02:44 )             28.7     12-24    137  |  106  |  19  ----------------------------<  100<H>  4.4   |  20<L>  |  0.61    Ca    8.9      24 Dec 2019 02:45  Phos  3.5     12-24  Mg     2.2     12-24    TPro  6.7  /  Alb  3.0<L>  /  TBili  1.2  /  DBili  x   /  AST  47<H>  /  ALT  12  /  AlkPhos  98  12-24      LIVER FUNCTIONS - ( 24 Dec 2019 02:45 )  Alb: 3.0 g/dL / Pro: 6.7 g/dL / ALK PHOS: 98 u/L / ALT: 12 u/L / AST: 47 u/L / GGT: x           Ammonia, Serum (12.24.19 @ 11:00)    Ammonia, Serum: 64: Ammonia levels will be falsely elevated if specimen is NOT  collected, processed and maintained at 4-8 C. umol/L

## 2019-12-24 NOTE — PHYSICAL THERAPY INITIAL EVALUATION ADULT - ACTIVE RANGE OF MOTION EXAMINATION, REHAB EVAL
bilateral  lower extremity Active ROM was WFL (within functional limits)/bilateral upper extremity Active ROM was WFL (within functional limits)/active assisted ROM performed

## 2019-12-24 NOTE — PROGRESS NOTE ADULT - ATTENDING COMMENTS
Patient with sickle cell disease presented to [facility] with acute chest syndrome, acute hypoxic respiratory failure requiring intubation c/b bilateral ptx and chest tubes, s/p RBC exchange and plasma exchange transferred here for further management, extubated, today more confused.  He appeared delirious, but was moving all extremities when asked to.  CT head shows area of hypoattenuation in cerebellar region concerning for stroke.  f/u heme and neuro reccs - likely will need PRBC vs exchange.  Pain controlled today.  c/w abx.

## 2019-12-24 NOTE — CHART NOTE - NSCHARTNOTEFT_GEN_A_CORE
MAR MICU TRANSFER NOTE    Please refer to MICU transfer note for full details.    Briefly, this is a  18 y/o male w/sickle cell no prior acute chest, last crises 2-3 years ago had recently traveled to [place] returned 5 days ago, presented to [place] ED w/complain of chest and bone pain. Patient doesn't report any sob, states bones hurt, did have decreased po intake for few days with travels.  Patient denied any fever, chills, sob, palpitations, n/v/d/c/. Of note, Pt never had been intubated prior or required transfusions previously, last sickle crisis was 2-3 years ago.      In ED, patient had a CTA that was noncontributory. RRT on 12/16, patient found to be in hypoxic resp failure, sat 91% on 4L NC. Initial CT chest on admission neg, was broadened to Vanc/zosyn from CTX with CXR showing worsening bilateral infiltrates c/f acute chest. On night of 12/16, Patient developed fever tachycardia further desat requiring NRB c/f vaso-occlusive crises. Patient was transferred to CCU for management of acute chest. Patient became hypoxic despite hiflow and bipap and was intubated on 12/17 AM. Initially pt on FiO2 100% PEEP 14; currently pt sat 98% on FiO2 60% PEEP 14.    Patient subsequently received plasma exchange on 12/17, and tolerated procedure well with LDH, haptoglobin, d-dimer, bilirubin and Creatinine improving. P/F ratio found to be 110, concerning for ARDS. After initial intubation, on am rounds pt biting (clenching down) his ETT and HR started to decrease. Bite block placed and patient was difficult to bag; decision made to remove ETT and re-intubate pt. Pt desat'd and 1st attempt was esophagus, 2nd attempt ETT placed in trach. Post intubation sats in the low 80s and CXR revealed b/l ptx; b/l pigtail catheters placed with re-expansion of the lungs. Given patient's persistent hypoxia and acute respiratory distress syndrome, remaining vent dependent, plasma exchange on 12/18/19 was repeated.  Patient transferred to [facility] for further management of sickle cell crisis.    In MICU, patient received 3U of simple plasma exchange transfusion, with improvement of Hgb from 6.5 on admission to 8.1--> 8.5 --> 9.4. Electrophoresis showed improvement in HgbS% form 90% in [facility] to 39% -->31%, where it has remained stable since 12/21. Patient also started on Vanc/Cefepime x7 days' total ([facility] + [facility]) for PNA coverage. As per discussion with Blood Bank (Dr. Singer) on 12/21, patient no longer deemed as requiring plasma exchange or exchange transfusion; Shiley catheter removed, along with arterial line, on 12/21. Respiratory status continued to improve, with PEEP lowered to 5; subsequently patient un-sedated, extubated on 12/22. Bilateral chest tubes removed on 12/23, with repeat CXR on morning on 12/24 showing no evidence of PTX. However, patient with persistent fevers requiring Tylenol with c/f aspiration, started on Zosyn (12/22 - ). Patient placed on Dilaudid PCA with hospitalist management; however, patient found to be unable to self-administer medication so PCA discontinued, instead opting for PRN Dilaudid. Patient deemed safe for transfer to floors (4S) on 12/24.         Vital Signs Last 24 Hrs  T(C): 38.2 (24 Dec 2019 17:15), Max: 38.2 (24 Dec 2019 17:15)  T(F): 100.7 (24 Dec 2019 17:15), Max: 100.7 (24 Dec 2019 17:15)  HR: 83 (24 Dec 2019 18:00) (76 - 113)  BP: 144/87 (24 Dec 2019 18:00) (129/85 - 163/86)  BP(mean): 104 (24 Dec 2019 18:00) (95 - 108)  RR: 35 (24 Dec 2019 18:00) (25 - 49)  SpO2: 100% (24 Dec 2019 18:00) (83% - 100%)  I&O's Summary    23 Dec 2019 07:01  -  24 Dec 2019 07:00  --------------------------------------------------------  IN: 800 mL / OUT: 1600 mL / NET: -800 mL    24 Dec 2019 07:01  -  24 Dec 2019 18:17  --------------------------------------------------------  IN: 100 mL / OUT: 2150 mL / NET: -2050 mL      Allergies    No Known Allergies    Intolerances      MEDICATIONS  (STANDING):  fentaNYL   Patch  25 MICROgram(s)/Hr. 1 Patch Transdermal every 48 hours  heparin  Injectable 5000 Unit(s) SubCutaneous every 8 hours  lidocaine   Patch 1 Patch Transdermal daily  naloxegol 12.5 milliGRAM(s) Oral daily  piperacillin/tazobactam IVPB.. 3.375 Gram(s) IV Intermittent every 8 hours  polyethylene glycol 3350 17 Gram(s) Oral two times a day    MEDICATIONS  (PRN):  HYDROmorphone  Injectable 1 milliGRAM(s) IV Push every 4 hours PRN Moderate Pain (4 - 6)                                  9.4    10.80 )-----------( 570      ( 24 Dec 2019 02:44 )             28.7     12-24    137  |  106  |  19  ----------------------------<  100<H>  4.4   |  20<L>  |  0.61    Ca    8.9      24 Dec 2019 02:45  Phos  3.5     12-24  Mg     2.2     12-24    TPro  6.7  /  Alb  3.0<L>  /  TBili  1.2  /  DBili  x   /  AST  47<H>  /  ALT  12  /  AlkPhos  98  12-24            ASSESSMENT & PLAN:   18 y/o M from [facility] visiting the [place] for the holidays with PMH sickle cell disease (prior asymptomatic, no hx of blood transfusion) and malaria on anti-malarials at home who p/w chest and bone pain c/b acute chest syndrome s/p intubation for hypoxic respiratory failure s/p RBC exchange transfusion x1 and plasma exchange x2 c/b b/l PTX/pneumomediastinum s/p b/l pigtail catheters transferred to [facility] for further management and heme consultation.     FOR FOLLOW UP:  - F/u CTH  - F/u Psych recs  - Physical therapy evaluation  - RUQ ultrasound pending    Kari Cote MD  PGY-3 | Internal Medicine  [phone] / [id number] MAR MICU TRANSFER NOTE    Please refer to MICU transfer note for full details.    Briefly, this is a  18 y/o male w/sickle cell no prior acute chest, last crises 2-3 years ago had recently traveled to [place] returned 5 days ago, presented to [place] ED w/complain of chest and bone pain. Patient doesn't report any sob, states bones hurt, did have decreased po intake for few days with travels.  Patient denied any fever, chills, sob, palpitations, n/v/d/c/. Of note, Pt never had been intubated prior or required transfusions previously, last sickle crisis was 2-3 years ago.      In ED, patient had a CTA that was noncontributory. RRT on 12/16, patient found to be in hypoxic resp failure, sat 91% on 4L NC. Initial CT chest on admission neg, was broadened to Vanc/zosyn from CTX with CXR showing worsening bilateral infiltrates c/f acute chest. On night of 12/16, Patient developed fever tachycardia further desat requiring NRB c/f vaso-occlusive crises. Patient was transferred to CCU for management of acute chest. Patient became hypoxic despite hiflow and bipap and was intubated on 12/17 AM. Initially pt on FiO2 100% PEEP 14; currently pt sat 98% on FiO2 60% PEEP 14.    Patient subsequently received plasma exchange on 12/17, and tolerated procedure well with LDH, haptoglobin, d-dimer, bilirubin and Creatinine improving. P/F ratio found to be 110, concerning for ARDS. After initial intubation, on am rounds pt biting (clenching down) his ETT and HR started to decrease. Bite block placed and patient was difficult to bag; decision made to remove ETT and re-intubate pt. Pt desat'd and 1st attempt was esophagus, 2nd attempt ETT placed in trach. Post intubation sats in the low 80s and CXR revealed b/l ptx; b/l pigtail catheters placed with re-expansion of the lungs. Given patient's persistent hypoxia and acute respiratory distress syndrome, remaining vent dependent, plasma exchange on 12/18/19 was repeated.  Patient transferred to [facility] for further management of sickle cell crisis.    In MICU, patient received 3U of simple plasma exchange transfusion, with improvement of Hgb from 6.5 on admission to 8.1--> 8.5 --> 9.4. Electrophoresis showed improvement in HgbS% form 90% in [facility] to 39% -->31%, where it has remained stable since 12/21. Patient also started on Vanc/Cefepime x7 days' total (OSH + [facility]) for PNA coverage. As per discussion with Blood Bank (Dr. Singer) on 12/21, patient no longer deemed as requiring plasma exchange or exchange transfusion; Shiley catheter removed, along with arterial line, on 12/21. Respiratory status continued to improve, with PEEP lowered to 5; subsequently patient un-sedated, extubated on 12/22. Bilateral chest tubes removed on 12/23, with repeat CXR on morning on 12/24 showing no evidence of PTX. However, patient with persistent fevers requiring Tylenol with c/f aspiration, started on Zosyn (12/22 - ). Patient placed on Dilaudid PCA with hospitalist management; however, patient found to be unable to self-administer medication so PCA discontinued, instead opting for PRN Dilaudid. Patient deemed safe for transfer to floors (4S) on 12/24.         Vital Signs Last 24 Hrs  T(C): 38.2 (24 Dec 2019 17:15), Max: 38.2 (24 Dec 2019 17:15)  T(F): 100.7 (24 Dec 2019 17:15), Max: 100.7 (24 Dec 2019 17:15)  HR: 83 (24 Dec 2019 18:00) (76 - 113)  BP: 144/87 (24 Dec 2019 18:00) (129/85 - 163/86)  BP(mean): 104 (24 Dec 2019 18:00) (95 - 108)  RR: 35 (24 Dec 2019 18:00) (25 - 49)  SpO2: 100% (24 Dec 2019 18:00) (83% - 100%)  I&O's Summary    23 Dec 2019 07:01  -  24 Dec 2019 07:00  --------------------------------------------------------  IN: 800 mL / OUT: 1600 mL / NET: -800 mL    24 Dec 2019 07:01  -  24 Dec 2019 18:17  --------------------------------------------------------  IN: 100 mL / OUT: 2150 mL / NET: -2050 mL      Allergies    No Known Allergies    Intolerances      MEDICATIONS  (STANDING):  fentaNYL   Patch  25 MICROgram(s)/Hr. 1 Patch Transdermal every 48 hours  heparin  Injectable 5000 Unit(s) SubCutaneous every 8 hours  lidocaine   Patch 1 Patch Transdermal daily  naloxegol 12.5 milliGRAM(s) Oral daily  piperacillin/tazobactam IVPB.. 3.375 Gram(s) IV Intermittent every 8 hours  polyethylene glycol 3350 17 Gram(s) Oral two times a day    MEDICATIONS  (PRN):  HYDROmorphone  Injectable 1 milliGRAM(s) IV Push every 4 hours PRN Moderate Pain (4 - 6)                                  9.4    10.80 )-----------( 570      ( 24 Dec 2019 02:44 )             28.7     12-24    137  |  106  |  19  ----------------------------<  100<H>  4.4   |  20<L>  |  0.61    Ca    8.9      24 Dec 2019 02:45  Phos  3.5     12-24  Mg     2.2     12-24    TPro  6.7  /  Alb  3.0<L>  /  TBili  1.2  /  DBili  x   /  AST  47<H>  /  ALT  12  /  AlkPhos  98  12-24            ASSESSMENT & PLAN:   18 y/o M from [facility] visiting the [place] with PMH sickle cell disease (prior asymptomatic, no hx of blood transfusion) and malaria on anti-malarials at home who p/w chest and bone pain c/b acute chest syndrome s/p intubation for hypoxic respiratory failure s/p RBC exchange transfusion x1 and plasma exchange x2 c/b b/l PTX/pneumomediastinum s/p b/l pigtail catheters transferred to [facility] for further management and heme consultation. 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

## 2019-12-24 NOTE — CHART NOTE - NSCHARTNOTEFT_GEN_A_CORE
MICU Transfer Note    Transfer from: MICU  Transfer to:  (  ) Medicine    (  ) Telemetry    (  ) RCU    (  ) Palliative    (  ) Stroke Unit    (  ) _______________  Accepting physician:      MICU COURSE: 18 y/o male w/sickle cell no prior acute chest, last crises 2-3 years ago had recently traveled to [facility] returned 5 days ago, presented to [place] ED w/complain of chest and bone pain. Patient doesn't report any sob, states bones hurt, did have decreased po intake for few days with travels.  Patient denied any fever, chills, sob, palpitations, n/v/d/c/. Of note, Pt never had been intubated prior or required transfusions previously, last sickle crisis was 2-3 years ago.      In ED, patient had a CTA that was noncontributory. RRT on 12/16, patient found to be in hypoxic resp failure, sat 91% on 4L NC. Initial CT chest on admission neg, was broadened to Vanc/zosyn from [facility] with CXR showing worsening bilateral infiltrates c/f acute chest. On night of 12/16, Patient developed fever tachycardia further desat requiring NRB c/f vaso-occlusive crises. Patient was transferred to CCU for management of acute chest. Patient became hypoxic despite hiflow and bipap and was intubated on 12/17 AM. Initially pt on FiO2 100% PEEP 14; currently pt sat 98% on FiO2 60% PEEP 14.    Patient subsequently received plasma exchange on 12/17, and tolerated procedure well with LDH, haptoglobin, d-dimer, bilirubin and Creatinine improving. P/F ratio found to be 110, concerning for ARDS. After initial intubation, on am rounds pt biting (clenching down) his ETT and HR started to decrease. Bite block placed and patient was difficult to bag; decision made to remove ETT and re-intubate pt. Pt desat'd and 1st attempt was esophagus, 2nd attempt ETT placed in trach. Post intubation sats in the low 80s and CXR revealed b/l ptx; b/l pigtail catheters placed with re-expansion of the lungs. Given patient's persistent hypoxia and acute respiratory distress syndrome, remaining vent dependent, plasma exchange on 12/18/19 was repeated.  Patient transferred to [facility] for further management of sickle cell crisis.    In MICU, patient received 3U of simple plasma exchange transfusion, with improvement of Hgb from 6.5 on admission to 8.1--> 8.5 --> 9.4. Electrophoresis showed improvement in HgbS% form 90% in [facility] to 39% --> 31%, where it has remained stable since 12/21.           ASSESSMENT & PLAN:         For Follow-Up:          Vital Signs Last 24 Hrs  T(C): 37.9 (24 Dec 2019 12:00), Max: 38.4 (23 Dec 2019 16:00)  T(F): 100.3 (24 Dec 2019 12:00), Max: 101.1 (23 Dec 2019 16:00)  HR: 105 (24 Dec 2019 11:00) (76 - 111)  BP: 138/84 (24 Dec 2019 11:00) (129/85 - 163/86)  BP(mean): 98 (24 Dec 2019 11:00) (95 - 106)  RR: 35 (24 Dec 2019 11:00) (26 - 47)  SpO2: 95% (24 Dec 2019 11:00) (83% - 100%)  I&O's Summary    23 Dec 2019 07:01  -  24 Dec 2019 07:00  --------------------------------------------------------  IN: 800 mL / OUT: 1600 mL / NET: -800 mL    24 Dec 2019 07:01  -  24 Dec 2019 12:01  --------------------------------------------------------  IN: 0 mL / OUT: 1650 mL / NET: -1650 mL          MEDICATIONS  (STANDING):  fentaNYL   Patch  25 MICROgram(s)/Hr. 1 Patch Transdermal every 48 hours  heparin  Injectable 5000 Unit(s) SubCutaneous every 8 hours  lidocaine   Patch 1 Patch Transdermal daily  naloxegol 12.5 milliGRAM(s) Oral daily  piperacillin/tazobactam IVPB.. 3.375 Gram(s) IV Intermittent every 8 hours  polyethylene glycol 3350 17 Gram(s) Oral two times a day    MEDICATIONS  (PRN):  HYDROmorphone  Injectable 1 milliGRAM(s) IV Push every 4 hours PRN Moderate Pain (4 - 6)        LABS                                            9.4                   Neurophils% (auto):   x      (12-24 @ 02:44):    10.80)-----------(570          Lymphocytes% (auto):  x                                             28.7                   Eosinphils% (auto):   x        Manual%: Neutrophils x    ; Lymphocytes x    ; Eosinophils x    ; Bands%: x    ; Blasts x                                    137    |  106    |  19                  Calcium: 8.9   / iCa: x      (12-24 @ 02:45)    ----------------------------<  100       Magnesium: 2.2                              4.4     |  20     |  0.61             Phosphorous: 3.5      TPro  6.7    /  Alb  3.0    /  TBili  1.2    /  DBili  x      /  AST  47     /  ALT  12     /  AlkPhos  98     24 Dec 2019 02:45 MICU Transfer Note    Transfer from: MICU  Transfer to:  (  ) Medicine    (  ) Telemetry    (  ) RCU    (  ) Palliative    (  ) Stroke Unit    (  ) _______________  Accepting physician:      MICU COURSE: 18 y/o male w/sickle cell no prior acute chest, last crises 2-3 years ago had recently traveled to [facility] returned 5 days ago, presented to [place] ED w/complain of chest and bone pain. Patient doesn't report any sob, states bones hurt, did have decreased po intake for few days with travels.  Patient denied any fever, chills, sob, palpitations, n/v/d/c/. Of note, Pt never had been intubated prior or required transfusions previously, last sickle crisis was 2-3 years ago.      In ED, patient had a CTA that was noncontributory. RRT on 12/16, patient found to be in hypoxic resp failure, sat 91% on 4L NC. Initial CT chest on admission neg, was broadened to Vanc/zosyn from [facility] with CXR showing worsening bilateral infiltrates c/f acute chest. On night of 12/16, Patient developed fever tachycardia further desat requiring NRB c/f vaso-occlusive crises. Patient was transferred to CCU for management of acute chest. Patient became hypoxic despite hiflow and bipap and was intubated on 12/17 AM. Initially pt on FiO2 100% PEEP 14; currently pt sat 98% on FiO2 60% PEEP 14.    Patient subsequently received plasma exchange on 12/17, and tolerated procedure well with LDH, haptoglobin, d-dimer, bilirubin and Creatinine improving. P/F ratio found to be 110, concerning for ARDS. After initial intubation, on am rounds pt biting (clenching down) his ETT and HR started to decrease. Bite block placed and patient was difficult to bag; decision made to remove ETT and re-intubate pt. Pt desat'd and 1st attempt was esophagus, 2nd attempt ETT placed in trach. Post intubation sats in the low 80s and CXR revealed b/l ptx; b/l pigtail catheters placed with re-expansion of the lungs. Given patient's persistent hypoxia and acute respiratory distress syndrome, remaining vent dependent, plasma exchange on 12/18/19 was repeated.  Patient transferred to [facility] for further management of sickle cell crisis.    In MICU, patient received 3U of simple plasma exchange transfusion, with improvement of Hgb from 6.5 on admission to 8.1--> 8.5 --> 9.4. Electrophoresis showed improvement in HgbS% form 90% in [facility] to 39% --> 31%, where it has remained stable since 12/21. Respiratory status continued to improve, with PEEP lowered to 5; subsequently patient was un-sedated, extubated on morning of 12/22          ASSESSMENT & PLAN:         For Follow-Up:          Vital Signs Last 24 Hrs  T(C): 37.9 (24 Dec 2019 12:00), Max: 38.4 (23 Dec 2019 16:00)  T(F): 100.3 (24 Dec 2019 12:00), Max: 101.1 (23 Dec 2019 16:00)  HR: 105 (24 Dec 2019 11:00) (76 - 111)  BP: 138/84 (24 Dec 2019 11:00) (129/85 - 163/86)  BP(mean): 98 (24 Dec 2019 11:00) (95 - 106)  RR: 35 (24 Dec 2019 11:00) (26 - 47)  SpO2: 95% (24 Dec 2019 11:00) (83% - 100%)  I&O's Summary    23 Dec 2019 07:01  -  24 Dec 2019 07:00  --------------------------------------------------------  IN: 800 mL / OUT: 1600 mL / NET: -800 mL    24 Dec 2019 07:01  -  24 Dec 2019 12:01  --------------------------------------------------------  IN: 0 mL / OUT: 1650 mL / NET: -1650 mL          MEDICATIONS  (STANDING):  fentaNYL   Patch  25 MICROgram(s)/Hr. 1 Patch Transdermal every 48 hours  heparin  Injectable 5000 Unit(s) SubCutaneous every 8 hours  lidocaine   Patch 1 Patch Transdermal daily  naloxegol 12.5 milliGRAM(s) Oral daily  piperacillin/tazobactam IVPB.. 3.375 Gram(s) IV Intermittent every 8 hours  polyethylene glycol 3350 17 Gram(s) Oral two times a day    MEDICATIONS  (PRN):  HYDROmorphone  Injectable 1 milliGRAM(s) IV Push every 4 hours PRN Moderate Pain (4 - 6)        LABS                                            9.4                   Neurophils% (auto):   x      (12-24 @ 02:44):    10.80)-----------(570          Lymphocytes% (auto):  x                                             28.7                   Eosinphils% (auto):   x        Manual%: Neutrophils x    ; Lymphocytes x    ; Eosinophils x    ; Bands%: x    ; Blasts x                                    137    |  106    |  19                  Calcium: 8.9   / iCa: x      (12-24 @ 02:45)    ----------------------------<  100       Magnesium: 2.2                              4.4     |  20     |  0.61             Phosphorous: 3.5      TPro  6.7    /  Alb  3.0    /  TBili  1.2    /  DBili  x      /  AST  47     /  ALT  12     /  AlkPhos  98     24 Dec 2019 02:45 MICU Transfer Note    Transfer from: MICU  Transfer to:  (  ) Medicine    (  ) Telemetry    (  ) RCU    (  ) Palliative    (  ) Stroke Unit    (  ) _______________  Accepting physician:      MICU COURSE: 20 y/o male w/sickle cell no prior acute chest, last crises 2-3 years ago had recently traveled to [facility] returned 5 days ago, presented to [place] ED w/complain of chest and bone pain. Patient doesn't report any sob, states bones hurt, did have decreased po intake for few days with travels.  Patient denied any fever, chills, sob, palpitations, n/v/d/c/. Of note, Pt never had been intubated prior or required transfusions previously, last sickle crisis was 2-3 years ago.      In ED, patient had a CTA that was noncontributory. RRT on 12/16, patient found to be in hypoxic resp failure, sat 91% on 4L NC. Initial CT chest on admission neg, was broadened to Vanc/zosyn from [facility] with CXR showing worsening bilateral infiltrates c/f acute chest. On night of 12/16, Patient developed fever tachycardia further desat requiring NRB c/f vaso-occlusive crises. Patient was transferred to CCU for management of acute chest. Patient became hypoxic despite hiflow and bipap and was intubated on 12/17 AM. Initially pt on FiO2 100% PEEP 14; currently pt sat 98% on FiO2 60% PEEP 14.    Patient subsequently received plasma exchange on 12/17, and tolerated procedure well with LDH, haptoglobin, d-dimer, bilirubin and Creatinine improving. P/F ratio found to be 110, concerning for ARDS. After initial intubation, on am rounds pt biting (clenching down) his ETT and HR started to decrease. Bite block placed and patient was difficult to bag; decision made to remove ETT and re-intubate pt. Pt desat'd and 1st attempt was esophagus, 2nd attempt ETT placed in trach. Post intubation sats in the low 80s and CXR revealed b/l ptx; b/l pigtail catheters placed with re-expansion of the lungs. Given patient's persistent hypoxia and acute respiratory distress syndrome, remaining vent dependent, plasma exchange on 12/18/19 was repeated.  Patient transferred to [facility] for further management of sickle cell crisis.    In MICU, patient received 3U of simple plasma exchange transfusion, with improvement of Hgb from 6.5 on admission to 8.1--> 8.5 --> 9.4. Electrophoresis showed improvement in HgbS% form 90% in [facility] to 39% -->31%, where it has remained stable since 12/21. Patient also started on Vanc/Cefepime x7 days' total (OSH + [facility]) for PNA coverage. As per discussion with [facility] (Dr. Singer) on 12/21, patient no longer deemed as requiring plasma exchange or exchange transfusion; Shiley catheter removed, along with arterial line, on 12/21. Respiratory status continued to improve, with PEEP lowered to 5; subsequently patient un-sedated, extubated on 12/22. Bilateral chest tubes removed on 12/23, with repeat CXR on morning on 12/24 showing no evidence of PTX. However, patient with persistent fevers requiring Tylenol with c/f aspiration, started on Zosyn (12/22 - ). Patient placed on Dilaudid PCA with hospitalist management; however, patient found to be unable to self-administer medication so PCA discontinued, instead opting for PRN Dilaudid. Patient deemed safe for transfer to  on 12/24.           ASSESSMENT & PLAN:  20 y/o M from [facility] [place] with PMH sickle cell disease (prior asymptomatic, no hx of blood transfusion) and malaria on anti-malarials at home who p/w chest and bone pain c/b acute chest syndrome s/p intubation for hypoxic respiratory failure s/p RBC exchange transfusion x1 and plasma exchange x2 c/b b/l PTX/pneumomediastinum s/p b/l pigtail catheters txferred to [facility] for further management and heme consultation.     Neuro  - AOx3 at baseline, no hx of CVA/TIA, seizure, or structural lesions. Currently off sedation   - Fentanyl and lidocaine patches added for analgesia, with additional pain control with Dilaudid 1mg q4hr PRN  - Patient with waxing/waning agitation and periods of being nonverbal to medical team. Also with depressive thoughts but w/o suicidal ideation. Family reports this is dramatic change from baseline and has not occurred before. Most likely 2/2 ICU delirium vs major depressive episode.  - Nonfocal exam morning of 12/24; however, patient with past exams concerning for unilateral weakness. CTH pending  - Psych consult placed on 12/24     Resp  #Hypoxic resp failure intubated found to have b/l pneumothoraces s/p b/l pigtail catheters at OSH  - Extubated on 12/22; pigtail catheters removed 12/23  - Initial post-pigtail removal CXR with trace R PTX; resolved on morning on 12/24  - Currently satting well     #PNA  -concern for possible aspiration as patient was vomiting prior to removing the tube and after  -Vanc/Cefepime finished 12/22; started on Zosyn q8hr 12/22 - due to c/f aspiration  - Azithro DC'd 12/20    #Pneumomediastinum  - Found on imaging both at OSH and here, no acute intervention necessary  - Pneumomediastinum improving, continue to monitor (note: crepitus extends into abdomen)      Hypotension 2/2 sepsis vs vasoplegia due to sedative meds  - A line D/areli on 12/22  - mild elevated trops likely due to demand ischemia;   -will cont to monitor    #GI  -currently NPO to monitor  continue bowel regimen   added reglan to assist with nausea and post tube feeding residuals   -no active issues    #ID  - vanc and cefepime to be completed today  -Leukocytosis improving 17-->12--. 9.59  - 12/15 blood cx NGTD (from [place] admission)  - urine legionella negative (from [facility] admis)    #Renal  Mild ISABELL likely pre renal  -Improving  - trend BMP    #Heme  Sickle cell anemia c/b acute chest s/p 1rbc and 2 plasma exchange transfusions w/ improvement in HbS%  -IVF, folic acid   -Hb now 8.6 s/p 3U PRBC's  - Most recent electrophoresis with HgbS 31.6%; repeat pending  -Heme recs appreciated: HgbS goal < 30% as per Heme and Blood Bank  -Spoke with Dr. Singer from [facility] on 12/21 regarding need for future plasma exchange. Reviewed labs trends, including Bilirubin, platelet trend, and Hgb trend, and current HgbS trend on electrophoresis. Upon review of labs with Dr. Singer, patient deemed no longer in need of future transfusions, including plasma exchange. d/areli con on 12/21    #Endo  -No active issues    #DVT PPx  -SQH    #GOC/Ethics  - full code          For Follow-Up:          Vital Signs Last 24 Hrs  T(C): 37.9 (24 Dec 2019 12:00), Max: 38.4 (23 Dec 2019 16:00)  T(F): 100.3 (24 Dec 2019 12:00), Max: 101.1 (23 Dec 2019 16:00)  HR: 105 (24 Dec 2019 11:00) (76 - 111)  BP: 138/84 (24 Dec 2019 11:00) (129/85 - 163/86)  BP(mean): 98 (24 Dec 2019 11:00) (95 - 106)  RR: 35 (24 Dec 2019 11:00) (26 - 47)  SpO2: 95% (24 Dec 2019 11:00) (83% - 100%)  I&O's Summary    23 Dec 2019 07:01  -  24 Dec 2019 07:00  --------------------------------------------------------  IN: 800 mL / OUT: 1600 mL / NET: -800 mL    24 Dec 2019 07:01  -  24 Dec 2019 12:01  --------------------------------------------------------  IN: 0 mL / OUT: 1650 mL / NET: -1650 mL          MEDICATIONS  (STANDING):  fentaNYL   Patch  25 MICROgram(s)/Hr. 1 Patch Transdermal every 48 hours  heparin  Injectable 5000 Unit(s) SubCutaneous every 8 hours  lidocaine   Patch 1 Patch Transdermal daily  naloxegol 12.5 milliGRAM(s) Oral daily  piperacillin/tazobactam IVPB.. 3.375 Gram(s) IV Intermittent every 8 hours  polyethylene glycol 3350 17 Gram(s) Oral two times a day    MEDICATIONS  (PRN):  HYDROmorphone  Injectable 1 milliGRAM(s) IV Push every 4 hours PRN Moderate Pain (4 - 6)        LABS                                            9.4                   Neurophils% (auto):   x      (12-24 @ 02:44):    10.80)-----------(570          Lymphocytes% (auto):  x                                             28.7                   Eosinphils% (auto):   x        Manual%: Neutrophils x    ; Lymphocytes x    ; Eosinophils x    ; Bands%: x    ; Blasts x                                    137    |  106    |  19                  Calcium: 8.9   / iCa: x      (12-24 @ 02:45)    ----------------------------<  100       Magnesium: 2.2                              4.4     |  20     |  0.61             Phosphorous: 3.5      TPro  6.7    /  Alb  3.0    /  TBili  1.2    /  DBili  x      /  AST  47     /  ALT  12     /  AlkPhos  98     24 Dec 2019 02:45 MICU Transfer Note    Transfer from: MICU  Transfer to:  (x ) Medicine    (  ) Telemetry    (  ) RCU    (  ) Palliative    (  ) Stroke Unit    (  ) _______________  Accepting physician:      MICU COURSE: 18 y/o male w/sickle cell no prior acute chest, last crises 2-3 years ago had recently traveled to [facility] returned 5 days ago, presented to [place] ED w/complain of chest and bone pain. Patient doesn't report any sob, states bones hurt, did have decreased po intake for few days with travels.  Patient denied any fever, chills, sob, palpitations, n/v/d/c/. Of note, Pt never had been intubated prior or required transfusions previously, last sickle crisis was 2-3 years ago.      In ED, patient had a CTA that was noncontributory. RRT on 12/16, patient found to be in hypoxic resp failure, sat 91% on 4L NC. Initial CT chest on admission neg, was broadened to Vanc/zosyn from [facility] with CXR showing worsening bilateral infiltrates c/f acute chest. On night of 12/16, Patient developed fever tachycardia further desat requiring NRB c/f vaso-occlusive crises. Patient was transferred to CCU for management of acute chest. Patient became hypoxic despite hiflow and bipap and was intubated on 12/17 AM. Initially pt on FiO2 100% PEEP 14; currently pt sat 98% on FiO2 60% PEEP 14.    Patient subsequently received plasma exchange on 12/17, and tolerated procedure well with LDH, haptoglobin, d-dimer, bilirubin and Creatinine improving. P/F ratio found to be 110, concerning for ARDS. After initial intubation, on am rounds pt biting (clenching down) his ETT and HR started to decrease. Bite block placed and patient was difficult to bag; decision made to remove ETT and re-intubate pt. Pt desat'd and 1st attempt was esophagus, 2nd attempt ETT placed in trach. Post intubation sats in the low 80s and CXR revealed b/l ptx; b/l pigtail catheters placed with re-expansion of the lungs. Given patient's persistent hypoxia and acute respiratory distress syndrome, remaining vent dependent, plasma exchange on 12/18/19 was repeated.  Patient transferred to [facility] for further management of sickle cell crisis.    In MICU, patient received 3U of simple plasma exchange transfusion, with improvement of Hgb from 6.5 on admission to 8.1--> 8.5 --> 9.4. Electrophoresis showed improvement in HgbS% form 90% in [facility] to 39% -->31%, where it has remained stable since 12/21. Patient also started on Vanc/Cefepime x7 days' total (OSH + [facility]) for PNA coverage. As per discussion with [facility] (Dr. Singer) on 12/21, patient no longer deemed as requiring plasma exchange or exchange transfusion; Shiley catheter removed, along with arterial line, on 12/21. Respiratory status continued to improve, with PEEP lowered to 5; subsequently patient un-sedated, extubated on 12/22. Bilateral chest tubes removed on 12/23, with repeat CXR on morning on 12/24 showing no evidence of PTX. However, patient with persistent fevers requiring Tylenol with c/f aspiration, started on Zosyn (12/22 - ). Patient placed on Dilaudid PCA with hospitalist management; however, patient found to be unable to self-administer medication so PCA discontinued, instead opting for PRN Dilaudid. Patient deemed safe for transfer to floors (4S) on 12/24.           ASSESSMENT & PLAN:  18 y/o M from [facility] [place] with PMH sickle cell disease (prior asymptomatic, no hx of blood transfusion) and malaria on anti-malarials at home who p/w chest and bone pain c/b acute chest syndrome s/p intubation for hypoxic respiratory failure s/p RBC exchange transfusion x1 and plasma exchange x2 c/b b/l PTX/pneumomediastinum s/p b/l pigtail catheters txferred to [facility] for further management and heme consultation.     Neuro  - AOx3 at baseline, no hx of CVA/TIA, seizure, or structural lesions. Currently off sedation   - Fentanyl and lidocaine patches added for analgesia, with additional pain control with Dilaudid 1mg q4hr PRN  - Patient with waxing/waning agitation and periods of being nonverbal to medical team. Also with depressive thoughts but w/o suicidal ideation. Family reports this is dramatic change from baseline and has not occurred before. Most likely 2/2 ICU delirium vs major depressive episode.  - Nonfocal exam morning of 12/24; however, patient with past exams concerning for unilateral weakness. CTH pending  - Psych consult placed on 12/24     Resp  #Hypoxic resp failure intubated found to have b/l pneumothoraces s/p b/l pigtail catheters at OSH  - Extubated on 12/22; pigtail catheters removed 12/23  - Initial post-pigtail removal CXR with trace R PTX; resolved on morning on 12/24  - Currently satting well, continue to monitor    #PNA  -concern for possible aspiration as patient was vomiting prior to removing the tube and after  -Vanc/Cefepime finished 12/22; started on Zosyn q8hr 12/22 - due to c/f aspiration  - Azithro DC'd 12/20    #Pneumomediastinum  - Found on imaging both at OSH and here, no acute intervention necessary  - Pneumomediastinum improving, continue to monitor (note: crepitus extends into abdomen)    Cardiovascular  # Hypotension 2/2 sepsis vs vasoplegia due to sedative meds  - A line D/areli on 12/22  - will cont to monitor    #GI  - Regular   -continue bowel regimen   added reglan to assist with nausea and post tube feeding residuals   -no active issues    #ID  - vanc and cefepime to be completed today  -Leukocytosis improving 17-->12--. 9.59  - 12/15 blood cx NGTD (from [place] admission)  - urine legionella negative (from [facility])    #Renal  Mild ISABELL likely pre renal  -Improving  - trend BMP    #Heme  Sickle cell anemia c/b acute chest s/p 1rbc and 2 plasma exchange transfusions w/ improvement in HbS%  -IVF, folic acid   -Hb now 8.6 s/p 3U PRBC's  - Most recent electrophoresis with HgbS 31.6%; repeat pending  -Heme recs appreciated: HgbS goal < 30% as per Heme and Blood Bank  -Spoke with Dr. Singer from [facility] on 12/21 regarding need for future plasma exchange. Reviewed labs trends, including Bilirubin, platelet trend, and Hgb trend, and current HgbS trend on electrophoresis. Upon review of labs with Dr. Singer, patient deemed no longer in need of future transfusions, including plasma exchange. d/areli con on 12/21    #Endo  -No active issues    #DVT PPx  -SQH    #GOC/Ethics  - full code          For Follow-Up:          Vital Signs Last 24 Hrs  T(C): 37.9 (24 Dec 2019 12:00), Max: 38.4 (23 Dec 2019 16:00)  T(F): 100.3 (24 Dec 2019 12:00), Max: 101.1 (23 Dec 2019 16:00)  HR: 105 (24 Dec 2019 11:00) (76 - 111)  BP: 138/84 (24 Dec 2019 11:00) (129/85 - 163/86)  BP(mean): 98 (24 Dec 2019 11:00) (95 - 106)  RR: 35 (24 Dec 2019 11:00) (26 - 47)  SpO2: 95% (24 Dec 2019 11:00) (83% - 100%)  I&O's Summary    23 Dec 2019 07:01  -  24 Dec 2019 07:00  --------------------------------------------------------  IN: 800 mL / OUT: 1600 mL / NET: -800 mL    24 Dec 2019 07:01  -  24 Dec 2019 12:01  --------------------------------------------------------  IN: 0 mL / OUT: 1650 mL / NET: -1650 mL          MEDICATIONS  (STANDING):  fentaNYL   Patch  25 MICROgram(s)/Hr. 1 Patch Transdermal every 48 hours  heparin  Injectable 5000 Unit(s) SubCutaneous every 8 hours  lidocaine   Patch 1 Patch Transdermal daily  naloxegol 12.5 milliGRAM(s) Oral daily  piperacillin/tazobactam IVPB.. 3.375 Gram(s) IV Intermittent every 8 hours  polyethylene glycol 3350 17 Gram(s) Oral two times a day    MEDICATIONS  (PRN):  HYDROmorphone  Injectable 1 milliGRAM(s) IV Push every 4 hours PRN Moderate Pain (4 - 6)        LABS                                            9.4                   Neurophils% (auto):   x      (12-24 @ 02:44):    10.80)-----------(570          Lymphocytes% (auto):  x                                             28.7                   Eosinphils% (auto):   x        Manual%: Neutrophils x    ; Lymphocytes x    ; Eosinophils x    ; Bands%: x    ; Blasts x                                    137    |  106    |  19                  Calcium: 8.9   / iCa: x      (12-24 @ 02:45)    ----------------------------<  100       Magnesium: 2.2                              4.4     |  20     |  0.61             Phosphorous: 3.5      TPro  6.7    /  Alb  3.0    /  TBili  1.2    /  DBili  x      /  AST  47     /  ALT  12     /  AlkPhos  98     24 Dec 2019 02:45 MICU Transfer Note    Transfer from: MICU  Transfer to:  (x ) Medicine    (  ) Telemetry    (  ) RCU    (  ) Palliative    (  ) Stroke Unit    (  ) _______________  Accepting physician:      MICU COURSE: 20 y/o male w/sickle cell no prior acute chest, last crises 2-3 years ago had recently traveled to [facility] returned 5 days ago, presented to [place] ED w/complain of chest and bone pain. Patient doesn't report any sob, states bones hurt, did have decreased po intake for few days with travels.  Patient denied any fever, chills, sob, palpitations, n/v/d/c/. Of note, Pt never had been intubated prior or required transfusions previously, last sickle crisis was 2-3 years ago.      In ED, patient had a CTA that was noncontributory. RRT on 12/16, patient found to be in hypoxic resp failure, sat 91% on 4L NC. Initial CT chest on admission neg, was broadened to Vanc/zosyn from [facility] with CXR showing worsening bilateral infiltrates c/f acute chest. On night of 12/16, Patient developed fever tachycardia further desat requiring NRB c/f vaso-occlusive crises. Patient was transferred to CCU for management of acute chest. Patient became hypoxic despite hiflow and bipap and was intubated on 12/17 AM. Initially pt on FiO2 100% PEEP 14; currently pt sat 98% on FiO2 60% PEEP 14.    Patient subsequently received plasma exchange on 12/17, and tolerated procedure well with LDH, haptoglobin, d-dimer, bilirubin and Creatinine improving. P/F ratio found to be 110, concerning for ARDS. After initial intubation, on am rounds pt biting (clenching down) his ETT and HR started to decrease. Bite block placed and patient was difficult to bag; decision made to remove ETT and re-intubate pt. Pt desat'd and 1st attempt was esophagus, 2nd attempt ETT placed in trach. Post intubation sats in the low 80s and CXR revealed b/l ptx; b/l pigtail catheters placed with re-expansion of the lungs. Given patient's persistent hypoxia and acute respiratory distress syndrome, remaining vent dependent, plasma exchange on 12/18/19 was repeated.  Patient transferred to [facility] for further management of sickle cell crisis.    In MICU, patient received 3U of simple plasma exchange transfusion, with improvement of Hgb from 6.5 on admission to 8.1--> 8.5 --> 9.4. Electrophoresis showed improvement in HgbS% form 90% in [facility] to 39% -->31%, where it has remained stable since 12/21. Patient also started on Vanc/Cefepime x7 days' total ([facility] + [facility]) for PNA coverage. As per discussion with [facility] (Dr. Singer) on 12/21, patient no longer deemed as requiring plasma exchange or exchange transfusion; Shiley catheter removed, along with arterial line, on 12/21. Respiratory status continued to improve, with PEEP lowered to 5; subsequently patient un-sedated, extubated on 12/22. Bilateral chest tubes removed on 12/23, with repeat CXR on morning on 12/24 showing no evidence of PTX. However, patient with persistent fevers requiring Tylenol with c/f aspiration, started on Zosyn (12/22 - ). Patient placed on Dilaudid PCA with hospitalist management; however, patient found to be unable to self-administer medication so PCA discontinued, instead opting for PRN Dilaudid. Patient deemed safe for transfer to floors (4S) on 12/24.       ASSESSMENT & PLAN:  20 y/o M from [facility] [place] the [place] the [place] with PMH sickle cell disease (prior asymptomatic, no hx of blood transfusion) and malaria on anti-malarials at home who p/w chest and bone pain c/b acute chest syndrome s/p intubation for hypoxic respiratory failure s/p RBC exchange transfusion x1 and plasma exchange x2 c/b b/l PTX/pneumomediastinum s/p b/l pigtail catheters transferred to [facility] for further management and heme consultation.     Neuro  - AOx3 at baseline, no hx of CVA/TIA, seizure, or structural lesions. Currently off sedation   - Fentanyl and lidocaine patches added for analgesia, with additional pain control with Dilaudid 1mg q4hr PRN  - Attempted Dilaudid PCA, but patient unable to press button  - Patient with waxing/waning agitation and periods of being nonverbal to medical team. Also with depressive thoughts but w/o suicidal ideation. Family reports this is dramatic change from baseline and has not occurred before. Most likely 2/2 ICU delirium vs major depressive episode.  - Nonfocal exam morning of 12/24; however, patient with past exams concerning for unilateral weakness. CTH pending  - Psych consult placed on 12/24     Resp  #Hypoxic resp failure intubated found to have b/l pneumothoraces s/p b/l pigtail catheters at OSH  - Extubated on 12/22; pigtail catheters removed 12/23  - Initial post-pigtail removal CXR with trace R PTX; resolved on morning on 12/24  - Currently satting well, continue to monitor    #PNA  -concern for possible aspiration as patient was vomiting prior to removing the tube and after  -Vanc/Cefepime finished 12/22; started on Zosyn q8hr 12/22 - due to c/f aspiration  - Azithro DC'd 12/20    #Pneumomediastinum  - Found on imaging both at OSH and here, no acute intervention necessary  - Pneumomediastinum improving, continue to monitor (note: crepitus extends into abdomen, but is improving)    Cardiovascular  # Hypotension 2/2 sepsis vs vasoplegia due to sedative meds  - A line D/areli on 12/22  - resolved    #GI  - Regular diet as tolerated  - Continue bowel regimen; repeat Xray abdomen  - Added Reglan to assist with nausea and post tube feeding residuals  - No active issues  - RUQ ultrasound for TTP pending    #ID  - Zosyn as above for aspiration PNA  - Leukocytosis stable    #Renal  Mild ISABELL likely pre renal; unknown baseline  -Improving, presumed at baseline  -Trend BMP    #Heme  Sickle cell anemia c/b acute chest s/p 1rbc and 2 plasma exchange transfusions w/ improvement in HbS%  -s/p IVF, folic acid   - Hb now 9.4 s/p 3U PRBC's  - Electrophoresis stable at 31%  -Heme recs appreciated: HgbS goal ~ 30% as per Heme and Blood Bank  -Spoke with Dr. Singer from [facility] on 12/21 regarding need for future plasma exchange. Reviewed labs trends, including Bilirubin, platelet trend, and Hgb trend, and current HgbS trend on electrophoresis. Upon review of labs with Dr. Singer, patient deemed no longer in need of future transfusions, including plasma exchange. d/areli con on 12/21    #Endo  -No active issues    #DVT PPx  -SQH    #GOC/Ethics  - full code      For Follow-Up:  - F/u CTH  - F/u Psych recs  - Physical therapy evaluation  - RUQ ultrasound pending    Vital Signs Last 24 Hrs  T(C): 37.9 (24 Dec 2019 12:00), Max: 38.4 (23 Dec 2019 16:00)  T(F): 100.3 (24 Dec 2019 12:00), Max: 101.1 (23 Dec 2019 16:00)  HR: 105 (24 Dec 2019 11:00) (76 - 111)  BP: 138/84 (24 Dec 2019 11:00) (129/85 - 163/86)  BP(mean): 98 (24 Dec 2019 11:00) (95 - 106)  RR: 35 (24 Dec 2019 11:00) (26 - 47)  SpO2: 95% (24 Dec 2019 11:00) (83% - 100%)  I&O's Summary    23 Dec 2019 07:01  -  24 Dec 2019 07:00  --------------------------------------------------------  IN: 800 mL / OUT: 1600 mL / NET: -800 mL    24 Dec 2019 07:01  -  24 Dec 2019 12:01  --------------------------------------------------------  IN: 0 mL / OUT: 1650 mL / NET: -1650 mL      MEDICATIONS  (STANDING):  fentaNYL   Patch  25 MICROgram(s)/Hr. 1 Patch Transdermal every 48 hours  heparin  Injectable 5000 Unit(s) SubCutaneous every 8 hours  lidocaine   Patch 1 Patch Transdermal daily  naloxegol 12.5 milliGRAM(s) Oral daily  piperacillin/tazobactam IVPB.. 3.375 Gram(s) IV Intermittent every 8 hours  polyethylene glycol 3350 17 Gram(s) Oral two times a day    MEDICATIONS  (PRN):  HYDROmorphone  Injectable 1 milliGRAM(s) IV Push every 4 hours PRN Moderate Pain (4 - 6)        LABS                                            9.4                   Neurophils% (auto):   x      (12-24 @ 02:44):    10.80)-----------(570          Lymphocytes% (auto):  x                                             28.7                   Eosinphils% (auto):   x        Manual%: Neutrophils x    ; Lymphocytes x    ; Eosinophils x    ; Bands%: x    ; Blasts x                                    137    |  106    |  19                  Calcium: 8.9   / iCa: x      (12-24 @ 02:45)    ----------------------------<  100       Magnesium: 2.2                              4.4     |  20     |  0.61             Phosphorous: 3.5      TPro  6.7    /  Alb  3.0    /  TBili  1.2    /  DBili  x      /  AST  47     /  ALT  12     /  AlkPhos  98     24 Dec 2019 02:45

## 2019-12-25 LAB
ALBUMIN SERPL ELPH-MCNC: 3.1 G/DL — LOW (ref 3.3–5)
ALBUMIN SERPL ELPH-MCNC: 3.1 G/DL — LOW (ref 3.3–5)
ALP SERPL-CCNC: 92 U/L — SIGNIFICANT CHANGE UP (ref 40–120)
ALP SERPL-CCNC: 92 U/L — SIGNIFICANT CHANGE UP (ref 40–120)
ALT FLD-CCNC: 14 U/L — SIGNIFICANT CHANGE UP (ref 4–41)
ALT FLD-CCNC: 14 U/L — SIGNIFICANT CHANGE UP (ref 4–41)
AMMONIA BLD-MCNC: 42 UMOL/L — SIGNIFICANT CHANGE UP (ref 11–55)
ANION GAP SERPL CALC-SCNC: 10 MMO/L — SIGNIFICANT CHANGE UP (ref 7–14)
ANION GAP SERPL CALC-SCNC: 10 MMO/L — SIGNIFICANT CHANGE UP (ref 7–14)
APPEARANCE UR: CLEAR — SIGNIFICANT CHANGE UP
APTT BLD: 28.5 SEC — SIGNIFICANT CHANGE UP (ref 27.5–36.3)
AST SERPL-CCNC: 46 U/L — HIGH (ref 4–40)
AST SERPL-CCNC: 46 U/L — HIGH (ref 4–40)
BACTERIA # UR AUTO: NEGATIVE — SIGNIFICANT CHANGE UP
BASOPHILS # BLD AUTO: 0.06 K/UL — SIGNIFICANT CHANGE UP (ref 0–0.2)
BASOPHILS NFR BLD AUTO: 0.5 % — SIGNIFICANT CHANGE UP (ref 0–2)
BILIRUB SERPL-MCNC: 1.1 MG/DL — SIGNIFICANT CHANGE UP (ref 0.2–1.2)
BILIRUB SERPL-MCNC: 1.1 MG/DL — SIGNIFICANT CHANGE UP (ref 0.2–1.2)
BILIRUB UR-MCNC: NEGATIVE — SIGNIFICANT CHANGE UP
BLOOD UR QL VISUAL: NEGATIVE — SIGNIFICANT CHANGE UP
BUN SERPL-MCNC: 22 MG/DL — SIGNIFICANT CHANGE UP (ref 7–23)
BUN SERPL-MCNC: 22 MG/DL — SIGNIFICANT CHANGE UP (ref 7–23)
CALCIUM SERPL-MCNC: 9.3 MG/DL — SIGNIFICANT CHANGE UP (ref 8.4–10.5)
CALCIUM SERPL-MCNC: 9.3 MG/DL — SIGNIFICANT CHANGE UP (ref 8.4–10.5)
CHLORIDE SERPL-SCNC: 106 MMOL/L — SIGNIFICANT CHANGE UP (ref 98–107)
CHLORIDE SERPL-SCNC: 106 MMOL/L — SIGNIFICANT CHANGE UP (ref 98–107)
CO2 SERPL-SCNC: 21 MMOL/L — LOW (ref 22–31)
CO2 SERPL-SCNC: 21 MMOL/L — LOW (ref 22–31)
COLOR SPEC: YELLOW — SIGNIFICANT CHANGE UP
CREAT SERPL-MCNC: 0.61 MG/DL — SIGNIFICANT CHANGE UP (ref 0.5–1.3)
CREAT SERPL-MCNC: 0.61 MG/DL — SIGNIFICANT CHANGE UP (ref 0.5–1.3)
EOSINOPHIL # BLD AUTO: 0.43 K/UL — SIGNIFICANT CHANGE UP (ref 0–0.5)
EOSINOPHIL NFR BLD AUTO: 3.2 % — SIGNIFICANT CHANGE UP (ref 0–6)
FOLATE SERPL-MCNC: 18.5 NG/ML — SIGNIFICANT CHANGE UP (ref 4.7–20)
GGT SERPL-CCNC: 96 U/L — HIGH (ref 8–61)
GLUCOSE SERPL-MCNC: 101 MG/DL — HIGH (ref 70–99)
GLUCOSE SERPL-MCNC: 101 MG/DL — HIGH (ref 70–99)
GLUCOSE UR-MCNC: NEGATIVE — SIGNIFICANT CHANGE UP
HCT VFR BLD CALC: 30.5 % — LOW (ref 39–50)
HGB BLD-MCNC: 9.7 G/DL — LOW (ref 13–17)
HYALINE CASTS # UR AUTO: NEGATIVE — SIGNIFICANT CHANGE UP
IMM GRANULOCYTES NFR BLD AUTO: 0.5 % — SIGNIFICANT CHANGE UP (ref 0–1.5)
INR BLD: 1.12 — SIGNIFICANT CHANGE UP (ref 0.88–1.17)
KETONES UR-MCNC: NEGATIVE — SIGNIFICANT CHANGE UP
LEUKOCYTE ESTERASE UR-ACNC: NEGATIVE — SIGNIFICANT CHANGE UP
LYMPHOCYTES # BLD AUTO: 30.4 % — SIGNIFICANT CHANGE UP (ref 13–44)
LYMPHOCYTES # BLD AUTO: 4.03 K/UL — HIGH (ref 1–3.3)
MAGNESIUM SERPL-MCNC: 2.1 MG/DL — SIGNIFICANT CHANGE UP (ref 1.6–2.6)
MANUAL SMEAR VERIFICATION: SIGNIFICANT CHANGE UP
MCHC RBC-ENTMCNC: 26.4 PG — LOW (ref 27–34)
MCHC RBC-ENTMCNC: 31.8 % — LOW (ref 32–36)
MCV RBC AUTO: 82.9 FL — SIGNIFICANT CHANGE UP (ref 80–100)
MONOCYTES # BLD AUTO: 1.3 K/UL — HIGH (ref 0–0.9)
MONOCYTES NFR BLD AUTO: 9.8 % — SIGNIFICANT CHANGE UP (ref 2–14)
NEUTROPHILS # BLD AUTO: 7.36 K/UL — SIGNIFICANT CHANGE UP (ref 1.8–7.4)
NEUTROPHILS NFR BLD AUTO: 55.6 % — SIGNIFICANT CHANGE UP (ref 43–77)
NITRITE UR-MCNC: NEGATIVE — SIGNIFICANT CHANGE UP
NRBC # FLD: 0.02 K/UL — SIGNIFICANT CHANGE UP (ref 0–0)
PH UR: 7.5 — SIGNIFICANT CHANGE UP (ref 5–8)
PHOSPHATE SERPL-MCNC: 3.8 MG/DL — SIGNIFICANT CHANGE UP (ref 2.5–4.5)
PLATELET # BLD AUTO: 632 K/UL — HIGH (ref 150–400)
PMV BLD: 9.3 FL — SIGNIFICANT CHANGE UP (ref 7–13)
POTASSIUM SERPL-MCNC: 4.2 MMOL/L — SIGNIFICANT CHANGE UP (ref 3.5–5.3)
POTASSIUM SERPL-MCNC: 4.2 MMOL/L — SIGNIFICANT CHANGE UP (ref 3.5–5.3)
POTASSIUM SERPL-SCNC: 4.2 MMOL/L — SIGNIFICANT CHANGE UP (ref 3.5–5.3)
POTASSIUM SERPL-SCNC: 4.2 MMOL/L — SIGNIFICANT CHANGE UP (ref 3.5–5.3)
PROT SERPL-MCNC: 7.3 G/DL — SIGNIFICANT CHANGE UP (ref 6–8.3)
PROT SERPL-MCNC: 7.3 G/DL — SIGNIFICANT CHANGE UP (ref 6–8.3)
PROT UR-MCNC: 20 — SIGNIFICANT CHANGE UP
PROTHROM AB SERPL-ACNC: 12.8 SEC — SIGNIFICANT CHANGE UP (ref 9.8–13.1)
RBC # BLD: 3.68 M/UL — LOW (ref 4.2–5.8)
RBC # FLD: 19 % — HIGH (ref 10.3–14.5)
RBC CASTS # UR COMP ASSIST: HIGH (ref 0–?)
SODIUM SERPL-SCNC: 137 MMOL/L — SIGNIFICANT CHANGE UP (ref 135–145)
SODIUM SERPL-SCNC: 137 MMOL/L — SIGNIFICANT CHANGE UP (ref 135–145)
SP GR SPEC: 1.02 — SIGNIFICANT CHANGE UP (ref 1–1.04)
SQUAMOUS # UR AUTO: SIGNIFICANT CHANGE UP
T3 SERPL-MCNC: 154.4 NG/DL — SIGNIFICANT CHANGE UP (ref 80–200)
T4 AB SER-ACNC: 7.64 UG/DL — SIGNIFICANT CHANGE UP (ref 5.1–13)
TSH SERPL-MCNC: 10.12 UIU/ML — HIGH (ref 0.27–4.2)
UROBILINOGEN FLD QL: NORMAL — SIGNIFICANT CHANGE UP
WBC # BLD: 13.24 K/UL — HIGH (ref 3.8–10.5)
WBC # FLD AUTO: 13.24 K/UL — HIGH (ref 3.8–10.5)
WBC UR QL: HIGH (ref 0–?)

## 2019-12-25 PROCEDURE — 99222 1ST HOSP IP/OBS MODERATE 55: CPT

## 2019-12-25 PROCEDURE — 99232 SBSQ HOSP IP/OBS MODERATE 35: CPT

## 2019-12-25 PROCEDURE — 99291 CRITICAL CARE FIRST HOUR: CPT | Mod: 25

## 2019-12-25 RX ORDER — FOLIC ACID 0.8 MG
1 TABLET ORAL DAILY
Refills: 0 | Status: DISCONTINUED | OUTPATIENT
Start: 2019-12-25 | End: 2019-12-31

## 2019-12-25 RX ORDER — SODIUM CHLORIDE 9 MG/ML
1000 INJECTION INTRAMUSCULAR; INTRAVENOUS; SUBCUTANEOUS
Refills: 0 | Status: DISCONTINUED | OUTPATIENT
Start: 2019-12-25 | End: 2019-12-26

## 2019-12-25 RX ORDER — MULTIVIT WITH MIN/MFOLATE/K2 340-15/3 G
1 POWDER (GRAM) ORAL ONCE
Refills: 0 | Status: COMPLETED | OUTPATIENT
Start: 2019-12-25 | End: 2019-12-25

## 2019-12-25 RX ORDER — ONDANSETRON 8 MG/1
4 TABLET, FILM COATED ORAL ONCE
Refills: 0 | Status: COMPLETED | OUTPATIENT
Start: 2019-12-25 | End: 2019-12-25

## 2019-12-25 RX ORDER — ACETAMINOPHEN 500 MG
975 TABLET ORAL ONCE
Refills: 0 | Status: COMPLETED | OUTPATIENT
Start: 2019-12-25 | End: 2019-12-25

## 2019-12-25 RX ORDER — SENNA PLUS 8.6 MG/1
2 TABLET ORAL AT BEDTIME
Refills: 0 | Status: DISCONTINUED | OUTPATIENT
Start: 2019-12-25 | End: 2019-12-31

## 2019-12-25 RX ADMIN — PIPERACILLIN AND TAZOBACTAM 25 GRAM(S): 4; .5 INJECTION, POWDER, LYOPHILIZED, FOR SOLUTION INTRAVENOUS at 08:40

## 2019-12-25 RX ADMIN — LIDOCAINE 1 PATCH: 4 CREAM TOPICAL at 13:46

## 2019-12-25 RX ADMIN — HEPARIN SODIUM 5000 UNIT(S): 5000 INJECTION INTRAVENOUS; SUBCUTANEOUS at 22:46

## 2019-12-25 RX ADMIN — HEPARIN SODIUM 5000 UNIT(S): 5000 INJECTION INTRAVENOUS; SUBCUTANEOUS at 05:27

## 2019-12-25 RX ADMIN — FENTANYL CITRATE 1 PATCH: 50 INJECTION INTRAVENOUS at 20:00

## 2019-12-25 RX ADMIN — HEPARIN SODIUM 5000 UNIT(S): 5000 INJECTION INTRAVENOUS; SUBCUTANEOUS at 14:21

## 2019-12-25 RX ADMIN — NALOXEGOL OXALATE 12.5 MILLIGRAM(S): 12.5 TABLET, FILM COATED ORAL at 13:45

## 2019-12-25 RX ADMIN — DEXMEDETOMIDINE HYDROCHLORIDE IN 0.9% SODIUM CHLORIDE 3.08 MICROGRAM(S)/KG/HR: 4 INJECTION INTRAVENOUS at 08:40

## 2019-12-25 RX ADMIN — Medication 81 MILLIGRAM(S): at 13:45

## 2019-12-25 RX ADMIN — HYDROMORPHONE HYDROCHLORIDE 1 MILLIGRAM(S): 2 INJECTION INTRAMUSCULAR; INTRAVENOUS; SUBCUTANEOUS at 18:06

## 2019-12-25 RX ADMIN — SODIUM CHLORIDE 100 MILLILITER(S): 9 INJECTION INTRAMUSCULAR; INTRAVENOUS; SUBCUTANEOUS at 08:40

## 2019-12-25 RX ADMIN — FENTANYL CITRATE 1 PATCH: 50 INJECTION INTRAVENOUS at 07:40

## 2019-12-25 RX ADMIN — PIPERACILLIN AND TAZOBACTAM 25 GRAM(S): 4; .5 INJECTION, POWDER, LYOPHILIZED, FOR SOLUTION INTRAVENOUS at 01:00

## 2019-12-25 RX ADMIN — Medication 975 MILLIGRAM(S): at 23:46

## 2019-12-25 RX ADMIN — SENNA PLUS 2 TABLET(S): 8.6 TABLET ORAL at 22:46

## 2019-12-25 RX ADMIN — PIPERACILLIN AND TAZOBACTAM 25 GRAM(S): 4; .5 INJECTION, POWDER, LYOPHILIZED, FOR SOLUTION INTRAVENOUS at 16:33

## 2019-12-25 RX ADMIN — ONDANSETRON 4 MILLIGRAM(S): 8 TABLET, FILM COATED ORAL at 21:13

## 2019-12-25 RX ADMIN — LIDOCAINE 1 PATCH: 4 CREAM TOPICAL at 20:00

## 2019-12-25 RX ADMIN — Medication 975 MILLIGRAM(S): at 22:46

## 2019-12-25 RX ADMIN — LIDOCAINE 1 PATCH: 4 CREAM TOPICAL at 00:00

## 2019-12-25 RX ADMIN — Medication 1 BOTTLE: at 22:42

## 2019-12-25 RX ADMIN — POLYETHYLENE GLYCOL 3350 17 GRAM(S): 17 POWDER, FOR SOLUTION ORAL at 18:06

## 2019-12-25 RX ADMIN — Medication 1 MILLIGRAM(S): at 22:46

## 2019-12-25 NOTE — PROGRESS NOTE ADULT - ATTENDING COMMENTS
Patient with a cute chest syndrome, acute hypoxic respiratory failure requiring intubation, c/b bilateral ptx and chest tubes, required RBC and plasma exchange, extubated.  CT head yesterday showed possible right cerebellar stroke (area of hypoattenuation) - could be due to SCC versus hypoxia.  In MICU for q2h neuro checks, f/u neurology reccs for when this could be downgraded.  s/p 1 prbc yesterday per heme.  c/w abx for now, likely 1-2 more days.

## 2019-12-25 NOTE — PROGRESS NOTE ADULT - ASSESSMENT
20 y/o male w/sickle cell no prior acute chest transferred from [facility] to [facility] on 12/18/19 for management of acute chest syndrome 2/2 sickle cell disease, course complicated by bilateral pneumothoraces.    #Sickle cell disease c/b acute chest  -Found to have acute subacute right cerebellar stroke in CTH s/p simple transfusion on 12/24 given HgS 31% on 12/23 per discussion with Dr. Cardenas.   -No transfusion today  -monitor CBC w/ differential, reticulocyte count, bilirubin, LDH, haptoglobin daily  -peripheral blood smear reviewed on 12/19/19; showing multiple sickle cells, microspherocytes, blister cells, and some target cells. 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  -IVF with NS, folic acid, incentive spirometry 20 y/o male w/sickle cell no prior acute chest transferred from [facility] to [facility] on 12/18/19 for management of acute chest syndrome 2/2 sickle cell disease, course complicated by bilateral pneumothoraces.    # Sickle cell disease c/b acute chest  -Found to have acute subacute right cerebellar stroke in CTH s/p simple transfusion on 12/24 given HgS 31% on 12/23 per discussion with Dr. Cardenas.   -No transfusion today  -monitor CBC w/ differential, reticulocyte count, bilirubin, LDH, haptoglobin daily  -peripheral blood smear reviewed on 12/19/19; showing multiple sickle cells, microspherocytes, blister cells, and some target cells. 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  -IVF with NS, folic acid, incentive spirometry 20 y/o male w/sickle cell no prior acute chest transferred from [facility] to [facility] on 12/18/19 for management of acute chest syndrome 2/2 sickle cell disease, course complicated by bilateral pneumothoraces.    # Sickle cell disease c/b acute chest  -Found to have acute subacute right cerebellar stroke in CTH s/p simple transfusion on 12/24 given HgS 31% on 12/23 per discussion with Dr. Cardenas.   -No transfusion today  -monitor CBC w/ differential, reticulocyte count, bilirubin, LDH, haptoglobin daily  -peripheral blood smear reviewed on 12/19/19; showing multiple sickle cells, microspherocytes, blister cells, and some target cells. 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  -IVF with NS, folic acid, incentive spirometry  -Please send Hgb electrophoresis today    The case was discussed with Dr. Jones Boothe MD, MPH  Hematology/Oncology Fellow  Pager: [phone]

## 2019-12-25 NOTE — PROGRESS NOTE ADULT - SUBJECTIVE AND OBJECTIVE BOX
Hematology/Oncology Follow-up    INTERVAL HPI/OVERNIGHT EVENTS:  No acute overnight event.     VITAL SIGNS:  T(F): 99.9 (19 @ 08:00)  HR: 70 (19 @ 08:00)  BP: 137/94 (19 @ 08:00)  RR: 30 (19 @ 08:00)  SpO2: 99% (19 @ 08:00)  Wt(kg): --    19 @ 07:01  -  19 @ 07:00  --------------------------------------------------------  IN: 1682.3 mL / OUT: 2450 mL / NET: -767.7 mL    19 @ 07:01  -  19 @ 09:01  --------------------------------------------------------  IN: 0 mL / OUT: 850 mL / NET: -850 mL        PHYSICAL EXAM:  Constitutional: NAD   Eyes: PERRLA, EOMI, sclera non-icteric, conjunctiva non-anemia  Neck: supple, no masses, no elevated JVP  Mouth: No mucositis, no exudate, no ulcer.   Respiratory: CTA b/l  Cardiovascular: Normal rate regular rhythm. normal S1S2, no murmur  Gastrointestinal: soft and flat, no tenderness to palpation, no masses palpable, normoactive bowel soundS, no HSM  Extremities:  No c/c/e  MSK: No joint swelling, erythema, or tenderness   Neurological: AOx3, Cranial nerves II-XII grossly intact  Skin: No rash  Psych: Normal affect    MEDICATIONS  (STANDING):  aspirin  chewable 81 milliGRAM(s) Oral daily  dexMEDEtomidine Infusion 0.2 MICROgram(s)/kG/Hr (3.085 mL/Hr) IV Continuous <Continuous>  fentaNYL   Patch  25 MICROgram(s)/Hr. 1 Patch Transdermal every 48 hours  heparin  Injectable 5000 Unit(s) SubCutaneous every 8 hours  lidocaine   Patch 1 Patch Transdermal daily  naloxegol 12.5 milliGRAM(s) Oral daily  piperacillin/tazobactam IVPB.. 3.375 Gram(s) IV Intermittent every 8 hours  polyethylene glycol 3350 17 Gram(s) Oral two times a day  sodium chloride 0.9%. 1000 milliLiter(s) (100 mL/Hr) IV Continuous <Continuous>    MEDICATIONS  (PRN):  HYDROmorphone  Injectable 1 milliGRAM(s) IV Push every 4 hours PRN Moderate Pain (4 - 6)      No Known Allergies      LABS:                        9.7    13.24 )-----------( 632      ( 25 Dec 2019 02:25 )             30.5     12    137  |  106  |  22  ----------------------------<  101<H>  4.2   |  21<L>  |  0.61    Ca    9.3      25 Dec 2019 02:30  Phos  3.8       Mg     2.1         TPro  7.3  /  Alb  3.1<L>  /  TBili  1.1  /  DBili  x   /  AST  46<H>  /  ALT  14  /  AlkPhos  92      PT/INR - ( 25 Dec 2019 02:30 )   PT: 12.8 SEC;   INR: 1.12          PTT - ( 25 Dec 2019 02:30 )  PTT:28.5 SEC   Urinalysis Basic - ( 25 Dec 2019 06:19 )    Color: YELLOW / Appearance: CLEAR / S.017 / pH: 7.5  Gluc: NEGATIVE / Ketone: NEGATIVE  / Bili: NEGATIVE / Urobili: NORMAL   Blood: NEGATIVE / Protein: 20 / Nitrite: NEGATIVE   Leuk Esterase: NEGATIVE / RBC: 6-10 / WBC 6-10   Sq Epi: OCC / Non Sq Epi: x / Bacteria: NEGATIVE        RADIOLOGY & ADDITIONAL TESTS:  Studies reviewed. Hematology/Oncology Follow-up    INTERVAL HPI/OVERNIGHT EVENTS:  No acute overnight event. Sedated with precedex. Calm on bed.     VITAL SIGNS:  T(F): 99.9 (19 @ 08:00)  HR: 70 (19 @ 08:00)  BP: 137/94 (19 @ 08:00)  RR: 30 (19 @ 08:00)  SpO2: 99% (19 @ 08:00)  Wt(kg): --    19 @ 07:01  -  19 @ 07:00  --------------------------------------------------------  IN: 1682.3 mL / OUT: 2450 mL / NET: -767.7 mL    19 @ 07:01  -  19 @ 09:01  --------------------------------------------------------  IN: 0 mL / OUT: 850 mL / NET: -850 mL        PHYSICAL EXAM:  Constitutional: NAD   Eyes: sclera non-icteric, conjunctiva non-anemia  Respiratory: CTA b/l  Cardiovascular: Normal rate regular rhythm. no murmur  Gastrointestinal: soft and flat, no tenderness to palpation, normoactive bowel sound  Extremities:  No c/c/e  MSK: No joint swelling, erythema, or tenderness   Neurological: AOx3, Cranial nerves II-XII grossly intact  Skin: No rash  Psych: Normal affect    MEDICATIONS  (STANDING):  aspirin  chewable 81 milliGRAM(s) Oral daily  dexMEDEtomidine Infusion 0.2 MICROgram(s)/kG/Hr (3.085 mL/Hr) IV Continuous <Continuous>  fentaNYL   Patch  25 MICROgram(s)/Hr. 1 Patch Transdermal every 48 hours  heparin  Injectable 5000 Unit(s) SubCutaneous every 8 hours  lidocaine   Patch 1 Patch Transdermal daily  naloxegol 12.5 milliGRAM(s) Oral daily  piperacillin/tazobactam IVPB.. 3.375 Gram(s) IV Intermittent every 8 hours  polyethylene glycol 3350 17 Gram(s) Oral two times a day  sodium chloride 0.9%. 1000 milliLiter(s) (100 mL/Hr) IV Continuous <Continuous>    MEDICATIONS  (PRN):  HYDROmorphone  Injectable 1 milliGRAM(s) IV Push every 4 hours PRN Moderate Pain (4 - 6)      No Known Allergies      LABS:                        9.7    13.24 )-----------( 632      ( 25 Dec 2019 02:25 )             30.5         137  |  106  |  22  ----------------------------<  101<H>  4.2   |  21<L>  |  0.61    Ca    9.3      25 Dec 2019 02:30  Phos  3.8       Mg     2.1         TPro  7.3  /  Alb  3.1<L>  /  TBili  1.1  /  DBili  x   /  AST  46<H>  /  ALT  14  /  AlkPhos  92      PT/INR - ( 25 Dec 2019 02:30 )   PT: 12.8 SEC;   INR: 1.12          PTT - ( 25 Dec 2019 02:30 )  PTT:28.5 SEC   Urinalysis Basic - ( 25 Dec 2019 06:19 )    Color: YELLOW / Appearance: CLEAR / S.017 / pH: 7.5  Gluc: NEGATIVE / Ketone: NEGATIVE  / Bili: NEGATIVE / Urobili: NORMAL   Blood: NEGATIVE / Protein: 20 / Nitrite: NEGATIVE   Leuk Esterase: NEGATIVE / RBC: 6-10 / WBC 6-10   Sq Epi: OCC / Non Sq Epi: x / Bacteria: NEGATIVE        RADIOLOGY & ADDITIONAL TESTS:  Studies reviewed.

## 2019-12-25 NOTE — PROGRESS NOTE ADULT - ATTENDING COMMENTS
This is a 19 year old man with a history of sickle cell disease transferred from [facility] to [facility] on 12/18/19 for management of acute chest syndrome 2/2 sickle cell disease, course complicated by bilateral pneumothorax.      Found to have acute subacute right cerebellar stroke in CTH s/p simple transfusion on 12/24 given HgS 31% on 12/23  Recommend rechecking Hemoglobin electrophoresis today, can determine future transfusions versus repeat exchange transfusions based on this new level.  Check reticulocyte count.      Continue IVF hydration with NS, continue folic acid.

## 2019-12-25 NOTE — PROGRESS NOTE ADULT - SUBJECTIVE AND OBJECTIVE BOX
COVERING RESIDENT: LILLIANA MILLER (PGY-1) | [facility] PAGER [phone] | [facility] P[facility] [phone]    INTERVAL HPI/OVERNIGHT EVENTS: Patient agitated ON, required Haldol x2. Low grade fever to 100.1, no acute intervention necessary.    SUBJECTIVE: Patient seen and examined at bedside. Patient more verbal and responsive to questioning while awake, although still not localizing pain. States that he feels "horrible", but unable to expound further. Denies chest pain, SOB, headache, abdominal pain.     CONSTITUTIONAL: + Fever, no chills  EYES/ENT: No visual changes  NECK: No pain or stiffness  RESPIRATORY: No shortness of breath  CARDIOVASCULAR: No chest pain or palpitations  GASTROINTESTINAL: No abdominal or epigastric pain. No nausea, vomiting, or hematemesis; No diarrhea or constipation. No melena or hematochezia. Last BM 2days ago  GENITOURINARY: No dysuria, frequency or hematuria  NEUROLOGICAL: No numbness or weakness  SKIN: No itching, rashes    OBJECTIVE:    VITAL SIGNS:  ICU Vital Signs Last 24 Hrs  T(C): 37.7 (25 Dec 2019 08:00), Max: 38.2 (24 Dec 2019 17:15)  T(F): 99.9 (25 Dec 2019 08:00), Max: 100.7 (24 Dec 2019 17:15)  HR: 64 (25 Dec 2019 11:00) (57 - 113)  BP: 129/83 (25 Dec 2019 11:00) (124/85 - 149/87)  BP(mean): 95 (25 Dec 2019 11:00) (86 - 108)  ABP: --  ABP(mean): --  RR: 19 (25 Dec 2019 11:00) (12 - 49)  SpO2: 99% (25 Dec 2019 11:00) (93% - 100%)         @ 07:  -   @ 07:00  --------------------------------------------------------  IN: 1682.3 mL / OUT: 2450 mL / NET: -767.7 mL     @ 07:  -   @ 13:19  --------------------------------------------------------  IN: 615.7 mL / OUT: 850 mL / NET: -234.3 mL      CAPILLARY BLOOD GLUCOSE          PHYSICAL EXAM:    General: NAD, lying in bed, lethargic but more responsive to questioning  HEENT: NC/AT; PERRL, EOMI, clear conjunctiva  Neck: supple  Respiratory: CTA b/l, resolving pneumomediastinum extending into abdomen  Cardiovascular: +S1/S2; RRR  Abdomen: TTP in RUQ, eliciting grimacing soft, ND; +BS x4  Extremities: WWP, 2+ peripheral pulses b/l; no LE edema  Skin: normal color and turgor; no rash  Neurological: Unable to assess AO status. MAEx4, slightly favoring the left    MEDICATIONS:  MEDICATIONS  (STANDING):  aspirin  chewable 81 milliGRAM(s) Oral daily  dexMEDEtomidine Infusion 0.2 MICROgram(s)/kG/Hr (3.085 mL/Hr) IV Continuous <Continuous>  fentaNYL   Patch  25 MICROgram(s)/Hr. 1 Patch Transdermal every 48 hours  heparin  Injectable 5000 Unit(s) SubCutaneous every 8 hours  lidocaine   Patch 1 Patch Transdermal daily  naloxegol 12.5 milliGRAM(s) Oral daily  piperacillin/tazobactam IVPB.. 3.375 Gram(s) IV Intermittent every 8 hours  polyethylene glycol 3350 17 Gram(s) Oral two times a day  sodium chloride 0.9%. 1000 milliLiter(s) (100 mL/Hr) IV Continuous <Continuous>    MEDICATIONS  (PRN):  HYDROmorphone  Injectable 1 milliGRAM(s) IV Push every 4 hours PRN Moderate Pain (4 - 6)      ALLERGIES:  Allergies    No Known Allergies    Intolerances        LABS:                        9.7    13.24 )-----------( 632      ( 25 Dec 2019 02:25 )             30.5     12    137  |  106  |  22  ----------------------------<  101<H>  4.2   |  21<L>  |  0.61    Ca    9.3      25 Dec 2019 02:30  Phos  3.8       Mg     2.1         TPro  7.3  /  Alb  3.1<L>  /  TBili  1.1  /  DBili  x   /  AST  46<H>  /  ALT  14  /  AlkPhos  92      PT/INR - ( 25 Dec 2019 02:30 )   PT: 12.8 SEC;   INR: 1.12          PTT - ( 25 Dec 2019 02:30 )  PTT:28.5 SEC  Urinalysis Basic - ( 25 Dec 2019 06:19 )    Color: YELLOW / Appearance: CLEAR / S.017 / pH: 7.5  Gluc: NEGATIVE / Ketone: NEGATIVE  / Bili: NEGATIVE / Urobili: NORMAL   Blood: NEGATIVE / Protein: 20 / Nitrite: NEGATIVE   Leuk Esterase: NEGATIVE / RBC: 6-10 / WBC 6-10   Sq Epi: OCC / Non Sq Epi: x / Bacteria: NEGATIVE        RADIOLOGY & ADDITIONAL TESTS: Reviewed.

## 2019-12-25 NOTE — PROGRESS NOTE ADULT - ASSESSMENT
ASSESSMENT & PLAN:  18 y/o M from [facility] visiting the [place] the holidays with PMH sickle cell disease (prior asymptomatic, no hx of blood transfusion) and malaria on anti-malarials at home who p/w chest and bone pain c/b acute chest syndrome s/p intubation for hypoxic respiratory failure s/p RBC exchange transfusion x1 and plasma exchange x2 c/b b/l PTX/pneumomediastinum s/p b/l pigtail catheters transferred to [facility] for further management and heme consultation. Course c/b acute/subacute right cerebellar infarct on CTH 12/24    Neuro  - AOx3 at baseline, no prior hx of CVA/TIA, seizure, or structural lesions. Currently off sedation   - Fentanyl and lidocaine patches added for analgesia, with additional pain control with Dilaudid 1mg q4hr PRN  - Attempted Dilaudid PCA, but patient unable to press button  - Patient with waxing/waning agitation and periods of being nonverbal to medical team. Also with depressive thoughts but w/o suicidal ideation. Family reports this is dramatic change from baseline and has not occurred before. Most likely 2/2 ICU delirium vs major depressive episode.  - Nonfocal exam morning of 12/24; however, patient with past exams concerning for unilateral weakness. CTH pending  - Psych consult placed on 12/24     Resp  #Hypoxic resp failure intubated found to have b/l pneumothoraces s/p b/l pigtail catheters at OSH  - Extubated on 12/22; pigtail catheters removed 12/23  - Initial post-pigtail removal CXR with trace R PTX; resolved on morning on 12/24  - Currently satting well, continue to monitor    #PNA  -concern for possible aspiration as patient was vomiting prior to removing the tube and after  -Vanc/Cefepime finished 12/22; started on Zosyn q8hr 12/22 - due to c/f aspiration  - Azithro DC'd 12/20    #Pneumomediastinum  - Found on imaging both at OSH and here, no acute intervention necessary  - Pneumomediastinum improving, continue to monitor (note: crepitus extends into abdomen, but is improving)    Cardiovascular  # Hypotension 2/2 sepsis vs vasoplegia due to sedative meds  - A line D/areli on 12/22  - resolved    #GI  - Regular diet as tolerated  - Continue bowel regimen; repeat Xray abdomen  - Added Reglan to assist with nausea and post tube feeding residuals  - No active issues  - RUQ ultrasound for TTP pending    #ID  - Zosyn as above for aspiration PNA  - Leukocytosis stable    #Renal  Mild ISABELL likely pre renal; unknown baseline  -Improving, presumed at baseline  -Trend BMP    #Heme  Sickle cell anemia c/b acute chest s/p 1rbc and 2 plasma exchange transfusions w/ improvement in HbS%  -s/p IVF, folic acid   - Hb now 9.4 s/p 3U PRBC's  - Electrophoresis stable at 31%  -Heme recs appreciated: HgbS goal ~ 30% as per Heme and Blood Bank  -Spoke with Dr. Singer from [facility] on 12/21 regarding need for future plasma exchange. Reviewed labs trends, including Bilirubin, platelet trend, and Hgb trend, and current HgbS trend on electrophoresis. Upon review of labs with Dr. Singer, patient deemed no longer in need of future transfusions, including plasma exchange. d/areli andujar on 12/21    #Endo  -No active issues    #DVT PPx  -SQH    #GOC/Ethics  - full code ASSESSMENT & PLAN:  20 y/o M from [facility] visiting the [place] with PMH sickle cell disease (prior asymptomatic, no hx of blood transfusion) and malaria on anti-malarials at home who p/w chest and bone pain c/b acute chest syndrome s/p intubation for hypoxic respiratory failure s/p RBC exchange transfusion x1 and plasma exchange x2 c/b b/l PTX/pneumomediastinum s/p b/l pigtail catheters transferred to [facility] for further management and heme consultation. Course c/b acute/subacute right cerebellar infarct on CTH 12/24    Neuro  - AOx3 at baseline, no prior hx of CVA/TIA, seizure, or structural lesions. Currently off sedation   - Fentanyl and lidocaine patches added for analgesia, with additional pain control with Dilaudid 1mg q4hr PRN  - Attempted Dilaudid PCA, but patient unable to press button  - Patient with waxing/waning agitation and periods of being nonverbal to medical team. Also with depressive thoughts but w/o suicidal ideation. Family reports this is dramatic change from baseline and has not occurred before. Most likely 2/2 ICU delirium  - Nonfocal exam morning of 12/24; however, patient with past exams concerning for unilateral weakness. CTH 12/24 with new finding of right cerebellar infarct, acute to subacute in nature  - Neuro recs: MR Angio head, MR head non-con, and MR Angio neck  - Appreciate Psych recs; Zyprexa PRN added for agitation    Resp  #Hypoxic resp failure intubated found to have b/l pneumothoraces s/p b/l pigtail catheters at OSH  - Extubated on 12/22; pigtail catheters removed 12/23  - Initial post-pigtail removal CXR with trace R PTX; resolved on morning on 12/24  - Currently satting well, continue to monitor    #PNA  -concern for possible aspiration as patient was vomiting prior to removing the tube and after  -Vanc/Cefepime finished 12/22; started on Zosyn q8hr 12/22 -12/28 for c/f aspiration PNA  - Azithro DC'd 12/20    #Pneumomediastinum  - Found on imaging both at OSH and here, no acute intervention necessary  - Pneumomediastinum improving, continue to monitor (note: crepitus extends into abdomen, but is improving)    Cardiovascular  # Hypotension 2/2 sepsis vs vasoplegia due to sedative meds  - A line D/areli on 12/22  - resolved    #GI  - Regular diet as tolerated  - Continue bowel regimen; repeat Xray abdomen  - Added Reglan to assist with nausea and post tube feeding residuals  - No active issues  - RUQ ultrasound for TTP pending    #ID  - Zosyn as above for aspiration PNA  - Leukocytosis fluctuating but remains elevated    #Renal  Mild ISABELL likely pre renal; unknown baseline  -Improving, presumed at baseline  -Trend BMP    #Heme  Sickle cell anemia c/b acute chest s/p 1rbc and 2 plasma exchange transfusions w/ improvement in HbS%  -s/p IVF, folic acid   - Hb now 9.4 s/p 3U PRBC's  - Electrophoresis stable at 31%  -Heme recs appreciated: HgbS goal ~ 30% as per Heme and Blood Bank  - In light of new CTH findings, discussed w/Heme with Blood Bank input. Patient does still not require exchange, but recommended additional 1U PRBC's; received on 12/24  - 12/25 Hgb electrophoresis pending    #Endo  -No active issues    #DVT PPx  -SQH    #GOC/Ethics  - full code

## 2019-12-26 LAB
ALBUMIN SERPL ELPH-MCNC: 2.9 G/DL — LOW (ref 3.3–5)
ALP SERPL-CCNC: 93 U/L — SIGNIFICANT CHANGE UP (ref 40–120)
ALT FLD-CCNC: 19 U/L — SIGNIFICANT CHANGE UP (ref 4–41)
ANION GAP SERPL CALC-SCNC: 12 MMO/L — SIGNIFICANT CHANGE UP (ref 7–14)
AST SERPL-CCNC: 48 U/L — HIGH (ref 4–40)
BASOPHILS # BLD AUTO: 0.06 K/UL — SIGNIFICANT CHANGE UP (ref 0–0.2)
BASOPHILS NFR BLD AUTO: 0.5 % — SIGNIFICANT CHANGE UP (ref 0–2)
BILIRUB DIRECT SERPL-MCNC: 0.4 MG/DL — HIGH (ref 0.1–0.2)
BILIRUB SERPL-MCNC: 1 MG/DL — SIGNIFICANT CHANGE UP (ref 0.2–1.2)
BILIRUB SERPL-MCNC: 1 MG/DL — SIGNIFICANT CHANGE UP (ref 0.2–1.2)
BUN SERPL-MCNC: 14 MG/DL — SIGNIFICANT CHANGE UP (ref 7–23)
CALCIUM SERPL-MCNC: 9 MG/DL — SIGNIFICANT CHANGE UP (ref 8.4–10.5)
CHLORIDE SERPL-SCNC: 104 MMOL/L — SIGNIFICANT CHANGE UP (ref 98–107)
CO2 SERPL-SCNC: 19 MMOL/L — LOW (ref 22–31)
CREAT SERPL-MCNC: 0.54 MG/DL — SIGNIFICANT CHANGE UP (ref 0.5–1.3)
EOSINOPHIL # BLD AUTO: 0.83 K/UL — HIGH (ref 0–0.5)
EOSINOPHIL NFR BLD AUTO: 6.4 % — HIGH (ref 0–6)
GLUCOSE SERPL-MCNC: 107 MG/DL — HIGH (ref 70–99)
HAPTOGLOB SERPL-MCNC: < 20 MG/DL — LOW (ref 34–200)
HCT VFR BLD CALC: 30.1 % — LOW (ref 39–50)
HGB A MFR BLD: 69.6 % — LOW
HGB A2 MFR BLD: 3 % — SIGNIFICANT CHANGE UP (ref 2.4–3.5)
HGB BLD-MCNC: 9.8 G/DL — LOW (ref 13–17)
HGB F MFR BLD: < 1 % — SIGNIFICANT CHANGE UP (ref 0–1.5)
HGB S MFR BLD: 26.7 % — HIGH (ref 0–0)
IMM GRANULOCYTES NFR BLD AUTO: 0.3 % — SIGNIFICANT CHANGE UP (ref 0–1.5)
LDH SERPL L TO P-CCNC: 393 U/L — HIGH (ref 135–225)
LYMPHOCYTES # BLD AUTO: 31.3 % — SIGNIFICANT CHANGE UP (ref 13–44)
LYMPHOCYTES # BLD AUTO: 4.03 K/UL — HIGH (ref 1–3.3)
MAGNESIUM SERPL-MCNC: 1.7 MG/DL — SIGNIFICANT CHANGE UP (ref 1.6–2.6)
MCHC RBC-ENTMCNC: 26.6 PG — LOW (ref 27–34)
MCHC RBC-ENTMCNC: 32.6 % — SIGNIFICANT CHANGE UP (ref 32–36)
MCV RBC AUTO: 81.6 FL — SIGNIFICANT CHANGE UP (ref 80–100)
MONOCYTES # BLD AUTO: 1.17 K/UL — HIGH (ref 0–0.9)
MONOCYTES NFR BLD AUTO: 9.1 % — SIGNIFICANT CHANGE UP (ref 2–14)
NEUTROPHILS # BLD AUTO: 6.76 K/UL — SIGNIFICANT CHANGE UP (ref 1.8–7.4)
NEUTROPHILS NFR BLD AUTO: 52.4 % — SIGNIFICANT CHANGE UP (ref 43–77)
NRBC # FLD: 0 K/UL — SIGNIFICANT CHANGE UP (ref 0–0)
PHOSPHATE SERPL-MCNC: 4.2 MG/DL — SIGNIFICANT CHANGE UP (ref 2.5–4.5)
PLATELET # BLD AUTO: 765 K/UL — HIGH (ref 150–400)
PMV BLD: 9.7 FL — SIGNIFICANT CHANGE UP (ref 7–13)
POTASSIUM SERPL-MCNC: 3.9 MMOL/L — SIGNIFICANT CHANGE UP (ref 3.5–5.3)
POTASSIUM SERPL-SCNC: 3.9 MMOL/L — SIGNIFICANT CHANGE UP (ref 3.5–5.3)
PROT SERPL-MCNC: 7 G/DL — SIGNIFICANT CHANGE UP (ref 6–8.3)
RBC # BLD: 3.69 M/UL — LOW (ref 4.2–5.8)
RBC # FLD: 19.3 % — HIGH (ref 10.3–14.5)
RETICS #: 69 K/UL — SIGNIFICANT CHANGE UP (ref 25–125)
RETICS/RBC NFR: 1.9 % — SIGNIFICANT CHANGE UP (ref 0.5–2.5)
SODIUM SERPL-SCNC: 135 MMOL/L — SIGNIFICANT CHANGE UP (ref 135–145)
WBC # BLD: 12.89 K/UL — HIGH (ref 3.8–10.5)
WBC # FLD AUTO: 12.89 K/UL — HIGH (ref 3.8–10.5)

## 2019-12-26 PROCEDURE — 99232 SBSQ HOSP IP/OBS MODERATE 35: CPT | Mod: GC

## 2019-12-26 PROCEDURE — 99232 SBSQ HOSP IP/OBS MODERATE 35: CPT

## 2019-12-26 PROCEDURE — 99291 CRITICAL CARE FIRST HOUR: CPT | Mod: 25

## 2019-12-26 RX ORDER — ATORVASTATIN CALCIUM 80 MG/1
80 TABLET, FILM COATED ORAL AT BEDTIME
Refills: 0 | Status: DISCONTINUED | OUTPATIENT
Start: 2019-12-26 | End: 2019-12-31

## 2019-12-26 RX ORDER — ACETAMINOPHEN 500 MG
650 TABLET ORAL ONCE
Refills: 0 | Status: COMPLETED | OUTPATIENT
Start: 2019-12-26 | End: 2019-12-26

## 2019-12-26 RX ORDER — HALOPERIDOL DECANOATE 100 MG/ML
2.5 INJECTION INTRAMUSCULAR ONCE
Refills: 0 | Status: COMPLETED | OUTPATIENT
Start: 2019-12-26 | End: 2019-12-26

## 2019-12-26 RX ORDER — SODIUM CHLORIDE 9 MG/ML
1000 INJECTION INTRAMUSCULAR; INTRAVENOUS; SUBCUTANEOUS
Refills: 0 | Status: DISCONTINUED | OUTPATIENT
Start: 2019-12-26 | End: 2019-12-28

## 2019-12-26 RX ORDER — FENTANYL CITRATE 50 UG/ML
1 INJECTION INTRAVENOUS
Refills: 0 | Status: DISCONTINUED | OUTPATIENT
Start: 2019-12-26 | End: 2019-12-29

## 2019-12-26 RX ADMIN — SODIUM CHLORIDE 100 MILLILITER(S): 9 INJECTION INTRAMUSCULAR; INTRAVENOUS; SUBCUTANEOUS at 06:05

## 2019-12-26 RX ADMIN — HALOPERIDOL DECANOATE 2.5 MILLIGRAM(S): 100 INJECTION INTRAMUSCULAR at 00:20

## 2019-12-26 RX ADMIN — Medication 650 MILLIGRAM(S): at 19:45

## 2019-12-26 RX ADMIN — HEPARIN SODIUM 5000 UNIT(S): 5000 INJECTION INTRAVENOUS; SUBCUTANEOUS at 06:05

## 2019-12-26 RX ADMIN — LIDOCAINE 1 PATCH: 4 CREAM TOPICAL at 00:54

## 2019-12-26 RX ADMIN — PIPERACILLIN AND TAZOBACTAM 25 GRAM(S): 4; .5 INJECTION, POWDER, LYOPHILIZED, FOR SOLUTION INTRAVENOUS at 08:50

## 2019-12-26 RX ADMIN — ATORVASTATIN CALCIUM 80 MILLIGRAM(S): 80 TABLET, FILM COATED ORAL at 21:53

## 2019-12-26 RX ADMIN — FENTANYL CITRATE 1 PATCH: 50 INJECTION INTRAVENOUS at 19:45

## 2019-12-26 RX ADMIN — LIDOCAINE 1 PATCH: 4 CREAM TOPICAL at 19:46

## 2019-12-26 RX ADMIN — HEPARIN SODIUM 5000 UNIT(S): 5000 INJECTION INTRAVENOUS; SUBCUTANEOUS at 21:53

## 2019-12-26 RX ADMIN — HYDROMORPHONE HYDROCHLORIDE 1 MILLIGRAM(S): 2 INJECTION INTRAMUSCULAR; INTRAVENOUS; SUBCUTANEOUS at 00:40

## 2019-12-26 RX ADMIN — PIPERACILLIN AND TAZOBACTAM 25 GRAM(S): 4; .5 INJECTION, POWDER, LYOPHILIZED, FOR SOLUTION INTRAVENOUS at 02:15

## 2019-12-26 RX ADMIN — LIDOCAINE 1 PATCH: 4 CREAM TOPICAL at 12:02

## 2019-12-26 RX ADMIN — NALOXEGOL OXALATE 12.5 MILLIGRAM(S): 12.5 TABLET, FILM COATED ORAL at 12:02

## 2019-12-26 RX ADMIN — PIPERACILLIN AND TAZOBACTAM 25 GRAM(S): 4; .5 INJECTION, POWDER, LYOPHILIZED, FOR SOLUTION INTRAVENOUS at 16:26

## 2019-12-26 RX ADMIN — FENTANYL CITRATE 1 PATCH: 50 INJECTION INTRAVENOUS at 16:40

## 2019-12-26 RX ADMIN — HEPARIN SODIUM 5000 UNIT(S): 5000 INJECTION INTRAVENOUS; SUBCUTANEOUS at 13:47

## 2019-12-26 RX ADMIN — HYDROMORPHONE HYDROCHLORIDE 1 MILLIGRAM(S): 2 INJECTION INTRAMUSCULAR; INTRAVENOUS; SUBCUTANEOUS at 00:20

## 2019-12-26 RX ADMIN — Medication 81 MILLIGRAM(S): at 12:02

## 2019-12-26 RX ADMIN — Medication 650 MILLIGRAM(S): at 20:45

## 2019-12-26 RX ADMIN — HYDROMORPHONE HYDROCHLORIDE 1 MILLIGRAM(S): 2 INJECTION INTRAMUSCULAR; INTRAVENOUS; SUBCUTANEOUS at 08:06

## 2019-12-26 RX ADMIN — Medication 1 MILLIGRAM(S): at 12:02

## 2019-12-26 RX ADMIN — POLYETHYLENE GLYCOL 3350 17 GRAM(S): 17 POWDER, FOR SOLUTION ORAL at 08:55

## 2019-12-26 RX ADMIN — FENTANYL CITRATE 1 PATCH: 50 INJECTION INTRAVENOUS at 07:45

## 2019-12-26 RX ADMIN — FENTANYL CITRATE 1 PATCH: 50 INJECTION INTRAVENOUS at 16:26

## 2019-12-26 RX ADMIN — SODIUM CHLORIDE 100 MILLILITER(S): 9 INJECTION INTRAMUSCULAR; INTRAVENOUS; SUBCUTANEOUS at 08:55

## 2019-12-26 NOTE — PROGRESS NOTE ADULT - ASSESSMENT
ASSESSMENT & PLAN:  18 y/o M from [facility] visiting the [place] the [place] with PMH sickle cell disease (prior asymptomatic, no hx of blood transfusion) and malaria on anti-malarials at home who p/w chest and bone pain c/b acute chest syndrome s/p intubation for hypoxic respiratory failure s/p RBC exchange transfusion x1 and plasma exchange x2 c/b b/l PTX/pneumomediastinum s/p b/l pigtail catheters transferred to [facility] for further management and heme consultation. Course c/b acute/subacute right cerebellar infarct on CTH 12/24    Neuro  - AOx3 at baseline, no prior hx of CVA/TIA, seizure, or structural lesions. Currently off sedation   - Fentanyl and lidocaine patches added for analgesia, with additional pain control with Dilaudid 1mg q4hr PRN  - Attempted Dilaudid PCA, but patient unable to press button  - Patient with waxing/waning agitation and periods of being nonverbal to medical team. Also with depressive thoughts but w/o suicidal ideation. Family reports this is dramatic change from baseline and has not occurred before. Most likely 2/2 ICU delirium  - Nonfocal exam morning of 12/24; however, patient with past exams concerning for unilateral weakness. CTH 12/24 with new finding of right cerebellar infarct, acute to subacute in nature  - Neuro recs: MR Angio head, MR head non-con, and MR Angio neck  - Continuing with q2hr Neuro checks as Per Neuro  - Appreciate Psych recs; Zyprexa PRN added for agitation    Resp  #Hypoxic resp failure intubated found to have b/l pneumothoraces s/p b/l pigtail catheters at OSH  - Extubated on 12/22; pigtail catheters removed 12/23  - Initial post-pigtail removal CXR with trace R PTX; resolved on morning on 12/24  - Currently satting well, continue to monitor    #PNA  -concern for possible aspiration as patient was vomiting prior to removing the tube and after  -Vanc/Cefepime finished 12/22; started on Zosyn q8hr 12/22 -12/28 for c/f aspiration PNA  - Azithro DC'd 12/20    #Pneumomediastinum  - Found on imaging both at OSH and here, no acute intervention necessary  - Pneumomediastinum improving, continue to monitor (note: crepitus extends into abdomen, but is improving)    Cardiovascular  # Hypotension 2/2 sepsis vs vasoplegia due to sedative meds  - A line D/areli on 12/22  - resolved    #GI  - Regular diet as tolerated  - Continue bowel regimen; repeat Xray abdomen with nonobstructive gas pattern  - RUQ with gallbladder biliary sludge     #ID  - Zosyn as above for aspiration PNA  - Leukocytosis fluctuating but remains elevated    #Renal  Mild ISABELL likely pre renal; unknown baseline  -Improving, presumed at baseline  -Trend BMP    #Heme  Sickle cell anemia c/b acute chest s/p 1rbc and 2 plasma exchange transfusions w/ improvement in HbS%  -s/p IVF, folic acid   - Hb now 9.4 s/p 3U PRBC's  - Electrophoresis stable at 31%  -Heme recs appreciated: HgbS goal ~ 30% as per Heme and Blood Bank  - In light of new CTH findings, discussed w/Heme with Blood Bank input. Patient does still not require exchange, but recommended additional 1U PRBC's; received on 12/24  - 12/25 Hgb electrophoresis pending    #Endo  -Elevated TSH with T3/T4, likely subclinical hypothyroidism i/s/o clinical illness    #DVT PPx  -SQH    #GOC/Ethics  - full code

## 2019-12-26 NOTE — PROGRESS NOTE BEHAVIORAL HEALTH - NSBHCHARTREVIEWVS_PSY_A_CORE FT
Vital Signs Last 24 Hrs  T(C): 37.1 (26 Dec 2019 12:00), Max: 37.4 (25 Dec 2019 16:00)  T(F): 98.7 (26 Dec 2019 12:00), Max: 99.3 (25 Dec 2019 16:00)  HR: 92 (26 Dec 2019 15:00) (62 - 92)  BP: 135/74 (26 Dec 2019 15:00) (127/73 - 145/88)  BP(mean): 90 (26 Dec 2019 15:00) (86 - 114)  RR: 32 (26 Dec 2019 15:00) (15 - 32)  SpO2: 100% (26 Dec 2019 15:00) (98% - 100%)

## 2019-12-26 NOTE — PROGRESS NOTE ADULT - ATTENDING COMMENTS
Dr. Barnhart's assessment and plan. Cerebellar ischemia likely related to the sickle cell crisis.  An MRI brain without contrast and vascular imaging and determine alternative etiology such as large vessel vasculopathy.  Continue aspirin 81 mg, excellent hydration, hematology follow up.

## 2019-12-26 NOTE — CHART NOTE - NSCHARTNOTEFT_GEN_A_CORE
MICU Transfer Note    Transfer from: MICU  Transfer to:  (x) Medicine    (  ) Telemetry    (  ) RCU    (  ) Palliative    (  ) Stroke Unit    (  ) _______________  Accepting physician:      MICU COURSE: 18 y/o male w/sickle cell no prior acute chest, last crises 2-3 years ago had recently traveled to [facility] returned 5 days ago, presented to [facility] ED w/complain of chest and bone pain. Patient doesn't report any sob, states bones hurt, did have decreased po intake for few days with travels.  Patient denied any fever, chills, sob, palpitations, n/v/d/c/. Of note, Pt never had been intubated prior or required transfusions previously, last sickle crisis was 2-3 years ago.      In ED, patient had a CTA that was noncontributory. RRT on 12/16, patient found to be in hypoxic resp failure, sat 91% on 4L NC. Initial CT chest on admission neg, was broadened to Vanc/zosyn from [facility] with CXR showing worsening bilateral infiltrates c/f acute chest. On night of 12/16, Patient developed fever tachycardia further desat requiring NRB c/f vaso-occlusive crises. Patient was transferred to CCU for management of acute chest. Patient became hypoxic despite hiflow and bipap and was intubated on 12/17 AM. Initially pt on FiO2 100% PEEP 14; currently pt sat 98% on FiO2 60% PEEP 14.    Patient subsequently received plasma exchange on 12/17, and tolerated procedure well with LDH, haptoglobin, d-dimer, bilirubin and Creatinine improving. P/F ratio found to be 110, concerning for ARDS. After initial intubation, on am rounds pt biting (clenching down) his ETT and HR started to decrease. Bite block placed and patient was difficult to bag; decision made to remove ETT and re-intubate pt. Pt desat'd and 1st attempt was esophagus, 2nd attempt ETT placed in trach. Post intubation sats in the low 80s and CXR revealed b/l ptx; b/l pigtail catheters placed with re-expansion of the lungs. Given patient's persistent hypoxia and acute respiratory distress syndrome, remaining vent dependent, plasma exchange on 12/18/19 was repeated.  Patient transferred to [facility] for further management of sickle cell crisis.    In MICU, patient received 3U of simple plasma exchange transfusion, with improvement of Hgb from 6.5 on admission to 8.1--> 8.5 --> 9.4. Electrophoresis showed improvement in HgbS% form 90% in [facility] to 39% -->31%, where it has remained stable since 12/21. Patient also started on Vanc/Cefepime x7 days' total ([facility] + [facility]) for PNA coverage. As per discussion with Blood Bank (Dr. Singer) on 12/21, patient no longer deemed as requiring plasma exchange or exchange transfusion; Shiley catheter removed, along with arterial line, on 12/21. Respiratory status continued to improve, with PEEP lowered to 5; subsequently patient un-sedated, extubated on 12/22. Bilateral chest tubes removed on 12/23, with repeat CXR on morning on 12/24 showing no evidence of PTX. However, patient with persistent fevers requiring Tylenol with c/f aspiration, started on Zosyn (12/22 - ). Patient placed on Dilaudid PCA with hospitalist management; however, patient found to be unable to self-administer medication so PCA discontinued, instead opting for PRN Dilaudid.   On 12/24, patient with persistent encephalopathy, CTH performed showing superior right cerebellar infarct. Neurology consulted, recommended ASA 81mg qd and Q1hr Neuro checks. Hematology recommended additional 1U of PRBC's and repeat of hemoglobin electrophoresis. Patient continuing to have no focal deficits, patient deemed safe by MICU team with Neurology input for transfer to floors (4S) on 12/26.     ASSESSMENT & PLAN:  #Neuro  - AOx3 at baseline, no prior hx of CVA/TIA, seizure, or structural lesions. Currently off sedation since 12/24  - Fentanyl and lidocaine patches added for analgesia, with additional pain control with Dilaudid 1mg q4hr PRN  - Attempted Dilaudid PCA on 12/23, but patient unable to press button  - Patient with waxing/waning agitation and periods of being nonverbal to medical team. Also with depressive thoughts but w/o suicidal ideation. Family reports this is dramatic change from baseline and has not occurred before. Most likely 2/2 ICU delirium. Improved as of 12/26  - Nonfocal exam morning of 12/24; however, patient with past exams concerning for weakness. CTH 12/24 with new finding of right cerebellar infarct, acute to subacute in nature  - Neuro recs: MR Angio head, MR head non-con, and MR Angio neck pending  - Can proceed to q4hr neuro checks, As per Neurology  - Appreciate Psych recs; Zyprexa PRN optimal for agitation    Resp  #Hypoxic resp failure intubated found to have b/l pneumothoraces s/p b/l pigtail catheters at OSH  - Extubated on 12/22; pigtail catheters removed 12/23  - Initial post-pigtail removal CXR with trace R PTX; resolved on morning on 12/24  - Currently satting well, continue to monitor    #PNA  -concern for possible aspiration as patient was vomiting prior to removing the tube and after  -Vanc/Cefepime finished 12/22; started on Zosyn q8hr 12/22 -12/28 for c/f aspiration PNA  - Azithro DC'd 12/20    #Pneumomediastinum  - Found on imaging both at OSH and here, no acute intervention necessary  - Pneumomediastinum improving, continue to monitor (note: crepitus extends into abdomen, but is improving)    Cardiovascular  # Hypotension 2/2 sepsis vs vasoplegia due to sedative meds  - A line D/areli on 12/22  - resolved    #GI  - Regular diet as tolerated  - Continue bowel regimen; repeat Xray abdomen 12/25 with nonobstructive gas pattern  - RUQ with gallbladder biliary sludge, no other acute pathology    #ID  - Zosyn as above for aspiration PNA  - Leukocytosis fluctuating but remains mildly elevated    #Renal  Mild ISABELL likely pre renal; unknown baseline  -Improved, presumed at baseline  -Trend BMP    #Heme  Sickle cell anemia c/b acute chest s/p 1rbc and 2 plasma exchange transfusions w/ improvement in HbS%  -s/p IVF, folic acid   - Hb now 9.7 s/p 4U PRBC's  - Electrophoresis stable at 31%  -Heme recs appreciated: HgbS goal ~ 30% as per Heme and Blood Bank  - In light of new CTH findings, discussed w/Heme with Blood Bank input. Patient does still not require exchange, but recommended additional 1U PRBC's; received on 12/24  - 12/25 Hgb electrophoresis pending    #Endo  -Elevated TSH with T3/T4, likely subclinical hypothyroidism i/s/o clinical illness    #DVT PPx  -SQH    #GOC/Ethics  - full code    For Follow-Up:  - MR Head non-contrast, MRA Head and Neck    Vital Signs Last 24 Hrs  T(C): 37.1 (26 Dec 2019 12:00), Max: 37.4 (25 Dec 2019 16:00)  T(F): 98.7 (26 Dec 2019 12:00), Max: 99.3 (25 Dec 2019 16:00)  HR: 62 (26 Dec 2019 12:00) (62 - 91)  BP: 129/87 (26 Dec 2019 12:00) (127/71 - 145/88)  BP(mean): 99 (26 Dec 2019 12:00) (86 - 114)  RR: 20 (26 Dec 2019 12:00) (15 - 31)  SpO2: 100% (26 Dec 2019 12:00) (98% - 100%)  I&O's Summary    25 Dec 2019 07:01  -  26 Dec 2019 07:00  --------------------------------------------------------  IN: 2215.7 mL / OUT: 2350 mL / NET: -134.3 mL    26 Dec 2019 07:01  -  26 Dec 2019 12:53  --------------------------------------------------------  IN: 600 mL / OUT: 0 mL / NET: 600 mL    MEDICATIONS  (STANDING):  aspirin  chewable 81 milliGRAM(s) Oral daily  fentaNYL   Patch  25 MICROgram(s)/Hr. 1 Patch Transdermal every 48 hours  folic acid 1 milliGRAM(s) Oral daily  heparin  Injectable 5000 Unit(s) SubCutaneous every 8 hours  lidocaine   Patch 1 Patch Transdermal daily  naloxegol 12.5 milliGRAM(s) Oral daily  piperacillin/tazobactam IVPB.. 3.375 Gram(s) IV Intermittent every 8 hours  polyethylene glycol 3350 17 Gram(s) Oral two times a day  senna 2 Tablet(s) Oral at bedtime  sodium chloride 0.9%. 1000 milliLiter(s) (100 mL/Hr) IV Continuous <Continuous>    MEDICATIONS  (PRN):  HYDROmorphone  Injectable 1 milliGRAM(s) IV Push every 4 hours PRN Moderate Pain (4 - 6)        LABS                                            9.8                   Neurophils% (auto):   52.4   (12-26 @ 05:17):    12.89)-----------(765          Lymphocytes% (auto):  31.3                                          30.1                   Eosinphils% (auto):   6.4      Manual%: Neutrophils x    ; Lymphocytes x    ; Eosinophils x    ; Bands%: x    ; Blasts x                                    135    |  104    |  14                  Calcium: 9.0   / iCa: x      (12-26 @ 05:17)    ----------------------------<  107       Magnesium: 1.7                              3.9     |  19     |  0.54             Phosphorous: 4.2      TPro  7.0    /  Alb  2.9    /  TBili  1.0    /  DBili  0.4    /  AST  48     /  ALT  19     /  AlkPhos  93     26 Dec 2019 05:17 MICU Transfer Note    Transfer from: MICU  Transfer to:  (x) Medicine    (  ) Telemetry    (  ) RCU    (  ) Palliative    (  ) Stroke Unit    (  ) _______________  Accepting physician:      MICU COURSE: 20 y/o male w/sickle cell no prior acute chest, last crises 2-3 years ago had recently traveled to [facility] returned 5 days ago, presented to [facility] ED w/complain of chest and bone pain. Patient doesn't report any sob, states bones hurt, did have decreased po intake for few days with travels.  Patient denied any fever, chills, sob, palpitations, n/v/d/c/. Of note, Pt never had been intubated prior or required transfusions previously, last sickle crisis was 2-3 years ago.      In ED, patient had a CTA that was noncontributory. RRT on 12/16, patient found to be in hypoxic resp failure, sat 91% on 4L NC. Initial CT chest on admission neg, was broadened to Vanc/zosyn from [facility] with CXR showing worsening bilateral infiltrates c/f acute chest. On night of 12/16, Patient developed fever tachycardia further desat requiring NRB c/f vaso-occlusive crises. Patient was transferred to CCU for management of acute chest. Patient became hypoxic despite hiflow and bipap and was intubated on 12/17 AM. Initially pt on FiO2 100% PEEP 14; currently pt sat 98% on FiO2 60% PEEP 14.    Patient subsequently received plasma exchange on 12/17, and tolerated procedure well with LDH, haptoglobin, d-dimer, bilirubin and Creatinine improving. P/F ratio found to be 110, concerning for ARDS. After initial intubation, on am rounds pt biting (clenching down) his ETT and HR started to decrease. Bite block placed and patient was difficult to bag; decision made to remove ETT and re-intubate pt. Pt desat'd and 1st attempt was esophagus, 2nd attempt ETT placed in trach. Post intubation sats in the low 80s and CXR revealed b/l ptx; b/l pigtail catheters placed with re-expansion of the lungs. Given patient's persistent hypoxia and acute respiratory distress syndrome, remaining vent dependent, plasma exchange on 12/18/19 was repeated.  Patient transferred to [facility] for further management of sickle cell crisis.    In MICU, patient received 3U of simple plasma exchange transfusion, with improvement of Hgb from 6.5 on admission to 8.1--> 8.5 --> 9.4. Electrophoresis showed improvement in HgbS% form 90% in [facility] to 39% -->31%, where it has remained stable since 12/21. Patient also started on Vanc/Cefepime x7 days' total ([facility] + [facility]) for PNA coverage. As per discussion with Blood Bank (Dr. Singer) on 12/21, patient no longer deemed as requiring plasma exchange or exchange transfusion; Shiley catheter removed, along with arterial line, on 12/21. Respiratory status continued to improve, with PEEP lowered to 5; subsequently patient un-sedated, extubated on 12/22. Bilateral chest tubes removed on 12/23, with repeat CXR on morning on 12/24 showing no evidence of PTX. However, patient with persistent fevers requiring Tylenol with c/f aspiration, started on Zosyn (12/22 - ). Patient placed on Dilaudid PCA with hospitalist management; however, patient found to be unable to self-administer medication so PCA discontinued, instead opting for PRN Dilaudid.   On 12/24, patient with persistent encephalopathy, CTH performed showing superior right cerebellar infarct. Neurology consulted, recommended ASA 81mg qd and Q1hr Neuro checks. Hematology recommended additional 1U of PRBC's and repeat of hemoglobin electrophoresis. Patient continuing to have no focal deficits, patient deemed safe by MICU team with Neurology input for transfer to floors (4S) on 12/26.     ASSESSMENT & PLAN:  #Neuro  - AOx3 at baseline, no prior hx of CVA/TIA, seizure, or structural lesions. Currently off sedation since 12/24  - Fentanyl and lidocaine patches added for analgesia, with additional pain control with Dilaudid 1mg q4hr PRN  - Attempted Dilaudid PCA on 12/23, but patient unable to press button  - Patient with waxing/waning agitation and periods of being nonverbal to medical team. Also with depressive thoughts but w/o suicidal ideation. Family reports this is dramatic change from baseline and has not occurred before. Most likely 2/2 ICU delirium. Improved as of 12/26  - Nonfocal exam morning of 12/24; however, patient with past exams concerning for weakness. CTH 12/24 with new finding of right cerebellar infarct, acute to subacute in nature  - Neuro recs: MR Angio head, MR head non-con, and MR Angio neck pending  - Can proceed to q4hr neuro checks, As per Neurology  - Appreciate Psych recs; Zyprexa PRN optimal for agitation    Resp  #Hypoxic resp failure intubated found to have b/l pneumothoraces s/p b/l pigtail catheters at OSH  - Extubated on 12/22; pigtail catheters removed 12/23  - Initial post-pigtail removal CXR with trace R PTX; resolved on morning on 12/24  - Currently satting well, continue to monitor    #PNA  -concern for possible aspiration as patient was vomiting prior to removing the tube and after  -Vanc/Cefepime finished 12/22; started on Zosyn q8hr 12/22 -12/28 for c/f aspiration PNA  - Azithro DC'd 12/20    #Pneumomediastinum  - Found on imaging both at OSH and here, no acute intervention necessary  - Pneumomediastinum improving, continue to monitor (note: crepitus extends into abdomen, but is improving)    Cardiovascular  # Hypotension 2/2 sepsis vs vasoplegia due to sedative meds  - A line D/areli on 12/22  - resolved    #GI  - Regular diet as tolerated  - Continue bowel regimen; repeat Xray abdomen 12/25 with nonobstructive gas pattern  - RUQ with gallbladder biliary sludge, no other acute pathology    #ID  - Zosyn as above for aspiration PNA  - Leukocytosis fluctuating but remains mildly elevated    #Renal  Mild ISABELL likely pre renal; unknown baseline  -Improved, presumed at baseline  -Trend BMP    #Heme  Sickle cell anemia c/b acute chest s/p 1rbc and 2 plasma exchange transfusions w/ improvement in HbS%  -s/p IVF, folic acid   - Hb now 9.7 s/p 4U PRBC's  - Electrophoresis stable at 31%  -Heme recs appreciated: HgbS goal ~ 30% as per Heme and Blood Bank  - In light of new CTH findings, discussed w/Heme with Blood Bank input. Patient does still not require exchange, but recommended additional 1U PRBC's; received on 12/24  - 12/25 Hgb electrophoresis pending    #Endo  -Elevated TSH with T3/T4, likely subclinical hypothyroidism i/s/o clinical illness    #DVT PPx  -SQH    #GOC/Ethics  - full code    For Follow-Up:  - MR Head non-contrast, MRA Head and Neck  - Neuro and Heme recs (patient will need outpatient hematologist, however he will most likely being going abroad)  - Zyprexa PRN for agitation, appreciate Psych recs    Vital Signs Last 24 Hrs  T(C): 37.1 (26 Dec 2019 12:00), Max: 37.4 (25 Dec 2019 16:00)  T(F): 98.7 (26 Dec 2019 12:00), Max: 99.3 (25 Dec 2019 16:00)  HR: 62 (26 Dec 2019 12:00) (62 - 91)  BP: 129/87 (26 Dec 2019 12:00) (127/71 - 145/88)  BP(mean): 99 (26 Dec 2019 12:00) (86 - 114)  RR: 20 (26 Dec 2019 12:00) (15 - 31)  SpO2: 100% (26 Dec 2019 12:00) (98% - 100%)  I&O's Summary    25 Dec 2019 07:01  -  26 Dec 2019 07:00  --------------------------------------------------------  IN: 2215.7 mL / OUT: 2350 mL / NET: -134.3 mL    26 Dec 2019 07:01  -  26 Dec 2019 12:53  --------------------------------------------------------  IN: 600 mL / OUT: 0 mL / NET: 600 mL    MEDICATIONS  (STANDING):  aspirin  chewable 81 milliGRAM(s) Oral daily  fentaNYL   Patch  25 MICROgram(s)/Hr. 1 Patch Transdermal every 48 hours  folic acid 1 milliGRAM(s) Oral daily  heparin  Injectable 5000 Unit(s) SubCutaneous every 8 hours  lidocaine   Patch 1 Patch Transdermal daily  naloxegol 12.5 milliGRAM(s) Oral daily  piperacillin/tazobactam IVPB.. 3.375 Gram(s) IV Intermittent every 8 hours  polyethylene glycol 3350 17 Gram(s) Oral two times a day  senna 2 Tablet(s) Oral at bedtime  sodium chloride 0.9%. 1000 milliLiter(s) (100 mL/Hr) IV Continuous <Continuous>    MEDICATIONS  (PRN):  HYDROmorphone  Injectable 1 milliGRAM(s) IV Push every 4 hours PRN Moderate Pain (4 - 6)        LABS                                            9.8                   Neurophils% (auto):   52.4   (12-26 @ 05:17):    12.89)-----------(765          Lymphocytes% (auto):  31.3                                          30.1                   Eosinphils% (auto):   6.4      Manual%: Neutrophils x    ; Lymphocytes x    ; Eosinophils x    ; Bands%: x    ; Blasts x                                    135    |  104    |  14                  Calcium: 9.0   / iCa: x      (12-26 @ 05:17)    ----------------------------<  107       Magnesium: 1.7                              3.9     |  19     |  0.54             Phosphorous: 4.2      TPro  7.0    /  Alb  2.9    /  TBili  1.0    /  DBili  0.4    /  AST  48     /  ALT  19     /  AlkPhos  93     26 Dec 2019 05:17

## 2019-12-26 NOTE — CHART NOTE - NSCHARTNOTEFT_GEN_A_CORE
MAR accept Note    Transfer from: MICU  Transfer to:  ( x ) Medicine    (  ) Telemetry    (  ) RCU    (  ) Palliative    (  ) Stroke Unit    (  ) _______________  Accepting physican: RAMY      MICU COURSE:  20 y/o male w/sickle cell no prior acute chest, last crises 2-3 years ago had recently traveled to [facility] returned 5 days ago, presented to [place] ED w/complain of chest and bone pain. Patient doesn't report any sob, states bones hurt, did have decreased po intake for few days with travels.  Patient denied any fever, chills, sob, palpitations, n/v/d/c/. Of note, Pt never had been intubated prior or required transfusions previously, last sickle crisis was 2-3 years ago.      In ED, patient had a CTA that was noncontributory. RRT on 12/16, patient found to be in hypoxic resp failure, sat 91% on 4L NC. Initial CT chest on admission neg, was broadened to Vanc/zosyn from [facility] with CXR showing worsening bilateral infiltrates c/f acute chest. On night of 12/16, Patient developed fever tachycardia further desat requiring NRB c/f vaso-occlusive crises. Patient was transferred to CCU for management of acute chest. Patient became hypoxic despite hiflow and bipap and was intubated on 12/17 AM. Initially pt on FiO2 100% PEEP 14; currently pt sat 98% on FiO2 60% PEEP 14.    Patient subsequently received plasma exchange on 12/17, and tolerated procedure well with LDH, haptoglobin, d-dimer, bilirubin and Creatinine improving. P/F ratio found to be 110, concerning for ARDS. After initial intubation, on am rounds pt biting (clenching down) his ETT and HR started to decrease. Bite block placed and patient was difficult to bag; decision made to remove ETT and re-intubate pt. Pt desat'd and 1st attempt was esophagus, 2nd attempt ETT placed in trach. Post intubation sats in the low 80s and CXR revealed b/l ptx; b/l pigtail catheters placed with re-expansion of the lungs. Given patient's persistent hypoxia and acute respiratory distress syndrome, remaining vent dependent, plasma exchange on 12/18/19 was repeated.  Patient transferred to [facility] for further management of sickle cell crisis.    In MICU, patient received 3U of simple plasma exchange transfusion, with improvement of Hgb from 6.5 on admission to 8.1--> 8.5 --> 9.4. Electrophoresis showed improvement in HgbS% form 90% in [facility] to 39% -->31%, where it has remained stable since 12/21. Patient also started on Vanc/Cefepime x7 days' total ([facility] + [facility]) for PNA coverage. As per discussion with Blood Bank (Dr. Singer) on 12/21, patient no longer deemed as requiring plasma exchange or exchange transfusion; Shiley catheter removed, along with arterial line, on 12/21. Respiratory status continued to improve, with PEEP lowered to 5; subsequently patient un-sedated, extubated on 12/22. Bilateral chest tubes removed on 12/23, with repeat CXR on morning on 12/24 showing no evidence of PTX. However, patient with persistent fevers requiring Tylenol with c/f aspiration, started on Zosyn (12/22 - ). Patient placed on Dilaudid PCA with hospitalist management; however, patient found to be unable to self-administer medication so PCA discontinued, instead opting for PRN Dilaudid.   On 12/24, patient with persistent encephalopathy, CTH performed showing superior right cerebellar infarct. Neurology consulted, recommended ASA 81mg qd and Q1hr Neuro checks. Hematology recommended additional 1U of PRBC's and repeat of hemoglobin electrophoresis. Patient continuing to have no focal deficits, patient deemed safe by MICU team with Neurology input for transfer to floors (4S) on 12/26.     Assessment:  20 y/o M from [facility] visiting the [place] the holidays with PMH sickle cell disease (prior asymptomatic, no hx of blood transfusion) and malaria on anti-malarials at home who p/w chest and bone pain c/b acute chest syndrome s/p intubation for hypoxic respiratory failure s/p RBC exchange transfusion x1 and plasma exchange x2 c/b b/l PTX/pneumomediastinum s/p b/l pigtail catheters transferred to [facility] for further management and heme consultation. Course c/b acute/subacute right cerebellar infarct on CTH 12/24    For Follow-Up:  - MR Head non-contrast, MRA Head and Neck  - Neuro and Heme recs (patient will need outpatient hematologist, however he will most likely being going abroad)  - Zyprexa PRN for agitation, appreciate Psych recs    Plan:  #Neuro  - AOx3 at baseline, no prior hx of CVA/TIA, seizure, or structural lesions. Currently off sedation since 12/24  - Fentanyl and lidocaine patches added for analgesia, with additional pain control with Dilaudid 1mg q4hr PRN  - Attempted Dilaudid PCA on 12/23, but patient unable to press button  - Patient with waxing/waning agitation and periods of being nonverbal to medical team. Also with depressive thoughts but w/o suicidal ideation. Family reports this is dramatic change from baseline and has not occurred before. Most likely 2/2 ICU delirium. Improved as of 12/26  - Nonfocal exam morning of 12/24; however, patient with past exams concerning for weakness. CTH 12/24 with new finding of right cerebellar infarct, acute to subacute in nature  - Neuro recs: MR Angio head, MR head non-con, and MR Angio neck pending  - Can proceed to q4hr neuro checks, As per Neurology  - Appreciate Psych recs; Zyprexa PRN optimal for agitation    Resp  #Hypoxic resp failure intubated found to have b/l pneumothoraces s/p b/l pigtail catheters at OSH  - Extubated on 12/22; pigtail catheters removed 12/23  - Initial post-pigtail removal CXR with trace R PTX; resolved on morning on 12/24  - Currently satting well, continue to monitor    #PNA  -concern for possible aspiration as patient was vomiting prior to removing the tube and after  -Vanc/Cefepime finished 12/22; started on Zosyn q8hr 12/22 -12/28 for c/f aspiration PNA  - Azithro DC'd 12/20    #Pneumomediastinum  - Found on imaging both at OSH and here, no acute intervention necessary  - Pneumomediastinum improving, continue to monitor (note: crepitus extends into abdomen, but is improving)    Cardiovascular  # Hypotension 2/2 sepsis vs vasoplegia due to sedative meds  - A line D/areli on 12/22  - resolved    #GI  - Regular diet as tolerated  - Continue bowel regimen; repeat Xray abdomen 12/25 with nonobstructive gas pattern  - RUQ with gallbladder biliary sludge, no other acute pathology    #ID  - Zosyn as above for aspiration PNA  - Leukocytosis fluctuating but remains mildly elevated    #Renal  Mild ISABELL likely pre renal; unknown baseline  -Improved, presumed at baseline  -Trend BMP    #Heme  Sickle cell anemia c/b acute chest s/p 1rbc and 2 plasma exchange transfusions w/ improvement in HbS%  -s/p IVF, folic acid   - Hb now 9.7 s/p 4U PRBC's  - Electrophoresis stable at 31%  -Heme recs appreciated: HgbS goal ~ 30% as per Heme and Blood Bank  - In light of new CTH findings, discussed w/Heme with Blood Bank input. Patient does still not require exchange, but recommended additional 1U PRBC's; received on 12/24  - 12/25 Hgb electrophoresis pending    #Endo  -Elevated TSH with T3/T4, likely subclinical hypothyroidism i/s/o clinical illness    #DVT PPx  -SQH    #GOC/Ethics  - full code          Vital Signs Last 24 Hrs  T(C): 36.6 (26 Dec 2019 15:53), Max: 37.3 (26 Dec 2019 04:00)  T(F): 97.9 (26 Dec 2019 15:53), Max: 99.1 (26 Dec 2019 04:00)  HR: 77 (26 Dec 2019 17:00) (62 - 98)  BP: 135/79 (26 Dec 2019 17:00) (127/73 - 145/88)  BP(mean): 94 (26 Dec 2019 17:00) (86 - 114)  RR: 21 (26 Dec 2019 17:00) (15 - 32)  SpO2: 100% (26 Dec 2019 17:00) (98% - 100%)  I&O's Summary    25 Dec 2019 07:01  -  26 Dec 2019 07:00  --------------------------------------------------------  IN: 2215.7 mL / OUT: 2350 mL / NET: -134.3 mL    26 Dec 2019 07:01  -  26 Dec 2019 17:40  --------------------------------------------------------  IN: 1100 mL / OUT: 2500 mL / NET: -1400 mL          MEDICATIONS  (STANDING):  aspirin  chewable 81 milliGRAM(s) Oral daily  atorvastatin 80 milliGRAM(s) Oral at bedtime  fentaNYL   Patch  25 MICROgram(s)/Hr 1 Patch Transdermal every 72 hours  folic acid 1 milliGRAM(s) Oral daily  heparin  Injectable 5000 Unit(s) SubCutaneous every 8 hours  lidocaine   Patch 1 Patch Transdermal daily  piperacillin/tazobactam IVPB.. 3.375 Gram(s) IV Intermittent every 8 hours  senna 2 Tablet(s) Oral at bedtime  sodium chloride 0.9%. 1000 milliLiter(s) (100 mL/Hr) IV Continuous <Continuous>    MEDICATIONS  (PRN):  HYDROmorphone  Injectable 1 milliGRAM(s) IV Push every 4 hours PRN Moderate Pain (4 - 6)        LABS                                            9.8                   Neurophils% (auto):   52.4   (12-26 @ 05:17):    12.89)-----------(765          Lymphocytes% (auto):  31.3                                          30.1                   Eosinphils% (auto):   6.4      Manual%: Neutrophils x    ; Lymphocytes x    ; Eosinophils x    ; Bands%: x    ; Blasts x                                    135    |  104    |  14                  Calcium: 9.0   / iCa: x      (12-26 @ 05:17)    ----------------------------<  107       Magnesium: 1.7                              3.9     |  19     |  0.54             Phosphorous: 4.2      TPro  7.0    /  Alb  2.9    /  TBili  1.0    /  DBili  0.4    /  AST  48     /  ALT  19     /  AlkPhos  93     26 Dec 2019 05:17

## 2019-12-26 NOTE — PROGRESS NOTE ADULT - SUBJECTIVE AND OBJECTIVE BOX
SUBJECTIVE: Patient seen and examined at the bedside by the neurology resident and stroke attending.     PAST MEDICAL & SURGICAL HISTORY:  Sickle cell disease    FAMILY HISTORY:    SOCIAL HISTORY:     MEDICATIONS (HOME):  Home Medications:    MEDICATIONS  (STANDING):  aspirin  chewable 81 milliGRAM(s) Oral daily  fentaNYL   Patch  25 MICROgram(s)/Hr. 1 Patch Transdermal every 48 hours  folic acid 1 milliGRAM(s) Oral daily  heparin  Injectable 5000 Unit(s) SubCutaneous every 8 hours  lidocaine   Patch 1 Patch Transdermal daily  naloxegol 12.5 milliGRAM(s) Oral daily  piperacillin/tazobactam IVPB.. 3.375 Gram(s) IV Intermittent every 8 hours  polyethylene glycol 3350 17 Gram(s) Oral two times a day  senna 2 Tablet(s) Oral at bedtime  sodium chloride 0.9%. 1000 milliLiter(s) (100 mL/Hr) IV Continuous <Continuous>    MEDICATIONS  (PRN):  HYDROmorphone  Injectable 1 milliGRAM(s) IV Push every 4 hours PRN Moderate Pain (4 - 6)    ALLERGIES/INTOLERANCES:  Allergies  No Known Allergies    Intolerances    VITALS & EXAMINATION:  Vital Signs Last 24 Hrs  T(C): 37.1 (26 Dec 2019 12:00), Max: 37.4 (25 Dec 2019 16:00)  T(F): 98.7 (26 Dec 2019 12:00), Max: 99.3 (25 Dec 2019 16:00)  HR: 63 (26 Dec 2019 13:00) (62 - 91)  BP: 134/97 (26 Dec 2019 13:00) (127/71 - 145/88)  BP(mean): 109 (26 Dec 2019 13:00) (86 - 114)  RR: 19 (26 Dec 2019 13:00) (15 - 31)  SpO2: 100% (26 Dec 2019 13:00) (98% - 100%)    General: Male, appears stated age, appears anxious, frontal bossing    Neurological (>12):  MS: Awake, alert. Follows all commands.    Language: Speech is clear    CNs: EOMI, no facial asymmetry    Fundoscopic: not examined     Motor:   RUE: no drift  LUE: no drift  RLE: motor drift  LLE: no drift	     Sensation: Intact to LT b/l throughout.     Coordination: mild dysmetria to FTN R>L    Gait: not examined    LABORATORY:  CBC                       9.8    12.89 )-----------( 765      ( 26 Dec 2019 05:17 )             30.1     Chem     135  |  104  |  14  ----------------------------<  107<H>  3.9   |  19<L>  |  0.54    Ca    9.0      26 Dec 2019 05:17  Phos  4.2       Mg     1.7         TPro  7.0  /  Alb  2.9<L>  /  TBili  1.0  /  DBili  0.4<H>  /  AST  48<H>  /  ALT  19  /  AlkPhos  93      LFTs LIVER FUNCTIONS - ( 26 Dec 2019 05:17 )  Alb: 2.9 g/dL / Pro: 7.0 g/dL / ALK PHOS: 93 u/L / ALT: 19 u/L / AST: 48 u/L / GGT: x           Coagulopathy PT/INR - ( 25 Dec 2019 02:30 )   PT: 12.8 SEC;   INR: 1.12          PTT - ( 25 Dec 2019 02:30 )  PTT:28.5 SEC  Lipid Panel   A1c   Cardiac enzymes     U/A Urinalysis Basic - ( 25 Dec 2019 06:19 )    Color: YELLOW / Appearance: CLEAR / S.017 / pH: 7.5  Gluc: NEGATIVE / Ketone: NEGATIVE  / Bili: NEGATIVE / Urobili: NORMAL   Blood: NEGATIVE / Protein: 20 / Nitrite: NEGATIVE   Leuk Esterase: NEGATIVE / RBC: 6-10 / WBC 6-10   Sq Epi: OCC / Non Sq Epi: x / Bacteria: NEGATIVE      CSF  Immunological  Other    STUDIES & IMAGING:  Studies (EKG, EEG, EMG, etc):     Radiology (XR, CT, MR, U/S, TTE/SHEKHAR):

## 2019-12-26 NOTE — PROGRESS NOTE ADULT - ATTENDING COMMENTS
Pt seen and examined in follow up, extubated, found to have a cerebellar CVA. HGB S level at 31% after exchange for ACS. He has some past pointing on exam, although his family member says that it was improved last night. No indication for repeat exchange now, as his HBG S level is at goal. Continue to monitor, may need consideration of chronic exchange after evaluation post MRI and echo. Prior echo was very limited study. I agree with Dr Burnham' assessment and plan

## 2019-12-26 NOTE — PROGRESS NOTE BEHAVIORAL HEALTH - NSBHCHARTREVIEWIMAGING_PSY_A_CORE FT
< from: CT Head No Cont (12.24.19 @ 15:49) >      Impression: Abnormal area of low-attenuation involving the superior right cerebellar region as described above.    < end of copied text >

## 2019-12-26 NOTE — PROGRESS NOTE ADULT - SUBJECTIVE AND OBJECTIVE BOX
COVERING RESIDENT: LILLIANA MILLER (PGY-1) | [facility] [phone] | [facility] [phone]    INTERVAL HPI/OVERNIGHT EVENTS: Nausea ON, given Zofran 4mg IV. Also w R sided abdominal pain w/o TTP, given mag citrate x1 and senna. Patient also agitated in the form of shaking his bed, attempting to pull out lines; Haldol 2.5mg x1 given.     SUBJECTIVE: Patient seen and examined at bedside.     CONSTITUTIONAL: No weakness, fevers or chills  EYES/ENT: No visual changes;  No vertigo or throat pain   NECK: No pain or stiffness  RESPIRATORY: No cough, wheezing, hemoptysis; No shortness of breath  CARDIOVASCULAR: No chest pain or palpitations  GASTROINTESTINAL: No abdominal or epigastric pain. No nausea, vomiting, or hematemesis; No diarrhea or constipation. No melena or hematochezia.  GENITOURINARY: No dysuria, frequency or hematuria  NEUROLOGICAL: No numbness or weakness  SKIN: No itching, rashes    OBJECTIVE:    VITAL SIGNS:  ICU Vital Signs Last 24 Hrs  T(C): 37.3 (26 Dec 2019 04:00), Max: 37.7 (25 Dec 2019 08:00)  T(F): 99.1 (26 Dec 2019 04:00), Max: 99.9 (25 Dec 2019 08:00)  HR: 72 (26 Dec 2019 06:00) (57 - 91)  BP: 134/84 (26 Dec 2019 06:00) (127/71 - 142/89)  BP(mean): 96 (26 Dec 2019 06:00) (86 - 114)  ABP: --  ABP(mean): --  RR: 20 (26 Dec 2019 06:00) (15 - 31)  SpO2: 99% (26 Dec 2019 06:00) (98% - 100%)         @ 07:01  -   @ 07:00  --------------------------------------------------------  IN: 2215.7 mL / OUT: 2350 mL / NET: -134.3 mL      CAPILLARY BLOOD GLUCOSE          PHYSICAL EXAM:    General: NAD  HEENT: NC/AT; PERRL, clear conjunctiva  Neck: supple  Respiratory: CTA b/l  Cardiovascular: +S1/S2; RRR  Abdomen: soft, NT/ND; +BS x4  Extremities: WWP, 2+ peripheral pulses b/l; no LE edema  Skin: normal color and turgor; no rash  Neurological:    MEDICATIONS:  MEDICATIONS  (STANDING):  aspirin  chewable 81 milliGRAM(s) Oral daily  fentaNYL   Patch  25 MICROgram(s)/Hr. 1 Patch Transdermal every 48 hours  folic acid 1 milliGRAM(s) Oral daily  heparin  Injectable 5000 Unit(s) SubCutaneous every 8 hours  lidocaine   Patch 1 Patch Transdermal daily  naloxegol 12.5 milliGRAM(s) Oral daily  piperacillin/tazobactam IVPB.. 3.375 Gram(s) IV Intermittent every 8 hours  polyethylene glycol 3350 17 Gram(s) Oral two times a day  senna 2 Tablet(s) Oral at bedtime  sodium chloride 0.9%. 1000 milliLiter(s) (100 mL/Hr) IV Continuous <Continuous>    MEDICATIONS  (PRN):  HYDROmorphone  Injectable 1 milliGRAM(s) IV Push every 4 hours PRN Moderate Pain (4 - 6)      ALLERGIES:  Allergies    No Known Allergies    Intolerances        LABS:                        9.8    12.89 )-----------( 765      ( 26 Dec 2019 05:17 )             30.1         135  |  104  |  14  ----------------------------<  107<H>  3.9   |  19<L>  |  0.54    Ca    9.0      26 Dec 2019 05:17  Phos  4.2       Mg     1.7         TPro  7.0  /  Alb  2.9<L>  /  TBili  1.0  /  DBili  0.4<H>  /  AST  48<H>  /  ALT  19  /  AlkPhos  93      PT/INR - ( 25 Dec 2019 02:30 )   PT: 12.8 SEC;   INR: 1.12          PTT - ( 25 Dec 2019 02:30 )  PTT:28.5 SEC  Urinalysis Basic - ( 25 Dec 2019 06:19 )    Color: YELLOW / Appearance: CLEAR / S.017 / pH: 7.5  Gluc: NEGATIVE / Ketone: NEGATIVE  / Bili: NEGATIVE / Urobili: NORMAL   Blood: NEGATIVE / Protein: 20 / Nitrite: NEGATIVE   Leuk Esterase: NEGATIVE / RBC: 6-10 / WBC 6-10   Sq Epi: OCC / Non Sq Epi: x / Bacteria: NEGATIVE        RADIOLOGY & ADDITIONAL TESTS: Reviewed.

## 2019-12-26 NOTE — PROGRESS NOTE ADULT - ASSESSMENT
20 y/o AA male with past medical history of Sickle Cell disease presented to [facility] from [facility] as transfer for management of acute chest syndrome with direct admission MICU on 12/19.   Initial reason for presenting to [facility] was chest pain and bone pain. Patient treated at [facility] for sickle cell crisis and pneumonia with hospital course complicated by acute hypoxic respiratory failure requiring mechanical ventilation with subsequent re-intubation due to desaturation further complicated by bilateral pneumothoraces requiring bilateral pigtail catheter placement and ARDS. Patient also received plasma exchange on 12/17 and 12/18.   In [facility] patient received 3u simple transfusion and extubated on 12/22 after which patient had displayed episodic periods of agitation. CT head done on 12/24 demonstrates acute R Superior cerebellar infarct. Neurology consulted for this reason.   Labs remarkable for HbS% of 31.3, Hgb 9.8.    Impression: R cerebellar infarct 2/2 cryptogenic etiology; possibly due to vaso-occlusive crisis in the setting of sickle cell disease (stroke of other determined etiology)  AMS 2/2 unknown etiology; would r/o additional ischemia via more sensitive imaging modalities    Recommendations:   MRI brain without contrast  MRA head without contrast and neck with contrast  TTE with bubble study for possible valvular heart disease  ASA 81 mg PO daily  Lipitor 80 mg PO QHS  HbA1c, LDL and continue with aggressive vascular risk factors modifications   Telemetry monitor  IV hydration  S&S eval  Would defer to Hematology regarding need for simple transfusion vs PLEX 20 y/o AA male with past medical history of Sickle Cell disease presented to [facility] from [facility] as transfer for management of acute chest syndrome with direct admission MICU on 12/19.   Initial reason for presenting to [facility] was chest pain and bone pain. Patient treated at [facility] for sickle cell crisis and pneumonia with hospital course complicated by acute hypoxic respiratory failure requiring mechanical ventilation with subsequent re-intubation due to desaturation further complicated by bilateral pneumothoraces requiring bilateral pigtail catheter placement and ARDS. Patient also received plasma exchange on 12/17 and 12/18.   In [facility] patient received 3u simple transfusion and extubated on 12/22 after which patient had displayed episodic periods of agitation. CT head done on 12/24 demonstrates acute R Superior cerebellar infarct. Neurology consulted for this reason.   Labs remarkable for HbS% of 31.3, Hgb 9.8.    Impression: R cerebellar infarct 2/2 cryptogenic etiology; possibly due to vaso-occlusive crisis in the setting of sickle cell disease (stroke of other determined etiology)    Recommendations:   MRI brain without contrast  MRA head without contrast and neck with contrast  TTE with bubble study for possible valvular heart disease  ASA 81 mg PO daily  Lipitor 80 mg PO QHS  HbA1c, LDL and continue with aggressive vascular risk factors modifications   Telemetry monitor  IV hydration  S&S eval  Would defer to Hematology regarding need for simple transfusion vs PLEX

## 2019-12-26 NOTE — PROGRESS NOTE BEHAVIORAL HEALTH - NSBHFUPINTERVALHXFT_PSY_A_CORE
On exam patient remember writer, AOx4, recall 0/3, attention intact, abstraction poor. Patient feels "down" because of what happened, but feels motivated to improve and is hopeful that he will.    Spoke to sister at bedside, both patient and family appear to be aware that patient's behaviors, cognition are waxing/waning, but feel that patient has been improving slowly.

## 2019-12-26 NOTE — PROGRESS NOTE BEHAVIORAL HEALTH - NSBHCHARTREVIEWLAB_PSY_A_CORE FT
9.8    12.89 )-----------( 765      ( 26 Dec 2019 05:17 )             30.1         135  |  104  |  14  ----------------------------<  107<H>  3.9   |  19<L>  |  0.54    Ca    9.0      26 Dec 2019 05:17  Phos  4.2       Mg     1.7         TPro  7.0  /  Alb  2.9<L>  /  TBili  1.0  /  DBili  0.4<H>  /  AST  48<H>  /  ALT  19  /  AlkPhos  93      Urinalysis Basic - ( 25 Dec 2019 06:19 )    Color: YELLOW / Appearance: CLEAR / S.017 / pH: 7.5  Gluc: NEGATIVE / Ketone: NEGATIVE  / Bili: NEGATIVE / Urobili: NORMAL   Blood: NEGATIVE / Protein: 20 / Nitrite: NEGATIVE   Leuk Esterase: NEGATIVE / RBC: 6-10 / WBC 6-10   Sq Epi: OCC / Non Sq Epi: x / Bacteria: NEGATIVE      LIVER FUNCTIONS - ( 26 Dec 2019 05:17 )  Alb: 2.9 g/dL / Pro: 7.0 g/dL / ALK PHOS: 93 u/L / ALT: 19 u/L / AST: 48 u/L / GGT: x           PT/INR - ( 25 Dec 2019 02:30 )   PT: 12.8 SEC;   INR: 1.12          PTT - ( 25 Dec 2019 02:30 )  PTT:28.5 SEC    Ammonia, Serum (19 @ 02:30)    Ammonia, Serum: 42: Ammonia levels will be falsely elevated if specimen is NOT  collected, processed and maintained at 4-8 C. umol/L

## 2019-12-26 NOTE — PROGRESS NOTE ADULT - SUBJECTIVE AND OBJECTIVE BOX
Hematology Oncology Follow-up    INTERVAL HPI/OVERNIGHT EVENTS: Very weak, unable to provide ROS, hypoactive affect. s/p extubation, on nasal canula.    Review of Systems: unable to obtain, answering only a few questions    VITAL SIGNS:  T(F): 98.7 (19 @ 12:00)  HR: 63 (19 @ 13:00)  BP: 134/97 (19 @ 13:00)  RR: 19 (19 @ 13:00)  SpO2: 100% (19 @ 13:00)    19 @ 07:01  -  19 @ 07:00  --------------------------------------------------------  IN: 2215.7 mL / OUT: 2350 mL / NET: -134.3 mL    19 @ 07:01  -  19 @ 13:49  --------------------------------------------------------  IN: 800 mL / OUT: 1375 mL / NET: -575 mL        PHYSICAL EXAM:    Constitutional: AAOx3 (last name, [facility], 2019), hypoactive  Eyes: PERRL, EOMI, sclera non icteric, no nystagmus  Neck: supple, no masses, no JVD, no lymphadenopathy  Respiratory: CTA b/l, no wheezing, rhonchi, rales, with normal respiratory effort; subcutaneous emphysema  Cardiovascular: RRR, normal S1S2, no M/R/G  Gastrointestinal: soft, NTND, no masses palpable, BS normal in all four quadrants, no HSM  Extremities:  no edema  MSK: no obvious abnormalities, normal ROM, no lymphadenopathy  Neurological: left arm dyskinesia, bilateral arm weakness and decreased  strength; lower extremity weakness but able to raise against gravity briefly  Skin: Normal temperature, no rash, no echymoses, no petichiae  Psych: calm    MEDICATIONS  (STANDING):  aspirin  chewable 81 milliGRAM(s) Oral daily  fentaNYL   Patch  25 MICROgram(s)/Hr. 1 Patch Transdermal every 48 hours  folic acid 1 milliGRAM(s) Oral daily  heparin  Injectable 5000 Unit(s) SubCutaneous every 8 hours  lidocaine   Patch 1 Patch Transdermal daily  naloxegol 12.5 milliGRAM(s) Oral daily  piperacillin/tazobactam IVPB.. 3.375 Gram(s) IV Intermittent every 8 hours  polyethylene glycol 3350 17 Gram(s) Oral two times a day  senna 2 Tablet(s) Oral at bedtime  sodium chloride 0.9%. 1000 milliLiter(s) (100 mL/Hr) IV Continuous <Continuous>    MEDICATIONS  (PRN):  HYDROmorphone  Injectable 1 milliGRAM(s) IV Push every 4 hours PRN Moderate Pain (4 - 6)      No Known Allergies      LABS:                        9.8    12.89 )-----------( 765      ( 26 Dec 2019 05:17 )             30.1         135  |  104  |  14  ----------------------------<  107<H>  3.9   |  19<L>  |  0.54    Ca    9.0      26 Dec 2019 05:17  Phos  4.2       Mg     1.7         TPro  7.0  /  Alb  2.9<L>  /  TBili  1.0  /  DBili  0.4<H>  /  AST  48<H>  /  ALT  19  /  AlkPhos  93      PT/INR - ( 25 Dec 2019 02:30 )   PT: 12.8 SEC;   INR: 1.12          PTT - ( 25 Dec 2019 02:30 )  PTT:28.5 SEC Lactate Dehydrogenase, Serum: 393 U/L ( @ 05:17)  Haptoglobin, Serum: < 20 mg/dL ( @ 05:17)    Urinalysis Basic - ( 25 Dec 2019 06:19 )    Color: YELLOW / Appearance: CLEAR / S.017 / pH: 7.5  Gluc: NEGATIVE / Ketone: NEGATIVE  / Bili: NEGATIVE / Urobili: NORMAL   Blood: NEGATIVE / Protein: 20 / Nitrite: NEGATIVE   Leuk Esterase: NEGATIVE / RBC: 6-10 / WBC 6-10   Sq Epi: OCC / Non Sq Epi: x / Bacteria: NEGATIVE    Folate, Serum: 18.5 ng/mL (19 @ 02:25)      Bilirubin Direct, Serum: 0.4 <H> (19 @ 05:17)      RADIOLOGY & ADDITIONAL TESTS:  Studies reviewed.        < from: US Abdomen Complete (19 @ 16:37) >    EXAM:  US ABDOMEN COMPLETE      PROCEDURE DATE:  Dec 24 2019     INTERPRETATION:  CLINICAL INFORMATION: 19-year-old man with sickle cell crisis and right upper quadrant pain    COMPARISON: None available.    TECHNIQUE: Sonography of the abdomen.     FINDINGS:    Liver: Within normal limits.    Bile ducts: Normal caliber.     Gallbladder: Sludge. Unable to reliably assess for focal tenderness.    Pancreas: Visualized portions are within normal limits.    Spleen: Within normal limits.    Right kidney: 13.8 cm. No hydronephrosis.    Left kidney: 10.5 cm.  No hydronephrosis.    Ascites: None.    Aorta and IVC: Visualized portions are within normal limits.    IMPRESSION:     Gallbladder sludge which may be secondary to prolonged fasting    HUEY BARRIOS M.D., ATTENDING RADIOLOGIST  This document has been electronically signed. Dec 24 2019  4:57PM      < end of copied text >

## 2019-12-26 NOTE — PROGRESS NOTE BEHAVIORAL HEALTH - NSBHCONSULTMEDS_PSY_A_CORE FT
- trend LFTs, ammonia   - no standing med indicated at this time  - if seizure ppx required, may benefit from mood stabilizing AED such as depakote or other mood/behaviorally neutral AED, defer to neuro  - psych will follow

## 2019-12-26 NOTE — PROGRESS NOTE ADULT - ATTENDING COMMENTS
Acute chest syndrome, acute hypoxic respiratory failure requiring intubation c/b bilateral ptx and chest tubes, RBC/plasma exchange, extubated.  Has right cerebral stroke (area of hypoattenuation).  Mental status much improved today - alert, able to follow commands, not restless.  No need for RBC exchange today.  D/w neurology, okay for q4h neuro checks.

## 2019-12-26 NOTE — PROGRESS NOTE ADULT - ASSESSMENT
18 y/o male w/sickle cell no prior acute chest transferred from [facility] to [facility] on 12/18/19 for management of acute chest syndrome 2/2 sickle cell disease, course complicated by bilateral pneumothoraces. s/p extubation on 12/22; pigtail catheters removed 12/23.    # Sickle cell disease c/b acute chest  -Found to have acute subacute right cerebellar stroke in CTH s/p simple transfusion on 12/24 given HgS 31% on 12/23 per discussion with Dr. Cardenas  -No transfusion today  -monitor CBC w/ differential, reticulocyte count, bilirubin, LDH, haptoglobin daily  -peripheral blood smear reviewed on 12/19/19; showing multiple sickle cells, microspherocytes, blister cells, and some target cells. 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  -IVF with NS, folic acid, incentive spirometry  -appreciate neurological evaluation; though suspect ischemic stroke 2/2 sickle cell disease, agree with stroke work up to r/o other etiologies  -please ensure adequate pain control for sickle cell    Bharathi Burnham MD, MPH  Hematology/Oncology Fellow, PGY5  p

## 2019-12-26 NOTE — PROGRESS NOTE BEHAVIORAL HEALTH - SUMMARY
20 y/o [place] Male ([place]iting [place]), no PPH, PMH of sickle cell disease no prior acute chest, last sickle crisis 3 years ago presented to [facility] with sickle crisis, then had RRT for hypoxic respiratory failure and was intubated secondary to ARS, sent to [facility] MICU for further mgmt did not require ECMO, extubated 2d ago no longer on sedatives. Psych c/s for delirium vs. depression.    Patient's cognitive state during lucid periods is much improved, though attentional/memory deficits and some waxing/waning persists per staff/ICU. No VH today. No cognitive/behavioral problems at baseline per family. Delirium likely improving slowly.     Would avoid antipsychotic meds for now unless patient acutely agitated. PRN as below.     Ammonia level downtrending, GGT elevated would continue to trend LFTs and ammonia if patient's cognition not improving. Folate wnl. TSH elevated though T4 wnl. CT head shows cerebellar infarct.   Given infarct, Cerebellar cognitive affective syndrome may be contributing to presentation.     Appreciate neuro f/u.

## 2019-12-27 DIAGNOSIS — G93.40 ENCEPHALOPATHY, UNSPECIFIED: ICD-10-CM

## 2019-12-27 DIAGNOSIS — I63.9 CEREBRAL INFARCTION, UNSPECIFIED: ICD-10-CM

## 2019-12-27 DIAGNOSIS — D57.1 SICKLE-CELL DISEASE WITHOUT CRISIS: ICD-10-CM

## 2019-12-27 DIAGNOSIS — Z02.9 ENCOUNTER FOR ADMINISTRATIVE EXAMINATIONS, UNSPECIFIED: ICD-10-CM

## 2019-12-27 LAB
ALBUMIN SERPL ELPH-MCNC: 3.1 G/DL — LOW (ref 3.3–5)
ALP SERPL-CCNC: 87 U/L — SIGNIFICANT CHANGE UP (ref 40–120)
ALT FLD-CCNC: 15 U/L — SIGNIFICANT CHANGE UP (ref 4–41)
ANION GAP SERPL CALC-SCNC: 11 MMO/L — SIGNIFICANT CHANGE UP (ref 7–14)
ANISOCYTOSIS BLD QL: SIGNIFICANT CHANGE UP
AST SERPL-CCNC: 37 U/L — SIGNIFICANT CHANGE UP (ref 4–40)
BASOPHILS # BLD AUTO: 0.08 K/UL — SIGNIFICANT CHANGE UP (ref 0–0.2)
BASOPHILS NFR BLD AUTO: 0.7 % — SIGNIFICANT CHANGE UP (ref 0–2)
BASOPHILS NFR SPEC: 0.9 % — SIGNIFICANT CHANGE UP (ref 0–2)
BILIRUB DIRECT SERPL-MCNC: 0.4 MG/DL — HIGH (ref 0.1–0.2)
BILIRUB SERPL-MCNC: 1.1 MG/DL — SIGNIFICANT CHANGE UP (ref 0.2–1.2)
BILIRUB SERPL-MCNC: 1.1 MG/DL — SIGNIFICANT CHANGE UP (ref 0.2–1.2)
BLASTS # FLD: 0 % — SIGNIFICANT CHANGE UP (ref 0–0)
BUN SERPL-MCNC: 12 MG/DL — SIGNIFICANT CHANGE UP (ref 7–23)
CALCIUM SERPL-MCNC: 9.2 MG/DL — SIGNIFICANT CHANGE UP (ref 8.4–10.5)
CHLORIDE SERPL-SCNC: 102 MMOL/L — SIGNIFICANT CHANGE UP (ref 98–107)
CHOLEST SERPL-MCNC: 167 MG/DL — SIGNIFICANT CHANGE UP (ref 120–199)
CO2 SERPL-SCNC: 21 MMOL/L — LOW (ref 22–31)
CREAT SERPL-MCNC: 0.56 MG/DL — SIGNIFICANT CHANGE UP (ref 0.5–1.3)
DACRYOCYTES BLD QL SMEAR: SLIGHT — SIGNIFICANT CHANGE UP
DENV IGG+IGM PNL SER IA: 2.73 — HIGH
DENV IGM SER-ACNC: 1.12 — SIGNIFICANT CHANGE UP
ELLIPTOCYTES BLD QL SMEAR: SLIGHT — SIGNIFICANT CHANGE UP
EOSINOPHIL # BLD AUTO: 0.71 K/UL — HIGH (ref 0–0.5)
EOSINOPHIL NFR BLD AUTO: 5.9 % — SIGNIFICANT CHANGE UP (ref 0–6)
EOSINOPHIL NFR FLD: 5.3 % — SIGNIFICANT CHANGE UP (ref 0–6)
GIANT PLATELETS BLD QL SMEAR: PRESENT — SIGNIFICANT CHANGE UP
GLUCOSE SERPL-MCNC: 91 MG/DL — SIGNIFICANT CHANGE UP (ref 70–99)
HAPTOGLOB SERPL-MCNC: < 20 MG/DL — LOW (ref 34–200)
HBA1C BLD-MCNC: 5 % — SIGNIFICANT CHANGE UP (ref 4–5.6)
HCT VFR BLD CALC: 28.4 % — LOW (ref 39–50)
HDLC SERPL-MCNC: 34 MG/DL — LOW (ref 35–55)
HGB BLD-MCNC: 9.5 G/DL — LOW (ref 13–17)
HYPOCHROMIA BLD QL: SLIGHT — SIGNIFICANT CHANGE UP
IMM GRANULOCYTES NFR BLD AUTO: 0.3 % — SIGNIFICANT CHANGE UP (ref 0–1.5)
LDH SERPL L TO P-CCNC: 347 U/L — HIGH (ref 135–225)
LIPID PNL WITH DIRECT LDL SERPL: 110 MG/DL — SIGNIFICANT CHANGE UP
LYMPHOCYTES # BLD AUTO: 36.5 % — SIGNIFICANT CHANGE UP (ref 13–44)
LYMPHOCYTES # BLD AUTO: 4.36 K/UL — HIGH (ref 1–3.3)
LYMPHOCYTES NFR SPEC AUTO: 21 % — SIGNIFICANT CHANGE UP (ref 13–44)
MACROCYTES BLD QL: SLIGHT — SIGNIFICANT CHANGE UP
MAGNESIUM SERPL-MCNC: 1.6 MG/DL — SIGNIFICANT CHANGE UP (ref 1.6–2.6)
MCHC RBC-ENTMCNC: 27.2 PG — SIGNIFICANT CHANGE UP (ref 27–34)
MCHC RBC-ENTMCNC: 33.5 % — SIGNIFICANT CHANGE UP (ref 32–36)
MCV RBC AUTO: 81.4 FL — SIGNIFICANT CHANGE UP (ref 80–100)
METAMYELOCYTES # FLD: 0 % — SIGNIFICANT CHANGE UP (ref 0–1)
MICROCYTES BLD QL: SLIGHT — SIGNIFICANT CHANGE UP
MONOCYTES # BLD AUTO: 0.92 K/UL — HIGH (ref 0–0.9)
MONOCYTES NFR BLD AUTO: 7.7 % — SIGNIFICANT CHANGE UP (ref 2–14)
MONOCYTES NFR BLD: 7.9 % — SIGNIFICANT CHANGE UP (ref 2–9)
MYELOCYTES NFR BLD: 0 % — SIGNIFICANT CHANGE UP (ref 0–0)
NEUTROPHIL AB SER-ACNC: 53.5 % — SIGNIFICANT CHANGE UP (ref 43–77)
NEUTROPHILS # BLD AUTO: 5.83 K/UL — SIGNIFICANT CHANGE UP (ref 1.8–7.4)
NEUTROPHILS NFR BLD AUTO: 48.9 % — SIGNIFICANT CHANGE UP (ref 43–77)
NEUTS BAND # BLD: 0 % — SIGNIFICANT CHANGE UP (ref 0–6)
NRBC # BLD: 1 /100WBC — SIGNIFICANT CHANGE UP
NRBC # FLD: 0.02 K/UL — SIGNIFICANT CHANGE UP (ref 0–0)
OTHER - HEMATOLOGY %: 0 — SIGNIFICANT CHANGE UP
OVALOCYTES BLD QL SMEAR: SLIGHT — SIGNIFICANT CHANGE UP
PHOSPHATE SERPL-MCNC: 3.9 MG/DL — SIGNIFICANT CHANGE UP (ref 2.5–4.5)
PLATELET # BLD AUTO: 811 K/UL — HIGH (ref 150–400)
PLATELET COUNT - ESTIMATE: SIGNIFICANT CHANGE UP
PMV BLD: 9.5 FL — SIGNIFICANT CHANGE UP (ref 7–13)
POIKILOCYTOSIS BLD QL AUTO: SLIGHT — SIGNIFICANT CHANGE UP
POLYCHROMASIA BLD QL SMEAR: SLIGHT — SIGNIFICANT CHANGE UP
POTASSIUM SERPL-MCNC: 3.8 MMOL/L — SIGNIFICANT CHANGE UP (ref 3.5–5.3)
POTASSIUM SERPL-SCNC: 3.8 MMOL/L — SIGNIFICANT CHANGE UP (ref 3.5–5.3)
PROMYELOCYTES # FLD: 0 % — SIGNIFICANT CHANGE UP (ref 0–0)
PROT SERPL-MCNC: 6.8 G/DL — SIGNIFICANT CHANGE UP (ref 6–8.3)
RBC # BLD: 3.49 M/UL — LOW (ref 4.2–5.8)
RBC # FLD: 19 % — HIGH (ref 10.3–14.5)
RETICS #: 57 K/UL — SIGNIFICANT CHANGE UP (ref 25–125)
RETICS/RBC NFR: 1.6 % — SIGNIFICANT CHANGE UP (ref 0.5–2.5)
SICKLE CELLS BLD QL SMEAR: SLIGHT — SIGNIFICANT CHANGE UP
SMUDGE CELLS # BLD: PRESENT — SIGNIFICANT CHANGE UP
SODIUM SERPL-SCNC: 134 MMOL/L — LOW (ref 135–145)
TRIGL SERPL-MCNC: 104 MG/DL — SIGNIFICANT CHANGE UP (ref 10–149)
VARIANT LYMPHS # BLD: 11.4 % — SIGNIFICANT CHANGE UP
VIT B12 SERPL-MCNC: 1117 PG/ML — HIGH (ref 200–900)
WBC # BLD: 11.94 K/UL — HIGH (ref 3.8–10.5)
WBC # FLD AUTO: 11.94 K/UL — HIGH (ref 3.8–10.5)

## 2019-12-27 PROCEDURE — 93306 TTE W/DOPPLER COMPLETE: CPT | Mod: 26

## 2019-12-27 PROCEDURE — 99233 SBSQ HOSP IP/OBS HIGH 50: CPT

## 2019-12-27 RX ADMIN — LIDOCAINE 1 PATCH: 4 CREAM TOPICAL at 00:45

## 2019-12-27 RX ADMIN — LIDOCAINE 1 PATCH: 4 CREAM TOPICAL at 12:52

## 2019-12-27 RX ADMIN — HEPARIN SODIUM 5000 UNIT(S): 5000 INJECTION INTRAVENOUS; SUBCUTANEOUS at 13:54

## 2019-12-27 RX ADMIN — Medication 81 MILLIGRAM(S): at 12:52

## 2019-12-27 RX ADMIN — LIDOCAINE 1 PATCH: 4 CREAM TOPICAL at 17:17

## 2019-12-27 RX ADMIN — SENNA PLUS 2 TABLET(S): 8.6 TABLET ORAL at 21:49

## 2019-12-27 RX ADMIN — HYDROMORPHONE HYDROCHLORIDE 1 MILLIGRAM(S): 2 INJECTION INTRAMUSCULAR; INTRAVENOUS; SUBCUTANEOUS at 17:54

## 2019-12-27 RX ADMIN — PIPERACILLIN AND TAZOBACTAM 25 GRAM(S): 4; .5 INJECTION, POWDER, LYOPHILIZED, FOR SOLUTION INTRAVENOUS at 09:50

## 2019-12-27 RX ADMIN — FENTANYL CITRATE 1 PATCH: 50 INJECTION INTRAVENOUS at 17:38

## 2019-12-27 RX ADMIN — PIPERACILLIN AND TAZOBACTAM 25 GRAM(S): 4; .5 INJECTION, POWDER, LYOPHILIZED, FOR SOLUTION INTRAVENOUS at 17:25

## 2019-12-27 RX ADMIN — HEPARIN SODIUM 5000 UNIT(S): 5000 INJECTION INTRAVENOUS; SUBCUTANEOUS at 07:20

## 2019-12-27 RX ADMIN — HEPARIN SODIUM 5000 UNIT(S): 5000 INJECTION INTRAVENOUS; SUBCUTANEOUS at 21:50

## 2019-12-27 RX ADMIN — PIPERACILLIN AND TAZOBACTAM 25 GRAM(S): 4; .5 INJECTION, POWDER, LYOPHILIZED, FOR SOLUTION INTRAVENOUS at 00:45

## 2019-12-27 RX ADMIN — ATORVASTATIN CALCIUM 80 MILLIGRAM(S): 80 TABLET, FILM COATED ORAL at 21:50

## 2019-12-27 RX ADMIN — Medication 1 MILLIGRAM(S): at 12:52

## 2019-12-27 RX ADMIN — FENTANYL CITRATE 1 PATCH: 50 INJECTION INTRAVENOUS at 07:20

## 2019-12-27 RX ADMIN — HYDROMORPHONE HYDROCHLORIDE 1 MILLIGRAM(S): 2 INJECTION INTRAMUSCULAR; INTRAVENOUS; SUBCUTANEOUS at 17:49

## 2019-12-27 NOTE — SWALLOW BEDSIDE ASSESSMENT ADULT - COMMENTS
Per charting, 19M visiting from [place] presented to [facility] 12/14/19 with bone pain, admitted for treatement of VOC, 12/16 developed ACS, was intubated, underwent exchange transfusion (again 12/17 and 12/18); 12/17 reintubated and found to have bilat PTX, bilat chest tubes placed; transferred to [facility] 12/18 on levophed, fentanyl, propofol gtt.  At [facility] had exchange transfusions x 3, last on 12/21. Extubated 12/22, chest tubes removed 12/23.  completed 7 days cefepime/vanco, but has persistent fevers and zosyn was resumed 12/22.  Due to persistent encephalopathy pain was treated with fentanyl patch with dilaudid IV push for breakthrough pain since pt could not use PCA. 12/24 CThead demonstrated right superior cerebellar CVA.  Pt received 1 U PRBC.  He was stable for transfer to medical floor 12/26.      Patient was received awake, alert and cooperative this PM. Patient's twin sister present at bedside. Patient was able to follow simple directives and communicate basic wants/needs. Patient denied difficulty swallowing at this time.

## 2019-12-27 NOTE — SWALLOW BEDSIDE ASSESSMENT ADULT - SWALLOW EVAL: RECOMMENDED FEEDING/EATING TECHNIQUES
position upright (90 degrees)/allow for swallow between intakes/oral hygiene/small sips/bites/maintain upright posture during/after eating for 30 mins/no straws

## 2019-12-27 NOTE — PROGRESS NOTE ADULT - PROBLEM SELECTOR PLAN 5
1.  Name of PCP: pt is from [facility] no local PCP  2.  PCP Contacted on Admission: [ ] Y    [ x] N    3.  PCP contacted at Discharge: [ ] Y    [ ] N    [ ] N/A  4.  Post-Discharge Appointment Date and Location:  5.  Summary of Handoff given to PCP:

## 2019-12-27 NOTE — SWALLOW BEDSIDE ASSESSMENT ADULT - ASR SWALLOW ASPIRATION MONITOR
pneumonia/upper respiratory infection/oral hygiene/gurgly voice/change of breathing pattern/position upright (90Y)/fever/throat clearing/cough

## 2019-12-27 NOTE — DIETITIAN INITIAL EVALUATION ADULT. - PERTINENT MEDS FT
MEDICATIONS  (STANDING):  aspirin  chewable 81 milliGRAM(s) Oral daily  atorvastatin 80 milliGRAM(s) Oral at bedtime  fentaNYL   Patch  25 MICROgram(s)/Hr 1 Patch Transdermal every 72 hours  folic acid 1 milliGRAM(s) Oral daily  heparin  Injectable 5000 Unit(s) SubCutaneous every 8 hours  lidocaine   Patch 1 Patch Transdermal daily  piperacillin/tazobactam IVPB.. 3.375 Gram(s) IV Intermittent every 8 hours  senna 2 Tablet(s) Oral at bedtime  sodium chloride 0.9%. 1000 milliLiter(s) (100 mL/Hr) IV Continuous <Continuous>

## 2019-12-27 NOTE — DIETITIAN INITIAL EVALUATION ADULT. - OTHER INFO
Initial Dietitian Evaluation 2/2 to extended length of stay. Patient is a 19 M from [facility] visiting [place] with PMH sickle cell disease (prior asymptomatic, no hx of blood transfusion) and malaria on anti-malarials at home who p/w chest and bone pain c/b acute chest syndrome s/p intubation for hypoxic respiratory failure s/p RBC exchange transfusion x1 and plasma exchange x2 c/b b/l PTX/pneumomediastinum s/p b/l pigtail catheters transferred to [facility] for further management and heme consultation. Course c/b acute/subacute right cerebellar infarct on CTH 12/24.  Pt asleep at the time of visit. Brother at bedside reports fair but improving PO intake at this Family is bringing in Ensure Shakes from home, he was made aware that Ensure is available in-house.  No GI distress (nausea/vomiting/diarrhea/constipation.) No reported difficulties chewing and swallowing.  Weight obtained on this admission is  61.7 kgs 12/19 and current weight is 69.2 kgs 12/26, which is suggestive of weight gain. However, question accuracy given significance of change and patient with periods of suboptimal PO intake during admission. Pt was on EN feeds via NGT from 12/19-12/22, which provided < 50% needs. Overall, brother states that his PO intake is improving more each day. Food  preferences obtained and to be implemented.

## 2019-12-27 NOTE — PROGRESS NOTE ADULT - ASSESSMENT
19M visiting from [place] presented to [facility] 12/14/19 with bone pain, admitted for treatement of VOC, 12/16 developed ACS, was intubated, underwent exchange transfusion (again 12/17 and 12/18); 12/17 reintubated and found to have bilat PTX, bilat chest tubes placed; transferred to [facility] 12/18 on levophed, fentanyl, propofol gtt.  At [facility] had exchange transfusions x 3, last on 12/21. Extubated 12/22, chest tubes removed 12/23.  completed 7 days cefepime/vanco, but has persistent fevers and zosyn was resumed 12/22.  Due to persistent encephalopathy pain was treated with fentanyl patch with dilaudid IV push for breakthrough pain since pt could not use PCA. 12/24 CThead demonstrated right superior cerebellar CVA.  Pt received 1 U PRBC.  He was stable for transfer to medical floor 12/26.

## 2019-12-27 NOTE — DIETITIAN INITIAL EVALUATION ADULT. - PERTINENT LABORATORY DATA
12-27 Na134 mmol/L<L> Glu 91 mg/dL K+ 3.8 mmol/L Cr  0.56 mg/dL BUN 12 mg/dL 12-27 Phos 3.9 mg/dL 12-27 Alb 3.1 g/dL<L> 12-27 [id number] 5.0 % 12-27 Chol 167 mg/dL  mg/dL HDL 34 mg/dL<L> Trig 104 mg/dL

## 2019-12-27 NOTE — DIETITIAN INITIAL EVALUATION ADULT. - ADD RECOMMEND
1- Regular diet  + Ensure Enlive 240mls 3x daily (1050kcal, 60g protein). 2- Monitor weights, labs, BM's, skin integrity, p.o. intake. 3- Please Encourage po intake, assist with meals and menu selections, provide alternatives PRN. 4- RD remains available, re-consult as needed. Felipe Dockery RD Pager #[phone]

## 2019-12-27 NOTE — SWALLOW BEDSIDE ASSESSMENT ADULT - SWALLOW EVAL: DIAGNOSIS
Patient demonstrated Functional Oral and Pharyngeal Stages of Swallowing characterized by adequate mastication (of regular solid textures), functional bolus collection, manipulation and transfer, adequate initiation of the pharyngeal onset, palpable laryngeal elevation/excursion, and no overt clinical s/s of laryngeal penetration/aspiration across trials of puree, regular solids and thin liquids via single cup sip as well as consecutive cup sip drinking.

## 2019-12-27 NOTE — PROGRESS NOTE ADULT - PROBLEM SELECTOR PLAN 1
Appreciate neuro recs  Possibly due to VOC  MRI/MRA/TTE with bubble  ASA 81, lipitor 80  IV hydration  S&S (currently eating regular diet)  PT/OT

## 2019-12-27 NOTE — PROGRESS NOTE ADULT - PROBLEM SELECTOR PLAN 4
Pain controlled with fentanyl patch, has not required breakthrough meds since early am 12/26  Monitor pain control may consoider weaning fentanyl patch if improving; if requiring pain meds may consider resuming PCA if encephalopathy has resolved  Continue IV hydration, incentive spirometry Pain controlled with fentanyl patch, has not required breakthrough meds since early am 12/26  Monitor pain control may consoider weaning fentanyl patch if improving; if requiring pain meds may consider resuming PCA if encephalopathy has resolved  Continue IV hydration, incentive spirometry  HbS percentage stable, does not require PLEX at this time; f/u heme recs

## 2019-12-27 NOTE — PROGRESS NOTE ADULT - SUBJECTIVE AND OBJECTIVE BOX
[facility] Division of Hospital Medicine  Joseph Chris MD  Pager [phone]    Patient is a 19y old  Male who presents with a chief complaint of sickle cell crisis (26 Dec 2019 13:49)      SUBJECTIVE / OVERNIGHT EVENTS: awake and alert, pain controlled with no pain in limbs, some mild RLQ pain that does not require extra pain meds at this time.      MEDICATIONS  (STANDING):  aspirin  chewable 81 milliGRAM(s) Oral daily  atorvastatin 80 milliGRAM(s) Oral at bedtime  fentaNYL   Patch  25 MICROgram(s)/Hr 1 Patch Transdermal every 72 hours  folic acid 1 milliGRAM(s) Oral daily  heparin  Injectable 5000 Unit(s) SubCutaneous every 8 hours  lidocaine   Patch 1 Patch Transdermal daily  piperacillin/tazobactam IVPB.. 3.375 Gram(s) IV Intermittent every 8 hours  senna 2 Tablet(s) Oral at bedtime  sodium chloride 0.9%. 1000 milliLiter(s) (100 mL/Hr) IV Continuous <Continuous>    MEDICATIONS  (PRN):  HYDROmorphone  Injectable 1 milliGRAM(s) IV Push every 4 hours PRN Moderate Pain (4 - 6)      CAPILLARY BLOOD GLUCOSE        I&O's Summary    26 Dec 2019 07:01  -  27 Dec 2019 07:00  --------------------------------------------------------  IN: 1300 mL / OUT: 2500 mL / NET: -1200 mL        PHYSICAL EXAM:  Vital Signs Last 24 Hrs  T(C): 36.8 (27 Dec 2019 06:40), Max: 37.2 (26 Dec 2019 19:00)  T(F): 98.2 (27 Dec 2019 06:40), Max: 99 (26 Dec 2019 19:00)  HR: 72 (27 Dec 2019 06:40) (62 - 98)  BP: 131/86 (27 Dec 2019 06:40) (129/87 - 144/95)  BP(mean): 92 (26 Dec 2019 19:00) (89 - 109)  RR: 17 (27 Dec 2019 06:40) (17 - 32)  SpO2: 99% (27 Dec 2019 06:40) (97% - 100%)  General: NAD  HEENT: NC/AT; PERRL, clear conjunctiva  Neck: supple  Respiratory: CTA b/l  Cardiovascular: +S1/S2; RRR  Abdomen: soft, NT/ND; +BS x4  Extremities: WWP, 2+ peripheral pulses b/l; no LE edema  Skin: normal color and turgor; no rash  LABS:                        9.5    11.94 )-----------( 811      ( 27 Dec 2019 03:10 )             28.4     12-27    134<L>  |  102  |  12  ----------------------------<  91  3.8   |  21<L>  |  0.56    Ca    9.2      27 Dec 2019 03:10  Phos  3.9     12-27  Mg     1.6     12-27    TPro  6.8  /  Alb  3.1<L>  /  TBili  1.1  /  DBili  0.4<H>  /  AST  37  /  ALT  15  /  AlkPhos  87  12-27                RADIOLOGY & ADDITIONAL TESTS:  Results Reviewed:   Imaging Personally Reviewed:  Electrocardiogram Personally Reviewed:    COORDINATION OF CARE:  Care Discussed with Consultants/Other Providers [Y/N]:  Prior or Outpatient Records Reviewed [Y/N]:

## 2019-12-28 DIAGNOSIS — R10.11 RIGHT UPPER QUADRANT PAIN: ICD-10-CM

## 2019-12-28 LAB
ALBUMIN SERPL ELPH-MCNC: 3.6 G/DL — SIGNIFICANT CHANGE UP (ref 3.3–5)
ALP SERPL-CCNC: 95 U/L — SIGNIFICANT CHANGE UP (ref 40–120)
ALT FLD-CCNC: 19 U/L — SIGNIFICANT CHANGE UP (ref 4–41)
ANION GAP SERPL CALC-SCNC: 13 MMO/L — SIGNIFICANT CHANGE UP (ref 7–14)
AST SERPL-CCNC: 37 U/L — SIGNIFICANT CHANGE UP (ref 4–40)
BASOPHILS # BLD AUTO: 0.11 K/UL — SIGNIFICANT CHANGE UP (ref 0–0.2)
BASOPHILS NFR BLD AUTO: 1 % — SIGNIFICANT CHANGE UP (ref 0–2)
BILIRUB SERPL-MCNC: 1.1 MG/DL — SIGNIFICANT CHANGE UP (ref 0.2–1.2)
BUN SERPL-MCNC: 12 MG/DL — SIGNIFICANT CHANGE UP (ref 7–23)
CALCIUM SERPL-MCNC: 9.7 MG/DL — SIGNIFICANT CHANGE UP (ref 8.4–10.5)
CHLORIDE SERPL-SCNC: 99 MMOL/L — SIGNIFICANT CHANGE UP (ref 98–107)
CO2 SERPL-SCNC: 23 MMOL/L — SIGNIFICANT CHANGE UP (ref 22–31)
CREAT SERPL-MCNC: 0.54 MG/DL — SIGNIFICANT CHANGE UP (ref 0.5–1.3)
EOSINOPHIL # BLD AUTO: 0.78 K/UL — HIGH (ref 0–0.5)
EOSINOPHIL NFR BLD AUTO: 7 % — HIGH (ref 0–6)
GLUCOSE SERPL-MCNC: 85 MG/DL — SIGNIFICANT CHANGE UP (ref 70–99)
HAPTOGLOB SERPL-MCNC: < 20 MG/DL — LOW (ref 34–200)
HCT VFR BLD CALC: 32.4 % — LOW (ref 39–50)
HGB BLD-MCNC: 10.3 G/DL — LOW (ref 13–17)
IMM GRANULOCYTES NFR BLD AUTO: 0.3 % — SIGNIFICANT CHANGE UP (ref 0–1.5)
LDH SERPL L TO P-CCNC: 366 U/L — HIGH (ref 135–225)
LYMPHOCYTES # BLD AUTO: 3.38 K/UL — HIGH (ref 1–3.3)
LYMPHOCYTES # BLD AUTO: 30.2 % — SIGNIFICANT CHANGE UP (ref 13–44)
MAGNESIUM SERPL-MCNC: 1.6 MG/DL — SIGNIFICANT CHANGE UP (ref 1.6–2.6)
MCHC RBC-ENTMCNC: 26.2 PG — LOW (ref 27–34)
MCHC RBC-ENTMCNC: 31.8 % — LOW (ref 32–36)
MCV RBC AUTO: 82.4 FL — SIGNIFICANT CHANGE UP (ref 80–100)
MONOCYTES # BLD AUTO: 0.98 K/UL — HIGH (ref 0–0.9)
MONOCYTES NFR BLD AUTO: 8.8 % — SIGNIFICANT CHANGE UP (ref 2–14)
NEUTROPHILS # BLD AUTO: 5.9 K/UL — SIGNIFICANT CHANGE UP (ref 1.8–7.4)
NEUTROPHILS NFR BLD AUTO: 52.7 % — SIGNIFICANT CHANGE UP (ref 43–77)
NRBC # FLD: 0 K/UL — SIGNIFICANT CHANGE UP (ref 0–0)
PHOSPHATE SERPL-MCNC: 4.4 MG/DL — SIGNIFICANT CHANGE UP (ref 2.5–4.5)
PLATELET # BLD AUTO: 1106 K/UL — CRITICAL HIGH (ref 150–400)
PMV BLD: 9.7 FL — SIGNIFICANT CHANGE UP (ref 7–13)
POTASSIUM SERPL-MCNC: 3.9 MMOL/L — SIGNIFICANT CHANGE UP (ref 3.5–5.3)
POTASSIUM SERPL-SCNC: 3.9 MMOL/L — SIGNIFICANT CHANGE UP (ref 3.5–5.3)
PROT SERPL-MCNC: 8 G/DL — SIGNIFICANT CHANGE UP (ref 6–8.3)
RBC # BLD: 3.93 M/UL — LOW (ref 4.2–5.8)
RBC # FLD: 18.6 % — HIGH (ref 10.3–14.5)
RETICS #: 49 K/UL — SIGNIFICANT CHANGE UP (ref 25–125)
RETICS/RBC NFR: 1.3 % — SIGNIFICANT CHANGE UP (ref 0.5–2.5)
SODIUM SERPL-SCNC: 135 MMOL/L — SIGNIFICANT CHANGE UP (ref 135–145)
WBC # BLD: 11.18 K/UL — HIGH (ref 3.8–10.5)
WBC # FLD AUTO: 11.18 K/UL — HIGH (ref 3.8–10.5)

## 2019-12-28 PROCEDURE — 99233 SBSQ HOSP IP/OBS HIGH 50: CPT

## 2019-12-28 RX ADMIN — HEPARIN SODIUM 5000 UNIT(S): 5000 INJECTION INTRAVENOUS; SUBCUTANEOUS at 21:25

## 2019-12-28 RX ADMIN — LIDOCAINE 1 PATCH: 4 CREAM TOPICAL at 12:15

## 2019-12-28 RX ADMIN — ATORVASTATIN CALCIUM 80 MILLIGRAM(S): 80 TABLET, FILM COATED ORAL at 21:25

## 2019-12-28 RX ADMIN — Medication 1 MILLIGRAM(S): at 12:15

## 2019-12-28 RX ADMIN — LIDOCAINE 1 PATCH: 4 CREAM TOPICAL at 00:51

## 2019-12-28 RX ADMIN — FENTANYL CITRATE 1 PATCH: 50 INJECTION INTRAVENOUS at 07:16

## 2019-12-28 RX ADMIN — LIDOCAINE 1 PATCH: 4 CREAM TOPICAL at 19:43

## 2019-12-28 RX ADMIN — HYDROMORPHONE HYDROCHLORIDE 1 MILLIGRAM(S): 2 INJECTION INTRAMUSCULAR; INTRAVENOUS; SUBCUTANEOUS at 20:15

## 2019-12-28 RX ADMIN — HYDROMORPHONE HYDROCHLORIDE 1 MILLIGRAM(S): 2 INJECTION INTRAMUSCULAR; INTRAVENOUS; SUBCUTANEOUS at 09:36

## 2019-12-28 RX ADMIN — SENNA PLUS 2 TABLET(S): 8.6 TABLET ORAL at 21:25

## 2019-12-28 RX ADMIN — HEPARIN SODIUM 5000 UNIT(S): 5000 INJECTION INTRAVENOUS; SUBCUTANEOUS at 06:45

## 2019-12-28 RX ADMIN — HYDROMORPHONE HYDROCHLORIDE 1 MILLIGRAM(S): 2 INJECTION INTRAMUSCULAR; INTRAVENOUS; SUBCUTANEOUS at 09:21

## 2019-12-28 RX ADMIN — HYDROMORPHONE HYDROCHLORIDE 1 MILLIGRAM(S): 2 INJECTION INTRAMUSCULAR; INTRAVENOUS; SUBCUTANEOUS at 20:02

## 2019-12-28 RX ADMIN — FENTANYL CITRATE 1 PATCH: 50 INJECTION INTRAVENOUS at 19:43

## 2019-12-28 RX ADMIN — HEPARIN SODIUM 5000 UNIT(S): 5000 INJECTION INTRAVENOUS; SUBCUTANEOUS at 12:15

## 2019-12-28 RX ADMIN — Medication 81 MILLIGRAM(S): at 12:15

## 2019-12-28 NOTE — PROGRESS NOTE ADULT - PROBLEM SELECTOR PLAN 5
Pain controlled with fentanyl patch, has not required breakthrough meds since early am 12/26  Monitor pain control may consider weaning fentanyl patch if improving; if requiring pain meds may consider resuming PCA if encephalopathy has resolved  Continue IV hydration, incentive spirometry  HbS percentage stable, does not require PLEX at this time; f/u heme recs Pain controlled with fentanyl patch, has not required breakthrough meds since early am 12/26  s/p IVF, tolerating po diet, incentive spirometry  HbS percentage stable, does not require PLEX at this time; f/u heme recs

## 2019-12-28 NOTE — OCCUPATIONAL THERAPY INITIAL EVALUATION ADULT - PERTINENT HX OF CURRENT PROBLEM, REHAB EVAL
18 y/o male from [place] with PMH sickle cell disease (prior asymptomatic, no hx of blood transfusion) and malaria who p/w chest and bone pain c/b acute chest syndrome s/p intubation for hypoxic respiratory failure s/p RBC exchange transfusion x1 and plasma exchange x2. Hospital course c/b b/l PTX/pneumomediastinum s/p b/l pigtail catheters transferred to [facility] for further management and heme consultation. Course c/b acute/subacute right cerebellar infarct on CTH 12/24.

## 2019-12-28 NOTE — PROGRESS NOTE ADULT - PROBLEM SELECTOR PLAN 6
1.  Name of PCP: pt is from [facility] no local PCP  2.  PCP Contacted on Admission: [ ] Y    [ x] N    3.  PCP contacted at Discharge: [ ] Y    [ ] N    [ ] N/A  4.  Post-Discharge Appointment Date and Location:  5.  Summary of Handoff given to PCP:

## 2019-12-28 NOTE — OCCUPATIONAL THERAPY INITIAL EVALUATION ADULT - LIVES WITH, PROFILE
[place]. Pt reports living in a private home with [place] uncle [place] with steps to manage. Pt has a tub shower with available shower seat to use.

## 2019-12-28 NOTE — OCCUPATIONAL THERAPY INITIAL EVALUATION ADULT - DIAGNOSIS, OT EVAL
s/p CVA, s/p hypoxic respiratory failure; pt appears to be performing ADLs and functional mobility at baseline

## 2019-12-28 NOTE — PROGRESS NOTE ADULT - SUBJECTIVE AND OBJECTIVE BOX
[facility] Division of Hospital Medicine  Leidy Berry DO  Pager: [phone]  Other Times:  [phone]    Patient is a 19y old  Male who presents with a chief complaint of sickle cell crisis (27 Dec 2019 11:19)    SUBJECTIVE / OVERNIGHT EVENTS:  Patient seen with brother at bedside complaining of Rt sided upper abdominal pain that is worse with deep inspiration. Denying any N/V/D, tolerating po. No other complaints.    ADDITIONAL REVIEW OF SYSTEMS:    MEDICATIONS  (STANDING):  aspirin  chewable 81 milliGRAM(s) Oral daily  atorvastatin 80 milliGRAM(s) Oral at bedtime  fentaNYL   Patch  25 MICROgram(s)/Hr 1 Patch Transdermal every 72 hours  folic acid 1 milliGRAM(s) Oral daily  heparin  Injectable 5000 Unit(s) SubCutaneous every 8 hours  lidocaine   Patch 1 Patch Transdermal daily  senna 2 Tablet(s) Oral at bedtime  sodium chloride 0.9%. 1000 milliLiter(s) (100 mL/Hr) IV Continuous <Continuous>    MEDICATIONS  (PRN):  HYDROmorphone  Injectable 1 milliGRAM(s) IV Push every 4 hours PRN Moderate Pain (4 - 6)      CAPILLARY BLOOD GLUCOSE        I&O's Summary      PHYSICAL EXAM:  Vital Signs Last 24 Hrs  T(C): 36.9 (28 Dec 2019 05:45), Max: 37 (27 Dec 2019 21:50)  T(F): 98.5 (28 Dec 2019 05:45), Max: 98.6 (27 Dec 2019 21:50)  HR: 76 (28 Dec 2019 05:45) (68 - 76)  BP: 124/83 (28 Dec 2019 05:45) (123/85 - 132/75)  BP(mean): --  RR: 17 (28 Dec 2019 05:45) (17 - 18)  SpO2: 100% (28 Dec 2019 05:45) (97% - 100%)  General: NAD  HEENT: NC/AT; PERRL, clear conjunctiva  Neck: supple  Respiratory: CTA b/l  Cardiovascular: +S1/S2; RRR  Abdomen: soft, NT/ND; +BS x4  Extremities: WWP, 2+ peripheral pulses b/l; no LE edema  Skin: normal color and turgor; no rash    LABS:                        10.3   11.18 )-----------( 1106     ( 28 Dec 2019 06:22 )             32.4     12-28    135  |  99  |  12  ----------------------------<  85  3.9   |  23  |  0.54    Ca    9.7      28 Dec 2019 06:22  Phos  4.4     12-28  Mg     1.6     12-28    TPro  8.0  /  Alb  3.6  /  TBili  1.1  /  DBili  x   /  AST  37  /  ALT  19  /  AlkPhos  95  12-28                RADIOLOGY & ADDITIONAL TESTS:  Results Reviewed:   Imaging Personally Reviewed:  Electrocardiogram Personally Reviewed:    COORDINATION OF CARE:  Care Discussed with Consultants/Other Providers [Y/N]:  Prior or Outpatient Records Reviewed [Y/N]:

## 2019-12-28 NOTE — OCCUPATIONAL THERAPY INITIAL EVALUATION ADULT - ANTICIPATED DISCHARGE DISPOSITION, OT EVAL
home with no OT needs. Pt appears to be at baseline for ADLs and functional mobility. Pt will not be placed on OT program. Please reconsult this discipline with any changes

## 2019-12-28 NOTE — PROGRESS NOTE ADULT - PROBLEM SELECTOR PLAN 4
Worsened with deep inspiration, patient with thin body habitus, could be pleuritic by rib cage  US 12/24 with gallbladder sludge which may be secondary to prolonged fasting  LFT's stable  - tylenol prn pain  - encourage po intake   - continue to monitor, if persistent pain/fever/change in LFT's will repeat abd US

## 2019-12-28 NOTE — OCCUPATIONAL THERAPY INITIAL EVALUATION ADULT - GENERAL OBSERVATIONS, REHAB EVAL
Pt received seated at edge of bed in NAD. Sister at bedside. Per RN Cornelia, pt okay to participate in OT evaluation.

## 2019-12-28 NOTE — PROGRESS NOTE ADULT - PROBLEM SELECTOR PLAN 1
Appreciate neuro recs  Possibly due to VOC  MRI/MRA/TTE with bubble  ASA 81, lipitor 80  IV hydration  S&S (currently eating regular diet)  PT/OT Appreciate neuro recs  Possibly due to VOC  MRI/MRA/TTE with bubble  ASA 81, lipitor 80  S&S (currently eating regular diet)  PT/OT

## 2019-12-29 DIAGNOSIS — D47.3 ESSENTIAL (HEMORRHAGIC) THROMBOCYTHEMIA: ICD-10-CM

## 2019-12-29 LAB
ALBUMIN SERPL ELPH-MCNC: 3.7 G/DL — SIGNIFICANT CHANGE UP (ref 3.3–5)
ALP SERPL-CCNC: 94 U/L — SIGNIFICANT CHANGE UP (ref 40–120)
ALT FLD-CCNC: 19 U/L — SIGNIFICANT CHANGE UP (ref 4–41)
ANION GAP SERPL CALC-SCNC: 12 MMO/L — SIGNIFICANT CHANGE UP (ref 7–14)
AST SERPL-CCNC: 36 U/L — SIGNIFICANT CHANGE UP (ref 4–40)
BASOPHILS # BLD AUTO: 0.08 K/UL — SIGNIFICANT CHANGE UP (ref 0–0.2)
BASOPHILS NFR BLD AUTO: 0.6 % — SIGNIFICANT CHANGE UP (ref 0–2)
BILIRUB SERPL-MCNC: 1 MG/DL — SIGNIFICANT CHANGE UP (ref 0.2–1.2)
BUN SERPL-MCNC: 13 MG/DL — SIGNIFICANT CHANGE UP (ref 7–23)
CALCIUM SERPL-MCNC: 9.9 MG/DL — SIGNIFICANT CHANGE UP (ref 8.4–10.5)
CHLORIDE SERPL-SCNC: 98 MMOL/L — SIGNIFICANT CHANGE UP (ref 98–107)
CO2 SERPL-SCNC: 23 MMOL/L — SIGNIFICANT CHANGE UP (ref 22–31)
CREAT SERPL-MCNC: 0.47 MG/DL — LOW (ref 0.5–1.3)
EOSINOPHIL # BLD AUTO: 0.74 K/UL — HIGH (ref 0–0.5)
EOSINOPHIL NFR BLD AUTO: 6 % — SIGNIFICANT CHANGE UP (ref 0–6)
GLUCOSE SERPL-MCNC: 87 MG/DL — SIGNIFICANT CHANGE UP (ref 70–99)
HAPTOGLOB SERPL-MCNC: < 20 MG/DL — LOW (ref 34–200)
HCT VFR BLD CALC: 31.4 % — LOW (ref 39–50)
HGB BLD-MCNC: 10 G/DL — LOW (ref 13–17)
IMM GRANULOCYTES NFR BLD AUTO: 0.4 % — SIGNIFICANT CHANGE UP (ref 0–1.5)
LDH SERPL L TO P-CCNC: 354 U/L — HIGH (ref 135–225)
LYMPHOCYTES # BLD AUTO: 27.2 % — SIGNIFICANT CHANGE UP (ref 13–44)
LYMPHOCYTES # BLD AUTO: 3.36 K/UL — HIGH (ref 1–3.3)
MAGNESIUM SERPL-MCNC: 1.5 MG/DL — LOW (ref 1.6–2.6)
MCHC RBC-ENTMCNC: 26.8 PG — LOW (ref 27–34)
MCHC RBC-ENTMCNC: 31.8 % — LOW (ref 32–36)
MCV RBC AUTO: 84.2 FL — SIGNIFICANT CHANGE UP (ref 80–100)
MONOCYTES # BLD AUTO: 1.02 K/UL — HIGH (ref 0–0.9)
MONOCYTES NFR BLD AUTO: 8.2 % — SIGNIFICANT CHANGE UP (ref 2–14)
NEUTROPHILS # BLD AUTO: 7.12 K/UL — SIGNIFICANT CHANGE UP (ref 1.8–7.4)
NEUTROPHILS NFR BLD AUTO: 57.6 % — SIGNIFICANT CHANGE UP (ref 43–77)
NRBC # FLD: 0 K/UL — SIGNIFICANT CHANGE UP (ref 0–0)
PHOSPHATE SERPL-MCNC: 4.2 MG/DL — SIGNIFICANT CHANGE UP (ref 2.5–4.5)
PLATELET # BLD AUTO: 1184 K/UL — CRITICAL HIGH (ref 150–400)
PMV BLD: 10.4 FL — SIGNIFICANT CHANGE UP (ref 7–13)
POTASSIUM SERPL-MCNC: 4 MMOL/L — SIGNIFICANT CHANGE UP (ref 3.5–5.3)
POTASSIUM SERPL-SCNC: 4 MMOL/L — SIGNIFICANT CHANGE UP (ref 3.5–5.3)
PROT SERPL-MCNC: 8 G/DL — SIGNIFICANT CHANGE UP (ref 6–8.3)
RBC # BLD: 3.73 M/UL — LOW (ref 4.2–5.8)
RBC # FLD: 18.6 % — HIGH (ref 10.3–14.5)
RETICS #: 39 K/UL — SIGNIFICANT CHANGE UP (ref 25–125)
RETICS/RBC NFR: 1 % — SIGNIFICANT CHANGE UP (ref 0.5–2.5)
SODIUM SERPL-SCNC: 133 MMOL/L — LOW (ref 135–145)
WBC # BLD: 12.37 K/UL — HIGH (ref 3.8–10.5)
WBC # FLD AUTO: 12.37 K/UL — HIGH (ref 3.8–10.5)

## 2019-12-29 PROCEDURE — 70544 MR ANGIOGRAPHY HEAD W/O DYE: CPT | Mod: 26,59

## 2019-12-29 PROCEDURE — 99233 SBSQ HOSP IP/OBS HIGH 50: CPT

## 2019-12-29 PROCEDURE — 70548 MR ANGIOGRAPHY NECK W/DYE: CPT | Mod: 26

## 2019-12-29 PROCEDURE — 70553 MRI BRAIN STEM W/O & W/DYE: CPT | Mod: 26

## 2019-12-29 RX ORDER — MAGNESIUM SULFATE 500 MG/ML
1 VIAL (ML) INJECTION ONCE
Refills: 0 | Status: COMPLETED | OUTPATIENT
Start: 2019-12-29 | End: 2019-12-29

## 2019-12-29 RX ORDER — FENTANYL CITRATE 50 UG/ML
1 INJECTION INTRAVENOUS
Refills: 0 | Status: DISCONTINUED | OUTPATIENT
Start: 2019-12-29 | End: 2019-12-30

## 2019-12-29 RX ADMIN — LIDOCAINE 1 PATCH: 4 CREAM TOPICAL at 12:31

## 2019-12-29 RX ADMIN — ATORVASTATIN CALCIUM 80 MILLIGRAM(S): 80 TABLET, FILM COATED ORAL at 21:09

## 2019-12-29 RX ADMIN — LIDOCAINE 1 PATCH: 4 CREAM TOPICAL at 00:00

## 2019-12-29 RX ADMIN — FENTANYL CITRATE 1 PATCH: 50 INJECTION INTRAVENOUS at 19:19

## 2019-12-29 RX ADMIN — FENTANYL CITRATE 1 PATCH: 50 INJECTION INTRAVENOUS at 16:16

## 2019-12-29 RX ADMIN — HEPARIN SODIUM 5000 UNIT(S): 5000 INJECTION INTRAVENOUS; SUBCUTANEOUS at 21:09

## 2019-12-29 RX ADMIN — FENTANYL CITRATE 1 PATCH: 50 INJECTION INTRAVENOUS at 19:30

## 2019-12-29 RX ADMIN — HEPARIN SODIUM 5000 UNIT(S): 5000 INJECTION INTRAVENOUS; SUBCUTANEOUS at 05:36

## 2019-12-29 RX ADMIN — HEPARIN SODIUM 5000 UNIT(S): 5000 INJECTION INTRAVENOUS; SUBCUTANEOUS at 12:30

## 2019-12-29 RX ADMIN — Medication 100 GRAM(S): at 12:30

## 2019-12-29 RX ADMIN — FENTANYL CITRATE 1 PATCH: 50 INJECTION INTRAVENOUS at 16:19

## 2019-12-29 RX ADMIN — Medication 1 MILLIGRAM(S): at 12:30

## 2019-12-29 RX ADMIN — FENTANYL CITRATE 1 PATCH: 50 INJECTION INTRAVENOUS at 22:40

## 2019-12-29 RX ADMIN — LIDOCAINE 1 PATCH: 4 CREAM TOPICAL at 19:31

## 2019-12-29 RX ADMIN — Medication 81 MILLIGRAM(S): at 12:30

## 2019-12-29 RX ADMIN — LIDOCAINE 1 PATCH: 4 CREAM TOPICAL at 19:19

## 2019-12-29 RX ADMIN — FENTANYL CITRATE 1 PATCH: 50 INJECTION INTRAVENOUS at 07:08

## 2019-12-29 RX ADMIN — SENNA PLUS 2 TABLET(S): 8.6 TABLET ORAL at 21:09

## 2019-12-29 NOTE — PROGRESS NOTE ADULT - PROBLEM SELECTOR PLAN 6
In the setting of anemia/SCD with active hemolysis  Discussed with Hematology fellow, as long as patient is clinically stable, can continue to monitor  - monitor CBC daily

## 2019-12-29 NOTE — PROGRESS NOTE ADULT - PROBLEM SELECTOR PLAN 7
1.  Name of PCP: pt is from [facility] no local PCP  2.  PCP Contacted on Admission: [ ] Y    [ x] N    3.  PCP contacted at Discharge: [ ] Y    [ ] N    [ ] N/A  4.  Post-Discharge Appointment Date and Location:  5.  Summary of Handoff given to PCP:

## 2019-12-29 NOTE — PROGRESS NOTE ADULT - PROBLEM SELECTOR PLAN 1
Appreciate neuro recs  Possibly due to VOC  MRI/MRA/TTE with bubble  ASA 81, lipitor 80  S&S (currently eating regular diet)  PT/OT

## 2019-12-29 NOTE — PROGRESS NOTE ADULT - ASSESSMENT
19M visiting from [place] presented to [facility] 12/14/19 with bone pain, admitted for treatement of VOC, 12/16 developed ACS, was intubated, underwent exchange transfusion (again 12/17 and 12/18); 12/17 reintubated and found to have bilat PTX, bilat chest tubes placed; transferred to [facility] 12/18 on levophed, fentanyl, propofol gtt.  At [facility] had exchange transfusions x 3, last on 12/21. Extubated 12/22, chest tubes removed 12/23.  completed 7 days cefepime/vanco, but has persistent fevers and zosyn was resumed 12/22.  Due to persistent encephalopathy pain was treated with fentanyl patch with dilaudid IV push for breakthrough pain since pt could not use PCA. 12/24 CThead demonstrated right superior cerebellar CVA.  Pt received 1 U PRBC.  He was stable for transfer to medical floor 12/26.

## 2019-12-29 NOTE — PROGRESS NOTE ADULT - SUBJECTIVE AND OBJECTIVE BOX
[facility] Division of Hospital Medicine  Leidy Berry DO  Pager: [phone]  Other Times:  [phone]    Patient is a 19y old  Male who presents with a chief complaint of sickle cell crisis (28 Dec 2019 12:11)      SUBJECTIVE / OVERNIGHT EVENTS:  Patient seen with brother at bedside denying any complaints. As per brother, he occasionally has a tremor when reaching/grabbing for things with Rt hand.     ADDITIONAL REVIEW OF SYSTEMS:    MEDICATIONS  (STANDING):  aspirin  chewable 81 milliGRAM(s) Oral daily  atorvastatin 80 milliGRAM(s) Oral at bedtime  fentaNYL   Patch  25 MICROgram(s)/Hr 1 Patch Transdermal every 72 hours  folic acid 1 milliGRAM(s) Oral daily  heparin  Injectable 5000 Unit(s) SubCutaneous every 8 hours  lidocaine   Patch 1 Patch Transdermal daily  magnesium sulfate  IVPB 1 Gram(s) IV Intermittent once  senna 2 Tablet(s) Oral at bedtime    MEDICATIONS  (PRN):  HYDROmorphone  Injectable 1 milliGRAM(s) IV Push every 4 hours PRN Moderate Pain (4 - 6)      CAPILLARY BLOOD GLUCOSE        I&O's Summary      PHYSICAL EXAM:  Vital Signs Last 24 Hrs  T(C): 37 (29 Dec 2019 08:00), Max: 37.6 (28 Dec 2019 20:01)  T(F): 98.6 (29 Dec 2019 08:00), Max: 99.6 (28 Dec 2019 20:01)  HR: 105 (29 Dec 2019 08:00) (66 - 105)  BP: 124/72 (29 Dec 2019 08:00) (116/79 - 131/86)  BP(mean): --  RR: 16 (29 Dec 2019 08:00) (16 - 18)  SpO2: 100% (29 Dec 2019 08:00) (100% - 100%)  CONSTITUTIONAL: NAD, well-developed, well-groomed  EYES: PERRLA; conjunctiva and sclera clear  ENMT: Moist oral mucosa, no pharyngeal injection or exudates; normal dentition  NECK: Supple, no palpable masses; no thyromegaly  RESPIRATORY: Normal respiratory effort; lungs are clear to auscultation bilaterally  CARDIOVASCULAR: Regular rate and rhythm, normal S1 and S2, no murmur/rub/gallop; No lower extremity edema; Peripheral pulses are 2+ bilaterally  ABDOMEN: Nontender to palpation, normoactive bowel sounds, no rebound/guarding; No hepatosplenomegaly  MUSCLOSKELETAL:  Normal gait; no clubbing or cyanosis of digits; no joint swelling or tenderness to palpation  PSYCH: A+O to person, place, and time; affect appropriate  NEUROLOGY: CN 2-12 are intact and symmetric; no gross sensory deficits;   SKIN: No rashes; no palpable lesions    LABS:                        10.0   12.37 )-----------( 1184     ( 29 Dec 2019 06:57 )             31.4     12-29    133<L>  |  98  |  13  ----------------------------<  87  4.0   |  23  |  0.47<L>    Ca    9.9      29 Dec 2019 06:57  Phos  4.2     12-29  Mg     1.5     12-29    TPro  8.0  /  Alb  3.7  /  TBili  1.0  /  DBili  x   /  AST  36  /  ALT  19  /  AlkPhos  94  12-29                RADIOLOGY & ADDITIONAL TESTS:  Results Reviewed:   Imaging Personally Reviewed:  Electrocardiogram Personally Reviewed:    COORDINATION OF CARE:  Care Discussed with Consultants/Other Providers [Y/N]:  Prior or Outpatient Records Reviewed [Y/N]:

## 2019-12-29 NOTE — PROGRESS NOTE ADULT - PROBLEM SELECTOR PLAN 5
Pain controlled with fentanyl patch, has not required breakthrough meds since early am 12/26  s/p IVF, tolerating po diet, incentive spirometry  HbS percentage stable, does not require PLEX at this time; f/u heme recs

## 2019-12-29 NOTE — PROVIDER CONTACT NOTE (MEDICATION) - SITUATION
New order for Fentanyl Patch required after early removal for MRI. Fentanyl 25mg applied 12/29 1619 by Day KARLOS Dixon, ordered to be in place for 72 hours. Fentanyl and lidocaine patch removed per protocol prior to MRI.

## 2019-12-30 ENCOUNTER — TRANSCRIPTION ENCOUNTER (OUTPATIENT)
Age: 19
End: 2019-12-30

## 2019-12-30 DIAGNOSIS — I72.9 ANEURYSM OF UNSPECIFIED SITE: ICD-10-CM

## 2019-12-30 LAB
ALBUMIN SERPL ELPH-MCNC: 4 G/DL — SIGNIFICANT CHANGE UP (ref 3.3–5)
ALP SERPL-CCNC: 91 U/L — SIGNIFICANT CHANGE UP (ref 40–120)
ALT FLD-CCNC: 19 U/L — SIGNIFICANT CHANGE UP (ref 4–41)
ANION GAP SERPL CALC-SCNC: 14 MMO/L — SIGNIFICANT CHANGE UP (ref 7–14)
AST SERPL-CCNC: 32 U/L — SIGNIFICANT CHANGE UP (ref 4–40)
BASOPHILS # BLD AUTO: 0.11 K/UL — SIGNIFICANT CHANGE UP (ref 0–0.2)
BASOPHILS NFR BLD AUTO: 0.9 % — SIGNIFICANT CHANGE UP (ref 0–2)
BILIRUB SERPL-MCNC: 1 MG/DL — SIGNIFICANT CHANGE UP (ref 0.2–1.2)
BUN SERPL-MCNC: 14 MG/DL — SIGNIFICANT CHANGE UP (ref 7–23)
CALCIUM SERPL-MCNC: 10.2 MG/DL — SIGNIFICANT CHANGE UP (ref 8.4–10.5)
CHLORIDE SERPL-SCNC: 99 MMOL/L — SIGNIFICANT CHANGE UP (ref 98–107)
CO2 SERPL-SCNC: 21 MMOL/L — LOW (ref 22–31)
CREAT SERPL-MCNC: 0.47 MG/DL — LOW (ref 0.5–1.3)
EOSINOPHIL # BLD AUTO: 0.5 K/UL — SIGNIFICANT CHANGE UP (ref 0–0.5)
EOSINOPHIL NFR BLD AUTO: 4.2 % — SIGNIFICANT CHANGE UP (ref 0–6)
GLUCOSE SERPL-MCNC: 99 MG/DL — SIGNIFICANT CHANGE UP (ref 70–99)
HAPTOGLOB SERPL-MCNC: < 20 MG/DL — LOW (ref 34–200)
HCT VFR BLD CALC: 31.3 % — LOW (ref 39–50)
HGB BLD-MCNC: 10 G/DL — LOW (ref 13–17)
IMM GRANULOCYTES NFR BLD AUTO: 0.3 % — SIGNIFICANT CHANGE UP (ref 0–1.5)
LDH SERPL L TO P-CCNC: 336 U/L — HIGH (ref 135–225)
LYMPHOCYTES # BLD AUTO: 28.7 % — SIGNIFICANT CHANGE UP (ref 13–44)
LYMPHOCYTES # BLD AUTO: 3.43 K/UL — HIGH (ref 1–3.3)
MAGNESIUM SERPL-MCNC: 1.7 MG/DL — SIGNIFICANT CHANGE UP (ref 1.6–2.6)
MCHC RBC-ENTMCNC: 26.4 PG — LOW (ref 27–34)
MCHC RBC-ENTMCNC: 31.9 % — LOW (ref 32–36)
MCV RBC AUTO: 82.6 FL — SIGNIFICANT CHANGE UP (ref 80–100)
MONOCYTES # BLD AUTO: 1.21 K/UL — HIGH (ref 0–0.9)
MONOCYTES NFR BLD AUTO: 10.1 % — SIGNIFICANT CHANGE UP (ref 2–14)
NEUTROPHILS # BLD AUTO: 6.69 K/UL — SIGNIFICANT CHANGE UP (ref 1.8–7.4)
NEUTROPHILS NFR BLD AUTO: 55.8 % — SIGNIFICANT CHANGE UP (ref 43–77)
NRBC # FLD: 0 K/UL — SIGNIFICANT CHANGE UP (ref 0–0)
PHOSPHATE SERPL-MCNC: 4.4 MG/DL — SIGNIFICANT CHANGE UP (ref 2.5–4.5)
PLATELET # BLD AUTO: 1194 K/UL — CRITICAL HIGH (ref 150–400)
PMV BLD: 9.8 FL — SIGNIFICANT CHANGE UP (ref 7–13)
POTASSIUM SERPL-MCNC: 4.2 MMOL/L — SIGNIFICANT CHANGE UP (ref 3.5–5.3)
POTASSIUM SERPL-SCNC: 4.2 MMOL/L — SIGNIFICANT CHANGE UP (ref 3.5–5.3)
PROT SERPL-MCNC: 8.7 G/DL — HIGH (ref 6–8.3)
RBC # BLD: 3.79 M/UL — LOW (ref 4.2–5.8)
RBC # FLD: 18.5 % — HIGH (ref 10.3–14.5)
RETICS #: 33 K/UL — SIGNIFICANT CHANGE UP (ref 25–125)
RETICS/RBC NFR: 0.9 % — SIGNIFICANT CHANGE UP (ref 0.5–2.5)
SODIUM SERPL-SCNC: 134 MMOL/L — LOW (ref 135–145)
WBC # BLD: 11.97 K/UL — HIGH (ref 3.8–10.5)
WBC # FLD AUTO: 11.97 K/UL — HIGH (ref 3.8–10.5)

## 2019-12-30 PROCEDURE — 99253 IP/OBS CNSLTJ NEW/EST LOW 45: CPT

## 2019-12-30 PROCEDURE — 99233 SBSQ HOSP IP/OBS HIGH 50: CPT

## 2019-12-30 PROCEDURE — 99233 SBSQ HOSP IP/OBS HIGH 50: CPT | Mod: GC

## 2019-12-30 RX ORDER — ATORVASTATIN CALCIUM 80 MG/1
1 TABLET, FILM COATED ORAL
Qty: 30 | Refills: 0
Start: 2019-12-30 | End: 2020-01-28

## 2019-12-30 RX ORDER — FOLIC ACID 0.8 MG
1 TABLET ORAL
Qty: 0 | Refills: 0 | DISCHARGE
Start: 2019-12-30

## 2019-12-30 RX ORDER — OXYCODONE HYDROCHLORIDE 5 MG/1
0.5 TABLET ORAL
Qty: 24 | Refills: 0
Start: 2019-12-30 | End: 2020-01-10

## 2019-12-30 RX ORDER — ASPIRIN/CALCIUM CARB/MAGNESIUM 324 MG
1 TABLET ORAL
Qty: 0 | Refills: 0 | DISCHARGE
Start: 2019-12-30

## 2019-12-30 RX ORDER — OXYCODONE HYDROCHLORIDE 5 MG/1
1 TABLET ORAL
Qty: 48 | Refills: 0
Start: 2019-12-30 | End: 2020-01-10

## 2019-12-30 RX ORDER — LIDOCAINE 4 G/100G
1 CREAM TOPICAL
Qty: 15 | Refills: 0
Start: 2019-12-30 | End: 2020-01-13

## 2019-12-30 RX ORDER — HYDROXYUREA 500 MG/1
1000 CAPSULE ORAL DAILY
Refills: 0 | Status: DISCONTINUED | OUTPATIENT
Start: 2019-12-30 | End: 2019-12-31

## 2019-12-30 RX ORDER — SENNA PLUS 8.6 MG/1
2 TABLET ORAL
Qty: 0 | Refills: 0 | DISCHARGE
Start: 2019-12-30

## 2019-12-30 RX ORDER — OXYCODONE HYDROCHLORIDE 5 MG/1
5 TABLET ORAL EVERY 6 HOURS
Refills: 0 | Status: DISCONTINUED | OUTPATIENT
Start: 2019-12-30 | End: 2019-12-31

## 2019-12-30 RX ADMIN — Medication 1 MILLIGRAM(S): at 12:06

## 2019-12-30 RX ADMIN — HEPARIN SODIUM 5000 UNIT(S): 5000 INJECTION INTRAVENOUS; SUBCUTANEOUS at 06:15

## 2019-12-30 RX ADMIN — HEPARIN SODIUM 5000 UNIT(S): 5000 INJECTION INTRAVENOUS; SUBCUTANEOUS at 12:07

## 2019-12-30 RX ADMIN — LIDOCAINE 1 PATCH: 4 CREAM TOPICAL at 19:35

## 2019-12-30 RX ADMIN — ATORVASTATIN CALCIUM 80 MILLIGRAM(S): 80 TABLET, FILM COATED ORAL at 23:30

## 2019-12-30 RX ADMIN — LIDOCAINE 1 PATCH: 4 CREAM TOPICAL at 12:06

## 2019-12-30 RX ADMIN — HYDROXYUREA 1000 MILLIGRAM(S): 500 CAPSULE ORAL at 18:47

## 2019-12-30 RX ADMIN — Medication 81 MILLIGRAM(S): at 12:06

## 2019-12-30 RX ADMIN — FENTANYL CITRATE 1 PATCH: 50 INJECTION INTRAVENOUS at 08:47

## 2019-12-30 NOTE — DISCHARGE NOTE PROVIDER - NSDCCPCAREPLAN_GEN_ALL_CORE_FT
PRINCIPAL DISCHARGE DIAGNOSIS  Diagnosis: Cerebrovascular accident (CVA)  Assessment and Plan of Treatment:       SECONDARY DISCHARGE DIAGNOSES  Diagnosis: Pneumonia, aspiration  Assessment and Plan of Treatment: PRINCIPAL DISCHARGE DIAGNOSIS  Diagnosis: Cerebrovascular accident (CVA)  Assessment and Plan of Treatment:       SECONDARY DISCHARGE DIAGNOSES  Diagnosis: Thrombocytosis  Assessment and Plan of Treatment: Follow up with Hematology    Diagnosis: Pneumonia, aspiration  Assessment and Plan of Treatment: s/p Completion of antibiotics    Diagnosis: Pneumothorax, unspecified type  Assessment and Plan of Treatment: REsolved    Diagnosis: Aneurysm  Assessment and Plan of Treatment: No acute Surgical Intervantion, You Need  to follow up with a Neurosurgeon in 1week, Please Call for an appointment    Diagnosis: Sickle cell disease  Assessment and Plan of Treatment: Pain Management,  Follow up with Your Hematologist in 1-2 weeeks

## 2019-12-30 NOTE — DISCHARGE NOTE PROVIDER - NSFOLLOWUPCLINICS_GEN_ALL_ED_FT
[facility]  Hematology/Oncology  [address] [place] [address]  Phone: [phone]  Fax:   Follow Up Time: 1 week    [facility]  Neurology  [address] [place] [id number]  Phone: [phone]  Fax:   Follow Up Time: 1 week

## 2019-12-30 NOTE — PROGRESS NOTE ADULT - PROBLEM SELECTOR PLAN 4
Worsened with deep inspiration, patient with thin body habitus, could be pleuritic by rib cage  US 12/24 with gallbladder sludge which may be secondary to prolonged fasting  LFT's stable  - tylenol prn pain  - encourage po intake   - continue to monitor, if persistent pain/fever/change in LFT's will repeat abd US improved

## 2019-12-30 NOTE — PROGRESS NOTE ADULT - SUBJECTIVE AND OBJECTIVE BOX
Hematology Oncology Follow-up    INTERVAL HPI/OVERNIGHT EVENTS: Denies any nausea or vomiting, no pain at this time.  Denies any fevers or chills. Denies any shortness of breath, no palpitations. Reports that strength / coordination in right hand is better.    Review of Systems:  General: denies fevers/chills, night sweats, malaise, changes in appetite  Head: denies HA  Eyes: denies vision change  ENT: denies oral lesions, rhinorrhea, epistaxes, sore throat, dysphagia  Respiratory: denies cough, shortness of breath, pleurisy  Cardiovascular: denies chest pain, palpitaitons, RICK  Gastrointestinal: denies nausea, vomiting, abdominal pain, constipation, diarrhea, melena, hematochezia  MSK: denies joint pain or muscle pain  Neuro: difficulty with right hand coordination, but improving; no numbness, paresthesias slurred speech, or headache  Skin: denies rash, petechiae, ecchymoses  Psych: denies anxiety or sleep disturbances    VITAL SIGNS:  T(F): 97.7 (12-30-19 @ 07:30)  HR: 82 (12-30-19 @ 07:30)  BP: 127/72 (12-30-19 @ 07:30)  RR: 16 (12-30-19 @ 07:30)  SpO2: 100% (12-30-19 @ 07:30)      PHYSICAL EXAM:    Constitutional: AAOx3, NAD; sitting upright at the edge of the bed eating breakfast, using right hand to hold fork without difficulty  Eyes: PERRL, EOMI, sclera non-icteric  Neck: supple, no masses, no JVD, no lymphadenopathy  Respiratory: CTA b/l, no wheezing, rhonchi, rales, with normal respiratory effort  Cardiovascular: RRR, normal S1S2, no M/R/G  Gastrointestinal: soft, NTND, no masses palpable, BS normal in all four quadrants, no HSM  Extremities:  no edema  MSK: no obvious abnormalities, normal ROM, no lymphadenopathy  Neurological: Mild right hand dyskinesia, markedly improved; otherwise no gross deficits appreciated  Skin: Normal temperature, no rash, no echymoses, no petichiae  Psych: normal affect    MEDICATIONS  (STANDING):  aspirin  chewable 81 milliGRAM(s) Oral daily  atorvastatin 80 milliGRAM(s) Oral at bedtime  fentaNYL   Patch  25 MICROgram(s)/Hr 1 Patch Transdermal every 72 hours  folic acid 1 milliGRAM(s) Oral daily  heparin  Injectable 5000 Unit(s) SubCutaneous every 8 hours  lidocaine   Patch 1 Patch Transdermal daily  senna 2 Tablet(s) Oral at bedtime    MEDICATIONS  (PRN):  HYDROmorphone  Injectable 1 milliGRAM(s) IV Push every 4 hours PRN Moderate Pain (4 - 6)      No Known Allergies      LABS:                        10.0   11.97 )-----------( 1194     ( 30 Dec 2019 06:15 )             31.3     12-30    134<L>  |  99  |  14  ----------------------------<  99  4.2   |  21<L>  |  0.47<L>    Ca    10.2      30 Dec 2019 06:15  Phos  4.4     12-30  Mg     1.7     12-30    TPro  8.7<H>  /  Alb  4.0  /  TBili  1.0  /  DBili  x   /  AST  32  /  ALT  19  /  AlkPhos  91  12-30     Lactate Dehydrogenase, Serum: 336 U/L (12-30 @ 06:15)  Haptoglobin, Serum: < 20 mg/dL (12-30 @ 06:15)      Vitamin B12, Serum: 1117 pg/mL (12-27-19 @ 03:10)  Folate, Serum: 18.5 ng/mL (12-25-19 @ 02:25)      RADIOLOGY & ADDITIONAL TESTS:  Studies reviewed.

## 2019-12-30 NOTE — DISCHARGE NOTE PROVIDER - NSDCMRMEDTOKEN_GEN_ALL_CORE_FT
aspirin 81 mg oral tablet, chewable: 1 tab(s) orally once a day  atorvastatin 80 mg oral tablet: 1 tab(s) orally once a day (at bedtime)  folic acid 1 mg oral tablet: 1 tab(s) orally once a day  lidocaine 5% topical film: Apply topically to affected area once a day  OxyCONTIN 10 mg oral tablet, extended release: 0.5 tab(s) orally every 6 hours, As Needed  for Pain  senna oral tablet: 2 tab(s) orally once a day (at bedtime) aspirin 81 mg oral tablet, chewable: 1 tab(s) orally once a day  atorvastatin 80 mg oral tablet: 1 tab(s) orally once a day (at bedtime)  folic acid 1 mg oral tablet: 1 tab(s) orally once a day  oxyCODONE 5 mg oral tablet: 1 tab(s) orally every 6 hours, As Needed -for moderate pain and severe pain. MDD:4 tabs QD  senna oral tablet: 2 tab(s) orally once a day (at bedtime) aspirin 81 mg oral tablet, chewable: 1 tab(s) orally once a day  atorvastatin 80 mg oral tablet: 1 tab(s) orally once a day (at bedtime)  folic acid 1 mg oral tablet: 1 tab(s) orally once a day  hydroxyurea 500 mg oral capsule: 2 cap(s) orally once a day  oxyCODONE 5 mg oral tablet: 1 tab(s) orally every 6 hours, As Needed -for moderate pain and severe pain. MDD:4 tabs QD  senna oral tablet: 2 tab(s) orally once a day (at bedtime) aspirin 81 mg oral tablet, chewable: 1 tab(s) orally once a day  atorvastatin 80 mg oral tablet: 1 tab(s) orally once a day (at bedtime)  folic acid 1 mg oral tablet: 1 tab(s) orally once a day  hydroxyurea 500 mg oral capsule: 2 cap(s) orally once a day  oxyCODONE 5 mg oral tablet: 1 tab(s) orally every 6 hours, As needed, Moderate Pain (4 - 6) and Severe Pain  senna oral tablet: 2 tab(s) orally once a day (at bedtime)

## 2019-12-30 NOTE — DISCHARGE NOTE PROVIDER - HOSPITAL COURSE
19M visiting from [place] presented to [facility] 12/14/19 with bone pain, admitted for treatement of VOC, 12/16 developed ACS, was intubated, underwent exchange transfusion (again 12/17 and 12/18); 12/17 reintubated and found to have bilat PTX, bilat chest tubes placed; transferred to [facility] 12/18 on levophed, fentanyl, propofol gtt.  At [facility] had exchange transfusions x 3, last on 12/21. Extubated 12/22, chest tubes removed 12/23.  completed 7 days cefepime/vanco, but has persistent fevers and zosyn was resumed 12/22.  Due to persistent encephalopathy pain was treated with fentanyl patch with dilaudid IV push for breakthrough pain since pt could not use PCA. 12/24 CThead demonstrated right superior cerebellar CVA.  Pt received 1 U PRBC.  He was stable for transfer to medical floor 12/26.           Problem/Plan - 1:    ·  Problem: CVA (cerebral vascular accident).  Plan: Appreciate neuro recs    Possibly due to VOC    MRI/MRA/TTE with bubble    ASA 81, lipitor 80    S&S (currently eating regular diet)    PT/OT.          Problem/Plan - 2:    ·  Problem: Encephalopathy.  Plan: Reportedly waxing/waning c/w delirium due to metabolic encephalopathy    Currently awake alert and oriented, monitor for resolution of encephalopathy    Behavioral health consulted 12/24 for management of associated agitation: Zyprexa prn, has not been required since 1 dose haldol given on 12/24    improved.          Problem/Plan - 3:    ·  Problem: Aspiration pneumonia, unspecified aspiration pneumonia type, unspecified laterality, unspecified part of lung.  Plan: Last fever 12/24    WBC trending down    Completed 5 days of zosyn.          Problem/Plan - 4:    ·  Problem: Right upper quadrant abdominal pain.  Plan: Worsened with deep inspiration, patient with thin body habitus, could be pleuritic by rib cage    US 12/24 with gallbladder sludge which may be secondary to prolonged fasting    LFT's stable    - tylenol prn pain    - encourage po intake     - continue to monitor, if persistent pain/fever/change in LFT's will repeat abd US improved.          Problem/Plan - 5:    ·  Problem: Sickle cell disease.  Plan: Pain controlled with fentanyl patch, has not required breakthrough meds since early am 12/26    s/p IVF, tolerating po diet, incentive spirometry    HbS percentage stable, does not require PLEX at this time; f/u heme recs.          Problem/Plan - 6:    Problem: Thrombocytosis. Plan: In the setting of anemia/SCD with active hemolysis    Discussed with Hematology fellow, as long as patient is clinically stable, can continue to monitor    - monitor CBC daily. 19M visiting from [place] presented to [facility] 12/14/19 with bone pain, admitted for treatement of VOC, 12/16 developed ACS, was intubated, underwent exchange transfusion (again 12/17 and 12/18); 12/17 reintubated and found to have bilat PTX, bilat chest tubes placed; transferred to [facility] 12/18 on levophed, fentanyl, propofol gtt.  At [facility] had exchange transfusions x 3, last on 12/21. Extubated 12/22, chest tubes removed 12/23.  completed 7 days cefepime/vanco, but has persistent fevers and zosyn was resumed 12/22.  Due to persistent encephalopathy pain was treated with fentanyl patch with dilaudid IV push for breakthrough pain since pt could not use PCA. 12/24 CThead demonstrated right superior cerebellar CVA.  Pt received 1 U PRBC.  He was stable for transfer to medical floor 12/26.          · 1) : CVA (cerebral vascular accident).  Plan: Appreciate neuro recs    Possibly due to VOC    MRI/MRA: Noted with Multiple aneurysm, Seen By Neurosurgery: No acute neurosurgical intervention     Outpatient Follow up with Dr. Walsh            TTE with bubble    ASA 81, lipitor 80    S&S (currently eating regular diet)    PT/OT.         ·  2) Encephalopathy.  Plan: Reportedly waxing/waning c/w delirium due to metabolic encephalopathy    Currently awake alert and oriented, monitor for resolution of encephalopathy    Behavioral health consulted 12/24 for management of associated agitation: Zyprexa prn, has not been required since 1 dose haldol given on 12/24    improved.         ·  3) Aspiration pneumonia, unspecified aspiration pneumonia type, unspecified laterality, unspecified part of lung.  Plan: Last fever 12/24    WBC trending down    Completed 5 days of zosyn.         · 4) Right upper quadrant abdominal pain.  Plan: Worsened with deep inspiration, patient with thin body habitus, could be pleuritic by rib cage    US 12/24 with gallbladder sludge which may be secondary to prolonged fasting    LFT's stable    - tylenol prn pain    - encourage po intake     -    · 5) Sickle cell disease.  Plan: Pain controlled with fentanyl patch, has not required breakthrough meds since early am 12/26    s/p IVF, tolerating po diet, incentive spirometry    HbS percentage stable, does not require PLEX at this time; f/u heme recs.         6) Thrombocytosis. Plan: In the setting of anemia/SCD with active hemolysis    Discussed with Hematology fellow, as long as patient is clinically stable, can continue to monitor    - monitor CBC daily. 19M visiting from [place] presented to [facility] 12/14/19 with bone pain, admitted for treatement of VOC, 12/16 developed ACS, was intubated, underwent exchange transfusion (again 12/17 and 12/18); 12/17 reintubated and found to have bilat PTX, bilat chest tubes placed; transferred to [facility] 12/18 on levophed, fentanyl, propofol gtt.  At [facility] had exchange transfusions x 3, last on 12/21. Extubated 12/22, chest tubes removed 12/23.  completed 7 days cefepime/vanco, but has persistent fevers and zosyn was resumed 12/22.  Due to persistent encephalopathy pain was treated with fentanyl patch with dilaudid IV push for breakthrough pain since pt could not use PCA. 12/24 CThead demonstrated right superior cerebellar CVA.  Pt received 1 U PRBC.  He was stable for transfer to medical floor 12/26.          · 1) : CVA (cerebral vascular accident).  Plan: Appreciate neuro recs    Possibly due to VOC    MRI/MRA: Noted with Multiple aneurysm, Seen By Neurosurgery: No acute neurosurgical intervention     Outpatient Follow up with Dr. Walsh        Evaluated by PT - patient with no needs     ASA 81, lipitor 80    S&S (currently eating regular diet)-  Regular diet with thin liquids     Echo with bubble study             ·  2) Encephalopathy.  Plan: Reportedly waxing/waning c/w delirium due to metabolic encephalopathy    Currently awake alert and oriented, monitor for resolution of encephalopathy    Behavioral health consulted 12/24 for management of associated agitation: Zyprexa prn, has not been required since 1 dose haldol given on 12/24    improved.         ·  3) Aspiration pneumonia, unspecified aspiration pneumonia type, unspecified laterality, unspecified part of lung.  Plan: Last fever 12/24    WBC trending down    Completed 5 days of zosyn.         · 4) Right upper quadrant abdominal pain.  Plan: Worsened with deep inspiration, patient with thin body habitus, could be pleuritic by rib cage    US 12/24 with gallbladder sludge which may be secondary to prolonged fasting    LFT's stable    - tylenol prn pain    - encourage po intake     -    · 5) Sickle cell disease.  Plan: Pain controlled with fentanyl patch, has not required breakthrough meds since early am 12/26    s/p IVF, tolerating po diet, incentive spirometry    HbS percentage stable, does not require PLEX at this time; f/u heme recs.    follow up with hematology as outpt          6) Thrombocytosis. Plan: In the setting of anemia/SCD with active hemolysis    Discussed with Hematology fellow, as long as patient is clinically stable, can continue to monitor    - monitor CBC daily.    started on hydroxyurea        Patients outpt medication sent to [facility] - cost 58.00 - As per  discussion with family - agreeable to cost and will pickup medication from [facility]

## 2019-12-30 NOTE — DISCHARGE NOTE PROVIDER - CARE PROVIDER_API CALL
Trae Walsh)  Neurological Surgery  [address] [place] [address]  Phone: [phone]  Fax: [phone]  Follow Up Time:

## 2019-12-30 NOTE — PROGRESS NOTE ADULT - ASSESSMENT
18 y/o male w/sickle cell no prior acute chest transferred from [facility] to [facility] on 12/18/19 for management of acute chest syndrome 2/2 sickle cell disease, course complicated by bilateral pneumothoraces. s/p extubation on 12/22; pigtail catheters removed 12/23.    # Sickle cell disease c/b acute chest  -Found to have acute subacute right cerebellar stroke in [facility] s/p simple transfusion on 12/24 given HgS 31% on 12/23 per discussion with Dr. Cardenas  -Hgb stable, reticulocytosis decreases in setting of decreased hemolysis 2/2 sickle cell  -has been having increased thrombocytosis, >1,000k over the weekend; suspect reactive in setting of recent sickle crisis. Will review peripheral blood smear. Was considering starting hydroxyurea for prophylaxis against sickle cell in the future, might start during this hospitalization  -monitor CBC w/ differential, reticulocyte count, bilirubin, LDH, haptoglobin daily  -peripheral blood smear reviewed on 12/19/19; showing multiple sickle cells, microspherocytes, blister cells, and some target cells. 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 Will repeat peripheral blood smear today.  -IVF, folic acid, incentive spirometry  -appreciate neurological evaluation; though suspect ischemic stroke 2/2 sickle cell disease, agree with stroke work up to r/o other etiologies  -please continue to ensure adequate pain control for sickle cell    Bharathi Burnham MD, MPH  Hematology/Oncology Fellow, PGY5  p  18 y/o male w/sickle cell no prior acute chest transferred from [facility] to [facility] on 12/18/19 for management of acute chest syndrome 2/2 sickle cell disease, course complicated by bilateral pneumothoraces. s/p extubation on 12/22; pigtail catheters removed 12/23.    # Sickle cell disease c/b acute chest  -Found to have acute subacute right cerebellar stroke in C[facility] s/p simple transfusion on 12/24 given HgS 31% on 12/23 per discussion with Dr. Cardenas  -Hgb stable, reticulocytosis decreases in setting of decreased hemolysis 2/2 sickle cell  -has been having increased thrombocytosis, >1,000k over the weekend; suspect reactive in setting of recent sickle crisis. Will review peripheral blood smear. Pt was considering starting hydroxyurea for prophylaxis against sickle cell in the future, recommend start during this hospitalization  -monitor CBC w/ differential, reticulocyte count, bilirubin, LDH, haptoglobin daily  -peripheral blood smear reviewed on 12/19/19; showing multiple sickle cells, microspherocytes, blister cells, and some target cells. 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 Will repeat peripheral blood smear today.  -IVF, folic acid, incentive spirometry  -appreciate neurological evaluation; though suspect ischemic stroke 2/2 sickle cell disease, agree with stroke work up to r/o other etiologies  -please continue to ensure adequate pain control for sickle cell    Bharathi Burnham MD, MPH  Hematology/Oncology Fellow, PGY5  p

## 2019-12-30 NOTE — PROGRESS NOTE ADULT - ATTENDING COMMENTS
Pt seen and examined agree with above. Recommend starting hydrea this afternoon given reactive thrombocytosis to prevent further rise in plt and to prevent sickle cell recurrence.

## 2019-12-30 NOTE — CONSULT NOTE ADULT - SUBJECTIVE AND OBJECTIVE BOX
HPI:  18 y/o male w/sickle cell no prior acute chest, last crises 2-3 years ago had recently traveled to [place] returned 5 days ago, presented to [facility] w/complain of chest and bone pain. Patient doesn't report any sob, states bones hurt, did have decreased po intake for few days with travels.  Patient denied any fever, chills, sob, palpitations, n/v/d/c/. Of note, Pt never had been intubated prior or required transfusions previously, last sickle crisis was 2-3 years ago.    In ED, patient had a CTA that was noncontributory. RRT on 12/16, patient found to be in hypoxic resp failure, sat 91% on 4L NC. Initial CT chest on admission neg, was broadened to Vanc/zosyn from CTX with CXR showing worsening bilateral infiltrates c/f acute chest. On night of 12/16, Patient developed fever tachycardia further desat requiring NRB c/f vaso-occlusive crises. Patient was transferred to CCU for management of acute chest. Patient became hypoxic despite hiflow and bipap and was intubated on 12/17 AM. Initially pt on FiO2 100% PEEP 14; currently pt sat 98% on FiO2 60% PEEP 14.  Patient subsequently received plasma exchange on 12/17, and tolerated procedure well with LDH, haptoglobin, d-dimer, bilirubin and Creatinine improving. P/F ratio found to be 110, concerning for ARDS. After initial intubation, on am rounds pt biting (clenching down) his ETT and HR started to decrease. Bite block placed and patient was difficult to bag; decision made to remove ETT and re-intubate pt. Pt desat'd and 1st attempt was esophagus, 2nd attempt ETT placed in trach. Post intubation sats in the low 80s and CXR revealed b/l ptx; b/l pigtail catheters placed with re-expansion of the lungs. Given patient's persistent hypoxia and acute respiratory distress syndrome, remaining vent dependent, plasma exchange on 12/18/19 was repeated.  Patient transferred to [facility] for further management of sickle cell crisis. (19 Dec 2019 05:18)    PAST MEDICAL & SURGICAL HISTORY:  Sickle cell disease    Allergies    No Known Allergies    Intolerances      aspirin  chewable 81 milliGRAM(s) Oral daily  atorvastatin 80 milliGRAM(s) Oral at bedtime  fentaNYL   Patch  25 MICROgram(s)/Hr 1 Patch Transdermal every 72 hours  folic acid 1 milliGRAM(s) Oral daily  heparin  Injectable 5000 Unit(s) SubCutaneous every 8 hours  HYDROmorphone  Injectable 1 milliGRAM(s) IV Push every 4 hours PRN  lidocaine   Patch 1 Patch Transdermal daily  senna 2 Tablet(s) Oral at bedtime    SOCIAL HISTORY:  FAMILY HISTORY:    Vital Signs Last 24 Hrs  T(C): 36.9 (30 Dec 2019 11:30), Max: 37.5 (29 Dec 2019 17:22)  T(F): 98.4 (30 Dec 2019 11:30), Max: 99.5 (29 Dec 2019 17:22)  HR: 81 (30 Dec 2019 11:30) (80 - 85)  BP: 131/75 (30 Dec 2019 11:30) (103/62 - 131/75)  BP(mean): --  RR: 16 (30 Dec 2019 11:30) (15 - 17)  SpO2: 100% (30 Dec 2019 11:30) (100% - 100%)    PHYSICAL EXAM:  Awake Alert Attentive Affect appropriate Ox3  PERRL EOMI  REA x 4  No drift    LABS:                          10.0   11.97 )-----------( 1194     ( 30 Dec 2019 06:15 )             31.3     12-30    134<L>  |  99  |  14  ----------------------------<  99  4.2   |  21<L>  |  0.47<L>    Ca    10.2      30 Dec 2019 06:15  Phos  4.4     12-30  Mg     1.7     12-30    TPro  8.7<H>  /  Alb  4.0  /  TBili  1.0  /  DBili  x   /  AST  32  /  ALT  19  /  AlkPhos  91  12-30          RADIOLOGY & ADDITIONAL STUDIES:  < from: MR Angio Head No Cont (12.29.19 @ 21:15) >  FINDINGS:    Brain MRI:  There is evidence of restricted diffusion within the anterior limb of the right internal capsule with associated low signal on ADC maps consistent with an acute lacunar infarct. There is no hemorrhagic transformation.  Increased T2 and FLAIR signal involves the right caudate head and right lentiform nucleus with slight increased diffusion-weighted signal, most likely reflecting evolving late subacute infarction. There is no hemorrhagic transformation.    There is abnormal gyriform T2 and FLAIR signal hyperintensity with associated enhancement involving the superior right cerebellar hemisphere, in the superior cerebellar artery distribution. There is abnormal dark signal on susceptibility weighted imaging within this region of abnormal signal, most consistent with petechial hemorrhagic transformation without hematoma formation. Vague increased diffusion-weighted signal is present without matched ADC map signal. These findings are most consistent with an evolving late subacute infarct in the right superior cerebellar artery distribution.  A late subacute lacunar infarct involves the left cerebellum with associated enhancement.    Gyriform vague increased FLAIR signal involves the right frontal lobe cortex at the level of the superior frontal gyrus and middle frontal gyrus, with associated linear enhancement in some areas. These findings are most consistent with evolving late subacute ischemia. There is no hemorrhagic transformation.  There is no extra-axial fluid collection.  The orbits, sellar and suprasellar structures, and craniocervical junction are unremarkable.   Diffuse decrease of the T1 marrow signal is noted, most likely reflecting marrow changes secondary to the patient's known history of sickle cell anemia.  The right maxillary sinus is completely opacified. There is moderate mucosal thickening involving the left maxillary sinus.    Brain MRA:  A 6 mm aneurysm involves the left carotid bifurcation, which also incorporates the proximal M1 segment. The neck of the aneurysm measures roughly 2 mm. The aneurysm projects superiorly.  A 3 mm aneurysm involves the left P1 segment which projects superiorly and posteriorly. A 2 mm aneurysm involves the distal left basilar artery, also incorporating the origin of the left posterior cerebral artery, which projects to the left and posteriorly. A questionable additional 2 mm aneurysm may involve the basilar tip.  There is no evidence for focal stenosis or acute major vessel occlusion about the [place]. The right superior cerebellar arteryis widely patent.    Neck MRA:  The aortic arch and origins of the great vessels are unremarkable.  Common and Internal Carotids: The origin, course and caliber of the common and internal carotid arteries are unremarkable.  There is no significant stenosis by NASCET criteria.  Vertebral arteries:   The origin, course and caliber of the vertebral arteries are unremarkable. Both vessels are patent to the intracranial circulation and vertebrobasilar confluence.      IMPRESSION:    BRAIN  Multiple areas of evolving ischemia are noted with distribution as described. Patchy petechial hemorrhagic transformation involves the right superior cerebellar artery distribution infarct.    BRAIN MRA  Multiple aneurysms are noted with distribution as described, involving both the anterior and posterior circulations. Serial imaging follow-up over time is recommended to monitor for stability.  No evidence for acute major vessel occlusion about the [place].    NECK MRA  No evidence of carotid or vertebral artery stenosis or dissection.  < end of copied text >

## 2019-12-30 NOTE — CONSULT NOTE ADULT - ASSESSMENT
20 y/o male w/sickle cell no prior acute chest, last crises 2-3 years ago had recently traveled to [place] returned 5 days ago, presented to [place] ED w/complain of chest and bone pain.  Patient transferred to [facility] for further management of sickle cell crisis. Found to have a cerebellar stroke, MRI/MRA performed which showed multiple aneurysms.

## 2019-12-30 NOTE — PROGRESS NOTE ADULT - SUBJECTIVE AND OBJECTIVE BOX
Patient is a 19y old  Male who presents with a chief complaint of sickle cell crisis (29 Dec 2019 11:23)      SUBJECTIVE / OVERNIGHT EVENTS: Pt in NAD lying in bed no acute events ON. last Bm 12/28. Tele SR. no PRN Dilaudid given.   ADDITIONAL REVIEW OF SYSTEMS: denies CP/SOB/N/V    MEDICATIONS  (STANDING):  aspirin  chewable 81 milliGRAM(s) Oral daily  atorvastatin 80 milliGRAM(s) Oral at bedtime  fentaNYL   Patch  25 MICROgram(s)/Hr 1 Patch Transdermal every 72 hours  folic acid 1 milliGRAM(s) Oral daily  heparin  Injectable 5000 Unit(s) SubCutaneous every 8 hours  lidocaine   Patch 1 Patch Transdermal daily  senna 2 Tablet(s) Oral at bedtime    MEDICATIONS  (PRN):  HYDROmorphone  Injectable 1 milliGRAM(s) IV Push every 4 hours PRN Moderate Pain (4 - 6)      CAPILLARY BLOOD GLUCOSE        I&O's Summary      PHYSICAL EXAM:  Vital Signs Last 24 Hrs  T(C): 36.5 (30 Dec 2019 07:30), Max: 37.5 (29 Dec 2019 17:22)  T(F): 97.7 (30 Dec 2019 07:30), Max: 99.5 (29 Dec 2019 17:22)  HR: 82 (30 Dec 2019 07:30) (80 - 94)  BP: 127/72 (30 Dec 2019 07:30) (103/62 - 127/72)  BP(mean): --  RR: 16 (30 Dec 2019 07:30) (15 - 17)  SpO2: 100% (30 Dec 2019 07:30) (100% - 100%)  CONSTITUTIONAL: NAD  EYES:  conjunctiva and sclera clear  ENMT: Moist oral mucosa  NECK: Supple  RESPIRATORY: Normal respiratory effort; lungs are clear to auscultation bilaterally  CARDIOVASCULAR: Regular rate and rhythm, normal S1 and S2, No lower extremity edema; Peripheral pulses are 2+ bilaterally  ABDOMEN: Nontender to palpation, normoactive bowel sounds,  MUSCULOSKELETAL:  Normal gait; no clubbing or cyanosis of digits;   PSYCH: A+O to person, place, and time; affect appropriate  NEUROLOGY: symmetric; no gross sensory deficits       LABS:                        10.0   11.97 )-----------( 1194     ( 30 Dec 2019 06:15 )             31.3     12-30    134<L>  |  99  |  14  ----------------------------<  99  4.2   |  21<L>  |  0.47<L>    Ca    10.2      30 Dec 2019 06:15  Phos  4.4     12-30  Mg     1.7     12-30    TPro  8.7<H>  /  Alb  4.0  /  TBili  1.0  /  DBili  x   /  AST  32  /  ALT  19  /  AlkPhos  91  12-30                RADIOLOGY & ADDITIONAL TESTS:  Results Reviewed: < from: Transthoracic Echocardiogram (12.27.19 @ 18:30) >  CONCLUSIONS:  1. Eccentric left ventricular hypertrophy (dilated left  ventricle with normal relative wall thickness).  2. Hyperdynamic left ventricle.  3. Normal left ventricular diastolic function.  4. Normal right ventricular size and function.  5. Estimated right ventricular systolic pressure equals 31  mm Hg, assuming right atrial pressure equals 10 mm Hg,  consistent with normal pulmonary pressures.    < end of copied text >    Imaging Personally Reviewed:  Electrocardiogram Personally Reviewed:    COORDINATION OF CARE:  Care Discussed with Consultants/Other Providers [Y/N]:  Prior or Outpatient Records Reviewed [Y/N]:

## 2019-12-30 NOTE — CHART NOTE - NSCHARTNOTEFT_GEN_A_CORE
MRI brain and MRA head and neck results reviewed  MRI brain consistent with known stroke, MRA neck unremarkable, MRA head shows multiple aneurysms, the largest being 6 mm in the L ICA  Would recommend neurosurgery evaluation for the aneurysm, and follow up outpatient with hematology, and both stroke neurology and neurosurgery (here if possible, or back home when patient returns)  Continue ASA 81, hydration    Vidhya Ac  [facility] Neurology MRI brain and MRA head and neck results reviewed  MRI brain consistent with known stroke, MRA neck unremarkable, MRA head shows multiple aneurysms, the largest being 6 mm in the L carotid bifurcation  Would recommend neurosurgery evaluation for the aneurysm, and follow up outpatient with hematology, and both stroke neurology and neurosurgery (here if possible, or back home when patient returns)  Continue ASA 81, hydration    Vidhya Ac  [facility] Neurology

## 2019-12-30 NOTE — PROGRESS NOTE ADULT - PROBLEM SELECTOR PLAN 7
1.  Name of PCP: pt is from [facility] no local PCP  2.  PCP Contacted on Admission: [ ] Y    [ x] N    3.  PCP contacted at Discharge: [ ] Y    [ ] N    [ ] N/A  4.  Post-Discharge Appointment Date and Location:  5.  Summary of Handoff given to PCP:

## 2019-12-30 NOTE — PROGRESS NOTE ADULT - ASSESSMENT
19M visiting from [place] presented to [facility] 12/14/19 with bone pain, admitted for treatement of VOC, 12/16 developed ACS, was intubated, underwent exchange transfusion (again 12/17 and 12/18); 12/17 reintubated and found to have bilat PTX, bilat chest tubes placed; transferred to [facility] 12/18 on levophed, fentanyl, propofol gtt.  At [facility] had exchange transfusions x 3, last on 12/21. Extubated 12/22, chest tubes removed 12/23.  completed 7 days cefepime/vanco, but has persistent fevers and zosyn was resumed 12/22.  Due to persistent encephalopathy pain was treated with fentanyl patch with dilaudid IV push for breakthrough pain since pt could not use PCA. 12/24 CThead demonstrated right superior cerebellar CVA.  Pt received 1 U PRBC.  He was stable for transfer to medical floor 12/26.

## 2019-12-31 ENCOUNTER — TRANSCRIPTION ENCOUNTER (OUTPATIENT)
Age: 19
End: 2019-12-31

## 2019-12-31 VITALS
TEMPERATURE: 99 F | OXYGEN SATURATION: 100 % | DIASTOLIC BLOOD PRESSURE: 78 MMHG | RESPIRATION RATE: 17 BRPM | SYSTOLIC BLOOD PRESSURE: 124 MMHG | HEART RATE: 74 BPM

## 2019-12-31 LAB
ANION GAP SERPL CALC-SCNC: 14 MMO/L — SIGNIFICANT CHANGE UP (ref 7–14)
BASOPHILS # BLD AUTO: 0.13 K/UL — SIGNIFICANT CHANGE UP (ref 0–0.2)
BASOPHILS NFR BLD AUTO: 1.1 % — SIGNIFICANT CHANGE UP (ref 0–2)
BUN SERPL-MCNC: 14 MG/DL — SIGNIFICANT CHANGE UP (ref 7–23)
CALCIUM SERPL-MCNC: 10.1 MG/DL — SIGNIFICANT CHANGE UP (ref 8.4–10.5)
CHLORIDE SERPL-SCNC: 97 MMOL/L — LOW (ref 98–107)
CO2 SERPL-SCNC: 21 MMOL/L — LOW (ref 22–31)
CREAT SERPL-MCNC: 0.45 MG/DL — LOW (ref 0.5–1.3)
EOSINOPHIL # BLD AUTO: 0.53 K/UL — HIGH (ref 0–0.5)
EOSINOPHIL NFR BLD AUTO: 4.5 % — SIGNIFICANT CHANGE UP (ref 0–6)
GLUCOSE SERPL-MCNC: 96 MG/DL — SIGNIFICANT CHANGE UP (ref 70–99)
HCT VFR BLD CALC: 30.5 % — LOW (ref 39–50)
HGB BLD-MCNC: 9.9 G/DL — LOW (ref 13–17)
IMM GRANULOCYTES NFR BLD AUTO: 0.4 % — SIGNIFICANT CHANGE UP (ref 0–1.5)
LYMPHOCYTES # BLD AUTO: 28.2 % — SIGNIFICANT CHANGE UP (ref 13–44)
LYMPHOCYTES # BLD AUTO: 3.32 K/UL — HIGH (ref 1–3.3)
MAGNESIUM SERPL-MCNC: 1.5 MG/DL — LOW (ref 1.6–2.6)
MCHC RBC-ENTMCNC: 26.7 PG — LOW (ref 27–34)
MCHC RBC-ENTMCNC: 32.5 % — SIGNIFICANT CHANGE UP (ref 32–36)
MCV RBC AUTO: 82.2 FL — SIGNIFICANT CHANGE UP (ref 80–100)
MONOCYTES # BLD AUTO: 1.27 K/UL — HIGH (ref 0–0.9)
MONOCYTES NFR BLD AUTO: 10.8 % — SIGNIFICANT CHANGE UP (ref 2–14)
NEUTROPHILS # BLD AUTO: 6.47 K/UL — SIGNIFICANT CHANGE UP (ref 1.8–7.4)
NEUTROPHILS NFR BLD AUTO: 55 % — SIGNIFICANT CHANGE UP (ref 43–77)
NRBC # FLD: 0 K/UL — SIGNIFICANT CHANGE UP (ref 0–0)
PLATELET # BLD AUTO: 1210 K/UL — CRITICAL HIGH (ref 150–400)
PMV BLD: 9.7 FL — SIGNIFICANT CHANGE UP (ref 7–13)
POTASSIUM SERPL-MCNC: 4.3 MMOL/L — SIGNIFICANT CHANGE UP (ref 3.5–5.3)
POTASSIUM SERPL-SCNC: 4.3 MMOL/L — SIGNIFICANT CHANGE UP (ref 3.5–5.3)
RBC # BLD: 3.71 M/UL — LOW (ref 4.2–5.8)
RBC # FLD: 18.4 % — HIGH (ref 10.3–14.5)
SODIUM SERPL-SCNC: 132 MMOL/L — LOW (ref 135–145)
WBC # BLD: 11.77 K/UL — HIGH (ref 3.8–10.5)
WBC # FLD AUTO: 11.77 K/UL — HIGH (ref 3.8–10.5)

## 2019-12-31 PROCEDURE — 99239 HOSP IP/OBS DSCHRG MGMT >30: CPT

## 2019-12-31 RX ORDER — HYDROXYUREA 500 MG/1
2 CAPSULE ORAL
Qty: 60 | Refills: 0
Start: 2019-12-31 | End: 2020-01-29

## 2019-12-31 RX ORDER — MAGNESIUM SULFATE 500 MG/ML
1 VIAL (ML) INJECTION ONCE
Refills: 0 | Status: COMPLETED | OUTPATIENT
Start: 2019-12-31 | End: 2019-12-31

## 2019-12-31 RX ORDER — OXYCODONE HYDROCHLORIDE 5 MG/1
1 TABLET ORAL
Qty: 0 | Refills: 0 | DISCHARGE
Start: 2019-12-31

## 2019-12-31 RX ADMIN — Medication 81 MILLIGRAM(S): at 12:29

## 2019-12-31 RX ADMIN — Medication 1 MILLIGRAM(S): at 12:29

## 2019-12-31 RX ADMIN — HEPARIN SODIUM 5000 UNIT(S): 5000 INJECTION INTRAVENOUS; SUBCUTANEOUS at 12:29

## 2019-12-31 RX ADMIN — HYDROXYUREA 1000 MILLIGRAM(S): 500 CAPSULE ORAL at 12:29

## 2019-12-31 RX ADMIN — Medication 100 GRAM(S): at 17:22

## 2019-12-31 RX ADMIN — HEPARIN SODIUM 5000 UNIT(S): 5000 INJECTION INTRAVENOUS; SUBCUTANEOUS at 06:54

## 2019-12-31 NOTE — PROGRESS NOTE ADULT - REASON FOR ADMISSION
sickle cell crisis

## 2019-12-31 NOTE — PROVIDER CONTACT NOTE (CRITICAL VALUE NOTIFICATION) - ACTION/TREATMENT ORDERED:
no new recommendations at this time
no new recommendations at this time
Will continue to monitor and educate patient on bleeding precautions.

## 2019-12-31 NOTE — PROGRESS NOTE ADULT - ASSESSMENT
19M visiting from [place] presented to [facility] 12/14/19 with bone pain, admitted for treatement of VOC, 12/16 developed ACS, was intubated, underwent exchange transfusion (again 12/17 and 12/18); 12/17 reintubated and found to have bilat PTX, bilat chest tubes placed; transferred to [facility] 12/18 on levophed, fentanyl, propofol gtt.  At [facility] had exchange transfusions x 3, last on 12/21. Extubated 12/22, chest tubes removed 12/23.  completed 7 days cefepime/vanco, but has persistent fevers and zosyn was resumed 12/22.  Due to persistent encephalopathy pain was treated with fentanyl patch with dilaudid IV push for breakthrough pain since pt could not use PCA. 12/24 CThead demonstrated right superior cerebellar CVA.  Pt received 1 U PRBC.  He was stable for transfer to medical floor 12/26.

## 2019-12-31 NOTE — PROGRESS NOTE ADULT - ATTENDING COMMENTS
Pt from [place] to resturn 1/12 and scripts sent to [facility] family agreeable to pay. discharge 39 min if TTE neg for PFO

## 2019-12-31 NOTE — PROVIDER CONTACT NOTE (CRITICAL VALUE NOTIFICATION) - BACKGROUND
admitted with sickle cell disease without crisis, +cva, hypoxic respiratory failure
20 yo male arrived with chest and bone pain c/b acute chest syndrome s/p intubation for hypoxic respiratory failure s/p RBC exchange transfusion x1 and plasma exchange x2 c/b BL PTX s/p BL pigtail catheters.
patient admitted with sickle cell disease without crisis, hx of cva, pneumothorax

## 2019-12-31 NOTE — CHART NOTE - NSCHARTNOTEFT_GEN_A_CORE
mag 1.5 - repleted   Await echo for discharge planning today mag 1.5 - repleted   limited echo with bubble study - no evidence of PFO  discharge planning following magnesium repletion    As d/w MD today echo negative for PFO patient to be discharged home

## 2019-12-31 NOTE — PROGRESS NOTE ADULT - PROVIDER SPECIALTY LIST ADULT
Heme/Onc
Hospitalist
MICU
Neurology
Hospitalist

## 2019-12-31 NOTE — PROGRESS NOTE ADULT - PROBLEM SELECTOR PLAN 1
Appreciate neuro recs  Possibly due to VOC  MRI/MRA/TTE with bubble  ASA 81, lipitor 80  S&S (currently eating regular diet)  PT/OT Appreciate neuro recs  Possibly due to VOC  MRI/MRA-anyerusm seen by NS no acute intervention outpt f/u   ASA 81, lipitor 80  S&S (currently eating regular diet)  PT/OT

## 2019-12-31 NOTE — PROGRESS NOTE ADULT - PROBLEM SELECTOR PLAN 3
Last fever 12/24  WBC trending down  Completed 5 days of zosyn
Last fever 12/24  WBC trending down  Zosyn since evening 12/22--if continues to improve consider d/c zosyn

## 2019-12-31 NOTE — PROGRESS NOTE ADULT - PROBLEM SELECTOR PLAN 7
1.  Name of PCP: pt is from [facility] no local PCP  2.  PCP Contacted on Admission: [ ] Y    [ x] N    3.  PCP contacted at Discharge: [ ] Y    [ ] N    [ ] N/A  4.  Post-Discharge Appointment Date and Location:  5.  Summary of Handoff given to PCP:

## 2019-12-31 NOTE — PROGRESS NOTE ADULT - PROBLEM SELECTOR PLAN 5
Pain controlled stopped fentanyl patch transition to PO Oxy IR   s/p IVF, tolerating po diet, incentive spirometry  HbS percentage stable, does not require PLEX at this time;

## 2019-12-31 NOTE — PROGRESS NOTE ADULT - PROBLEM SELECTOR PLAN 6
In the setting of anemia/SCD with active hemolysis  Discussed with Hematology fellow, as long as patient is clinically stable, can continue to monitor  added hydrea  - monitor CBC daily

## 2019-12-31 NOTE — PROGRESS NOTE ADULT - PROBLEM SELECTOR PROBLEM 3
Aspiration pneumonia, unspecified aspiration pneumonia type, unspecified laterality, unspecified part of lung

## 2019-12-31 NOTE — PROGRESS NOTE ADULT - PROBLEM SELECTOR PROBLEM 4
Right upper quadrant abdominal pain
Sickle cell disease

## 2019-12-31 NOTE — DISCHARGE NOTE NURSING/CASE MANAGEMENT/SOCIAL WORK - PATIENT PORTAL LINK FT
You can access the FollowMyHealth Patient Portal offered by [facility] by registering at the following website: [email or web address]. By joining [facility] [employer]’s FollowMyHealth portal, you will also be able to view your health information using other applications (apps) compatible with our system.

## 2019-12-31 NOTE — PROVIDER CONTACT NOTE (CRITICAL VALUE NOTIFICATION) - ASSESSMENT
no active signs/symptoms of bleeding, no SOB.
Patient has no symptoms. Patient reports no s/s of excessive bleeding or brusing.
no active bleeding noted

## 2019-12-31 NOTE — PROGRESS NOTE ADULT - PROBLEM SELECTOR PROBLEM 5
Sickle cell disease
Discharge planning issues

## 2019-12-31 NOTE — DISCHARGE NOTE NURSING/CASE MANAGEMENT/SOCIAL WORK - NSDCPEFALRISK_GEN_ALL_CORE
Problem: Patient Care Overview (Adult)  Goal: Plan of Care Review  Outcome: Outcome(s) achieved Date Met:  12/13/17 12/13/17 1130   Coping/Psychosocial Response Interventions   Plan Of Care Reviewed With patient;family   Patient Care Overview   Progress improving       Goal: Adult Individualization and Mutuality  Outcome: Outcome(s) achieved Date Met:  12/13/17 12/13/17 1130   Individualization   Patient Specific Preferences None       Goal: Discharge Needs Assessment  Outcome: Outcome(s) achieved Date Met:  12/13/17 12/13/17 1130   Discharge Needs Assessment   Concerns To Be Addressed no discharge needs identified   Readmission Within The Last 30 Days no previous admission in last 30 days   Equipment Needed After Discharge none   Discharge Disposition home or self-care   Living Environment   Transportation Available car;family or friend will provide         Problem: Cardiac Catheterization with/without PCI (Adult)  Intervention: Monitor ECG and Peripheral Pulses    12/13/17 1130   Monitor ECG and Peripheral Pulses   Lead Monitored Lead II   Sinus Rhythm sinus arrhythmia;sinus bradycardia       Intervention: Prevent/Manage Vascular Access Site Complications    12/13/17 1130   Cardiac Interventions   Hemostasis Management mechanical compression device removed;vascular closure device maintained   Positioning   Head Of Bed (HOB) Position HOB elevated   Activity   Activity Type activity adjusted per tolerance   Safety Interventions   Bleeding Precautions blood pressure closely monitored;coagulation studies reviewed;monitored for signs of bleeding;protective footwear encouraged         Goal: Signs and Symptoms of Listed Potential Problems Will be Absent or Manageable (Cardiac Catheterization with/without PCI)  Outcome: Outcome(s) achieved Date Met:  12/13/17 12/13/17 1130   Cardiac Catheterization with/without PCI   Problems Assessed (Cardiac Catheterization) all   Problems Present (Cardiac Catheterization)  none            Patient information on fall and injury prevention

## 2019-12-31 NOTE — PROGRESS NOTE ADULT - NSHPATTENDINGPLANDISCUSS_GEN_ALL_CORE
resident/fellow/nurse
resident/fellow/nurse
ICU team and patient family
MICU team
resident/fellow/nurse
resident/fellow/nurse
ACP
ACP

## 2019-12-31 NOTE — PROGRESS NOTE ADULT - SUBJECTIVE AND OBJECTIVE BOX
Patient is a 19y old  Male who presents with a chief complaint of sickle cell crisis (30 Dec 2019 14:07)      SUBJECTIVE / OVERNIGHT EVENTS: Pt doing well in NAD denies any pain   ADDITIONAL REVIEW OF SYSTEMS: denies N/V/D    MEDICATIONS  (STANDING):  aspirin  chewable 81 milliGRAM(s) Oral daily  atorvastatin 80 milliGRAM(s) Oral at bedtime  folic acid 1 milliGRAM(s) Oral daily  heparin  Injectable 5000 Unit(s) SubCutaneous every 8 hours  hydroxyurea (Non - oncologic) 1000 milliGRAM(s) Oral daily  senna 2 Tablet(s) Oral at bedtime    MEDICATIONS  (PRN):  oxyCODONE    IR 5 milliGRAM(s) Oral every 6 hours PRN Moderate Pain (4 - 6) and Severe Pain      CAPILLARY BLOOD GLUCOSE        I&O's Summary      PHYSICAL EXAM:  Vital Signs Last 24 Hrs  T(C): 37.1 (31 Dec 2019 12:28), Max: 37.1 (31 Dec 2019 12:28)  T(F): 98.8 (31 Dec 2019 12:28), Max: 98.8 (31 Dec 2019 12:28)  HR: 74 (31 Dec 2019 12:28) (74 - 81)  BP: 124/78 (31 Dec 2019 12:28) (121/78 - 128/76)  BP(mean): --  RR: 17 (31 Dec 2019 12:28) (16 - 18)  SpO2: 100% (31 Dec 2019 12:28) (98% - 100%)  CONSTITUTIONAL: NAD, well-developed, well-groomed  EYES: PERRLA; conjunctiva and sclera clear  ENMT: Moist oral mucosa, no pharyngeal injection or exudates; normal dentition  NECK: Supple, no palpable masses; no thyromegaly  RESPIRATORY: Normal respiratory effort; lungs are clear to auscultation bilaterally  CARDIOVASCULAR: Regular rate and rhythm, normal S1 and S2, no murmur/rub/gallop; No lower extremity edema; Peripheral pulses are 2+ bilaterally  ABDOMEN: Nontender to palpation, normoactive bowel sounds, no rebound/guarding; No hepatosplenomegaly  MUSCULOSKELETAL:  Normal gait; no clubbing or cyanosis of digits; no joint swelling or tenderness to palpation  PSYCH: A+O to person, place, and time; affect appropriate  NEUROLOGY: CN 2-12 are intact and symmetric; no gross sensory deficits   SKIN: No rashes; no palpable lesions    LABS:                        9.9    11.77 )-----------( 1210     ( 31 Dec 2019 06:30 )             30.5     12-31    132<L>  |  97<L>  |  14  ----------------------------<  96  4.3   |  21<L>  |  0.45<L>    Ca    10.1      31 Dec 2019 06:30  Phos  4.4     12-30  Mg     1.5     12-31    TPro  8.7<H>  /  Alb  4.0  /  TBili  1.0  /  DBili  x   /  AST  32  /  ALT  19  /  AlkPhos  91  12-30                RADIOLOGY & ADDITIONAL TESTS:  Results Reviewed:   Imaging Personally Reviewed:  Electrocardiogram Personally Reviewed:    COORDINATION OF CARE:  Care Discussed with Consultants/Other Providers [Y/N]:  Prior or Outpatient Records Reviewed [Y/N]: Patient is a 19y old  Male who presents with a chief complaint of sickle cell crisis (30 Dec 2019 14:07)      SUBJECTIVE / OVERNIGHT EVENTS: Pt doing well in NAD denies any pain   ADDITIONAL REVIEW OF SYSTEMS: denies N/V/D    MEDICATIONS  (STANDING):  aspirin  chewable 81 milliGRAM(s) Oral daily  atorvastatin 80 milliGRAM(s) Oral at bedtime  folic acid 1 milliGRAM(s) Oral daily  heparin  Injectable 5000 Unit(s) SubCutaneous every 8 hours  hydroxyurea (Non - oncologic) 1000 milliGRAM(s) Oral daily  senna 2 Tablet(s) Oral at bedtime    MEDICATIONS  (PRN):  oxyCODONE    IR 5 milliGRAM(s) Oral every 6 hours PRN Moderate Pain (4 - 6) and Severe Pain      CAPILLARY BLOOD GLUCOSE        I&O's Summary      PHYSICAL EXAM:  Vital Signs Last 24 Hrs  T(C): 37.1 (31 Dec 2019 12:28), Max: 37.1 (31 Dec 2019 12:28)  T(F): 98.8 (31 Dec 2019 12:28), Max: 98.8 (31 Dec 2019 12:28)  HR: 74 (31 Dec 2019 12:28) (74 - 81)  BP: 124/78 (31 Dec 2019 12:28) (121/78 - 128/76)  BP(mean): --  RR: 17 (31 Dec 2019 12:28) (16 - 18)  SpO2: 100% (31 Dec 2019 12:28) (98% - 100%)  CONSTITUTIONAL: NAD, well-developed, well-groomed  EYES: PERRLA; conjunctiva and sclera clear  ENMT: Moist oral mucosa, no pharyngeal injection or exudates; normal dentition  NECK: Supple, no palpable masses; no thyromegaly  RESPIRATORY: Normal respiratory effort; lungs are clear to auscultation bilaterally  CARDIOVASCULAR: Regular rate and rhythm, normal S1 and S2, no murmur/rub/gallop; No lower extremity edema; Peripheral pulses are 2+ bilaterally  ABDOMEN: Nontender to palpation, normoactive bowel sounds, no rebound/guarding; No hepatosplenomegaly  MUSCULOSKELETAL:  Normal gait; no clubbing or cyanosis of digits; no joint swelling or tenderness to palpation  PSYCH: A+O to person, place, and time; affect appropriate  NEUROLOGY: CN 2-12 are intact and symmetric; no gross sensory deficits   SKIN: No rashes; no palpable lesions    LABS:                        9.9    11.77 )-----------( 1210     ( 31 Dec 2019 06:30 )             30.5     12-31    132<L>  |  97<L>  |  14  ----------------------------<  96  4.3   |  21<L>  |  0.45<L>    Ca    10.1      31 Dec 2019 06:30  Phos  4.4     12-30  Mg     1.5     12-31    TPro  8.7<H>  /  Alb  4.0  /  TBili  1.0  /  DBili  x   /  AST  32  /  ALT  19  /  AlkPhos  91  12-30                RADIOLOGY & ADDITIONAL TESTS:  Results Reviewed: < from: MR Angio Neck w/ IV Cont (12.29.19 @ 21:15) >  EXAM:  MR ANGIO BRAIN      EXAM:  MR ANGIO NECK IC      EXAM:  MR BRAIN        PROCEDURE DATE:  Dec 29 2019         INTERPRETATION:  CLINICAL INDICATION: Sickle cell crisis, acute/subacute right cerebellar infarct    Technique: Brain MRI with and without contrast,, non-contrast brain MRA and neck MRA with and without contrast were performed.      Through the brain, sagittal and axial T1, axial T2, FLAIR, diffusion weighted images and an ADC map were obtained. Axial T1 post-contrast images were obtained. 6 cc of intravenous gadolinium was administered and 1.5 discarded,  for contrast MRI brain and contrast enhanced MR angiography of the extracranial circulation.     2-D and 3-D time-of-flight MR angiography of intracranial and extracranial circulation was performed with time of flight imaging technique. Maximal intensity projection images were reviewed in multiple planes.    COMPARISON: CT head dated 12/24/2019.    FINDINGS:    Brain MRI:    There is evidence of restricted diffusion within the anterior limb of the right internal capsule with associated low signal on ADC maps consistent with an acute lacunar infarct. There is no hemorrhagic transformation.    Increased T2 and FLAIR signal involves the right caudate head and right lentiform nucleus with slight increased diffusion-weighted signal, most likely reflecting evolving late subacute infarction. There is no hemorrhagic transformation.    There is abnormal gyriform T2 and FLAIR signal hyperintensity with associated enhancement involving the superior right cerebellar hemisphere, in the superior cerebellar artery distribution. There is abnormal dark signal on susceptibility weighted imaging within this region of abnormal signal, most consistent with petechial hemorrhagic transformation without hematoma formation. Vague increased diffusion-weighted signal is present without matched ADC map signal. These findings are most consistent with an evolving late subacute infarct in the right superior cerebellar artery distribution.    A late subacute lacunar infarct involves the left cerebellum with associated enhancement.    Gyriform vague increased FLAIR signal involves the right frontal lobe cortex at the level of the superior frontal gyrus and middle frontal gyrus, with associated linear enhancement in some areas. These findings are most consistent with evolving late subacute ischemia. There is no hemorrhagic transformation.    There is no extra-axial fluid collection.    The orbits, sellar and suprasellar structures, and craniocervical junction are unremarkable.     Diffuse decrease of the T1 marrow signal is noted, most likely reflecting marrow changes secondary to the patient's known history of sickle cell anemia.    The right maxillary sinus is completely opacified. There is moderate mucosal thickening involving the left maxillary sinus.    Brain MRA:    A 6 mm aneurysm involves the left carotid bifurcation, which also incorporates the proximal M1 segment. The neck of the aneurysm measures roughly 2 mm. The aneurysm projects superiorly.    A 3 mm aneurysm involves the left P1 segment which projects superiorly and posteriorly. A 2 mm aneurysm involves the distal left basilar artery, also incorporating the origin of the left posterior cerebral artery, which projects to the left and posteriorly. A questionable additional 2 mm aneurysm may involve the basilar tip.    There is no evidence for focal stenosis or acute major vessel occlusion about the [place]. The right superior cerebellar arteryis widely patent.    Neck MRA:    The aortic arch and origins of the great vessels are unremarkable.    Common and Internal Carotids: The origin, course and caliber of the common and internal carotid arteries are unremarkable.  There is no significant stenosis by NASCET criteria.    Vertebral arteries:   The origin, course and caliber of the vertebral arteries are unremarkable. Both vessels are patent to the intracranial circulation and vertebrobasilar confluence.      IMPRESSION:    BRAIN    Multiple areas of evolving ischemia are noted with distribution as described. Patchy petechial hemorrhagic transformation involves the right superior cerebellar artery distribution infarct.    BRAIN MRA    Multiple aneurysms are noted with distribution as described, involving both the anterior and posterior circulations. Serial imaging follow-up over time is recommended to monitor for stability.    No evidence for acute major vessel occlusion about the [place].    NECK MRA    No evidence of carotid or vertebral artery stenosis or dissection.    < end of copied text >    Imaging Personally Reviewed:  Electrocardiogram Personally Reviewed:    COORDINATION OF CARE:  Care Discussed with Consultants/Other Providers [Y/N]:  Prior or Outpatient Records Reviewed [Y/N]:

## 2019-12-31 NOTE — PROVIDER CONTACT NOTE (CRITICAL VALUE NOTIFICATION) - SITUATION
RN notified telemetry ADRIÁN vang of critical lab value- platelets 1210
Patient admitted to [facility] for heme consultation. Admitting diagnosis sickle cell disease without crisis.
lab representative notified RN of critical lab value- platelets 118

## 2019-12-31 NOTE — PROGRESS NOTE ADULT - PROBLEM SELECTOR PLAN 2
Reportedly waxing/waning c/w delirium due to metabolic encephalopathy  Currently awake alert and oriented, monitor for resolution of encephalopathy  Behavioral health consulted 12/24 for management of associated agitation: Zyprexa prn, has not been required since 1 dose haldol given on 12/24  improved
Reportedly waxing/waning c/w delirium due to metabolic encephalopathy  Currently awake alert and oriented, monitor for resolution of encephalopathy  Behavioral health consulted 12/24 for management of associated agitation: Zyprexa prn, has not been required since 1 dose haldol given on 12/24
Reportedly waxing/waning c/w delirium due to metabolic encephalopathy  Currently awake alert and oriented, monitor for resolution of encephalopathy  Behavioral health consulted 12/24 for management of associated agitation: Zyprexa prn, has not been required since 1 dose haldol given on 12/24
Reportedly waxing/waning c/w delirium due to metabolic encephalopathy  Currently awake alert and oriented, monitor for resolution of encephalopathy  Behavioral health consulted 12/24 for management of associated agitation: Zyprexa prn, has not been required since 1 dose haldol given on 12/24  improved
Reportedly waxing/waning c/w delirium due to metabolic encephalopathy  Currently awake alert and oriented, monitor for resolution of encephalopathy  Behavioral health consulted 12/24 for management of associated agitation: zyprexa prn, has not been required since 1 dose haldol given on 12/24

## 2020-01-02 PROBLEM — Z00.00 ENCOUNTER FOR PREVENTIVE HEALTH EXAMINATION: Status: ACTIVE | Noted: 2020-01-02

## 2020-01-06 ENCOUNTER — INBOUND DOCUMENT (OUTPATIENT)
Age: 20
End: 2020-01-06

## 2020-01-09 ENCOUNTER — APPOINTMENT (OUTPATIENT)
Dept: NEUROSURGERY | Facility: CLINIC | Age: 20
End: 2020-01-09
Payer: MEDICAID

## 2020-01-09 ENCOUNTER — RESULT REVIEW (OUTPATIENT)
Age: 20
End: 2020-01-09

## 2020-01-09 ENCOUNTER — APPOINTMENT (OUTPATIENT)
Dept: HEMATOLOGY ONCOLOGY | Facility: CLINIC | Age: 20
End: 2020-01-09
Payer: MEDICAID

## 2020-01-09 ENCOUNTER — OUTPATIENT (OUTPATIENT)
Dept: OUTPATIENT SERVICES | Facility: HOSPITAL | Age: 20
LOS: 1 days | Discharge: HOME | End: 2020-01-09

## 2020-01-09 VITALS
TEMPERATURE: 97.5 F | WEIGHT: 122.33 LBS | RESPIRATION RATE: 18 BRPM | HEART RATE: 98 BPM | OXYGEN SATURATION: 99 % | SYSTOLIC BLOOD PRESSURE: 130 MMHG | DIASTOLIC BLOOD PRESSURE: 78 MMHG | BODY MASS INDEX: 17.13 KG/M2 | HEIGHT: 71 IN

## 2020-01-09 DIAGNOSIS — I63.9 CEREBRAL INFARCTION, UNSPECIFIED: ICD-10-CM

## 2020-01-09 DIAGNOSIS — Z83.2 FAMILY HISTORY OF DISEASES OF THE BLOOD AND BLOOD-FORMING ORGANS AND CERTAIN DISORDERS INVOLVING THE IMMUNE MECHANISM: ICD-10-CM

## 2020-01-09 DIAGNOSIS — D57.1 SICKLE-CELL DISEASE WITHOUT CRISIS: ICD-10-CM

## 2020-01-09 LAB
ANISOCYTOSIS BLD QL: SLIGHT — SIGNIFICANT CHANGE UP
BASOPHILS # BLD AUTO: 0 K/UL — SIGNIFICANT CHANGE UP (ref 0–0.2)
BASOPHILS NFR BLD AUTO: 1 % — SIGNIFICANT CHANGE UP (ref 0–2)
DACRYOCYTES BLD QL SMEAR: SLIGHT — SIGNIFICANT CHANGE UP
ELLIPTOCYTES BLD QL SMEAR: SLIGHT — SIGNIFICANT CHANGE UP
EOSINOPHIL # BLD AUTO: 0.5 K/UL — SIGNIFICANT CHANGE UP (ref 0–0.5)
EOSINOPHIL NFR BLD AUTO: 5 % — SIGNIFICANT CHANGE UP (ref 0–6)
HCT VFR BLD CALC: 28 % — LOW (ref 39–50)
HGB BLD-MCNC: 9.2 G/DL — LOW (ref 13–17)
HYPOCHROMIA BLD QL: SLIGHT — SIGNIFICANT CHANGE UP
LYMPHOCYTES # BLD AUTO: 4.2 K/UL — HIGH (ref 1–3.3)
LYMPHOCYTES # BLD AUTO: 45 % — HIGH (ref 13–44)
MACROCYTES BLD QL: SLIGHT — SIGNIFICANT CHANGE UP
MCHC RBC-ENTMCNC: 27.5 PG — SIGNIFICANT CHANGE UP (ref 27–34)
MCHC RBC-ENTMCNC: 32.6 G/DL — SIGNIFICANT CHANGE UP (ref 32–36)
MCV RBC AUTO: 84.2 FL — SIGNIFICANT CHANGE UP (ref 80–100)
MICROCYTES BLD QL: SLIGHT — SIGNIFICANT CHANGE UP
MONOCYTES # BLD AUTO: 1 K/UL — HIGH (ref 0–0.9)
MONOCYTES NFR BLD AUTO: 8 % — SIGNIFICANT CHANGE UP (ref 2–14)
NEUTROPHILS # BLD AUTO: 3.1 K/UL — SIGNIFICANT CHANGE UP (ref 1.8–7.4)
NEUTROPHILS NFR BLD AUTO: 41 % — LOW (ref 43–77)
PLAT MORPH BLD: NORMAL — SIGNIFICANT CHANGE UP
PLATELET # BLD AUTO: 655 K/UL — HIGH (ref 150–400)
POIKILOCYTOSIS BLD QL AUTO: SLIGHT — SIGNIFICANT CHANGE UP
POLYCHROMASIA BLD QL SMEAR: SLIGHT — SIGNIFICANT CHANGE UP
RBC # BLD: 3.33 M/UL — LOW (ref 4.2–5.8)
RBC # FLD: 16.5 % — HIGH (ref 10.3–14.5)
RBC BLD AUTO: ABNORMAL
SICKLE CELLS BLD QL SMEAR: SLIGHT — SIGNIFICANT CHANGE UP
TARGETS BLD QL SMEAR: SLIGHT — SIGNIFICANT CHANGE UP
WBC # BLD: 8.8 K/UL — SIGNIFICANT CHANGE UP (ref 3.8–10.5)
WBC # FLD AUTO: 8.8 K/UL — SIGNIFICANT CHANGE UP (ref 3.8–10.5)

## 2020-01-09 PROCEDURE — 99215 OFFICE O/P EST HI 40 MIN: CPT

## 2020-01-09 PROCEDURE — 99201 OFFICE OUTPATIENT NEW 10 MINUTES: CPT

## 2020-01-09 RX ORDER — ATORVASTATIN CALCIUM 20 MG/1
20 TABLET, FILM COATED ORAL
Refills: 0 | Status: ACTIVE | COMMUNITY
Start: 2020-01-09

## 2020-01-09 RX ORDER — FOLIC ACID 1 MG/1
1 TABLET ORAL
Refills: 0 | Status: ACTIVE | COMMUNITY
Start: 2020-01-09

## 2020-01-09 RX ORDER — ASPIRIN ENTERIC COATED TABLETS 81 MG 81 MG/1
81 TABLET, DELAYED RELEASE ORAL DAILY
Qty: 30 | Refills: 6 | Status: ACTIVE | COMMUNITY
Start: 2020-01-09

## 2020-01-09 RX ORDER — HYDROXYUREA 500 MG/1
500 CAPSULE ORAL
Qty: 90 | Refills: 4 | Status: ACTIVE | COMMUNITY
Start: 2020-01-09

## 2020-01-13 PROBLEM — I63.9 STROKE: Status: ACTIVE | Noted: 2020-01-09

## 2020-01-13 NOTE — REASON FOR VISIT
[Initial Consultation] : an initial consultation for [FreeTextEntry2] : HbSS disease, thrombocytosis

## 2020-01-13 NOTE — PHYSICAL EXAM
[Fully active, able to carry on all pre-disease performance without restriction] : Status 0 - Fully active, able to carry on all pre-disease performance without restriction [Normal] : no JVD, no calf tenderness, venous stasis changes, varices

## 2020-01-13 NOTE — ASSESSMENT
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 HbSS\par -patient will not be here long term\par -if any plans to be here, would favor putting patient on voxelator or L glutamine, will assess if patient here longer\par -gave patient the names of the medications to discuss with his doctor in [place]\par -continue HU 1000mg daily for now, he will need a repeat CBC in 1-2 weeks\par \par 2. Thrombocytosis:\par -improving\par \par 3.Brain Aneurysm:\par -care per NSGY, to be seen today\par

## 2020-01-13 NOTE — HISTORY OF PRESENT ILLNESS
[Disease:__________________________] : Disease: [unfilled] [de-identified] : 18 y/o male w/sickle cell no prior acute chest, over the last few years has approx 2 mild crises per year (only hosp 1x in past), transferred from [facility] to [facility] on 12/18/19 for management of acute chest syndrome 2/2 sickle cell disease. History obtained from chart (has two MRNs) and from primary team as patient was intubated upon arrival. Per chart, Mr. Morrison initially presented on 12/14/19, had recently returned from [facility], [place] ~12/9/19, complaining of chest and bone pain to the ER provider. Denied any SOB, stating that his bones hurt; did endorse decreased PO intake for few days with travels. Denied any fevers, chills, SOB, palpitations, N/V/D/C; CTA in ED was negative for PE or consolidation. Patient was admitted to the medicine service and placed on telemetry for acute pain crisis and chest pain.  Patient was noted to have progressive fevers, and increasing respiratory distress with increasing oxygen requirements.  The MICU was consulted after two rapid responses on  12/16/19 with increasing O2 requirements requiring hi-flow O2 and BIPAP with concern for acute chest syndrome and chest xray now showing bilateral diffuse infiltrates.  While awaiting for red cell exchange transfusion, patient was noted to have increasing work of breathing with persistent hypoxia and increasing anxiety.  Patient was intubated on 12/16/19 and subsequently  received exchange transfusion with notable initial Hb electrophoresis of HbS of 94.2%. \par \par Course complicated by bilateral pneumothorax and subsequent bilateral pigtail catheters placed with re-expansion of the lungs.\par \par   Patient received plasma exchange on 12/17/19 starting at 2200, and tolerated procedure well with LDH, haptoglobin, d-dimer, bilirubin and Creatinine improving.  Given patient's persistent hypoxia and acute respiratory distress syndrome, remaining vent dependent, plasma exchange on 12/18/19 was repeated.  Transfer to [facility] was arranged for continuing management of acute chest and pneumothorax.  After arriving at [facility], he developed symptoms and confirmation of stroke, at that time he was given simple transfusion given a low HbS percentage.   On MRI, he was found to have multiple aneurysms as well.\par Of note his Hb SS history of as follows \par -he has been hospitalized 2 times in the last 3 years; other hosp was for cough, received antibiotics only and improved \par -never had acute chest syndrome\par -he has never been exchange transfused\par -when he was young they suspected he had sickle cell disease\par -his sister is a sickle cell patient and they thought he might have it\par -he never used to have crisis like he had it\par -when he was going to college 2 years ago, that is when it was confirmed that it was SS.\par \par \par -Since the stroke his hands are a little shaky but are getting better\par -otherwise no other affects\par -he is to follow up with doc in [place]\par -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

## 2020-01-14 ENCOUNTER — APPOINTMENT (OUTPATIENT)
Dept: NEUROSURGERY | Facility: HOSPITAL | Age: 20
End: 2020-01-14
Payer: MEDICAID

## 2020-01-14 ENCOUNTER — TRANSCRIPTION ENCOUNTER (OUTPATIENT)
Age: 20
End: 2020-01-14

## 2020-01-14 ENCOUNTER — INPATIENT (INPATIENT)
Facility: HOSPITAL | Age: 20
LOS: 0 days | Discharge: HOME | DRG: 93 | End: 2020-01-15
Attending: NEUROLOGICAL SURGERY | Admitting: NEUROLOGICAL SURGERY
Payer: MEDICAID

## 2020-01-14 VITALS
WEIGHT: 123.46 LBS | OXYGEN SATURATION: 100 % | RESPIRATION RATE: 18 BRPM | SYSTOLIC BLOOD PRESSURE: 133 MMHG | HEIGHT: 72 IN | TEMPERATURE: 98 F | DIASTOLIC BLOOD PRESSURE: 79 MMHG | HEART RATE: 99 BPM

## 2020-01-14 DIAGNOSIS — I67.1 CEREBRAL ANEURYSM, NONRUPTURED: ICD-10-CM

## 2020-01-14 LAB
ALBUMIN SERPL ELPH-MCNC: 4.5 G/DL — SIGNIFICANT CHANGE UP (ref 3.3–5)
ALP SERPL-CCNC: 80 U/L — SIGNIFICANT CHANGE UP (ref 40–120)
ALT FLD-CCNC: 21 U/L — SIGNIFICANT CHANGE UP (ref 10–45)
ANION GAP SERPL CALC-SCNC: 10 MMOL/L — SIGNIFICANT CHANGE UP (ref 5–17)
ANISOCYTOSIS BLD QL: SLIGHT — SIGNIFICANT CHANGE UP
APTT BLD: 33.3 SEC — SIGNIFICANT CHANGE UP (ref 27.5–36.3)
APTT BLD: 34.1 SEC — SIGNIFICANT CHANGE UP (ref 27.5–36.3)
AST SERPL-CCNC: 30 U/L — SIGNIFICANT CHANGE UP (ref 10–40)
BASOPHILS # BLD AUTO: 0 K/UL — SIGNIFICANT CHANGE UP (ref 0–0.2)
BASOPHILS NFR BLD AUTO: 0 % — SIGNIFICANT CHANGE UP (ref 0–2)
BILIRUB SERPL-MCNC: 1 MG/DL — SIGNIFICANT CHANGE UP (ref 0.2–1.2)
BLD GP AB SCN SERPL QL: NEGATIVE — SIGNIFICANT CHANGE UP
BUN SERPL-MCNC: 11 MG/DL — SIGNIFICANT CHANGE UP (ref 7–23)
CALCIUM SERPL-MCNC: 10.2 MG/DL — SIGNIFICANT CHANGE UP (ref 8.4–10.5)
CHLORIDE SERPL-SCNC: 101 MMOL/L — SIGNIFICANT CHANGE UP (ref 96–108)
CO2 SERPL-SCNC: 24 MMOL/L — SIGNIFICANT CHANGE UP (ref 22–31)
CREAT SERPL-MCNC: 0.46 MG/DL — LOW (ref 0.5–1.3)
DACRYOCYTES BLD QL SMEAR: SLIGHT — SIGNIFICANT CHANGE UP
EOSINOPHIL # BLD AUTO: 0.58 K/UL — HIGH (ref 0–0.5)
EOSINOPHIL NFR BLD AUTO: 4 % — SIGNIFICANT CHANGE UP (ref 0–6)
GLUCOSE SERPL-MCNC: 87 MG/DL — SIGNIFICANT CHANGE UP (ref 70–99)
HCT VFR BLD CALC: 30.7 % — LOW (ref 39–50)
HGB BLD-MCNC: 10.1 G/DL — LOW (ref 13–17)
HYPOCHROMIA BLD QL: SIGNIFICANT CHANGE UP
INR BLD: 1.12 RATIO — SIGNIFICANT CHANGE UP (ref 0.88–1.16)
LDH SERPL L TO P-CCNC: 289 U/L — HIGH (ref 50–242)
LYMPHOCYTES # BLD AUTO: 32 % — SIGNIFICANT CHANGE UP (ref 13–44)
LYMPHOCYTES # BLD AUTO: 4.67 K/UL — HIGH (ref 1–3.3)
MANUAL SMEAR VERIFICATION: SIGNIFICANT CHANGE UP
MCHC RBC-ENTMCNC: 27.3 PG — SIGNIFICANT CHANGE UP (ref 27–34)
MCHC RBC-ENTMCNC: 32.9 GM/DL — SIGNIFICANT CHANGE UP (ref 32–36)
MCV RBC AUTO: 83 FL — SIGNIFICANT CHANGE UP (ref 80–100)
MONOCYTES # BLD AUTO: 1.17 K/UL — HIGH (ref 0–0.9)
MONOCYTES NFR BLD AUTO: 8 % — SIGNIFICANT CHANGE UP (ref 2–14)
NEUTROPHILS # BLD AUTO: 8.18 K/UL — HIGH (ref 1.8–7.4)
NEUTROPHILS NFR BLD AUTO: 56 % — SIGNIFICANT CHANGE UP (ref 43–77)
NRBC # BLD: 0 /100 — SIGNIFICANT CHANGE UP (ref 0–0)
OVALOCYTES BLD QL SMEAR: SLIGHT — SIGNIFICANT CHANGE UP
PLAT MORPH BLD: NORMAL — SIGNIFICANT CHANGE UP
PLATELET # BLD AUTO: 545 K/UL — HIGH (ref 150–400)
POIKILOCYTOSIS BLD QL AUTO: SLIGHT — SIGNIFICANT CHANGE UP
POTASSIUM SERPL-MCNC: 4.6 MMOL/L — SIGNIFICANT CHANGE UP (ref 3.5–5.3)
POTASSIUM SERPL-SCNC: 4.6 MMOL/L — SIGNIFICANT CHANGE UP (ref 3.5–5.3)
PROT SERPL-MCNC: 8.8 G/DL — HIGH (ref 6–8.3)
PROTHROM AB SERPL-ACNC: 12.9 SEC — SIGNIFICANT CHANGE UP (ref 10–12.9)
RBC # BLD: 3.7 M/UL — LOW (ref 4.2–5.8)
RBC # BLD: 3.7 M/UL — LOW (ref 4.2–5.8)
RBC # FLD: 19.2 % — HIGH (ref 10.3–14.5)
RBC BLD AUTO: ABNORMAL
RETICS #: 72.2 K/UL — SIGNIFICANT CHANGE UP (ref 25–125)
RETICS/RBC NFR: 2 % — SIGNIFICANT CHANGE UP (ref 0.5–2.5)
RH IG SCN BLD-IMP: POSITIVE — SIGNIFICANT CHANGE UP
RH IG SCN BLD-IMP: POSITIVE — SIGNIFICANT CHANGE UP
SCHISTOCYTES BLD QL AUTO: SLIGHT — SIGNIFICANT CHANGE UP
SODIUM SERPL-SCNC: 135 MMOL/L — SIGNIFICANT CHANGE UP (ref 135–145)
TARGETS BLD QL SMEAR: SLIGHT — SIGNIFICANT CHANGE UP
WBC # BLD: 14.6 K/UL — HIGH (ref 3.8–10.5)
WBC # FLD AUTO: 14.6 K/UL — HIGH (ref 3.8–10.5)

## 2020-01-14 PROCEDURE — 99285 EMERGENCY DEPT VISIT HI MDM: CPT

## 2020-01-14 PROCEDURE — 76377 3D RENDER W/INTRP POSTPROCES: CPT | Mod: 26

## 2020-01-14 PROCEDURE — 36226 PLACE CATH VERTEBRAL ART: CPT | Mod: 50

## 2020-01-14 PROCEDURE — 36227 PLACE CATH XTRNL CAROTID: CPT | Mod: 50

## 2020-01-14 PROCEDURE — 36224 PLACE CATH CAROTD ART: CPT | Mod: 50

## 2020-01-14 RX ORDER — ASPIRIN/CALCIUM CARB/MAGNESIUM 324 MG
81 TABLET ORAL DAILY
Refills: 0 | Status: DISCONTINUED | OUTPATIENT
Start: 2020-01-14 | End: 2020-01-15

## 2020-01-14 RX ORDER — HYDROXYUREA 500 MG/1
500 CAPSULE ORAL DAILY
Refills: 0 | Status: DISCONTINUED | OUTPATIENT
Start: 2020-01-14 | End: 2020-01-15

## 2020-01-14 RX ORDER — ATORVASTATIN CALCIUM 80 MG/1
80 TABLET, FILM COATED ORAL AT BEDTIME
Refills: 0 | Status: DISCONTINUED | OUTPATIENT
Start: 2020-01-14 | End: 2020-01-15

## 2020-01-14 RX ORDER — SENNA PLUS 8.6 MG/1
2 TABLET ORAL AT BEDTIME
Refills: 0 | Status: DISCONTINUED | OUTPATIENT
Start: 2020-01-14 | End: 2020-01-15

## 2020-01-14 RX ORDER — ACETAMINOPHEN 500 MG
325 TABLET ORAL EVERY 4 HOURS
Refills: 0 | Status: DISCONTINUED | OUTPATIENT
Start: 2020-01-14 | End: 2020-01-15

## 2020-01-14 RX ORDER — SODIUM CHLORIDE 9 MG/ML
1000 INJECTION INTRAMUSCULAR; INTRAVENOUS; SUBCUTANEOUS
Refills: 0 | Status: DISCONTINUED | OUTPATIENT
Start: 2020-01-14 | End: 2020-01-15

## 2020-01-14 RX ORDER — SODIUM CHLORIDE 9 MG/ML
1000 INJECTION INTRAMUSCULAR; INTRAVENOUS; SUBCUTANEOUS ONCE
Refills: 0 | Status: COMPLETED | OUTPATIENT
Start: 2020-01-14 | End: 2020-01-14

## 2020-01-14 RX ORDER — INFLUENZA VIRUS VACCINE 15; 15; 15; 15 UG/.5ML; UG/.5ML; UG/.5ML; UG/.5ML
0.5 SUSPENSION INTRAMUSCULAR ONCE
Refills: 0 | Status: DISCONTINUED | OUTPATIENT
Start: 2020-01-14 | End: 2020-01-15

## 2020-01-14 RX ORDER — OXYCODONE HYDROCHLORIDE 5 MG/1
5 TABLET ORAL EVERY 4 HOURS
Refills: 0 | Status: DISCONTINUED | OUTPATIENT
Start: 2020-01-14 | End: 2020-01-15

## 2020-01-14 RX ADMIN — SODIUM CHLORIDE 1000 MILLILITER(S): 9 INJECTION INTRAMUSCULAR; INTRAVENOUS; SUBCUTANEOUS at 11:12

## 2020-01-14 RX ADMIN — ATORVASTATIN CALCIUM 80 MILLIGRAM(S): 80 TABLET, FILM COATED ORAL at 21:59

## 2020-01-14 NOTE — DISCHARGE NOTE PROVIDER - NSDCCPCAREPLAN_GEN_ALL_CORE_FT
PRINCIPAL DISCHARGE DIAGNOSIS  Diagnosis: Brain aneurysm  Assessment and Plan of Treatment: PRINCIPAL DISCHARGE DIAGNOSIS  Diagnosis: Brain aneurysm  Assessment and Plan of Treatment: s/p cerebral angiogram, found Left ICA and Left PCA aneurysms. Continue baby aspirin daily and atorvastatin.      SECONDARY DISCHARGE DIAGNOSES  Diagnosis: Sickle cell disease  Assessment and Plan of Treatment: continue hydroxyurea

## 2020-01-14 NOTE — ED ADULT TRIAGE NOTE - CHIEF COMPLAINT QUOTE
2 weeks ago diagnosed with brain aneurysm. very severe HA and dizziness this AM. "screaming in pain".

## 2020-01-14 NOTE — DISCHARGE NOTE PROVIDER - NSDCMRMEDTOKEN_GEN_ALL_CORE_FT
Aspir 81 oral delayed release tablet: 1 tab(s) orally once a day  atorvastatin 80 mg oral tablet: 1 tab(s) orally once a day  folic acid 1 mg oral tablet: 1 tab(s) orally once a day  hydroxyurea 500 mg oral capsule: 1  orally once a day  oxyCODONE 5 mg oral tablet: acetaminophen 325 mg oral tablet: 2 tab(s) orally every 6 hours, As needed, Mild Pain (1 - 3)  Aspir 81 oral delayed release tablet: 1 tab(s) orally once a day  atorvastatin 80 mg oral tablet: 1 tab(s) orally once a day  folic acid 1 mg oral tablet: 1 tab(s) orally once a day  hydroxyurea 500 mg oral capsule: 2 cap(s) orally once a day

## 2020-01-14 NOTE — H&P ADULT - HISTORY OF PRESENT ILLNESS
19 year old male with pmhx sickle cell disease, brain aneurysm w/ recent admission 12/14-12/31 for pain crisis and subsequent acute chest syndrome requiring intubation and  multiple exchange transfusions. Pt initially presented to [facility] VS 12/14 and  transferred to [facility] on 12/18 w/ extubation 12/22. He was altered after extubation and had CT head and MRI/MRA which revealed multiple areas patchy ischemia w/ hemorrhagic transformation in the right superior cerebellar region as well as multiple areas of aneurysm. Pt was d/c home for outpt nsx f/up. Patient reports over past 7 days has intermittent right sided HA. HA noted to be 6 at rest and 8 at max intensity. Has not taken anything for pain thus far. Most recent HA started last night and gradually progressed reported as intermittent since. Was advised to come to ED for persistent or worsened HA. Has had persistent but improving chest pain. Denies Fevers, chills, sob, abd pain, n/v/d.

## 2020-01-14 NOTE — DISCHARGE NOTE NURSING/CASE MANAGEMENT/SOCIAL WORK - PATIENT PORTAL LINK FT
You can access the FollowMyHealth Patient Portal offered by [facility] by registering at the following website: [email or web address]. By joining [facility]’s FollowMyHealth portal, you will also be able to view your health information using other applications (apps) compatible with our system.

## 2020-01-14 NOTE — ED ADULT NURSE REASSESSMENT NOTE - NS ED NURSE REASSESS COMMENT FT1
pt with neurosurg resident at bedside pt admitted pending angiogram today pt declining any tylenol for discomfort at this time remains alert oriented verbal resting comfortably

## 2020-01-14 NOTE — ED ADULT NURSE NOTE - OBJECTIVE STATEMENT
pt 18 yo male recently discharged from [facility] where he was tx for sickle cell crisis and in medically induced coma presents with sisters for worsening headache right sided today pt per sister was diagnoded with an aneurysem while in patient and told to come to hospital anytime there is a severe headache pt alert oriented verbal denies any visual changes no vomiting appetie normalno weakness has not taken anything for headache at home as yet pt pending js castelan evaluation

## 2020-01-14 NOTE — PATIENT PROFILE ADULT - EQUIPMENT CURRENTLY USED AT HOME
This note was copied from a baby's chart  CONSULT - LACTATION  Baby Girl Cornelio Talamantes 2 days female MRN: [id number]    [facility] [address] NURSERY Room / Bed: L&D 308(N)/L&D 308(N) Encounter: [id number]    Maternal Information     MOTHER:  Hari Judge  Maternal Age: 22 y o    OB History: #: 1, Date: [birth year], Sex: Female, Weight: 3600 g (7 lb 15 oz), GA: 40w3d, Delivery: Vaginal, Spontaneous Delivery, Apgar1: 9, Apgar5: 9, Living: Living, Birth Comments: None   Previouse breast reduction surgery? No    Lactation history:   Has patient previously breast fed: No   How long had patient previously breast fed:     Previous breast feeding complications:       Past Surgical History:   Procedure Laterality Date    EAR RECONSTRUCTION Left     WISDOM TOOTH EXTRACTION         Birth information:  YOB: 2018   Time of birth: 6:40 PM   Sex: female   Delivery type: Vaginal, Spontaneous Delivery   Birth Weight: 3600 g (7 lb 15 oz)   Percent of Weight Change: -5%     Gestational Age: 44w3d   [unfilled]    Assessment     Breast and nipple assessment: sore nipple     Assessment: did not assess at this time    Feeding assessment: feeding well  LATCH:  Latch: Audible Swallowing:     Type of Nipple:     Comfort (Breast/Nipple):     Hold (Positioning):     LATCH Score:            Feeding recommendations:  breast feed on demand     Breastfeeding booklet given and reviewed with patient  Patient verbalized breastfeeding is going well,but she is sore  Enc to call for assistance next feeding to help her with positioning and latching and as needed,phone # given      Mary Jo Davis RN 10/23/2017 8:52 AM
no

## 2020-01-14 NOTE — DISCHARGE NOTE PROVIDER - NSDCACTIVITY_GEN_ALL_CORE
Showering allowed/Walking - Outdoors allowed/Do not drive or operate machinery/Stairs allowed/Walking - Indoors allowed/No heavy lifting/straining

## 2020-01-14 NOTE — DISCHARGE NOTE PROVIDER - PROVIDER TOKENS
PROVIDER:[TOKEN:[[id number]],FOLLOWUP:[2 weeks]] PROVIDER:[TOKEN:[[id number]],SCHEDULEDAPPT:[01/30/2020],SCHEDULEDAPPTTIME:[12:15 AM]]

## 2020-01-14 NOTE — PATIENT PROFILE ADULT - FALL HARM RISK
Well-Baby Checkup: 6 Months  At the 6-month checkup, the healthcare provider will [address] baby and ask how things are going at home. This sheet describes some of what you can expect.   Development and milestones  The healthcare provider will ask Harika Valencia · When offering single-ingredient foods such as homemade or store-bought baby food, introduce one new flavor of food every 3 to 5 days before trying a new or different flavor.  Following each new food, be aware of possible allergic reactions such as diarrhe · Keep putting your baby down to sleep on his or her back. If the baby rolls over while sleeping, that’s okay. You do not need to return the baby to his or her back. · Do not put your child in the crib with a bottle.   · At this age, some parents let their Based on recommendations from the CDC, at this visit your baby may receive the following vaccinations:  · Diphtheria, tetanus, and pertussis  · Haemophilus influenzae type b  · Hepatitis B  · Influenza (flu)  · Pneumococcus  · Polio  · Rotavirus  Setting a * Growth percentiles are based on WHO (Girls, 0-2 years) data.   Ht Readings from Last 3 Encounters:  09/12/17 : 26\" (52 %, Z= 0.05)*  07/20/17 : 25.5\" (81 %, Z= 0.88)*  05/10/17 : 23\" (72 %, Z= 0.57)*    * Growth percentiles are based on WHO (Girls, 0-2 Feedings discussed and questions answered: Can begin stage 2 foods (inc meats); when sitting - some finger feeding (Cheerios broken in half are excellent); can try some egg yolk at 7 mo age (try on two occasions then if no problem - whole egg OK); by 8 mo FEVERS ARE A SIGN THAT THE BODY'S IMMUNE SYSTEM IS WORKING WELL:  Fevers are a sign that your child's immune system is working well. Fevers are not dangerous. In fact, they help fight infection. Deanna Ponto may make your child feel uncomfortable.  If your Never leave your baby alone or on a bed, especially since he/she could roll off. Never leave a baby alone with other young children; sometimes they don't know how to treat a baby. Put up a gate in your home if you have stairs to prevent falls.     MAKE SURE Vaccine Information Statements (VIS) are available online. In an effort to go green and be paperless, we are providing you with the website to view and /or print a copy at home. at IndividualReport.nl.   Click on the \"Vaccine Information Sheet\" a other

## 2020-01-14 NOTE — ED PROVIDER NOTE - PHYSICAL EXAMINATION
CONSTITUTIONAL: Patient is awake, alert and oriented x 3. Patient is well appearing and in no acute distress.  HEAD: NCAT,   NECK: supple, FROM  LUNGS: CTA B/L,  HEART: RRR.+S1S2 no murmurs,   ABDOMEN: Soft nd/nt+bs no rebound or guarding.   EXTREMITY: no edema or calf tenderness b/l, FROM upper and lower ext b/l  SKIN: with no rash or lesions.   NEURO: Cn3-12 grossly intact. Strength5/5UE/LE.NmlSensation.Gait normal.

## 2020-01-14 NOTE — H&P ADULT - NSHPPOAPULMEMBOLUS_GEN_A_CORE
Occupational Therapy Treatment Note     07/30/19 3053   Restrictions/Precautions   Weight Bearing Precautions Per Order No   Braces or Orthoses   (none as per neuro sx )   Other Precautions 1:1;Cognitive;Pain   Lifestyle   Autonomy Pt is independent wt ADLS at this time  Reciprocal Relationships LIVES WITH SIGNIFICANT OTHER/FIANCE OF 7 YEARS, STATING "WE DON'T BELIEVE IN MARRIAGE, WE BOTH LEFT OUR SPOUSES FOR EACHOTHER"   Service to Others PT IS BELIEVED TO BE UNEMPLOYED   Intrinsic Gratification pt reported she enjoys taking care of animals and reported that she "raised a aury"    Pain Assessment   Pain Assessment 0-10   Pain Type Acute pain   Pain Location Back   Light Housekeeping   Light Housekeeping Level Walker   Light Housekeeping Level of Assistance Minimum assistance   Cognition   Overall Cognitive Status Impaired   Arousal/Participation Alert   Attention Difficulty attending to directions   Orientation Level Oriented to person;Oriented to place   Memory Decreased recall of recent events;Decreased recall of precautions   Following Commands Follows one step commands with increased time or repetition   Activity Tolerance   Activity Tolerance Patient tolerated treatment well   Assessment   Assessment Pt participated in occupational therapy with focus on activity tolerance, and formal cognitive screening/Jeff Cognitive Level Screen ACLS  Pt cleared by RN/Karen for pt participation in occupational therapy  Pt received sitting edge of pt bed/fully dressed for home and agreeable to therapy following pt Identifiers confirmed  Pt reported her goal to go home with her SO  Pt able to complete her ADLS/self-care tasks Independent at this time  Pt able to complete form cognitive screening ACLS and based on pt score pt requires 24hour supervision to remove dangerous objects from outside visual field and to solve problems arising from minor changes in environment  Pt now meeting initial eval goals    No further OT needs identified at this time  Plan to d/c pt from OT services  Plan   Treatment Interventions ADL retraining   Goal Expiration Date 08/01/19   Treatment Day 3   OT Frequency 3-5x/wk   Recommendation   OT Discharge Recommendation 24 hour supervision/assist   Barthel Index   Feeding 10   Bathing 0   Grooming Score 5   Dressing Score 10   Bladder Score 10   Bowels Score 10   Toilet Use Score 10   Transfers (Bed/Chair) Score 15   Mobility (Level Surface) Score 10   Stairs Score 5   Barthel Index Score 85   Modified Shawn Scale   Modified Lingle Scale 3   4 2    Administered Jeff Cognitive Level Screen (ACLS)  Pt scored 4 2/6 0 indicating 24 Hour supervision is recommended to remove dangerous objects outside the visual field and to solve problems due to minor changes in the environment  Behavior:  Recognizes errors  Identifies problems  Asks for Day/Date  Sequences one activity at a time  Processing speed is slow and may take extra time to complete tasks  Memorization will be very slow  Requires long term repetitive training for new tasks  Keep directions short and concise  Grooming:  Initiates and completes with supplies from familiar accessible locations  Stops and asks for help when an error is made  Expect cuts with use of straight razor  Check every few minutes if left alone  Allow ample time for completion of tasks  Dressing:  Selects clothing items based on striking features like color  May choose to wear a favorite item all the time  May argue with suggestions to change selections  Bathing:  Recognizes need for a bath and may ask if time is appropriate  Collects supplies from a visible location  Follows routine  May miss small hidden areas  Recognizes problem like lack of soap and asks for help  Remove hazards from proximity to bath (ie: electrical appliances)  Walking/exercising:  Ambulates to a familiar location ½ to 1 mile away  May not vary route    May fail to attend to activity or noises outside visual field  May ask for help if lost   May be able to learn a graded exercise program   May become anxious in high stimulus environments (malls, airports, casinos)  May resist going to certain places  Eating:  Initiates coming to table at routine times  Uses utensils  Recognizes errors and asks for help  Attempts to comply with social standards  May have trouble waiting for others  Assist with handling hot foods/liquids  Monitor compliance with special diet  Toileting:  Recognizes difficulties that interfere with toileting routine and asks for help  May be able to report change in bowel or bladder habits  May take much longer than average to complete toileting  Medications:  Initiates taking familiar dose at regular time of day  May not be able to open container and may have incorrect ideas of effects that lead to noncompliance  May not seek assist for problems encountered  Supervise to ensure compliance  Use of adaptive equipment:  Needs help with more complex equipment  May need assistance with fasteners that require fine motor skills  Does not understand the potential hazards of incorrect use  May forget to use equipment  Housekeeping:   Initiates and completes a routine of housekeeping activities  May be able to set priorities but may become fixated on a priority  Cleans, polishes and sweeps one feature at a time  Check for quality of cleaning results  Food preparation:  Does not plan for long term food needs  May go to store repeatedly whenever food is desired  May search cabinet for particular food item and ask for help when items are not found  May prepare a simple meal but may not recognize problems  Store all undesirable equipment away from view  Do not leave alone when using dangerous tools/equipment  Spending money:  May be able to set a priority for an expense that is highly valued  May become fixated on item    May resist help   Shopping:  May go to familiar shops but does not make a list or compare prices  Looks in familiar locations for an item  May ask for help when not found  May insist on purchasing item that is unrealistic or impractical   May resist help  May be anxious in high stimulus environments  Laundry:  May sort laundry by color or other simple striking cues  May view as a priority and initiate regularly  Recognizes errors like too much soap but cannot offer solutions  Traveling:  May go through actions slowly  Can sit unaccompanied up to 1 hour on a bus or train with landmarks as a guide when to get off  Asks for assistance if lost, does not alter familiar routes  May become anxious in high stimulus environments (bus, airport terminals)  Telephone:  Dials a new number written down by checking it 1 digit at a time  Writes down information very slowly  Does not consider a persons schedule or time of day when calling  Needs assistance to make calls from unfamiliar telephones  May call emergency or familiar numbers excessively  Driving:  Should NOT operate a motor vehicle             Kranthi GILL/MARRY no

## 2020-01-14 NOTE — ED ADULT NURSE NOTE - CHPI ED NUR SYMPTOMS NEG
no loss of consciousness/no nausea/no numbness/no blurred vision/no change in level of consciousness/no vomiting/no fever/no confusion/no weakness/no dizziness

## 2020-01-14 NOTE — DISCHARGE NOTE PROVIDER - NSDCFUADDAPPT_GEN_ALL_CORE_FT
Call your Neurosurgeon for appointment in 1-2 week    Return to Emergency Department or contact your Neurosurgeon if any changes in mental status, weakness, numbness or tingling of extremities; difficulty swallowing; drainage or redness of wound, fever; pain in legs; difficulty urinating or constipation. Return to Emergency Department or contact your Neurosurgeon if any changes in mental status, weakness, numbness or tingling of extremities; severe headache, any bleeding from groin site, difficulty swallowing; drainage or redness of wound, fever; pain in legs; difficulty urinating or constipation.

## 2020-01-14 NOTE — CHART NOTE - NSCHARTNOTEFT_GEN_A_CORE
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 Trae Walsh  Resident: Dr. Bebeto Blackmon     NP: Maddison Bishop    RN: Arpita PEARL    Anest[place]. 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 ( Official note to follow).  Patient tolerated procedure well, hemodynamically stable, no change in neurological status compared to baseline.  Results discussed with neurosurgery, patient and patient's  family.  Groin sheath was removed,  manual compression held to hemostasis  for  21 minutes, no active bleeding, no hematoma, quick clot and safeguard balloon dressing applied at 1630h.  Patient transferred to Recovery Room

## 2020-01-14 NOTE — CHART NOTE - NSCHARTNOTEFT_GEN_A_CORE
Interventional Neuro Radiology  Pre-Procedure Note PA-C        HPI:  This is a 19 year old male with pmhx sickle cell disease, brain aneurysm w/ recent admission 12/14-12/31 for pain crisis and subsequent acute chest syndrome requiring intubation and  multiple exchange transfusions. Pt initially presented to [facility] VS 12/14 and  transferred to [facility] on 12/18 w/ extubation 12/22. He was altered after extubation and had CT head and MRI/MRA which revealed multiple areas patchy ischemia w/ hemorrhagic transformation in the right superior cerebellar region as well as multiple areas of aneurysm. Pt was d/c home for outpt nsx f/up. Patient reports over past 7 days has intermittent right sided HA. HA noted to be 6 at rest and 8 at max intensity. Has not taken anything for pain thus far. Most recent HA started last night and gradually progressed reported as intermittent since. Was advised to come to ED for persistent or worsened HA. Has had persistent but improving chest pain. Denies Fevers, chills, sob, abd pain, n/v/d. (14 Jan 2020 13:19)      Allergies: No Known Allergies      PAST MEDICAL & SURGICAL HISTORY:  Sickle cell anemia      Social History: Non smoker    FAMILY HISTORY: No family history of cerebral aneurysm      Current Medications:   acetaminophen   Tablet .. 325 milliGRAM(s) Oral every 4 hours PRN  aspirin enteric coated 81 milliGRAM(s) Oral daily  atorvastatin 80 milliGRAM(s) Oral at bedtime  hydroxyurea 500 milliGRAM(s) Oral daily  senna 2 Tablet(s) Oral at bedtime PRN  sodium chloride 0.9%. 1000 milliLiter(s) IV Continuous <Continuous>      Labs:                         10.1   14.60 )-----------( 545                   30.7           135  |  101  |  11  ----------------------------<  87  4.6   |  24  |  0.46<L>        Blood Bank: 01-14-20  O Positive      Assessment/Plan:   This is a 19y  year old male with multiple cerebral aneurysms on MRA head.   Patient presents to neuro-IR for selective cerebral angiography.   Procedure, goals, risks, benefits and alternatives  were discussed with patient and patient's family.  All questions were answered.  Risks include but are not limited to stroke, vessel injury, hemorrhage, and or right  groin hematoma.  Patient demonstrates understanding  of all risks involved with this procedure and wishes to continue.   Appropriate  consent was obtained from patient and consent is in the patient's chart.

## 2020-01-14 NOTE — DISCHARGE NOTE PROVIDER - HOSPITAL COURSE
19 year old male with pmhx sickle cell disease, brain aneurysm w/ recent admission 12/14-12/31 for pain crisis and subsequent acute chest syndrome requiring intubation and  multiple exchange transfusions. Pt initially presented to [facility] VS 12/14 and  transferred to [facility] on 12/18 w/ extubation 12/22. He was altered after extubation and had CT head and MRI/MRA which revealed multiple areas patchy ischemia w/ hemorrhagic transformation in the right superior cerebellar region as well as multiple areas of aneurysm. Pt was d/c home for outpt nsx f/up. Patient reports over past 7 days has intermittent right sided HA. HA noted to be 6 at rest and 8 at max intensity. Has not taken anything for pain thus far. Most recent HA started last night and gradually progressed reported as intermittent since. Was advised to come to ED for persistent or worsened HA. Has had persistent but improving chest pain. Denies Fevers, chills, sob, abd pain, n/v/d.        Admitted for cerebral angiogram which revealed left 3x5mm bi-lobed ICA aneurysm as well as a 2mm left posterior cerebral artery, no intervention at this time, clear for outpatient followup with Dr. Walsh 19 year old male with pmhx sickle cell disease, brain aneurysm w/ recent admission 12/14-12/31 for pain crisis and subsequent acute chest syndrome requiring intubation and  multiple exchange transfusions. Pt initially presented to [facility] VS 12/14 and  transferred to [facility] on 12/18 w/ extubation 12/22. He was altered after extubation and had CT head and MRI/MRA which revealed multiple areas patchy ischemia w/ hemorrhagic transformation in the right superior cerebellar region as well as multiple areas of aneurysm. Pt was d/c home for outpt nsx f/up. Patient reports over past 7 days has intermittent right sided HA. HA noted to be 6 at rest and 8 at max intensity. Has not taken anything for pain thus far. Most recent HA started last night and gradually progressed reported as intermittent since. Was advised to come to ED for persistent or worsened HA. Has had persistent but improving chest pain. Denies Fevers, chills, sob, abd pain, n/v/d.        Admitted for cerebral angiogram which revealed left 3x5mm bi-lobed ICA aneurysm as well as a 2mm left posterior cerebral artery, no intervention at this time, clear for outpatient followup with Dr. Walsh.

## 2020-01-14 NOTE — ED PROVIDER NOTE - ATTENDING CONTRIBUTION TO CARE
Patient presenting complaining of posterior headache.  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  Over past four days has been having R posterior headaches, moderate in maximal intensity, non thunderclap in onset without associate visual changes, nausea, vomiting, numbness, tingling and weakness.  Reporting no further sickle pains, chest pains, difficulty breathing, cough, fevers.    A 14 point review of systems is negative except as in HPI or otherwise documented.    Exam:  General: Patient well appearing, vital signs within normal limits  HEENT: airway patent with moist mucous membranes  Cardiac: RRR S1/S2 with strong peripheral pulses  Respiratory: lungs clear without respiratory distress  GI: abdomen soft, non tender, non distended  Neuro: slight R facial droop, slight finger nose dysmetria on L hand, otherwise CN II-XII intact, normal strength, sensation, ambulation  Skin: warm, well perfused  Psych: normal mood and affect    Patient description of headaches less concerning for aneurysmal rupture today, however intervention was deferred at time of initial diagnosis likely secondary to critical state and incidental aneurysmal finding, discussed with neurosurgery - will obtain labs, CT head for screening, neurosurgery planning for admission today for formal angiogram to further evaluate aneurysms and possible intervention.

## 2020-01-14 NOTE — H&P ADULT - ASSESSMENT
18yo right handed male, hx of sickle cell disease on 81mg ASA daily and recent admission with findings of a superior cerebellar hemorrhage and incidentally found intracranial aneurysms, who presents with 7 days of intermittent posterior headaches. The headaches are gradual in nature and have come and go without provocation. He had a headache today and presents to the ED.    Plan:  - Admit to Neurosurgery  - Angio today with Dr. Walsh  - IVF @100mL/hr  - Continue ASA 81mg

## 2020-01-14 NOTE — DISCHARGE NOTE PROVIDER - NSDCCPTREATMENT_GEN_ALL_CORE_FT
PRINCIPAL PROCEDURE  Procedure: Fluoroscopic intravenous digital subtraction angiography of intracerebral vessel  Findings and Treatment:

## 2020-01-14 NOTE — DISCHARGE NOTE PROVIDER - CARE PROVIDER_API CALL
Trae Walsh)  Neurological Surgery  [address] [place] [address]  Phone: [phone]  Fax: [phone]  Follow Up Time: 2 weeks Trae Walsh)  Neurological Surgery  [address] [place] [address]  Phone: [phone]  Fax: [phone]  Scheduled Appointment: 01/30/2020 12:15 AM

## 2020-01-14 NOTE — DISCHARGE NOTE PROVIDER - NSDCFUADDINST_GEN_ALL_CORE_FT
No strenuous activity. No heavy lifting. Do not return to work until cleared by physician. No driving until cleared by physician.    You may remove any dressing and shower on the 3rd day after you surgery.  No soaking in tub, do not remove steri strips. Do not apply any ointments to incision.

## 2020-01-14 NOTE — ED PROVIDER NOTE - OBJECTIVE STATEMENT
19 year old male with pmhx sickle cell disease, brain aneurysm w/ recent admission 12/14-12/31 for pain crisis and subsequent acute chest syndrome requiring intubation and  multiple exchange transfusions. Pt initially presented to [facility] VS 12/14 and  transferred to [facility] on 12/18 w/ extubation 12/22. He was altered after extubation and had CT head and MRI/MRA which revealed multiple areas patchy ischemia w/ hemorrhagic transformation in the right superior cerebellar region as well as multiple areas of aneurysm. Pt was d/c home for outpt nsx f/up. Patient reports over past 4 days has intermittent right sided HA. HA noted to be 6 at rest and 8 at max intensity. Has not taken anything for pain thus far. Most recent HA started last night and gradually progressed reported as intermittent since. Was advised to come to ED for persistent or worsened HA. Has had persistent but improving chest pain. Denies Fevers, chills, sob, abd pain, n/v/d.        MR S: [id number]  [facility]: [id number]

## 2020-01-15 VITALS
DIASTOLIC BLOOD PRESSURE: 64 MMHG | SYSTOLIC BLOOD PRESSURE: 107 MMHG | OXYGEN SATURATION: 100 % | TEMPERATURE: 99 F | HEART RATE: 76 BPM | RESPIRATION RATE: 18 BRPM

## 2020-01-15 LAB
ANION GAP SERPL CALC-SCNC: 11 MMOL/L — SIGNIFICANT CHANGE UP (ref 5–17)
BUN SERPL-MCNC: 11 MG/DL — SIGNIFICANT CHANGE UP (ref 7–23)
CALCIUM SERPL-MCNC: 9.5 MG/DL — SIGNIFICANT CHANGE UP (ref 8.4–10.5)
CHLORIDE SERPL-SCNC: 103 MMOL/L — SIGNIFICANT CHANGE UP (ref 96–108)
CO2 SERPL-SCNC: 23 MMOL/L — SIGNIFICANT CHANGE UP (ref 22–31)
CREAT SERPL-MCNC: 0.52 MG/DL — SIGNIFICANT CHANGE UP (ref 0.5–1.3)
GLUCOSE SERPL-MCNC: 94 MG/DL — SIGNIFICANT CHANGE UP (ref 70–99)
HCT VFR BLD CALC: 29.9 % — LOW (ref 39–50)
HGB BLD-MCNC: 9.1 G/DL — LOW (ref 13–17)
MCHC RBC-ENTMCNC: 26 PG — LOW (ref 27–34)
MCHC RBC-ENTMCNC: 30.4 GM/DL — LOW (ref 32–36)
MCV RBC AUTO: 85.4 FL — SIGNIFICANT CHANGE UP (ref 80–100)
PLATELET # BLD AUTO: 508 K/UL — HIGH (ref 150–400)
PLATELET RESPONSE ASPIRIN RESULT: 440 ARU — SIGNIFICANT CHANGE UP (ref 350–700)
POTASSIUM SERPL-MCNC: 4.2 MMOL/L — SIGNIFICANT CHANGE UP (ref 3.5–5.3)
POTASSIUM SERPL-SCNC: 4.2 MMOL/L — SIGNIFICANT CHANGE UP (ref 3.5–5.3)
RBC # BLD: 3.5 M/UL — LOW (ref 4.2–5.8)
RBC # FLD: 19.7 % — HIGH (ref 10.3–14.5)
SODIUM SERPL-SCNC: 137 MMOL/L — SIGNIFICANT CHANGE UP (ref 135–145)
WBC # BLD: 13.89 K/UL — HIGH (ref 3.8–10.5)
WBC # FLD AUTO: 13.89 K/UL — HIGH (ref 3.8–10.5)

## 2020-01-15 PROCEDURE — 80048 BASIC METABOLIC PNL TOTAL CA: CPT

## 2020-01-15 PROCEDURE — 86850 RBC ANTIBODY SCREEN: CPT

## 2020-01-15 PROCEDURE — 80053 COMPREHEN METABOLIC PANEL: CPT

## 2020-01-15 PROCEDURE — 86900 BLOOD TYPING SEROLOGIC ABO: CPT

## 2020-01-15 PROCEDURE — C1887: CPT

## 2020-01-15 PROCEDURE — C1769: CPT

## 2020-01-15 PROCEDURE — 85045 AUTOMATED RETICULOCYTE COUNT: CPT

## 2020-01-15 PROCEDURE — C1894: CPT

## 2020-01-15 PROCEDURE — 36227 PLACE CATH XTRNL CAROTID: CPT

## 2020-01-15 PROCEDURE — 99238 HOSP IP/OBS DSCHRG MGMT 30/<: CPT

## 2020-01-15 PROCEDURE — 36226 PLACE CATH VERTEBRAL ART: CPT

## 2020-01-15 PROCEDURE — 85576 BLOOD PLATELET AGGREGATION: CPT

## 2020-01-15 PROCEDURE — 85027 COMPLETE CBC AUTOMATED: CPT

## 2020-01-15 PROCEDURE — 86901 BLOOD TYPING SEROLOGIC RH(D): CPT

## 2020-01-15 PROCEDURE — 36224 PLACE CATH CAROTD ART: CPT

## 2020-01-15 PROCEDURE — 85730 THROMBOPLASTIN TIME PARTIAL: CPT

## 2020-01-15 PROCEDURE — 99285 EMERGENCY DEPT VISIT HI MDM: CPT

## 2020-01-15 PROCEDURE — 83615 LACTATE (LD) (LDH) ENZYME: CPT

## 2020-01-15 PROCEDURE — 85610 PROTHROMBIN TIME: CPT

## 2020-01-15 RX ORDER — FOLIC ACID 0.8 MG
1 TABLET ORAL DAILY
Refills: 0 | Status: DISCONTINUED | OUTPATIENT
Start: 2020-01-15 | End: 2020-01-15

## 2020-01-15 RX ORDER — HYDROXYUREA 500 MG/1
2 CAPSULE ORAL
Qty: 60 | Refills: 0
Start: 2020-01-15

## 2020-01-15 RX ORDER — ACETAMINOPHEN 500 MG
2 TABLET ORAL
Qty: 0 | Refills: 0 | DISCHARGE
Start: 2020-01-15

## 2020-01-15 RX ORDER — HYDROXYUREA 500 MG/1
500 CAPSULE ORAL ONCE
Refills: 0 | Status: COMPLETED | OUTPATIENT
Start: 2020-01-15 | End: 2020-01-15

## 2020-01-15 RX ORDER — ATORVASTATIN CALCIUM 80 MG/1
1 TABLET, FILM COATED ORAL
Qty: 30 | Refills: 0
Start: 2020-01-15

## 2020-01-15 RX ORDER — HYDROXYUREA 500 MG/1
2 CAPSULE ORAL
Qty: 0 | Refills: 0 | DISCHARGE
Start: 2020-01-15

## 2020-01-15 RX ORDER — HYDROXYUREA 500 MG/1
1000 CAPSULE ORAL DAILY
Refills: 0 | Status: DISCONTINUED | OUTPATIENT
Start: 2020-01-16 | End: 2020-01-15

## 2020-01-15 RX ORDER — FOLIC ACID 0.8 MG
1 TABLET ORAL
Qty: 30 | Refills: 0
Start: 2020-01-15

## 2020-01-15 RX ORDER — ACETAMINOPHEN 500 MG
650 TABLET ORAL EVERY 6 HOURS
Refills: 0 | Status: DISCONTINUED | OUTPATIENT
Start: 2020-01-15 | End: 2020-01-15

## 2020-01-15 RX ADMIN — SODIUM CHLORIDE 100 MILLILITER(S): 9 INJECTION INTRAMUSCULAR; INTRAVENOUS; SUBCUTANEOUS at 05:58

## 2020-01-15 RX ADMIN — Medication 81 MILLIGRAM(S): at 11:56

## 2020-01-15 RX ADMIN — Medication 1 MILLIGRAM(S): at 15:38

## 2020-01-15 RX ADMIN — HYDROXYUREA 500 MILLIGRAM(S): 500 CAPSULE ORAL at 15:38

## 2020-01-15 RX ADMIN — HYDROXYUREA 500 MILLIGRAM(S): 500 CAPSULE ORAL at 11:56

## 2020-01-15 NOTE — PROGRESS NOTE ADULT - SUBJECTIVE AND OBJECTIVE BOX
SUBJECTIVE: Pt seen and examined, resting comfortably in bed    OVERNIGHT EVENTS: none    Vital Signs Last 24 Hrs  T(C): 36.8 (15 Truman 2020 12:44), Max: 37.1 (14 Jan 2020 21:02)  T(F): 98.2 (15 Truman 2020 12:44), Max: 98.8 (14 Jan 2020 21:02)  HR: 102 (15 Truman 2020 12:44) (72 - 102)  BP: 116/68 (15 Truman 2020 12:44) (108/67 - 125/85)  BP(mean): --  RR: 17 (15 Truman 2020 12:44) (16 - 17)  SpO2: 98% (15 Truman 2020 12:44) (98% - 100%)    PHYSICAL EXAM:    General: No Acute Distress     Neurological: Awake, alert oriented to person, place and time, Following Commands, Face Symmetrical, Speech Fluent, Moving all extremities, Muscle Strength normal in all four extremities, No Drift, Sensation to Light Touch Intact    Pulmonary: Clear to Auscultation, No Rales, No Rhonchi, No Wheezes     Cardiovascular: S1, S2, Regular Rate and Rhythm     Gastrointestinal: Soft, Nontender, Nondistended     Incision: groin intact    LABS:                        9.1    13.89 )-----------( 508      ( 15 Truman 2020 09:02 )             29.9    01-15    137  |  103  |  11  ----------------------------<  94  4.2   |  23  |  0.52    Ca    9.5      15 Truman 2020 05:44    TPro  8.8<H>  /  Alb  4.5  /  TBili  1.0  /  DBili  x   /  AST  30  /  ALT  21  /  AlkPhos  80  01-14  PT/INR - ( 14 Jan 2020 13:13 )   PT: 12.9 sec;   INR: 1.12 ratio         PTT - ( 14 Jan 2020 13:13 )  PTT:34.1 sec  Hemoglobin A1C, Whole Blood: 5.0 % (12-27 @ 03:10)      01-14 @ 07:01  -  01-15 @ 07:00  --------------------------------------------------------  IN: 1620 mL / OUT: 700 mL / NET: 920 mL      DRAINS: none    MEDICATIONS:  Antibiotics:    Neuro:  acetaminophen   Tablet .. 650 milliGRAM(s) Oral every 6 hours PRN Temp greater or equal to 38C (100.4F), Mild Pain (1 - 3)  oxyCODONE    IR 5 milliGRAM(s) Oral every 4 hours PRN Moderate Pain (4 - 6)    Cardiac:    Pulm:    GI/:  senna 2 Tablet(s) Oral at bedtime PRN Constipation    Other:   aspirin enteric coated 81 milliGRAM(s) Oral daily  atorvastatin 80 milliGRAM(s) Oral at bedtime  hydroxyurea 500 milliGRAM(s) Oral once  influenza   Vaccine 0.5 milliLiter(s) IntraMuscular once  sodium chloride 0.9%. 1000 milliLiter(s) IV Continuous <Continuous>    DIET: [x] Regular [] CCD [] Renal [] Puree [] Dysphagia [] Tube Feeds:     IMAGING:   < from: MR Angio Neck w/ IV Cont (12.29.19 @ 21:15) >  IMPRESSION:    BRAIN    Multiple areas of evolving ischemia are noted with distribution as described. Patchy petechial hemorrhagic transformation involves the right superior cerebellar artery distribution infarct.    BRAIN MRA    Multiple aneurysms are noted with distribution as described, involving both the anterior and posterior circulations. Serial imaging follow-up over time is recommended to monitor for stability.    No evidence for acute major vessel occlusion about the [place].    NECK MRA    No evidence of carotid or vertebral artery stenosis or dissection.    < end of copied text > SUBJECTIVE: Pt seen and examined, resting comfortably in bed    OVERNIGHT EVENTS: none    Vital Signs Last 24 Hrs  T(C): 36.8 (15 Truman 2020 12:44), Max: 37.1 (14 Jan 2020 21:02)  T(F): 98.2 (15 Truman 2020 12:44), Max: 98.8 (14 Jan 2020 21:02)  HR: 102 (15 Truman 2020 12:44) (72 - 102)  BP: 116/68 (15 Truman 2020 12:44) (108/67 - 125/85)  BP(mean): --  RR: 17 (15 Truman 2020 12:44) (16 - 17)  SpO2: 98% (15 Truman 2020 12:44) (98% - 100%)    PHYSICAL EXAM:    General: No Acute Distress     Neurological: Awake, alert oriented to person, place and time, Following Commands, Face Symmetrical, Speech Fluent, Moving all extremities, Muscle Strength normal in all four extremities, No Drift, Sensation to Light Touch Intact    Pulmonary: Clear to Auscultation, No Rales, No Rhonchi, No Wheezes     Cardiovascular: S1, S2, Regular Rate and Rhythm     Gastrointestinal: Soft, Nontender, Nondistended     Incision: right groin intact, no hematoma    LABS:                        9.1    13.89 )-----------( 508      ( 15 Truman 2020 09:02 )             29.9    01-15    137  |  103  |  11  ----------------------------<  94  4.2   |  23  |  0.52    Ca    9.5      15 Truman 2020 05:44    TPro  8.8<H>  /  Alb  4.5  /  TBili  1.0  /  DBili  x   /  AST  30  /  ALT  21  /  AlkPhos  80  01-14  PT/INR - ( 14 Jan 2020 13:13 )   PT: 12.9 sec;   INR: 1.12 ratio         PTT - ( 14 Jan 2020 13:13 )  PTT:34.1 sec  Hemoglobin A1C, Whole Blood: 5.0 % (12-27 @ 03:10)      01-14 @ 07:01  -  01-15 @ 07:00  --------------------------------------------------------  IN: 1620 mL / OUT: 700 mL / NET: 920 mL      DRAINS: none    MEDICATIONS:  Antibiotics:    Neuro:  acetaminophen   Tablet .. 650 milliGRAM(s) Oral every 6 hours PRN Temp greater or equal to 38C (100.4F), Mild Pain (1 - 3)  oxyCODONE    IR 5 milliGRAM(s) Oral every 4 hours PRN Moderate Pain (4 - 6)    Cardiac:    Pulm:    GI/:  senna 2 Tablet(s) Oral at bedtime PRN Constipation    Other:   aspirin enteric coated 81 milliGRAM(s) Oral daily  atorvastatin 80 milliGRAM(s) Oral at bedtime  hydroxyurea 500 milliGRAM(s) Oral once  influenza   Vaccine 0.5 milliLiter(s) IntraMuscular once  sodium chloride 0.9%. 1000 milliLiter(s) IV Continuous <Continuous>    DIET: [x] Regular [] CCD [] Renal [] Puree [] Dysphagia [] Tube Feeds:     IMAGING:   < from: MR Angio Neck w/ IV Cont (12.29.19 @ 21:15) >  IMPRESSION:    BRAIN    Multiple areas of evolving ischemia are noted with distribution as described. Patchy petechial hemorrhagic transformation involves the right superior cerebellar artery distribution infarct.    BRAIN MRA    Multiple aneurysms are noted with distribution as described, involving both the anterior and posterior circulations. Serial imaging follow-up over time is recommended to monitor for stability.    No evidence for acute major vessel occlusion about the [place].    NECK MRA    No evidence of carotid or vertebral artery stenosis or dissection.    < end of copied text >

## 2020-01-15 NOTE — CONSULT LETTER
[Dear  ___] : Dear  [unfilled], [FreeTextEntry1] : Thank you for requesting the neurosurgical consultation on Linda Morrison.  As you know, he is a 19-year-old -American male with a past medical history of sickle cell anemia.  He also has a prior history of stroke secondary to the sickle cell anemia.  He presented to [facility] in acute sickle crisis.  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  I recommended further evaluation with formal cerebral angiography in preparation for treatment.  Obviously prior to the procedure, he will need clearance as well as management for his sickle cell disease.  Once this is completed, he will see us back in the office and I will make formal recommendations regarding treatment.\par \par  \par \par Thank you for allowing us to participate in the care of this pleasant patient.  If you have any questions, please do not hesitate to contact me.\par  [FreeTextEntry3] : \par Sincerely,\par \par Trae Walsh MD\par Professor of Neurological Surgery and Radiology\par  Department of Neurosurgery\par Director Cerebrovascular Surgery\par [employer] [employer] [facility]\par

## 2020-01-15 NOTE — CHART NOTE - NSCHARTNOTEFT_GEN_A_CORE
CAPRINI SCORE [CLOT] Score on Admission for     AGE RELATED RISK FACTORS                                                       MOBILITY RELATED FACTORS  [ ] Age 41-60 years                                            (1 Point)                  [ ] Bed rest                                                        (1 Point)  [ ] Age: 61-74 years                                           (2 Points)                 [ ] Plaster cast                                                   (2 Points)  [ ] Age= 75 years                                              (3 Points)                 [ ] Bed bound for more than 72 hours                 (2 Points)    DISEASE RELATED RISK FACTORS                                               GENDER SPECIFIC FACTORS  [ ] Edema in the lower extremities                       (1 Point)                  [ ] Pregnancy                                                     (1 Point)  [ ] Varicose veins                                               (1 Point)                  [ ] Post-partum < 6 weeks                                   (1 Point)             [ ] BMI > 25 Kg/m2                                            (1 Point)                  [ ] Hormonal therapy  or oral contraception          (1 Point)                 [ ] Sepsis (in the previous month)                        (1 Point)                  [ ] History of pregnancy complications                 (1 point)  [ ] Pneumonia or serious lung disease                                               [ ] Unexplained or recurrent                     (1 Point)           (in the previous month)                               (1 Point)  [ ] Abnormal pulmonary function test                     (1 Point)                 SURGERY RELATED RISK FACTORS (include planned surgeries)  [ ] Acute myocardial infarction                              (1 Point)                 [ ]  Section                                             (1 Point)  [ ] Congestive heart failure (in the previous month)  (1 Point)         [ ] Minor surgery                                                  (1 Point)   [ ] Inflammatory bowel disease                             (1 Point)                 [ ] Arthroscopic surgery                                        (2 Points)  [ ] Central venous access                                      (2 Points)                [ ] General surgery lasting more than 45 minutes   (2 Points)       [ ] Stroke (in the previous month)                          (5 Points)               [ ] Elective arthroplasty                                         (5 Points)            [ ] current or past malignancy                              (2 Points)                                                                                                       HEMATOLOGY RELATED FACTORS                                                 TRAUMA RELATED RISK FACTORS  [ ] Prior episodes of VTE                                     (3 Points)                [ ] Fracture of the hip, pelvis, or leg                       (5 Points)  [ ] Positive family history for VTE                         (3 Points)                 [ ] Acute spinal cord injury (in the previous month)  (5 Points)  [ ] Prothrombin 84894 A                                     (3 Points)                 [ ] Paralysis  (less than 1 month)                             (5 Points)  [ ] Factor V Leiden                                             (3 Points)                  [ ] Multiple Trauma within 1 month                        (5 Points)  [ ] Lupus anticoagulants                                     (3 Points)                                                           [ ] Anticardiolipin antibodies                               (3 Points)                                                       [ ] High homocysteine in the blood                      (3 Points)                                             [ ] Other congenital or acquired thrombophilia      (3 Points)                                                [ ] Heparin induced thrombocytopenia                  (3 Points)                                          Total Score [         0 ]    Risk:  Very low 0   Low 1 to 2   Moderate 3 to 4   High =5       VTE Prophylasix Recommednations:  [ x mechanical pneumatic compression devices                                      [ ] contraindicated: ______________  [ x] chemo prophylasix                                                                                   [ ] contraindicated _____________________    **** HIGH LIKELIHOOD DVT PRESENT ON ADMISSION  [ ] (please order LE dopplers within 24 hours of admission)

## 2020-01-15 NOTE — PHYSICAL EXAM
[General Appearance - Alert] : alert [General Appearance - In No Acute Distress] : in no acute distress [Place] : oriented to place [Person] : oriented to person [Time] : oriented to time [Involuntary Movements] : no involuntary movements were seen [Motor Strength] : muscle strength was normal in all four extremities [] : no respiratory distress

## 2020-01-15 NOTE — PROGRESS NOTE ADULT - ASSESSMENT
19 year old male with pmhx sickle cell disease, brain aneurysm w/ recent admission 12/14-12/31 for pain crisis and subsequent acute chest syndrome requiring intubation and  multiple exchange transfusions. Pt initially presented to [facility] VS 12/14 and  transferred to [facility] on 12/18 w/ extubation 12/22. He was altered after extubation and had CT head and MRI/MRA which revealed multiple areas patchy ischemia w/ hemorrhagic transformation in the right superior cerebellar region as well as multiple areas of aneurysm. Pt was d/c home for outpt nsx f/up. Patient reports over past 7 days has intermittent right sided HA. HA noted to be 6 at rest and 8 at max intensity. Has not taken anything for pain thus far. Most recent HA started last night and gradually progressed reported as intermittent since. Was advised to come to ED for persistent or worsened HA. Has had persistent but improving chest pain. Denies Fevers, chills, sob, abd pain, n/v/d.   Underwent cerebral angiogram and found to have  LICA aneurysm and LPCA aneurysm on 1/14/20.        PLAN:  Neuro:   - plan as per Dr Walsh  - continue asa   - ARU - 440  - oxycodone, tylenol prn pain  - sickle cell disease- continue hydroxyurea  - vitals stable  - labs reviewed   DVT ppx:   - venodynes    PT/OT:   - no needs      Assessment:  Please Check When Present   []  GCS  E   V  M     Heart Failure: []Acute, [] acute on chronic , []chronic  Heart Failure:  [] Diastolic (HFpEF), [] Systolic (HFrEF), []Combined (HFpEF and HFrEF), [] RHF, [] Pulm HTN, [] Other    [] ISABELL, [] ATN, [] AIN, [] other  [] CKD1, [] CKD2, [] CKD 3, [] CKD 4, [] CKD 5, []ESRD    Encephalopathy: [] Metabolic, [] Hepatic, [] toxic, [] Neurological, [] Other    Abnormal Nurtitional Status: [] malnurtition (see nutrition note), [ ]underweight: BMI < 19, [] morbid obesity: BMI >40, [] Cachexia    [] Sepsis  [] hypovolemic shock,[] cardiogenic shock, [] hemorrhagic shock, [] neuogenic shock  [] Acute Respiratory Failure  []Cerebral edema, [] Brain compression/ herniation,   [] Functional quadriplegia  [] Acute blood loss anemia    Spectralink # [id number]

## 2020-01-15 NOTE — HISTORY OF PRESENT ILLNESS
[de-identified] : Linda Morrison is a 19-year-old -American male with a past medical history of sickle cell anemia. He also has a prior history of stroke secondary to the sickle cell anemia. He presented to [facility] in acute sickle crisis. 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 He is here today to discuss treatment options for the aneurysm. He plans on traveling back home to [facility] on Sunday.

## 2020-01-15 NOTE — ASSESSMENT
[FreeTextEntry1] : Linda Morrison is a 19-year-old -American male with a past medical history of sickle cell anemia. He also has a prior history of stroke secondary to the sickle cell anemia. He presented to [facility] in acute sickle crisis. 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 I recommended further evaluation with formal cerebral angiography in preparation for treatment. The risks, benefits, alternatives, complications and personnel associated with the procedure were discussed with the patient and the family in great detail.  They request that we proceed.\par

## 2020-01-27 ENCOUNTER — INPATIENT (INPATIENT)
Facility: HOSPITAL | Age: 20
LOS: 13 days | Discharge: HOME | DRG: 25 | End: 2020-02-10
Attending: NEUROLOGICAL SURGERY | Admitting: NEUROLOGICAL SURGERY
Payer: MEDICAID

## 2020-01-27 VITALS
OXYGEN SATURATION: 100 % | WEIGHT: 125.66 LBS | DIASTOLIC BLOOD PRESSURE: 84 MMHG | TEMPERATURE: 99 F | RESPIRATION RATE: 20 BRPM | SYSTOLIC BLOOD PRESSURE: 137 MMHG | HEIGHT: 72 IN | HEART RATE: 111 BPM

## 2020-01-27 DIAGNOSIS — I67.1 CEREBRAL ANEURYSM, NONRUPTURED: ICD-10-CM

## 2020-01-27 LAB
ALBUMIN SERPL ELPH-MCNC: 4.4 G/DL — SIGNIFICANT CHANGE UP (ref 3.3–5)
ALP SERPL-CCNC: 82 U/L — SIGNIFICANT CHANGE UP (ref 40–120)
ALT FLD-CCNC: 24 U/L — SIGNIFICANT CHANGE UP (ref 10–45)
ANION GAP SERPL CALC-SCNC: 13 MMOL/L — SIGNIFICANT CHANGE UP (ref 5–17)
ANISOCYTOSIS BLD QL: SLIGHT — SIGNIFICANT CHANGE UP
APTT BLD: 34.2 SEC — SIGNIFICANT CHANGE UP (ref 27.5–36.3)
AST SERPL-CCNC: 37 U/L — SIGNIFICANT CHANGE UP (ref 10–40)
BASOPHILS # BLD AUTO: 0.07 K/UL — SIGNIFICANT CHANGE UP (ref 0–0.2)
BASOPHILS NFR BLD AUTO: 0.9 % — SIGNIFICANT CHANGE UP (ref 0–2)
BILIRUB SERPL-MCNC: 2 MG/DL — HIGH (ref 0.2–1.2)
BLD GP AB SCN SERPL QL: NEGATIVE — SIGNIFICANT CHANGE UP
BUN SERPL-MCNC: 8 MG/DL — SIGNIFICANT CHANGE UP (ref 7–23)
CALCIUM SERPL-MCNC: 10.1 MG/DL — SIGNIFICANT CHANGE UP (ref 8.4–10.5)
CHLORIDE SERPL-SCNC: 104 MMOL/L — SIGNIFICANT CHANGE UP (ref 96–108)
CO2 SERPL-SCNC: 21 MMOL/L — LOW (ref 22–31)
CREAT SERPL-MCNC: 0.43 MG/DL — LOW (ref 0.5–1.3)
ELLIPTOCYTES BLD QL SMEAR: SLIGHT — SIGNIFICANT CHANGE UP
EOSINOPHIL # BLD AUTO: 0.07 K/UL — SIGNIFICANT CHANGE UP (ref 0–0.5)
EOSINOPHIL NFR BLD AUTO: 0.9 % — SIGNIFICANT CHANGE UP (ref 0–6)
GLUCOSE SERPL-MCNC: 89 MG/DL — SIGNIFICANT CHANGE UP (ref 70–99)
HCT VFR BLD CALC: 32.8 % — LOW (ref 39–50)
HGB BLD-MCNC: 10.7 G/DL — LOW (ref 13–17)
HYPOCHROMIA BLD QL: SLIGHT — SIGNIFICANT CHANGE UP
INR BLD: 1.14 RATIO — SIGNIFICANT CHANGE UP (ref 0.88–1.16)
LYMPHOCYTES # BLD AUTO: 3.83 K/UL — HIGH (ref 1–3.3)
LYMPHOCYTES # BLD AUTO: 46.5 % — HIGH (ref 13–44)
MACROCYTES BLD QL: SLIGHT — SIGNIFICANT CHANGE UP
MCHC RBC-ENTMCNC: 28.8 PG — SIGNIFICANT CHANGE UP (ref 27–34)
MCHC RBC-ENTMCNC: 32.6 GM/DL — SIGNIFICANT CHANGE UP (ref 32–36)
MCV RBC AUTO: 88.2 FL — SIGNIFICANT CHANGE UP (ref 80–100)
MONOCYTES # BLD AUTO: 0.21 K/UL — SIGNIFICANT CHANGE UP (ref 0–0.9)
MONOCYTES NFR BLD AUTO: 2.6 % — SIGNIFICANT CHANGE UP (ref 2–14)
NEUTROPHILS # BLD AUTO: 4.04 K/UL — SIGNIFICANT CHANGE UP (ref 1.8–7.4)
NEUTROPHILS NFR BLD AUTO: 49.1 % — SIGNIFICANT CHANGE UP (ref 43–77)
PLAT MORPH BLD: NORMAL — SIGNIFICANT CHANGE UP
PLATELET # BLD AUTO: 296 K/UL — SIGNIFICANT CHANGE UP (ref 150–400)
PLATELET RESPONSE ASPIRIN RESULT: 498 ARU — SIGNIFICANT CHANGE UP (ref 350–700)
POIKILOCYTOSIS BLD QL AUTO: SIGNIFICANT CHANGE UP
POLYCHROMASIA BLD QL SMEAR: SIGNIFICANT CHANGE UP
POTASSIUM SERPL-MCNC: 4.2 MMOL/L — SIGNIFICANT CHANGE UP (ref 3.5–5.3)
POTASSIUM SERPL-SCNC: 4.2 MMOL/L — SIGNIFICANT CHANGE UP (ref 3.5–5.3)
PROT SERPL-MCNC: 8.2 G/DL — SIGNIFICANT CHANGE UP (ref 6–8.3)
PROTHROM AB SERPL-ACNC: 13.1 SEC — HIGH (ref 10–12.9)
RBC # BLD: 3.72 M/UL — LOW (ref 4.2–5.8)
RBC # BLD: 3.72 M/UL — LOW (ref 4.2–5.8)
RBC # FLD: 23 % — HIGH (ref 10.3–14.5)
RBC BLD AUTO: ABNORMAL
RETICS #: 177.8 K/UL — HIGH (ref 25–125)
RETICS/RBC NFR: 4.8 % — HIGH (ref 0.5–2.5)
RH IG SCN BLD-IMP: POSITIVE — SIGNIFICANT CHANGE UP
SCHISTOCYTES BLD QL AUTO: SLIGHT — SIGNIFICANT CHANGE UP
SICKLE CELLS BLD QL SMEAR: SLIGHT — SIGNIFICANT CHANGE UP
SODIUM SERPL-SCNC: 138 MMOL/L — SIGNIFICANT CHANGE UP (ref 135–145)
TARGETS BLD QL SMEAR: SIGNIFICANT CHANGE UP
WBC # BLD: 8.23 K/UL — SIGNIFICANT CHANGE UP (ref 3.8–10.5)
WBC # FLD AUTO: 8.23 K/UL — SIGNIFICANT CHANGE UP (ref 3.8–10.5)

## 2020-01-27 PROCEDURE — 99285 EMERGENCY DEPT VISIT HI MDM: CPT

## 2020-01-27 PROCEDURE — 99221 1ST HOSP IP/OBS SF/LOW 40: CPT | Mod: GC

## 2020-01-27 RX ORDER — ATORVASTATIN CALCIUM 80 MG/1
80 TABLET, FILM COATED ORAL AT BEDTIME
Refills: 0 | Status: DISCONTINUED | OUTPATIENT
Start: 2020-01-27 | End: 2020-01-29

## 2020-01-27 RX ORDER — SODIUM CHLORIDE 9 MG/ML
3 INJECTION INTRAMUSCULAR; INTRAVENOUS; SUBCUTANEOUS EVERY 8 HOURS
Refills: 0 | Status: DISCONTINUED | OUTPATIENT
Start: 2020-01-27 | End: 2020-01-29

## 2020-01-27 RX ORDER — ACETAMINOPHEN 500 MG
650 TABLET ORAL EVERY 6 HOURS
Refills: 0 | Status: DISCONTINUED | OUTPATIENT
Start: 2020-01-27 | End: 2020-01-29

## 2020-01-27 RX ORDER — SODIUM CHLORIDE 9 MG/ML
1000 INJECTION INTRAMUSCULAR; INTRAVENOUS; SUBCUTANEOUS ONCE
Refills: 0 | Status: COMPLETED | OUTPATIENT
Start: 2020-01-27 | End: 2020-01-27

## 2020-01-27 RX ORDER — METOCLOPRAMIDE HCL 10 MG
10 TABLET ORAL EVERY 6 HOURS
Refills: 0 | Status: DISCONTINUED | OUTPATIENT
Start: 2020-01-27 | End: 2020-01-29

## 2020-01-27 RX ORDER — ACETAMINOPHEN 500 MG
975 TABLET ORAL ONCE
Refills: 0 | Status: COMPLETED | OUTPATIENT
Start: 2020-01-27 | End: 2020-01-27

## 2020-01-27 RX ORDER — SENNA PLUS 8.6 MG/1
2 TABLET ORAL AT BEDTIME
Refills: 0 | Status: DISCONTINUED | OUTPATIENT
Start: 2020-01-27 | End: 2020-01-29

## 2020-01-27 RX ORDER — INFLUENZA VIRUS VACCINE 15; 15; 15; 15 UG/.5ML; UG/.5ML; UG/.5ML; UG/.5ML
0.5 SUSPENSION INTRAMUSCULAR ONCE
Refills: 0 | Status: DISCONTINUED | OUTPATIENT
Start: 2020-01-27 | End: 2020-02-10

## 2020-01-27 RX ORDER — FOLIC ACID 0.8 MG
1 TABLET ORAL DAILY
Refills: 0 | Status: DISCONTINUED | OUTPATIENT
Start: 2020-01-27 | End: 2020-01-29

## 2020-01-27 RX ORDER — HYDROXYUREA 500 MG/1
1000 CAPSULE ORAL DAILY
Refills: 0 | Status: DISCONTINUED | OUTPATIENT
Start: 2020-01-27 | End: 2020-01-29

## 2020-01-27 RX ADMIN — SODIUM CHLORIDE 3 MILLILITER(S): 9 INJECTION INTRAMUSCULAR; INTRAVENOUS; SUBCUTANEOUS at 22:14

## 2020-01-27 RX ADMIN — Medication 975 MILLIGRAM(S): at 11:38

## 2020-01-27 RX ADMIN — ATORVASTATIN CALCIUM 80 MILLIGRAM(S): 80 TABLET, FILM COATED ORAL at 22:13

## 2020-01-27 RX ADMIN — Medication 975 MILLIGRAM(S): at 12:00

## 2020-01-27 RX ADMIN — SODIUM CHLORIDE 1000 MILLILITER(S): 9 INJECTION INTRAMUSCULAR; INTRAVENOUS; SUBCUTANEOUS at 15:43

## 2020-01-27 NOTE — ED PROVIDER NOTE - ATTENDING CONTRIBUTION TO CARE
------------ATTENDING NOTE------------  pt w/ family c/o 24 hrs of constant pain in R forehead wrapping around, no nausea/vomiting, no neck stiffness, normal neuro exam, HD stable, pt anxious/concerned about brain aneurysm and returning to school in [place], complicated by Sickle Cell, admitting for surgery and Heme following.  - Corwin Colin MD   ----------------------------------------------

## 2020-01-27 NOTE — ED PROVIDER NOTE - CARE PLAN
Principal Discharge DX:	Brain aneurysm Principal Discharge DX:	Brain aneurysm  Secondary Diagnosis:	Tension headache

## 2020-01-27 NOTE — CONSULT NOTE ADULT - NSHPATTENDINGPLANDISCUSS_GEN_ALL_CORE
patient his sister and Medical resident patient his sister and Medical resident HAYLEY Herron; hematology staff and Dr Tate

## 2020-01-27 NOTE — CONSULT NOTE ADULT - SUBJECTIVE AND OBJECTIVE BOX
HPI:          REVIEW OF SYSTEMS:    CONSTITUTIONAL: No weakness, fevers or chills  EYES/ENT: No visual changes, no throat pain   RESPIRATORY: No cough, wheezing, hemoptysis; No shortness of breath  CARDIOVASCULAR: No chest pain or palpitations  GASTROINTESTINAL: No abdominal pain, nausea, vomiting, or hematemesis; No diarrhea or constipation. No melena or hematochezia.  GENITOURINARY: No dysuria, frequency or hematuria  MUSCULOSKELETAL: No joint pain.  NEUROLOGICAL: No dizziness, numbness, or weakness  SKIN: No itching, burning, rashes, or lesions   All other review of systems is negative unless indicated above.  Allergies    No Known Allergies    Intolerances        PAST MEDICAL & SURGICAL HISTORY:  Brain aneurysm  Sickle cell anemia  Sickle cell disease  No significant past surgical history      FAMILY HISTORY:      Social History:          VITAL SIGNS:  Vital Signs Last 24 Hrs  T(C): 37.6 (27 Jan 2020 15:33), Max: 37.6 (27 Jan 2020 15:33)  T(F): 99.6 (27 Jan 2020 15:33), Max: 99.6 (27 Jan 2020 15:33)  HR: 91 (27 Jan 2020 15:33) (90 - 111)  BP: 124/85 (27 Jan 2020 15:33) (119/82 - 137/84)  BP(mean): --  RR: 18 (27 Jan 2020 15:33) (12 - 20)  SpO2: 100% (27 Jan 2020 15:33) (100% - 100%)      PHYSICAL EXAM:     GENERAL: no acute distress  HEENT: EOMI, neck supple  RESPIRATORY: LCTAB/L, no rhonchi, rales, or wheezing  CARDIOVASCULAR: RRR, no murmurs, gallops, rubs  ABDOMINAL: soft, non-tender, non-distended, positive bowel sounds   EXTREMITIES: no clubbing, cyanosis, or edema  NEUROLOGICAL: alert and oriented x 3, non-focal  SKIN: no rashes or lesions   MUSCULOSKELETAL: no gross joint deformity                          10.7   8.23  )-----------( 296      ( 27 Jan 2020 11:44 )             32.8     01-27    138  |  104  |  8   ----------------------------<  89  4.2   |  21<L>  |  0.43<L>    Ca    10.1      27 Jan 2020 11:44    TPro  8.2  /  Alb  4.4  /  TBili  2.0<H>  /  DBili  x   /  AST  37  /  ALT  24  /  AlkPhos  82  01-27      MEDICATIONS  (STANDING):  atorvastatin 80 milliGRAM(s) Oral at bedtime  folic acid 1 milliGRAM(s) Oral daily  hydroxyurea 1000 milliGRAM(s) Oral daily  sodium chloride 0.9% lock flush 3 milliLiter(s) IV Push every 8 hours

## 2020-01-27 NOTE — H&P ADULT - HISTORY OF PRESENT ILLNESS
19M pmhx SSD several known unruptured aneurysms largest is L ICA s/p DSA 1/14/2020 presents for gradual worsening headache since yesterday, neurointact

## 2020-01-27 NOTE — ED PROVIDER NOTE - PROGRESS NOTE DETAILS
Zoey PGY3: pt agrees to CTH that was also recommended by neurosurgery seen by neurosurgery will admit to their service for possible clipping of aneurysm

## 2020-01-27 NOTE — ED ADULT NURSE REASSESSMENT NOTE - NS ED NURSE REASSESS COMMENT FT1
Pt resting comfortably in stretcher. Labs drawn and sent to lab, fluids infusing. Family at bedside. Pt and family aware of plan of care, pending admission bed.

## 2020-01-27 NOTE — ED ADULT NURSE NOTE - OBJECTIVE STATEMENT
19 year old male presenting to ED c/o constant sharp 8/10 right sided headache beginning last night while watching TV, and left leg tingling. Pt A+Ox3 reports he's experienced this pain before, Tylenol usually relieves it, and denies exacerbating factors. Pt denies taking medication today for the pain, denies weakness, dizziness or lightheadedness, blurry vision, chest pain, SOB, abdominal pain, N/V. PMH includes sickle cell anemia.

## 2020-01-27 NOTE — ED PROVIDER NOTE - CONSTITUTIONAL, MLM
normal... Well appearing, awake, alert, oriented to person, place, time/situation and in no apparent distress. Well appearing, awake, alert, and in no apparent distress.

## 2020-01-27 NOTE — CONSULT NOTE ADULT - SUBJECTIVE AND OBJECTIVE BOX
p ([id number])     HPI:  19M known unruptured L ICA aneurysm s/p DSA 1/14/2020 presents for gradual worsening headache since yesterday, has now resolved with tylenol, neurointact    Imaging:    Exam:  AOx3, FC, EOMI, no facial   5/5 throughout, no drift  SILT    --Anticoagulation:    =====================  PAST MEDICAL HISTORY   Sickle cell anemia  Sickle cell disease    PAST SURGICAL HISTORY         MEDICATIONS:  Antibiotics:    Neuro:    Other:      SOCIAL HISTORY:   Occupation:   Marital Status:     FAMILY HISTORY:      ROS: Negative except per HPI    LABS:                          10.7   8.23  )-----------( 296      ( 27 Jan 2020 11:44 )             32.8     01-27    138  |  104  |  8   ----------------------------<  89  4.2   |  21<L>  |  0.43<L>    Ca    10.1      27 Jan 2020 11:44    TPro  8.2  /  Alb  4.4  /  TBili  2.0<H>  /  DBili  x   /  AST  37  /  ALT  24  /  AlkPhos  82  01-27

## 2020-01-27 NOTE — ED PROVIDER NOTE - OBJECTIVE STATEMENT
19 year old male patient complaining of a right sided headache that began last night that started suddenly while watching TV. He describes the pain as "stabbing" and complained of left leg tingling, general dizziness, but not weakness. He claims this is similar to other headaches he's had but worse. He has not taken and medication to treat the headache. -Coopre CASILLAS 19 year old M c PMH of SSA and brain aneurysm (dx in December, pending possible neurosurgery, hasn't had neurosurgery yet) patient complaining of a sudden onset right sided headache that began last night that started suddenly while watching TV. He describes the pain as "stabbing" and complained of left leg tingling, general dizziness, but not weakness. He claims this is similar to other headaches he's had but worse. He has not taken and medication to treat the headache.  -Cooper CASILLAS

## 2020-01-27 NOTE — CONSULT NOTE ADULT - SUBJECTIVE AND OBJECTIVE BOX
Mr. Morrison is a 19 year old male from [place] who has a history of Hb SS disease Mr. Morrison is a 19 year old male from [place] who has a history of Hb SS disease, brain aneurysm w/ recent admission 12/14-12/31 for pain crisis and subsequent acute chest syndrome as well as stroke requiring intubation and multiple exchange transfusions. Pt initially presented to [facility] VS 12/14 and  transferred to [facility] on 12/18 w/ extubation 12/22. He was altered after extubation and had CT head and MRI/MRA which revealed multiple areas patchy ischemia w/ hemorrhagic transformation in the right superior cerebellar region as well as multiple areas of aneurysm. Pt was d/c home for outpt nsx f/up. Patient reports over past day he had a headache which is now resolved and the patient has not had any pain, SOB, or fever. Most recent HA 2 weeks ago gradually progressed reported as intermittent since. Was advised to come to ED for persistent or worsened HA. Denies Fevers, chills, sob, abd pain, n/v/d, bleeding, belly pain. Patient admitted to [facility] for brain surgery, hematology consulted for assistance with matilda-operative management. Patient with 2 pain crises per year on average, most recent exacerbation was otherwise the worse.    MRI head reviewed:  A 6 mm aneurysm involves the left carotid bifurcation, which also incorporates the proximal M1 segment. The neck of the aneurysm measures roughly 2 mm. The aneurysm projects superiorly.    A 3 mm aneurysm involves the left P1 segment which projects superiorly and posteriorly. A 2 mm aneurysm involves the distal left basilar artery, also incorporating the origin of the left posterior cerebral artery, which projects to the left and posteriorly. A questionable additional 2 mm aneurysm may involve the basilar tip.    There is no evidence for focal stenosis or acute major vessel occlusion about the [place]. The right superior cerebellar artery is widely patent.      Follows with Dr. Kelley outpatient.  Per outpatient records:  Hb SS history is as follows   -he has been hospitalized 2 times in the last 3 years; other hosp was for cough, received antibiotics only and improved   -never had acute chest syndrome until recent hospitalization  -he has never been exchange transfused until last hopsitalization  -when he was young they suspected he had sickle cell disease  -his sister is a sickle cell patient and they thought he might have it  -he never used to have crisis like he had it  -when he was going to college 2 years ago, that is when it was confirmed that it was SS.  -Since the stroke his hands are a little shaky but are getting better  -otherwise no other effects  -he was to follow up with doc in [place]    Was recently started on hydroxyurea by Dr. Kelley. Also h/o thrombocytosis incidentally found after recent hospitalizations on aspirin.    Home Medications:  acetaminophen 325 mg oral tablet: 2 tab(s) orally every 6 hours, As needed, Mild Pain (1 - 3) (15 Truman 2020 15:46)  aspirin 81 oral delayed release tablet: 1 tab(s) orally once a day (15 Truman 2020 10:22)    Social History: from [place], unclear vaccination history    FAMILY HISTORY:  Sister with Hb SS disease, no other relatives have it    PAST MEDICAL & SURGICAL HISTORY:  Brain aneurysm  Sickle cell anemia  Sickle cell disease  No significant past surgical history    Allergies    No Known Allergies    Intolerances      ICU Vital Signs Last 24 Hrs  T(C): 37.1 (27 Jan 2020 11:24), Max: 37.1 (27 Jan 2020 09:46)  T(F): 98.7 (27 Jan 2020 11:24), Max: 98.7 (27 Jan 2020 09:46)  HR: 92 (27 Jan 2020 11:24) (90 - 111)  BP: 119/82 (27 Jan 2020 11:24) (119/82 - 137/84)  RR: 18 (27 Jan 2020 11:24) (12 - 20)  SpO2: 100% (27 Jan 2020 11:24) (100% - 100%)    PHYSICAL EXAM:  GENERAL: NAD, lying in bed comfortably  HEAD:  Atraumatic, Normocephalic  EYES: EOMI, PERRLA, conjunctiva and sclera clear  ENT: Moist mucous membranes  NECK: Supple, No JVD  CHEST/LUNG: Clear to auscultation bilaterally; No rales, rhonchi, wheezing, or rubs. Unlabored respirations  HEART: Regular rate and rhythm; No murmurs, rubs, or gallops  ABDOMEN: Bowel sounds present; Soft, Nontender, Nondistended. No hepatomegaly  EXTREMITIES:  2+ Peripheral Pulses, brisk capillary refill. No clubbing, cyanosis, or edema  NERVOUS SYSTEM:  Alert & Oriented X3, speech clear. No deficits.  MSK: FROM all 4 extremities, full and equal strength  SKIN: No rashes or lesions    CBC Full  -  ( 27 Jan 2020 11:44 )  WBC Count : 8.23 K/uL  RBC Count : 3.72 M/uL  Hemoglobin : 10.7 g/dL  Hematocrit : 32.8 %  Platelet Count - Automated : 296 K/uL  Mean Cell Volume : 88.2 fl  Mean Cell Hemoglobin : 28.8 pg  Mean Cell Hemoglobin Concentration : 32.6 gm/dL  Auto Neutrophil # : 4.04 K/uL  Auto Lymphocyte # : 3.83 K/uL  Auto Monocyte # : 0.21 K/uL  Auto Eosinophil # : 0.07 K/uL  Auto Basophil # : 0.07 K/uL  Auto Neutrophil % : 49.1 %  Auto Lymphocyte % : 46.5 %  Auto Monocyte % : 2.6 %  Auto Eosinophil % : 0.9 %  Auto Basophil % : 0.9 %    Retic percentage: 4.8%    Bili 2.0  Cr 0.43 Mr. Morrison is a 19 year old male from [place] who has a history of Hb SS disease, brain aneurysm w/ recent admission 12/14-12/31 for pain crisis and subsequent acute chest syndrome as well as stroke requiring intubation and multiple exchange transfusions. Pt initially presented to [facility] VS 12/14 and  transferred to [facility] on 12/18 w/ extubation 12/22. He was altered after extubation and had CT head and MRI/MRA which revealed multiple areas patchy ischemia w/ hemorrhagic transformation in the right superior cerebellar region as well as multiple areas of aneurysm. Pt was d/c home for outpt nsx f/up. Patient reports over past day he had a headache which is now resolved and the patient has not had any pain, SOB, or fever. Most recent HA 2 weeks ago gradually progressed reported as intermittent since. Was advised to come to ED for persistent or worsened HA. Denies Fevers, chills, sob, abd pain, n/v/d, bleeding, belly pain. Patient admitted to [facility] for brain surgery, hematology consulted for assistance with matilda-operative management. Patient with 2 pain crises per year on average, most recent exacerbation was otherwise the worse.    MRI head reviewed:  A 6 mm aneurysm involves the left carotid bifurcation, which also incorporates the proximal M1 segment. The neck of the aneurysm measures roughly 2 mm. The aneurysm projects superiorly.    A 3 mm aneurysm involves the left P1 segment which projects superiorly and posteriorly. A 2 mm aneurysm involves the distal left basilar artery, also incorporating the origin of the left posterior cerebral artery, which projects to the left and posteriorly. A questionable additional 2 mm aneurysm may involve the basilar tip.    There is no evidence for focal stenosis or acute major vessel occlusion about the [place]. The right superior cerebellar artery is widely patent.      Follows with Dr. Kelley outpatient.  Per outpatient records:  Hb SS history is as follows   -he has been hospitalized 2 times in the last 3 years; other hosp was for cough, received antibiotics only and improved   -never had acute chest syndrome until recent hospitalization  -he has never been exchange transfused until last hopsitalization  -when he was young they suspected he had sickle cell disease  -his sister is a sickle cell patient and they thought he might have it  -he never used to have crisis like he had it  -when he was going to college 2 years ago, that is when it was confirmed that it was SS.  -Since the stroke his hands are a little shaky but are getting better  -otherwise no other effects  -he was to follow up with doc in [place]    Was recently started on hydroxyurea by Dr. Kelley. Also h/o thrombocytosis incidentally found after recent hospitalizations on aspirin.    Home Medications:  acetaminophen 325 mg prn  aspirin 81  take folate  recently on hydroxyurea    Social History: from [place], unclear vaccination history    FAMILY HISTORY:  Sister with Hb SS disease, no other relatives have it    PAST MEDICAL & SURGICAL HISTORY:  Brain aneurysm  Sickle cell anemia  Sickle cell disease  No significant past surgical history    Allergies    No Known Allergies    Intolerances      ICU Vital Signs Last 24 Hrs  T(C): 37.1 (27 Jan 2020 11:24), Max: 37.1 (27 Jan 2020 09:46)  T(F): 98.7 (27 Jan 2020 11:24), Max: 98.7 (27 Jan 2020 09:46)  HR: 92 (27 Jan 2020 11:24) (90 - 111)  BP: 119/82 (27 Jan 2020 11:24) (119/82 - 137/84)  RR: 18 (27 Jan 2020 11:24) (12 - 20)  SpO2: 100% (27 Jan 2020 11:24) (100% - 100%)    PHYSICAL EXAM:  GENERAL: NAD, lying in bed comfortably  HEAD:  Atraumatic, Normocephalic  EYES: EOMI, PERRLA, conjunctiva and sclera clear  ENT: Moist mucous membranes  NECK: Supple, No JVD  CHEST/LUNG: Clear to auscultation bilaterally; No rales, rhonchi, wheezing, or rubs. Unlabored respirations  HEART: Regular rate and rhythm; No murmurs, rubs, or gallops  ABDOMEN: Bowel sounds present; Soft, Nontender, Nondistended. No hepatomegaly  EXTREMITIES:  2+ Peripheral Pulses, brisk capillary refill. No clubbing, cyanosis, or edema  NERVOUS SYSTEM:  Alert & Oriented X3, speech clear. No deficits.  MSK: FROM all 4 extremities, full and equal strength  SKIN: No rashes or lesions    CBC Full  -  ( 27 Jan 2020 11:44 )  WBC Count : 8.23 K/uL  RBC Count : 3.72 M/uL  Hemoglobin : 10.7 g/dL  Hematocrit : 32.8 %  Platelet Count - Automated : 296 K/uL  Mean Cell Volume : 88.2 fl  Mean Cell Hemoglobin : 28.8 pg  Mean Cell Hemoglobin Concentration : 32.6 gm/dL  Auto Neutrophil # : 4.04 K/uL  Auto Lymphocyte # : 3.83 K/uL  Auto Monocyte # : 0.21 K/uL  Auto Eosinophil # : 0.07 K/uL  Auto Basophil # : 0.07 K/uL  Auto Neutrophil % : 49.1 %  Auto Lymphocyte % : 46.5 %  Auto Monocyte % : 2.6 %  Auto Eosinophil % : 0.9 %  Auto Basophil % : 0.9 %    Retic percentage: 4.8%    Bili 2.0  Cr 0.43

## 2020-01-27 NOTE — ED PROVIDER NOTE - PHYSICAL EXAMINATION
NEURO: pupils 3 mm, PERRL, EOMI (CN III, IV, VI), facial sensation intact to light touch in all 3 divisions bilat (CN V), face is symmetric with normal eye closure, eye opening, and smile (CN VII), hearing is normal to rubbing fingers (CN VII), palate elevates symmetrically, phonation is normal (CN IX, X),  shoulder shrug intact bilat (CN XI), tongue is midline with nl movements and no atrophy (CN XII), finger to nose test nl bilat, negative pronator drift bilat, speech is clear; 5/5 motor strength BUE and BLE: deltoids, biceps, triceps, wrist flexors/extensors, hand , hip flexors, knee flexors/extensors, plantar/dorsiflexors, hallux flexors/extensors; sensation intact to light touch BUE and BLE: C5-T1 and L3-S1; gait wnl

## 2020-01-27 NOTE — ED ADULT NURSE NOTE - SUICIDE SCREENING QUESTION 3
Talked with mom. (Zoraida) appt scheduled. Mom indicated pt clicked with DR. Sheehan. Mom wanted to try Dr. Sheehan for cpe.     Msg copied from previous encounter from Dr. Steele -6 month supply refill prescribed.    advise mom to set up Complete exam in October.  Attempt to do pelvic/ pap if Patient willing.  
No

## 2020-01-27 NOTE — ED PROVIDER NOTE - CRANIAL NERVE AND PUPILLARY EXAM
corneal reflex intact/tongue is midline/cranial nerves 2-12 intact/central and peripheral vision intact

## 2020-01-27 NOTE — CONSULT NOTE ADULT - ASSESSMENT
AWAIT FINAL RECS    19 year old man with Hb SS with only minimal symptoms in the past with recent long hospitalization for acute chest syndrome requiring intubation and exchange transfusion c/b stroke who on MRI was found to have multiple aneurysms c/b recurrent headaches, who presents for neurosurgical intervention upon brain aneurysms prior to returning home to [facility]. Hematology consulted for matilda-operative management from perspective of Hb SS disease.    Recs:  -Common triggers matilda-operatively of sickling include acidemia, hypoxia, anxiety. Thus, intra- and post-operative management should focus on minimizing hypoxia, hypothermia, acidosis, and intravascular volume depletion.  -Matilda-operative fasting should ideally be minimized as much as possible to avoid dehydration  -O2 monitoring on pulse oximetry can sometimes be difficult and inaccurate with routine pulse oximetry.   -Please use incentive spirometer in matilda-operative period both pre- and post- operatively. AWAIT FINAL RECS    19 year old man with Hb SS with only minimal symptoms in the past with recent long hospitalization for acute chest syndrome requiring intubation and exchange transfusion c/b stroke who on MRI was found to have multiple aneurysms c/b recurrent headaches, who presents for neurosurgical intervention upon brain aneurysms prior to returning home to [facility]. Hematology consulted for matilda-operative management from perspective of Hb SS disease.    Recs:  -Common triggers matilda-operatively of sickling include acidemia, hypoxia, anxiety. Thus, intra- and post-operative management should focus on minimizing hypoxia, hypothermia, acidosis, and intravascular volume depletion.  -Matilda-operative fasting should ideally be minimized as much as possible to avoid dehydration  -O2 monitoring on pulse oximetry can sometimes be difficult and inaccurate with routine pulse oximetry.   -Please use incentive spirometer in matilda-operative period both pre- and post- operatively.  -Thrombocytosis: recently started on ASA, would hold matilda-operatively. AWAIT FINAL RECS    19 year old man with Hb SS with only minimal symptoms in the past with recent long hospitalization for acute chest syndrome requiring intubation and exchange transfusion c/b stroke who on MRI was found to have multiple aneurysms c/b recurrent headaches, who presents for neurosurgical intervention upon brain aneurysms prior to returning home to [facility]. Hematology consulted for matilda-operative management from perspective of Hb SS disease.    Recs:  -Common triggers matilda-operatively of sickling include acidemia, hypoxia, anxiety. Thus, intra- and post-operative management should focus on minimizing hypoxia, hypothermia, acidosis, and intravascular volume depletion.  -Matilda-operative fasting should ideally be minimized as much as possible to avoid dehydration  -O2 monitoring on pulse oximetry can sometimes be difficult and inaccurate with routine pulse oximetry.   -Please use incentive spirometer in matilda-operative period both pre- and post- operatively.  -Thrombocytosis: recently started on ASA, would hold matilda-operatively.  -transfuse goal Hb ~10 to minimize hypoxia 19 year old man with Hb SS with only minimal symptoms in the past with recent long hospitalization for acute chest syndrome requiring intubation and exchange transfusion c/b stroke who on MRI was found to have multiple aneurysms c/b recurrent headaches, who presents for neurosurgical intervention upon brain aneurysms prior to returning home to [facility]. Hematology consulted for matilda-operative management from perspective of Hb SS disease.    Recs:  -Common triggers matilda-operatively of sickling include acidemia, hypoxia, anxiety. Thus, intra- and post-operative management should focus on minimizing hypoxia, hypothermia, acidosis, and intravascular volume depletion.  -Matilda-operative fasting should ideally be minimized as much as possible to avoid dehydration  -O2 monitoring on pulse oximetry can sometimes be difficult and inaccurate with routine pulse oximetry.   -Please use incentive spirometer in matilda-operative period both pre- and post- operatively.  -Thrombocytosis: recently started on ASA, would hold matilda-operatively.  -transfuse goal Hb ~10 to minimize hypoxia

## 2020-01-28 ENCOUNTER — TRANSCRIPTION ENCOUNTER (OUTPATIENT)
Age: 20
End: 2020-01-28

## 2020-01-28 LAB
HCT VFR BLD CALC: 32.7 % — LOW (ref 39–50)
HGB BLD-MCNC: 11 G/DL — LOW (ref 13–17)
MCHC RBC-ENTMCNC: 29.1 PG — SIGNIFICANT CHANGE UP (ref 27–34)
MCHC RBC-ENTMCNC: 33.6 GM/DL — SIGNIFICANT CHANGE UP (ref 32–36)
MCV RBC AUTO: 86.5 FL — SIGNIFICANT CHANGE UP (ref 80–100)
NRBC # BLD: 0 /100 WBCS — SIGNIFICANT CHANGE UP (ref 0–0)
PLATELET # BLD AUTO: 274 K/UL — SIGNIFICANT CHANGE UP (ref 150–400)
RBC # BLD: 3.78 M/UL — LOW (ref 4.2–5.8)
RBC # FLD: 23.2 % — HIGH (ref 10.3–14.5)
WBC # BLD: 9.34 K/UL — SIGNIFICANT CHANGE UP (ref 3.8–10.5)
WBC # FLD AUTO: 9.34 K/UL — SIGNIFICANT CHANGE UP (ref 3.8–10.5)

## 2020-01-28 PROCEDURE — 99233 SBSQ HOSP IP/OBS HIGH 50: CPT | Mod: 57

## 2020-01-28 RX ORDER — SODIUM CHLORIDE 9 MG/ML
1000 INJECTION INTRAMUSCULAR; INTRAVENOUS; SUBCUTANEOUS
Refills: 0 | Status: DISCONTINUED | OUTPATIENT
Start: 2020-01-28 | End: 2020-01-29

## 2020-01-28 RX ADMIN — SODIUM CHLORIDE 3 MILLILITER(S): 9 INJECTION INTRAMUSCULAR; INTRAVENOUS; SUBCUTANEOUS at 14:00

## 2020-01-28 RX ADMIN — ATORVASTATIN CALCIUM 80 MILLIGRAM(S): 80 TABLET, FILM COATED ORAL at 21:50

## 2020-01-28 RX ADMIN — SODIUM CHLORIDE 3 MILLILITER(S): 9 INJECTION INTRAMUSCULAR; INTRAVENOUS; SUBCUTANEOUS at 21:51

## 2020-01-28 RX ADMIN — Medication 650 MILLIGRAM(S): at 21:50

## 2020-01-28 RX ADMIN — HYDROXYUREA 1000 MILLIGRAM(S): 500 CAPSULE ORAL at 11:43

## 2020-01-28 RX ADMIN — Medication 1 MILLIGRAM(S): at 11:43

## 2020-01-28 RX ADMIN — SODIUM CHLORIDE 3 MILLILITER(S): 9 INJECTION INTRAMUSCULAR; INTRAVENOUS; SUBCUTANEOUS at 06:48

## 2020-01-28 RX ADMIN — Medication 650 MILLIGRAM(S): at 22:20

## 2020-01-28 NOTE — PROGRESS NOTE ADULT - ASSESSMENT
HPI:  19M pmhx SSD several known unruptured aneurysms largest is L ICA s/p DSA 1/14/2020 presents for gradual worsening headache since yesterday, neurointact (27 Jan 2020 14:00)    PAST MEDICAL & SURGICAL HISTORY:  Brain aneurysm  Sickle cell anemia  Sickle cell disease  No significant past surgical history    PLAN:  Neuro: NPO/IVF @ midnight, for OR tomorrow, consent to be obtained  Respiratory: patient instructed on incentive spirometer  Heme/Onc: appreciate hematology consult for h/o sickle cell; keep HG >10; f/u CBC later today               DVT ppx: [] SQH [] SQL and venodynes bilaterally; no chemoprophylaxis for OR tomorrow  Renal: IVF at midnight  ID: afebrile  GI: bowel   PT/OT: post op evaluation    Will discuss with Dr Nico Phipps # [id number]    Assessment:  Please Check When Present   []  GCS  E   V  M     Heart Failure: []Acute, [] acute on chronic , []chronic  Heart Failure:  [] Diastolic (HFpEF), [] Systolic (HFrEF), []Combined (HFpEF and HFrEF), [] RHF, [] Pulm HTN, [] Other    [] ISABELL, [] ATN, [] AIN, [] other  [] CKD1, [] CKD2, [] CKD 3, [] CKD 4, [] CKD 5, []ESRD    Encephalopathy: [] Metabolic, [] Hepatic, [] toxic, [] Neurological, [] Other    Abnormal Nutritional Status: [] malnutrition (see nutrition note), [ ]underweight: BMI < 19, [] morbid obesity: BMI >40, [] Cachexia    [] Sepsis  [] hypovolemic shock,[] cardiogenic shock, [] hemorrhagic shock, [] neurogenic shock  [] Acute Respiratory Failure  []Cerebral edema, [] Brain compression/ herniation,   [] Functional quadriplegia  [] Acute blood loss anemia

## 2020-01-28 NOTE — PROGRESS NOTE ADULT - SUBJECTIVE AND OBJECTIVE BOX
CHIEF COMPLAINT: patient without complaints, denies N/V/Visual changes; anxious about procedure    Vital Signs Last 24 Hrs  T(C): 36.7 (28 Jan 2020 12:19), Max: 37.6 (27 Jan 2020 15:33)  T(F): 98 (28 Jan 2020 12:19), Max: 99.6 (27 Jan 2020 15:33)  HR: 92 (28 Jan 2020 12:19) (72 - 92)  BP: 138/89 (28 Jan 2020 12:19) (109/76 - 138/89)  BP(mean): --  RR: 18 (28 Jan 2020 12:19) (18 - 19)  SpO2: 100% (28 Jan 2020 12:19) (97% - 100%)    PHYSICAL EXAM:    General: No Acute Distress     Neurological: Awake, alert oriented to person, place and time, Following Commands, PERRL, EOMI, Face Symmetrical, Speech Fluent, Moving all extremities, Muscle Strength normal in all four extremities, No Drift, Sensation to Light Touch Intact    Pulmonary: Clear to Auscultation, No Rales, No Rhonchi, No Wheezes     Cardiovascular: S1, S2, Regular Rate and Rhythm     Gastrointestinal: Soft, Nontender, Nondistended     Incision: none    LABS:                        10.7   8.23  )-----------( 296      ( 27 Jan 2020 11:44 )             32.8    01-27    138  |  104  |  8   ----------------------------<  89  4.2   |  21<L>  |  0.43<L>    Ca    10.1      27 Jan 2020 11:44    TPro  8.2  /  Alb  4.4  /  TBili  2.0<H>  /  DBili  x   /  AST  37  /  ALT  24  /  AlkPhos  82  01-27  PT/INR - ( 27 Jan 2020 15:56 )   PT: 13.1 sec;   INR: 1.14 ratio         PTT - ( 27 Jan 2020 15:56 )  PTT:34.2 sec      01-28 @ 07:01  -  01-28 @ 14:23  --------------------------------------------------------  IN: 720 mL / OUT: 0 mL / NET: 720 mL    Type + Screen (01.27.20 @ 16:04)    ABO Interpretation: O    Rh Interpretation: Positive    Antibody Screen: Negative    MEDICATIONS  (STANDING):  atorvastatin 80 milliGRAM(s) Oral at bedtime  folic acid 1 milliGRAM(s) Oral daily  hydroxyurea 1000 milliGRAM(s) Oral daily  influenza   Vaccine 0.5 milliLiter(s) IntraMuscular once  sodium chloride 0.9% lock flush 3 milliLiter(s) IV Push every 8 hours  sodium chloride 0.9%. 1000 milliLiter(s) (75 mL/Hr) IV Continuous <Continuous>    MEDICATIONS  (PRN):  acetaminophen   Tablet .. 650 milliGRAM(s) Oral every 6 hours PRN Temp greater or equal to 38C (100.4F), Mild Pain (1 - 3)  metoclopramide Injectable 10 milliGRAM(s) IV Push every 6 hours PRN Nausea and/or Vomiting  senna 2 Tablet(s) Oral at bedtime PRN Constipation      DIET: [X] Regular [] CCD [] Renal [] Puree [] Dysphagia [] Tube Feeds:   NPO/IVF @ midnight    IMAGING:   < from: MR Angio Neck w/ IV Cont (12.29.19 @ 21:15) >  PROCEDURE DATE:  Dec 29 2019         INTERPRETATION:  CLINICAL INDICATION: Sickle cell crisis, acute/subacute right cerebellar infarct    Technique: Brain MRI with and without contrast,, non-contrast brain MRA and neck MRA with and without contrast were performed.      Through the brain, sagittal and axial T1, axial T2, FLAIR, diffusion weighted images and an ADC map were obtained. Axial T1 post-contrast images were obtained. 6 cc of intravenous gadolinium was administered and 1.5 discarded,  for contrast MRI brain and contrast enhanced MR angiography of the extracranial circulation.     2-D and 3-D time-of-flight MR angiography of intracranial and extracranial circulation was performed with time of flight imaging technique. Maximal intensity projection images were reviewed in multiple planes.    COMPARISON: CT head dated 12/24/2019.    FINDINGS:    Brain MRI:    There is evidence of restricted diffusion within the anterior limb of the right internal capsule with associated low signal on ADC maps consistent with an acute lacunar infarct. There is no hemorrhagic transformation.    Increased T2 and FLAIR signal involves the right caudate head and right lentiform nucleus with slight increased diffusion-weighted signal, most likely reflecting evolving late subacute infarction. There is no hemorrhagic transformation.    There is abnormal gyriform T2 and FLAIR signal hyperintensity with associated enhancement involving the superior right cerebellar hemisphere, in the superior cerebellar artery distribution. There is abnormal dark signal on susceptibility weighted imaging within this region of abnormal signal, most consistent with petechial hemorrhagic transformation without hematoma formation. Vague increased diffusion-weighted signal is present without matched ADC map signal. These findings are most consistent with an evolving late subacute infarct in the right superior cerebellar artery distribution.    A late subacute lacunar infarct involves the left cerebellum with associated enhancement.    Gyriform vague increased FLAIR signal involves the right frontal lobe cortex at the level of the superior frontal gyrus and middle frontal gyrus, with associated linear enhancement in some areas. These findings are most consistent with evolving late subacute ischemia. There is no hemorrhagic transformation.    There is no extra-axial fluid collection.    The orbits, sellar and suprasellar structures, and craniocervical junction are unremarkable.     Diffuse decrease of the T1 marrow signal is noted, most likely reflecting marrow changes secondary to the patient's known history of sickle cell anemia.    The right maxillary sinus is completely opacified. There is moderate mucosal thickening involving the left maxillary sinus.    Brain MRA:    A 6 mm aneurysm involves the left carotid bifurcation, which also incorporates the proximal M1 segment. The neck of the aneurysm measures roughly 2 mm. The aneurysm projects superiorly.    A 3 mm aneurysm involves the left P1 segment which projects superiorly and posteriorly. A 2 mm aneurysm involves the distal left basilar artery, also incorporating the origin of the left posterior cerebral artery, which projects to the left and posteriorly. A questionable additional 2 mm aneurysm may involve the basilar tip.    There is no evidence for focal stenosis or acute major vessel occlusion about the [place]. The right superior cerebellar arteryis widely patent.    Neck MRA:    The aortic arch and origins of the great vessels are unremarkable.    Common and Internal Carotids: The origin, course and caliber of the common and internal carotid arteries are unremarkable.  There is no significant stenosis by NASCET criteria.    Vertebral arteries:   The origin, course and caliber of the vertebral arteries are unremarkable. Both vessels are patent to the intracranial circulation and vertebrobasilar confluence.      IMPRESSION:    BRAIN    Multiple areas of evolving ischemia are noted with distribution as described. Patchy petechial hemorrhagic transformation involves the right superior cerebellar artery distribution infarct.    BRAIN MRA    Multiple aneurysms are noted with distribution as described, involving both the anterior and posterior circulations. Serial imaging follow-up over time is recommended to monitor for stability.    No evidence for acute major vessel occlusion about the [place].    NECK MRA    No evidence of carotid or vertebral artery stenosis or dissection.      < end of copied text >

## 2020-01-28 NOTE — CHART NOTE - NSCHARTNOTEFT_GEN_A_CORE
CAPRINI SCORE [CLOT] Score on Admission for     AGE RELATED RISK FACTORS                                                       MOBILITY RELATED FACTORS  [ ] Age 41-60 years                                            (1 Point)                  [ ] Bed rest                                                        (1 Point)  [ ] Age: 61-74 years                                           (2 Points)                 [ ] Plaster cast                                                   (2 Points)  [ ] Age= 75 years                                              (3 Points)                 [ ] Bed bound for more than 72 hours                 (2 Points)    DISEASE RELATED RISK FACTORS                                               GENDER SPECIFIC FACTORS  [ ] Edema in the lower extremities                       (1 Point)                  [ ] Pregnancy                                                     (1 Point)  [ ] Varicose veins                                               (1 Point)                  [ ] Post-partum < 6 weeks                                   (1 Point)             [ ] BMI > 25 Kg/m2                                            (1 Point)                  [ ] Hormonal therapy  or oral contraception          (1 Point)                 [ ] Sepsis (in the previous month)                        (1 Point)            [ ] History of pregnancy complications                 (1 point)  [ ] Pneumonia or serious lung disease                                            [ ] Unexplained or recurrent                     (1 Point)           (in the previous month)                               (1 Point)  [ ] Abnormal pulmonary function test                     (1 Point)                 SURGERY RELATED RISK FACTORS (include planned surgeries)  [ ] Acute myocardial infarction                              (1 Point)                 [ ]  Section                                             (1 Point)  [ ] Congestive heart failure (in the previous month)  (1 Point)         [ ] Minor surgery                                                  (1 Point)   [ ] Inflammatory bowel disease                             (1 Point)                 [ ] Arthroscopic surgery                                        (2 Points)  [ ] Central venous access                                      (2 Points)                 [ ] General surgery lasting more than 45 minutes   (2 Points)       [ ] Stroke (in the previous month)                          (5 Points)               [ ] Elective arthroplasty                                         (5 Points)            [ ] current or past malignancy                              (2 Points)                                                                                                       HEMATOLOGY RELATED FACTORS                                                 TRAUMA RELATED RISK FACTORS  [ ] Prior episodes of VTE                                     (3 Points)                [ ] Fracture of the hip, pelvis, or leg                       (5 Points)  [ ] Positive family history for VTE                         (3 Points)             [ ] Acute spinal cord injury (in the previous month)  (5 Points)  [ ] Prothrombin 80737 A                                     (3 Points)                [ ] Paralysis  (less than 1 month)                             (5 Points)  [ ] Factor V Leiden                                             (3 Points)                  [ ] Multiple Trauma within 1 month                        (5 Points)  [ ] Lupus anticoagulants                                     (3 Points)                                                           [ ] Anticardiolipin antibodies                               (3 Points)                                                       [ ] High homocysteine in the blood                      (3 Points)                                             [ ] Other congenital or acquired thrombophilia      (3 Points)                                                [ ] Heparin induced thrombocytopenia                  (3 Points)                                          Total Score [    0      ]    Risk:  Very low 0   Low 1 to 2   Moderate 3 to 4   High =5       VTE Prophylaxis Recommendations:  [ x] mechanical pneumatic compression devices                                      [ ] contraindicated: _____________________  [ ] chemo prophylaxis                                                                                  [x ] contraindicated _____________________    **** HIGH LIKELIHOOD DVT PRESENT ON ADMISSION  [ ] (please order LE dopplers within 24 hours of admission)

## 2020-01-29 ENCOUNTER — APPOINTMENT (OUTPATIENT)
Dept: NEUROSURGERY | Facility: HOSPITAL | Age: 20
End: 2020-01-29
Payer: MEDICAID

## 2020-01-29 LAB
ANION GAP SERPL CALC-SCNC: 16 MMOL/L — SIGNIFICANT CHANGE UP (ref 5–17)
BUN SERPL-MCNC: 12 MG/DL — SIGNIFICANT CHANGE UP (ref 7–23)
CALCIUM SERPL-MCNC: 8.4 MG/DL — SIGNIFICANT CHANGE UP (ref 8.4–10.5)
CHLORIDE SERPL-SCNC: 105 MMOL/L — SIGNIFICANT CHANGE UP (ref 96–108)
CO2 SERPL-SCNC: 18 MMOL/L — LOW (ref 22–31)
CREAT SERPL-MCNC: 0.46 MG/DL — LOW (ref 0.5–1.3)
GLUCOSE SERPL-MCNC: 226 MG/DL — HIGH (ref 70–99)
HCT VFR BLD CALC: 34.2 % — LOW (ref 39–50)
HGB BLD-MCNC: 11.2 G/DL — LOW (ref 13–17)
MAGNESIUM SERPL-MCNC: 1.6 MG/DL — SIGNIFICANT CHANGE UP (ref 1.6–2.6)
MCHC RBC-ENTMCNC: 28.3 PG — SIGNIFICANT CHANGE UP (ref 27–34)
MCHC RBC-ENTMCNC: 32.7 GM/DL — SIGNIFICANT CHANGE UP (ref 32–36)
MCV RBC AUTO: 86.4 FL — SIGNIFICANT CHANGE UP (ref 80–100)
NRBC # BLD: 0 /100 WBCS — SIGNIFICANT CHANGE UP (ref 0–0)
PHOSPHATE SERPL-MCNC: 4.4 MG/DL — SIGNIFICANT CHANGE UP (ref 2.5–4.5)
PLATELET # BLD AUTO: 247 K/UL — SIGNIFICANT CHANGE UP (ref 150–400)
PLATELET RESPONSE ASPIRIN RESULT: 518 ARU — SIGNIFICANT CHANGE UP (ref 350–700)
POTASSIUM SERPL-MCNC: 3.6 MMOL/L — SIGNIFICANT CHANGE UP (ref 3.5–5.3)
POTASSIUM SERPL-SCNC: 3.6 MMOL/L — SIGNIFICANT CHANGE UP (ref 3.5–5.3)
RBC # BLD: 3.96 M/UL — LOW (ref 4.2–5.8)
RBC # FLD: 19.8 % — HIGH (ref 10.3–14.5)
SODIUM SERPL-SCNC: 139 MMOL/L — SIGNIFICANT CHANGE UP (ref 135–145)
WBC # BLD: 13.35 K/UL — HIGH (ref 3.8–10.5)
WBC # FLD AUTO: 13.35 K/UL — HIGH (ref 3.8–10.5)

## 2020-01-29 PROCEDURE — 61781 SCAN PROC CRANIAL INTRA: CPT

## 2020-01-29 PROCEDURE — 99291 CRITICAL CARE FIRST HOUR: CPT

## 2020-01-29 PROCEDURE — 71045 X-RAY EXAM CHEST 1 VIEW: CPT | Mod: 26

## 2020-01-29 PROCEDURE — 61700 BRAIN ANEURYSM REPR SIMPLE: CPT

## 2020-01-29 PROCEDURE — 69990 MICROSURGERY ADD-ON: CPT | Mod: 59

## 2020-01-29 PROCEDURE — 61702 INNER SKULL VESSEL SURGERY: CPT

## 2020-01-29 RX ORDER — MORPHINE SULFATE 50 MG/1
1 CAPSULE, EXTENDED RELEASE ORAL
Refills: 0 | Status: DISCONTINUED | OUTPATIENT
Start: 2020-01-29 | End: 2020-01-30

## 2020-01-29 RX ORDER — POTASSIUM CHLORIDE 20 MEQ
40 PACKET (EA) ORAL ONCE
Refills: 0 | Status: DISCONTINUED | OUTPATIENT
Start: 2020-01-29 | End: 2020-01-29

## 2020-01-29 RX ORDER — ACETAMINOPHEN 500 MG
650 TABLET ORAL EVERY 6 HOURS
Refills: 0 | Status: DISCONTINUED | OUTPATIENT
Start: 2020-01-29 | End: 2020-02-10

## 2020-01-29 RX ORDER — CHLORHEXIDINE GLUCONATE 213 G/1000ML
1 SOLUTION TOPICAL
Refills: 0 | Status: DISCONTINUED | OUTPATIENT
Start: 2020-01-29 | End: 2020-01-31

## 2020-01-29 RX ORDER — NICARDIPINE HYDROCHLORIDE 30 MG/1
5 CAPSULE, EXTENDED RELEASE ORAL
Qty: 40 | Refills: 0 | Status: DISCONTINUED | OUTPATIENT
Start: 2020-01-29 | End: 2020-01-30

## 2020-01-29 RX ORDER — SODIUM CHLORIDE 9 MG/ML
1000 INJECTION INTRAMUSCULAR; INTRAVENOUS; SUBCUTANEOUS
Refills: 0 | Status: DISCONTINUED | OUTPATIENT
Start: 2020-01-29 | End: 2020-02-03

## 2020-01-29 RX ORDER — PANTOPRAZOLE SODIUM 20 MG/1
40 TABLET, DELAYED RELEASE ORAL
Refills: 0 | Status: DISCONTINUED | OUTPATIENT
Start: 2020-01-29 | End: 2020-02-10

## 2020-01-29 RX ORDER — ONDANSETRON 8 MG/1
4 TABLET, FILM COATED ORAL ONCE
Refills: 0 | Status: COMPLETED | OUTPATIENT
Start: 2020-01-29 | End: 2020-01-29

## 2020-01-29 RX ORDER — MAGNESIUM SULFATE 500 MG/ML
2 VIAL (ML) INJECTION ONCE
Refills: 0 | Status: COMPLETED | OUTPATIENT
Start: 2020-01-29 | End: 2020-01-30

## 2020-01-29 RX ORDER — INSULIN LISPRO 100/ML
VIAL (ML) SUBCUTANEOUS
Refills: 0 | Status: DISCONTINUED | OUTPATIENT
Start: 2020-01-29 | End: 2020-01-29

## 2020-01-29 RX ORDER — HYDROXYUREA 500 MG/1
1000 CAPSULE ORAL DAILY
Refills: 0 | Status: DISCONTINUED | OUTPATIENT
Start: 2020-01-29 | End: 2020-02-10

## 2020-01-29 RX ORDER — HYDROXYUREA 500 MG/1
1000 CAPSULE ORAL DAILY
Refills: 0 | Status: DISCONTINUED | OUTPATIENT
Start: 2020-01-29 | End: 2020-01-29

## 2020-01-29 RX ORDER — DEXAMETHASONE 0.5 MG/5ML
4 ELIXIR ORAL EVERY 6 HOURS
Refills: 0 | Status: DISCONTINUED | OUTPATIENT
Start: 2020-01-29 | End: 2020-01-29

## 2020-01-29 RX ORDER — INSULIN LISPRO 100/ML
VIAL (ML) SUBCUTANEOUS EVERY 6 HOURS
Refills: 0 | Status: DISCONTINUED | OUTPATIENT
Start: 2020-01-29 | End: 2020-01-31

## 2020-01-29 RX ORDER — FOLIC ACID 0.8 MG
1 TABLET ORAL DAILY
Refills: 0 | Status: DISCONTINUED | OUTPATIENT
Start: 2020-01-29 | End: 2020-02-10

## 2020-01-29 RX ORDER — NICARDIPINE HYDROCHLORIDE 30 MG/1
5 CAPSULE, EXTENDED RELEASE ORAL
Qty: 40 | Refills: 0 | Status: DISCONTINUED | OUTPATIENT
Start: 2020-01-29 | End: 2020-01-29

## 2020-01-29 RX ORDER — ACETAMINOPHEN 500 MG
650 TABLET ORAL ONCE
Refills: 0 | Status: COMPLETED | OUTPATIENT
Start: 2020-01-29 | End: 2020-01-29

## 2020-01-29 RX ORDER — METOCLOPRAMIDE HCL 10 MG
10 TABLET ORAL EVERY 6 HOURS
Refills: 0 | Status: DISCONTINUED | OUTPATIENT
Start: 2020-01-29 | End: 2020-02-10

## 2020-01-29 RX ORDER — DEXAMETHASONE 0.5 MG/5ML
4 ELIXIR ORAL EVERY 6 HOURS
Refills: 0 | Status: DISCONTINUED | OUTPATIENT
Start: 2020-01-29 | End: 2020-01-30

## 2020-01-29 RX ORDER — ATORVASTATIN CALCIUM 80 MG/1
80 TABLET, FILM COATED ORAL AT BEDTIME
Refills: 0 | Status: DISCONTINUED | OUTPATIENT
Start: 2020-01-29 | End: 2020-02-10

## 2020-01-29 RX ORDER — SODIUM CHLORIDE 9 MG/ML
10 INJECTION INTRAMUSCULAR; INTRAVENOUS; SUBCUTANEOUS
Refills: 0 | Status: DISCONTINUED | OUTPATIENT
Start: 2020-01-29 | End: 2020-02-10

## 2020-01-29 RX ORDER — ONDANSETRON 8 MG/1
4 TABLET, FILM COATED ORAL ONCE
Refills: 0 | Status: DISCONTINUED | OUTPATIENT
Start: 2020-01-29 | End: 2020-01-29

## 2020-01-29 RX ORDER — SENNA PLUS 8.6 MG/1
2 TABLET ORAL AT BEDTIME
Refills: 0 | Status: DISCONTINUED | OUTPATIENT
Start: 2020-01-29 | End: 2020-02-10

## 2020-01-29 RX ORDER — LEVETIRACETAM 250 MG/1
500 TABLET, FILM COATED ORAL EVERY 12 HOURS
Refills: 0 | Status: DISCONTINUED | OUTPATIENT
Start: 2020-01-29 | End: 2020-01-29

## 2020-01-29 RX ORDER — ATORVASTATIN CALCIUM 80 MG/1
80 TABLET, FILM COATED ORAL AT BEDTIME
Refills: 0 | Status: DISCONTINUED | OUTPATIENT
Start: 2020-01-29 | End: 2020-01-29

## 2020-01-29 RX ORDER — SENNA PLUS 8.6 MG/1
2 TABLET ORAL AT BEDTIME
Refills: 0 | Status: DISCONTINUED | OUTPATIENT
Start: 2020-01-29 | End: 2020-01-29

## 2020-01-29 RX ORDER — FOLIC ACID 0.8 MG
1 TABLET ORAL DAILY
Refills: 0 | Status: DISCONTINUED | OUTPATIENT
Start: 2020-01-29 | End: 2020-01-29

## 2020-01-29 RX ORDER — SODIUM CHLORIDE 9 MG/ML
1000 INJECTION INTRAMUSCULAR; INTRAVENOUS; SUBCUTANEOUS
Refills: 0 | Status: DISCONTINUED | OUTPATIENT
Start: 2020-01-29 | End: 2020-01-29

## 2020-01-29 RX ORDER — POTASSIUM CHLORIDE 20 MEQ
20 PACKET (EA) ORAL
Refills: 0 | Status: COMPLETED | OUTPATIENT
Start: 2020-01-29 | End: 2020-01-30

## 2020-01-29 RX ORDER — LEVETIRACETAM 250 MG/1
500 TABLET, FILM COATED ORAL EVERY 12 HOURS
Refills: 0 | Status: DISCONTINUED | OUTPATIENT
Start: 2020-01-29 | End: 2020-01-30

## 2020-01-29 RX ORDER — SODIUM CHLORIDE 9 MG/ML
500 INJECTION INTRAMUSCULAR; INTRAVENOUS; SUBCUTANEOUS ONCE
Refills: 0 | Status: COMPLETED | OUTPATIENT
Start: 2020-01-29 | End: 2020-01-29

## 2020-01-29 RX ADMIN — SODIUM CHLORIDE 75 MILLILITER(S): 9 INJECTION INTRAMUSCULAR; INTRAVENOUS; SUBCUTANEOUS at 00:01

## 2020-01-29 RX ADMIN — SODIUM CHLORIDE 1000 MILLILITER(S): 9 INJECTION INTRAMUSCULAR; INTRAVENOUS; SUBCUTANEOUS at 19:30

## 2020-01-29 RX ADMIN — ONDANSETRON 4 MILLIGRAM(S): 8 TABLET, FILM COATED ORAL at 17:30

## 2020-01-29 RX ADMIN — Medication 4 MILLIGRAM(S): at 23:45

## 2020-01-29 RX ADMIN — SODIUM CHLORIDE 3 MILLILITER(S): 9 INJECTION INTRAMUSCULAR; INTRAVENOUS; SUBCUTANEOUS at 05:01

## 2020-01-29 RX ADMIN — Medication 50 MILLIEQUIVALENT(S): at 23:57

## 2020-01-29 RX ADMIN — Medication 260 MILLIGRAM(S): at 20:00

## 2020-01-29 RX ADMIN — Medication 4 MILLIGRAM(S): at 18:36

## 2020-01-29 RX ADMIN — Medication 650 MILLIGRAM(S): at 20:30

## 2020-01-29 RX ADMIN — ONDANSETRON 4 MILLIGRAM(S): 8 TABLET, FILM COATED ORAL at 23:54

## 2020-01-29 RX ADMIN — CHLORHEXIDINE GLUCONATE 1 APPLICATION(S): 213 SOLUTION TOPICAL at 21:51

## 2020-01-29 RX ADMIN — LEVETIRACETAM 400 MILLIGRAM(S): 250 TABLET, FILM COATED ORAL at 18:36

## 2020-01-29 NOTE — PRE-ANESTHESIA EVALUATION ADULT - NSANTHPMHFT_GEN_ALL_CORE
19M with sickle cell disease and recent admission in December for acute chest syndrome complicated by hypoxic respiratory failure, ARDS and aspiration pneumonia requiring intubation, s/p multiple exchange transfusions c/b B/L PTX s/p B/L chest tubes and superior cerebellar CVA found to have multiple cerebral aneurysms now scheduled for clipping.     Re-admitted 1/27/20 for worsening, intermittent headaches. 19M with sickle cell disease and recent admission in December for acute chest syndrome complicated by hypoxic respiratory failure, ARDS and aspiration pneumonia requiring intubation, s/p multiple exchange transfusions c/b B/L PTX s/p B/L chest tubes and superior cerebellar CVA found to have multiple cerebral aneurysms now scheduled for clipping.     Re-admitted 1/27/20 for worsening, intermittent headaches. No neuro deficits.

## 2020-01-29 NOTE — PROGRESS NOTE ADULT - SUBJECTIVE AND OBJECTIVE BOX
SUMMARY:18yo man with Sickle Cell Disease and several known unruptured aneurysms largest is L ICA s/p DSA 1/14/2020 presents for gradual worsening headache since 1d prior to presentation on 1/27.    OVERNIGHT EVENTS: s/p L crani for clipping of L ICA terminus and L SCA aneurysms    ADMISSION SCORES:   GCS:  HH: MF: NIHSS: ICH Score:    REVIEW OF SYSTEMS: post op pain    VITALS: [x] Reviewed    IMAGING/DATA: [x] Reviewed    IVF FLUIDS/MEDICATIONS: [x] Reviewed    ALLERGIES: Allergies    No Known Allergies    Intolerances        DEVICES:   [] Restraints [x] PIVs [] ET tube [] central line [] PICC [x] arterial line [x] farmer [] NGT/OGT [] EVD [] LD [] SEA/HMV [] LiCOX [] ICP monitor [] Trach [] PEG [] Chest Tube [] other:    EXAMINATION:  General: No acute distress  HEENT: Anicteric sclerae  Cardiac: [id number]  Lungs: Clear  Abdomen: Soft, non-tender, +BS  Extremities: No c/c/e  Skin/Incision Site: R groin clean, dry and intact, no hematoma  Neurologic: *****Awake, alert, fully oriented, follows commands, PERRL, VFFtc, EOMI, face symmetric, tongue midline, no drift, full strength SUMMARY:20yo man with Sickle Cell Disease and several known unruptured aneurysms largest is L ICA s/p DSA 1/14/2020 presents for gradual worsening headache since 1d prior to presentation on 1/27.    OVERNIGHT EVENTS: s/p L crani for clipping of L ICA terminus and L SCA aneurysms    ADMISSION SCORES:   GCS: 14  HH: MF: NIHSS: ICH Score:    REVIEW OF SYSTEMS: post op pain    VITALS: [x] Reviewed    IMAGING/DATA: [x] Reviewed    IVF FLUIDS/MEDICATIONS: [x] Reviewed    ALLERGIES: Allergies    No Known Allergies    Intolerances        DEVICES:   [] Restraints [x] PIVs [] ET tube [] central line [] PICC [x] arterial line [x] farmer [] NGT/OGT [] EVD [] LD [] SEA/HMV [] LiCOX [] ICP monitor [] Trach [] PEG [] Chest Tube [] other:    EXAMINATION:  General: No acute distress  HEENT: Anicteric sclerae  Cardiac: [id number]  Lungs: Clear  Abdomen: Soft, non-tender, +BS  Extremities: No c/c/e  Skin/Incision Site: R groin clean, dry and intact, no hematoma  Neurologic: EO to voice, does not maintain, L 3rdn palsy pupil 5mm not reactive, R 3mm reactive, FS, BUE no drift 5/5, BLE 5/5  w/ encouragement effort dependent exam

## 2020-01-29 NOTE — PRE-ANESTHESIA EVALUATION ADULT - NSANTHAGERD_ENT_A_CORE
Detail Level: Zone Initiate Treatment: Doxycycline 100mg bid x 2 weeks then to 40 mg qd to minimize bacterial resistance.  Patient explained risks with oral antibiotics and patient states doxy has worked well in the past.  Discussed causes of folliculitis including infectious and inflammatory causes.  I opted to defer biopsy given clinical appearance and prior response to oral antibiotics.  Will try topical antibiotic with hope to reduce need for oral medications.  \\n\\nPatient states no new products that could be inducing present condition. Plan: Pt to start Doxycycline 40mg Qd after completing two weeks at the higher dose. If patient becomes resistant to Doxycycline, further work-up will be done including biopsy.\\nPt has taken two courses of Isotretinoin for her acne years ago No

## 2020-01-29 NOTE — PROGRESS NOTE ADULT - ASSESSMENT
POD#0 s/p L crani for clipping of L ICA terminus and L SCA aneurysms; intra op angio    NEURO:  CT/CTA in am  pain control  decadron per neurosurgery  keppra  Activity: [] OOB as tolerated [x] Bedrest x4-6 post op [] PT [] OT [] PMNR    PULM:  Incentive spirometry, mobilize as tolerated    CV:  -150mmHg, d/c a-line in AM    RENAL:  IVF until good PO intake, d/c farmer in AM    GI:  Diet: Dysphagia screen and then advance diet as tolerated  GI prophylaxis [x] not indicated [] PPI [] other:  Bowel regimen [] colace [x] senna [] other:    ENDO:   Goal euglycemia (-180)  ABRAHAM    HEME/ONC:  VTE prophylaxis: [x] SCDs [] chemoprophylaxis [x] hold chemoprophylaxis due to: fresh post op [] high risk of DVT/PE on admission due to:  SCD--hematology consult appreciated, keep Hgb>10 matilda op, hydration    ID:  Matilda-op antibiotics    MISC:    SOCIAL/FAMILY:  [x] awaiting [] updated at bedside [] family meeting    CODE STATUS:  [x] Full Code [] DNR [] DNI [] Palliative/Comfort Care    DISPOSITION:  [x] ICU [] Stroke Unit [] Floor [] EMU [] RCU [] PCU    [x] Patient is at high risk of neurologic deterioration/death due to: post op hemorrhage, stroke, Sickle Cell crisis POD#0 s/p L crani for clipping of L ICA terminus and L SCA aneurysms; intra op angio    NEURO:  CT/CTA in am  pain control  decadron per neurosurgery  keppra  Activity: [x] OOB as tolerated [x] Bedrest x4-6 post op [x] PT [x] OT [] PMNR    PULM:  Incentive spirometry, mobilize as tolerated    CV:  -150mmHg, d/c a-line in AM    RENAL:  IVF, d/c farmer in AM    GI:  Diet: Dysphagia screen and then advance diet as tolerated  GI prophylaxis [x] not indicated [] PPI [] other:  Bowel regimen [] colace [x] senna [] other:    ENDO:   Goal euglycemia (-180)  ABRAHAM    HEME/ONC:  VTE prophylaxis: [x] SCDs [] chemoprophylaxis [x] hold chemoprophylaxis due to: fresh post op [] high risk of DVT/PE on admission due to:  SCD--hematology consult appreciated, keep Hgb>10 matilda op, hydration, cont home meds    ID:  Matilda-op antibiotics    MISC:    SOCIAL/FAMILY:  [] awaiting [x] updated at bedside--sister [] family meeting    CODE STATUS:  [x] Full Code [] DNR [] DNI [] Palliative/Comfort Care    DISPOSITION:  [x] ICU [] Stroke Unit [] Floor [] EMU [] RCU [] PCU    [x] Patient is at high risk of neurologic deterioration/death due to: post op hemorrhage, stroke, Sickle Cell crisis

## 2020-01-30 ENCOUNTER — APPOINTMENT (OUTPATIENT)
Dept: NEUROSURGERY | Facility: CLINIC | Age: 20
End: 2020-01-30

## 2020-01-30 LAB
ANION GAP SERPL CALC-SCNC: 13 MMOL/L — SIGNIFICANT CHANGE UP (ref 5–17)
APPEARANCE UR: CLEAR — SIGNIFICANT CHANGE UP
BACTERIA # UR AUTO: NEGATIVE — SIGNIFICANT CHANGE UP
BILIRUB UR-MCNC: NEGATIVE — SIGNIFICANT CHANGE UP
BUN SERPL-MCNC: 14 MG/DL — SIGNIFICANT CHANGE UP (ref 7–23)
CALCIUM SERPL-MCNC: 9.3 MG/DL — SIGNIFICANT CHANGE UP (ref 8.4–10.5)
CHLORIDE SERPL-SCNC: 104 MMOL/L — SIGNIFICANT CHANGE UP (ref 96–108)
CO2 SERPL-SCNC: 19 MMOL/L — LOW (ref 22–31)
COLOR SPEC: SIGNIFICANT CHANGE UP
CREAT SERPL-MCNC: 0.44 MG/DL — LOW (ref 0.5–1.3)
DIFF PNL FLD: NEGATIVE — SIGNIFICANT CHANGE UP
EPI CELLS # UR: 0 /HPF — SIGNIFICANT CHANGE UP
GLUCOSE SERPL-MCNC: 137 MG/DL — HIGH (ref 70–99)
GLUCOSE UR QL: NEGATIVE — SIGNIFICANT CHANGE UP
HCT VFR BLD CALC: 32.6 % — LOW (ref 39–50)
HEMOGLOBIN INTERPRETATION: SIGNIFICANT CHANGE UP
HGB A MFR BLD: 57.8 % — LOW (ref 95.8–98)
HGB A2 MFR BLD: 3.2 % — SIGNIFICANT CHANGE UP (ref 2–3.2)
HGB BLD-MCNC: 11.2 G/DL — LOW (ref 13–17)
HGB F MFR BLD: 1 % — SIGNIFICANT CHANGE UP (ref 0–1)
HGB S MFR BLD: 38 % — HIGH
HYALINE CASTS # UR AUTO: 0 /LPF — SIGNIFICANT CHANGE UP (ref 0–2)
KETONES UR-MCNC: NEGATIVE — SIGNIFICANT CHANGE UP
LEUKOCYTE ESTERASE UR-ACNC: NEGATIVE — SIGNIFICANT CHANGE UP
MAGNESIUM SERPL-MCNC: 2.4 MG/DL — SIGNIFICANT CHANGE UP (ref 1.6–2.6)
MCHC RBC-ENTMCNC: 29.4 PG — SIGNIFICANT CHANGE UP (ref 27–34)
MCHC RBC-ENTMCNC: 34.4 GM/DL — SIGNIFICANT CHANGE UP (ref 32–36)
MCV RBC AUTO: 85.6 FL — SIGNIFICANT CHANGE UP (ref 80–100)
NITRITE UR-MCNC: NEGATIVE — SIGNIFICANT CHANGE UP
NRBC # BLD: 0 /100 WBCS — SIGNIFICANT CHANGE UP (ref 0–0)
PH UR: 7 — SIGNIFICANT CHANGE UP (ref 5–8)
PHOSPHATE SERPL-MCNC: 3.3 MG/DL — SIGNIFICANT CHANGE UP (ref 2.5–4.5)
PLATELET # BLD AUTO: 230 K/UL — SIGNIFICANT CHANGE UP (ref 150–400)
POTASSIUM SERPL-MCNC: 4.2 MMOL/L — SIGNIFICANT CHANGE UP (ref 3.5–5.3)
POTASSIUM SERPL-SCNC: 4.2 MMOL/L — SIGNIFICANT CHANGE UP (ref 3.5–5.3)
PROT UR-MCNC: ABNORMAL
RBC # BLD: 3.81 M/UL — LOW (ref 4.2–5.8)
RBC # FLD: 20.8 % — HIGH (ref 10.3–14.5)
RBC CASTS # UR COMP ASSIST: 4 /HPF — SIGNIFICANT CHANGE UP (ref 0–4)
SODIUM SERPL-SCNC: 136 MMOL/L — SIGNIFICANT CHANGE UP (ref 135–145)
SP GR SPEC: 1.03 — HIGH (ref 1.01–1.02)
UROBILINOGEN FLD QL: NEGATIVE — SIGNIFICANT CHANGE UP
WBC # BLD: 13.01 K/UL — HIGH (ref 3.8–10.5)
WBC # FLD AUTO: 13.01 K/UL — HIGH (ref 3.8–10.5)
WBC UR QL: 1 /HPF — SIGNIFICANT CHANGE UP (ref 0–5)

## 2020-01-30 PROCEDURE — 71045 X-RAY EXAM CHEST 1 VIEW: CPT | Mod: 26

## 2020-01-30 PROCEDURE — 70496 CT ANGIOGRAPHY HEAD: CPT | Mod: 26

## 2020-01-30 PROCEDURE — 99232 SBSQ HOSP IP/OBS MODERATE 35: CPT | Mod: GC

## 2020-01-30 PROCEDURE — 99291 CRITICAL CARE FIRST HOUR: CPT

## 2020-01-30 PROCEDURE — 99292 CRITICAL CARE ADDL 30 MIN: CPT

## 2020-01-30 RX ORDER — DEXAMETHASONE 0.5 MG/5ML
4 ELIXIR ORAL EVERY 6 HOURS
Refills: 0 | Status: COMPLETED | OUTPATIENT
Start: 2020-01-30 | End: 2020-02-01

## 2020-01-30 RX ORDER — DEXAMETHASONE 0.5 MG/5ML
4 ELIXIR ORAL EVERY 6 HOURS
Refills: 0 | Status: DISCONTINUED | OUTPATIENT
Start: 2020-01-30 | End: 2020-01-30

## 2020-01-30 RX ORDER — ACETAMINOPHEN 500 MG
1000 TABLET ORAL ONCE
Refills: 0 | Status: COMPLETED | OUTPATIENT
Start: 2020-01-30 | End: 2020-01-30

## 2020-01-30 RX ORDER — OXYCODONE HYDROCHLORIDE 5 MG/1
10 TABLET ORAL EVERY 4 HOURS
Refills: 0 | Status: DISCONTINUED | OUTPATIENT
Start: 2020-01-30 | End: 2020-02-05

## 2020-01-30 RX ORDER — POLYETHYLENE GLYCOL 3350 17 G/17G
17 POWDER, FOR SOLUTION ORAL
Refills: 0 | Status: DISCONTINUED | OUTPATIENT
Start: 2020-01-30 | End: 2020-02-10

## 2020-01-30 RX ORDER — ENOXAPARIN SODIUM 100 MG/ML
40 INJECTION SUBCUTANEOUS
Refills: 0 | Status: DISCONTINUED | OUTPATIENT
Start: 2020-01-30 | End: 2020-01-30

## 2020-01-30 RX ORDER — LEVETIRACETAM 250 MG/1
500 TABLET, FILM COATED ORAL
Refills: 0 | Status: DISCONTINUED | OUTPATIENT
Start: 2020-01-30 | End: 2020-01-30

## 2020-01-30 RX ORDER — OXYCODONE HYDROCHLORIDE 5 MG/1
5 TABLET ORAL EVERY 4 HOURS
Refills: 0 | Status: DISCONTINUED | OUTPATIENT
Start: 2020-01-30 | End: 2020-02-05

## 2020-01-30 RX ADMIN — Medication 50 MILLIEQUIVALENT(S): at 01:03

## 2020-01-30 RX ADMIN — Medication 4 MILLIGRAM(S): at 17:30

## 2020-01-30 RX ADMIN — OXYCODONE HYDROCHLORIDE 5 MILLIGRAM(S): 5 TABLET ORAL at 20:10

## 2020-01-30 RX ADMIN — HYDROXYUREA 1000 MILLIGRAM(S): 500 CAPSULE ORAL at 12:53

## 2020-01-30 RX ADMIN — Medication 650 MILLIGRAM(S): at 23:00

## 2020-01-30 RX ADMIN — Medication 50 GRAM(S): at 04:04

## 2020-01-30 RX ADMIN — CHLORHEXIDINE GLUCONATE 1 APPLICATION(S): 213 SOLUTION TOPICAL at 05:08

## 2020-01-30 RX ADMIN — OXYCODONE HYDROCHLORIDE 5 MILLIGRAM(S): 5 TABLET ORAL at 19:40

## 2020-01-30 RX ADMIN — CHLORHEXIDINE GLUCONATE 1 APPLICATION(S): 213 SOLUTION TOPICAL at 22:55

## 2020-01-30 RX ADMIN — Medication 650 MILLIGRAM(S): at 15:33

## 2020-01-30 RX ADMIN — LEVETIRACETAM 400 MILLIGRAM(S): 250 TABLET, FILM COATED ORAL at 05:09

## 2020-01-30 RX ADMIN — SODIUM CHLORIDE 100 MILLILITER(S): 9 INJECTION INTRAMUSCULAR; INTRAVENOUS; SUBCUTANEOUS at 05:09

## 2020-01-30 RX ADMIN — Medication 650 MILLIGRAM(S): at 16:30

## 2020-01-30 RX ADMIN — Medication 4 MILLIGRAM(S): at 05:08

## 2020-01-30 RX ADMIN — Medication 1000 MILLIGRAM(S): at 04:00

## 2020-01-30 RX ADMIN — Medication 4 MILLIGRAM(S): at 12:57

## 2020-01-30 RX ADMIN — OXYCODONE HYDROCHLORIDE 5 MILLIGRAM(S): 5 TABLET ORAL at 05:09

## 2020-01-30 RX ADMIN — ATORVASTATIN CALCIUM 80 MILLIGRAM(S): 80 TABLET, FILM COATED ORAL at 22:56

## 2020-01-30 RX ADMIN — NICARDIPINE HYDROCHLORIDE 25 MG/HR: 30 CAPSULE, EXTENDED RELEASE ORAL at 05:09

## 2020-01-30 RX ADMIN — OXYCODONE HYDROCHLORIDE 5 MILLIGRAM(S): 5 TABLET ORAL at 05:39

## 2020-01-30 RX ADMIN — PANTOPRAZOLE SODIUM 40 MILLIGRAM(S): 20 TABLET, DELAYED RELEASE ORAL at 10:18

## 2020-01-30 RX ADMIN — Medication 400 MILLIGRAM(S): at 03:30

## 2020-01-30 RX ADMIN — Medication 1 MILLIGRAM(S): at 12:57

## 2020-01-30 RX ADMIN — SODIUM CHLORIDE 100 MILLILITER(S): 9 INJECTION INTRAMUSCULAR; INTRAVENOUS; SUBCUTANEOUS at 17:30

## 2020-01-30 NOTE — PROGRESS NOTE ADULT - SUBJECTIVE AND OBJECTIVE BOX
SUMMARY:20yo man with Sickle Cell Disease and several known unruptured aneurysms largest is L ICA s/p DSA 1/14/2020 presents for gradual worsening headache since 1d prior to presentation on 1/27.    ADMISSION SCORES:   GCS: 14     HOSPITAL COURSE:  1/29: s/p L crani for clipping of L ICA terminus and L SCA aneurysms    Overnight Events: Neurologically stable.     ROS: Negative unless specified    VITALS:   T(C): 36.3 (01-30-20 @ 07:00), Max: 37.4 (01-29-20 @ 19:00)  HR: 85 (01-30-20 @ 07:00) (85 - 127)  BP: --  RR: 14 (01-30-20 @ 07:00) (14 - 28)  SpO2: 100% (01-30-20 @ 07:00) (100% - 100%)    01-29-20 @ 07:01  -  01-30-20 @ 07:00  --------------------------------------------------------  IN: 2810 mL / OUT: 1150 mL / NET: 1660 mL      LABS:  ABG - ( 29 Jan 2020 16:42 )  pH, Arterial: 7.38  pH, Blood: x     /  pCO2: 37    /  pO2: 473   / HCO3: 22    / Base Excess: -2.7  /  SaO2: 100                          11.2   13.35 )-----------( 247      ( 29 Jan 2020 19:32 )             34.2     01-29    139  |  105  |  12  ----------------------------<  226<H>  3.6   |  18<L>  |  0.46<L>    Ca    8.4      29 Jan 2020 19:32  Phos  4.4     01-29  Mg     1.6     01-29        MEDS:  MEDICATIONS  (STANDING):  atorvastatin 80 milliGRAM(s) Oral at bedtime  chlorhexidine 4% Liquid 1 Application(s) Topical <User Schedule>  chlorhexidine 4% Liquid 1 Application(s) Topical <User Schedule>  dexAMETHasone  Injectable 4 milliGRAM(s) IV Push every 6 hours  folic acid 1 milliGRAM(s) Oral daily  hydroxyurea 1000 milliGRAM(s) Oral daily  influenza   Vaccine 0.5 milliLiter(s) IntraMuscular once  insulin lispro (HumaLOG) corrective regimen sliding scale   SubCutaneous every 6 hours  levETIRAcetam  IVPB 500 milliGRAM(s) IV Intermittent every 12 hours  niCARdipine Infusion 5 mG/Hr (25 mL/Hr) IV Continuous <Continuous>  pantoprazole    Tablet 40 milliGRAM(s) Oral before breakfast  senna 2 Tablet(s) Oral at bedtime  sodium chloride 0.9%. 1000 milliLiter(s) (100 mL/Hr) IV Continuous <Continuous>    MEDICATIONS  (PRN):  acetaminophen   Tablet .. 650 milliGRAM(s) Oral every 6 hours PRN Temp greater or equal to 38C (100.4F), Mild Pain (1 - 3)  metoclopramide Injectable 10 milliGRAM(s) IV Push every 6 hours PRN Nausea and/or Vomiting  oxyCODONE    IR 5 milliGRAM(s) Oral every 4 hours PRN Moderate Pain (4 - 6)  oxyCODONE    IR 10 milliGRAM(s) Oral every 4 hours PRN Severe Pain (7 - 10)  sodium chloride 0.9% lock flush 10 milliLiter(s) IV Push every 1 hour PRN Pre/post blood products, medications, blood draw, and to maintain line patency      EXAMINATION:  General: No acute distress  HEENT: Anicteric sclerae  Cardiac: [id number]  Lungs: Clear  Abdomen: Soft, non-tender, +BS  Extremities: No c/c/e  Skin/Incision Site: R groin clean, dry and intact, no hematoma  Neurologic: EO to voice, does not maintain, L 3rdn palsy pupil 5mm not reactive, R 3mm reactive, FS, BUE no drift 5/5, BLE 5/5  w/ encouragement effort dependent exam

## 2020-01-30 NOTE — PROGRESS NOTE ADULT - SUBJECTIVE AND OBJECTIVE BOX
INTERVAL HPI/OVERNIGHT EVENTS:  Patient S&E at bedside. had surgery yesterday    VITAL SIGNS:  T(F): 97.4 (01-30-20 @ 07:00)  HR: 103 (01-30-20 @ 12:00)  BP: --  RR: 16 (01-30-20 @ 12:00)  SpO2: 100% (01-30-20 @ 12:00)  Wt(kg): --    PHYSICAL EXAM:    Constitutional: NAD  Eyes: EOMI, sclera non-icteric  Neck: supple, no masses, no JVD  Respiratory: CTA b/l, good air entry b/l  Cardiovascular: RRR, no M/R/G  Gastrointestinal: soft, NTND, no masses palpable, + BS, no hepatosplenomegaly  Extremities: no c/c/e  Neurological: Awake       MEDICATIONS  (STANDING):  atorvastatin 80 milliGRAM(s) Oral at bedtime  chlorhexidine 4% Liquid 1 Application(s) Topical <User Schedule>  chlorhexidine 4% Liquid 1 Application(s) Topical <User Schedule>  dexAMETHasone     Tablet 4 milliGRAM(s) Oral every 6 hours  enoxaparin Injectable 40 milliGRAM(s) SubCutaneous <User Schedule>  folic acid 1 milliGRAM(s) Oral daily  hydroxyurea 1000 milliGRAM(s) Oral daily  influenza   Vaccine 0.5 milliLiter(s) IntraMuscular once  insulin lispro (HumaLOG) corrective regimen sliding scale   SubCutaneous every 6 hours  pantoprazole    Tablet 40 milliGRAM(s) Oral before breakfast  polyethylene glycol 3350 17 Gram(s) Oral two times a day  senna 2 Tablet(s) Oral at bedtime  sodium chloride 0.9%. 1000 milliLiter(s) (100 mL/Hr) IV Continuous <Continuous>    MEDICATIONS  (PRN):  acetaminophen   Tablet .. 650 milliGRAM(s) Oral every 6 hours PRN Temp greater or equal to 38C (100.4F), Mild Pain (1 - 3)  metoclopramide Injectable 10 milliGRAM(s) IV Push every 6 hours PRN Nausea and/or Vomiting  oxyCODONE    IR 5 milliGRAM(s) Oral every 4 hours PRN Moderate Pain (4 - 6)  oxyCODONE    IR 10 milliGRAM(s) Oral every 4 hours PRN Severe Pain (7 - 10)  sodium chloride 0.9% lock flush 10 milliLiter(s) IV Push every 1 hour PRN Pre/post blood products, medications, blood draw, and to maintain line patency      Allergies    No Known Allergies    Intolerances        LABS:                        11.2   13.35 )-----------( 247      ( 29 Jan 2020 19:32 )             34.2     01-29    139  |  105  |  12  ----------------------------<  226<H>  3.6   |  18<L>  |  0.46<L>    Ca    8.4      29 Jan 2020 19:32  Phos  4.4     01-29  Mg     1.6     01-29          General:   General:  · Primary Surgeon	markie	  · Assistant(s)	darius vargas schneider	  · Elective procedure	Yes	    Pre-Op Diagnosis, Post-Op Diagnosis and Procedure:   Pre-Op, Post-Op and Procedure Selector:  ·  PRE-OP DIAGNOSIS:  Cerebral aneurysm 29-Jan-2020 15:59:03  Stephen Davis.  ·  POST-OP DIAGNOSIS:  Cerebral aneurysm 29-Jan-2020 15:59:14  Stephen Davis.  ·  PROCEDURES:  Craniotomy for aneurysm repair 29-Jan-2020 15:58:52  Stephen Davis.      Operative Findings:  · Operative Findings	left crani for pca and ica bifur aneurysms	    Specimens/Blood Loss/IV/Output/Protocol/VTE:   Specimens/Blood Loss/IV/Output/Protocol/VTE:  · Specimens	none	  · Drains	SG HMV	  · Estimated Blood Loss	300 milliLiter(s)	  · IV Infusions - Crystalloids	2500	  · IV Infusions - Blood Products	2 unit PRBC 1 plts	  · Antibiotic Protocol	Followed protocol	  · Venous Thromboembolism Prophylaxis Therapy	scds	      Electronic Signatures:  Stephen Davis)  (Signed 29-Jan-2020 16:00)  	Authored: General, Pre-Op Diagnosis, Post-Op Diagnosis and Procedure, Operative Findings, Specimens/Blood Loss/IV/Output/Protocol/VTE  Trae Walsh)  (Signature Pending)  	Co-Signer: General, Pre-Op Diagnosis, Post-Op Diagnosis and Procedure, Operative Findings, Specimens/Blood Loss/IV/Output/Protocol/VTE

## 2020-01-30 NOTE — DIETITIAN INITIAL EVALUATION ADULT. - ADD RECOMMEND
1) Recommend regular diet when medically feasible, consistency deferred to team. 2) Recommend Ensure x2 daily (700kcals and 40g protein) to optimize nutrition intake. 3) RD to remain available for further diet recommendations. 4) Continue to trend labs, weights, skin integrity and PO intake. 1) Recommend regular diet when medically feasible, consistency deferred to team. 2) Recommend Ensure x2 daily (700kcals and 40g protein) to optimize nutrition intake. 3) RD to remain available for further diet recommendations. 4) Continue to trend labs, weights, skin integrity and PO intake. 5) Underweight alert placed in chart. 1) Recommend regular diet when medically feasible, consistency deferred to team. 2) Recommend Ensure Clear x2 daily and Ensure Enlive upon diet advancement (700kcals and 40g protein) to optimize nutrition intake. 3) RD to remain available for further diet recommendations. 4) Continue to trend labs, weights, skin integrity and PO intake. 5) Underweight alert placed in chart.

## 2020-01-30 NOTE — PROVIDER CONTACT NOTE (OTHER) - ASSESSMENT
Tylenol 650mg PO administered at 15:30 as per order. Repeat temperature noted to be higher at 39.2 degrees celsius.

## 2020-01-30 NOTE — DIETITIAN INITIAL EVALUATION ADULT. - PHYSICAL APPEARANCE
other (specify) Ht: 72 inches  Wt: 125.6 lbs  BMI: 17 kg/m2  IBW: 178 lbs (+/-10%)  IBW%: 142%  Skin per nursing documentation: No pressure injuries noted.   Edema per nursing documentation: None noted. Ht: 72 inches  Wt: 125.6 lbs  BMI: 17 kg/m2  IBW: 178 lbs (+/-10%)  IBW%: 142%  Skin per nursing documentation: No pressure injuries noted.   Edema per nursing documentation: None noted.  Nutrition focused physical exam deferred at this time as pt is mildly agitated, visually appears well-nourished Ht: 72 inches  Wt: 125.6 lbs  BMI: 17 kg/m2  IBW: 178 lbs (+/-10%)  IBW%: 71%  Skin per nursing documentation: No pressure injuries noted.   Edema per nursing documentation: None noted.  Nutrition focused physical exam deferred at this time as pt is mildly agitated, visually appears well-nourished

## 2020-01-30 NOTE — DIETITIAN INITIAL EVALUATION ADULT. - REASON INDICATOR FOR ASSESSMENT
Pt seen for NSCU length of stay  Source: Medical record and RN Pt seen for NSCU length of stay  Source: Medical record and pt's sister at bedside

## 2020-01-30 NOTE — PROGRESS NOTE ADULT - ASSESSMENT
POD#1s/p L crani for clipping of L ICA terminus and L SCA aneurysms; intra op angio    NEURO: Neurocheck q1H  CT/CTA in am  3 day decadron taper per neurosurgery  DC Keppra   Pain control w/ Tylenol prn and Oxy 5/10 prn  Activity: [x] OOB as tolerated [x] Bedrest x4-6 post op [x] PT [x] OT [] PMNR    PULM:  Incentive spirometry, mobilize as tolerated    CV: ? - 140  On Nicardipine gtt  On Atorvastatin 80 qHS    RENAL: IVL    GI:  Diet: Clear liquid diet; advance as tolerated  GI prophylaxis [x] not indicated [] PPI [] other:  Bowel regimen [] colace [x] senna [] other:    ENDO:   Goal euglycemia (-180)  ABRAHAM    HEME/ONC:  SCD--hematology consult appreciated, keep Hgb>10 matilda op, hydration, cont home meds  VTE prophylaxis: [x] SCDs [X] chemoprophylaxis - start chemoprophylaxis if no heme on CT    ID:  Matilda-op antibiotics    MISC:    SOCIAL/FAMILY:  [] awaiting [x] updated at bedside--sister [] family meeting    CODE STATUS:  [x] Full Code [] DNR [] DNI [] Palliative/Comfort Care    DISPOSITION: Floor if CT/CTA ok.     [x] Patient is at high risk of neurologic deterioration/death due to: post op hemorrhage, stroke, Sickle Cell crisis POD#1s/p L crani for clipping of L ICA terminus and L SCA aneurysms; intra op angio    NEURO: Neurocheck q1H  CT/CTA in am  3 day decadron taper per neurosurgery  DC Keppra   Pain control w/ Tylenol prn and Oxy 5/10 prn  Activity: [x] OOB as tolerated [x] Bedrest x4-6 post op [x] PT [x] OT [] PMNR    PULM:  Incentive spirometry, mobilize as tolerated    CV:  - 150  DC Nicardipine gtt  On Atorvastatin 80 qHS    RENAL: IVF    GI:  Diet: Clear liquid diet; advance as tolerated  GI prophylaxis [x] not indicated [] PPI [] other:  Bowel regimen [] colace [x] senna [] other:    ENDO:   Goal euglycemia (-180)  ABRAHAM    HEME/ONC:  SCD--hematology consult appreciated, keep Hgb>10 matilda op, hydration, cont home meds  VTE prophylaxis: [x] SCDs [X] chemoprophylaxis - start chemoprophylaxis if no heme on CT/CTA    ID:  Matilda-op antibiotics    MISC:    SOCIAL/FAMILY:  [] awaiting [x] updated at bedside--sister [] family meeting    CODE STATUS:  [x] Full Code [] DNR [] DNI [] Palliative/Comfort Care    DISPOSITION: Floor if CT/CTA ok.     [x] Patient is at high risk of neurologic deterioration/death due to: post op hemorrhage, stroke, Sickle Cell crisis POD#1s/p L crani for clipping of L ICA terminus and L SCA aneurysms; intra op angio    NEURO: Neurocheck q1H  CT/CTA in am  3 day decadron taper per neurosurgery  DC Keppra   Pain control w/ Tylenol prn and Oxy 5/10 prn  Activity: [x] OOB as tolerated [x] PT [x] OT [] PMNR    PULM:  Incentive spirometry, mobilize as tolerated    CV:  - 150  DC Nicardipine gtt  On Atorvastatin 80 qHS    RENAL: IVF    GI:  Diet: Clear liquid diet; advance as tolerated  GI prophylaxis [x] not indicated [] PPI [] other:  Bowel regimen [] colace [x] senna [] other:    ENDO:   Goal euglycemia (-180)  ABRAHAM    HEME/ONC:  SCD--hematology consult appreciated, keep Hgb>10 matilda op, hydration, cont home meds  VTE prophylaxis: [x] SCDs [X] chemoprophylaxis - start chemoprophylaxis if no heme on CT/CTA    ID:  Matilda-op antibiotics    MISC:    SOCIAL/FAMILY:  [] awaiting [x] updated at bedside--sister [] family meeting    CODE STATUS:  [x] Full Code [] DNR [] DNI [] Palliative/Comfort Care    DISPOSITION: Floor if CT/CTA ok.     [x] Patient is at high risk of neurologic deterioration/death due to: post op hemorrhage, stroke, Sickle Cell crisis POD#1s/p L crani for clipping of L ICA terminus and L SCA aneurysms; intra op angio    NEURO: Neurocheck q1H  CT/CTA in am  3 day decadron taper per neurosurgery  DC Keppra   Pain control w/ Tylenol prn and Oxy 5/10 prn  Activity: [x] OOB as tolerated [x] PT [x] OT [] PMNR    PULM:  Incentive spirometry, mobilize as tolerated    CV:  - 150  DC Nicardipine gtt  On Atorvastatin 80 qHS    RENAL: IVF    GI:  Diet: Clear liquid diet; advance as tolerated  GI prophylaxis [x] not indicated [] PPI [] other:  Bowel regimen [] colace [x] senna [] other:    ENDO:   Goal euglycemia (-180)  ABRAHAM    HEME/ONC:  SCD--hematology consult appreciated, keep Hgb>10 matilda op, hydration, cont home meds  VTE prophylaxis: [x] SCDs [X] chemoprophylaxis - no chemoprophylaxis given post-op heme    ID:  Matilda-op antibiotics    MISC:    SOCIAL/FAMILY:  [] awaiting [x] updated at bedside--sister [] family meeting    CODE STATUS:  [x] Full Code [] DNR [] DNI [] Palliative/Comfort Care    DISPOSITION: Floor if ok with NeuroIR team    [x] Patient is at high risk of neurologic deterioration/death due to: post op hemorrhage, stroke, Sickle Cell crisis POD#1s/p L crani for clipping of L ICA terminus and L SCA aneurysms; intra op angio    NEURO: Neurocheck q1H  CT/CTA in am  3 day decadron taper per neurosurgery  DC Keppra   Pain control w/ Tylenol prn and Oxy 5/10 prn  Activity: [x] OOB as tolerated [x] PT [x] OT [] PMNR    PULM:  Incentive spirometry, mobilize as tolerated    CV:  - 150  DC Nicardipine gtt  On Atorvastatin 80 qHS    RENAL: IVF    GI:  Diet: Clear liquid diet; advance as tolerated  GI prophylaxis [x] not indicated [] PPI [] other:  Bowel regimen [] colace [x] senna [] other:    ENDO:   Goal euglycemia (-180)  ABRAHAM    HEME/ONC:  SCD--hematology consult appreciated, keep Hgb>10 matilda op, hydration, cont home meds  VTE prophylaxis: [x] SCDs [X] chemoprophylaxis - no chemoprophylaxis given post-op heme    ID:  Matilda-op antibiotics    MISC:    SOCIAL/FAMILY:  [] awaiting [x] updated at bedside--sister [] family meeting    CODE STATUS:  [x] Full Code [] DNR [] DNI [] Palliative/Comfort Care    DISPOSITION: ICU    [x] Patient is at high risk of neurologic deterioration/death due to: post op hemorrhage, stroke, Sickle Cell crisis

## 2020-01-30 NOTE — DIETITIAN INITIAL EVALUATION ADULT. - OTHER INFO
Pt was asleep with no family at bedside. Unable to assess PTA intake. Per medical record, pt took folic acid at home. Pt with no known food allergies.     Dosing wt: 125.6 lbs (1/29). Per previous RD note, pt had an admission wt of 137 lbs (12/19/19), indicating a 11 lbs wt loss x1 month.    Pt on clear liquid diet in house. Last BM PTA on 1/27. Pt's sister states that since last hospitalization in december '19 pt has been eating well with no changes in appetite. Pt eats 3 well-balanced meals a day and supplements oral intake with Original Ensure as needed. Pt does not follow any dietary restrictions at home. Sister states that pt does not take any micronutrient supplements. Sister denies pt with chewing or swallowing issues. Pt with no known food allergies.     Dosing wt: 125.6 lbs (1/29). Per previous RD note, pt had an admission wt of 137 lbs (12/19/19), indicating a 11 lbs wt loss x1 month. However, sister states that pt has not had any recent wt loss and has a UBW ~130 lbs.     Pt on clear liquid diet in house and is tolerating it well. Pt had some nausea yesterday, sister believes related to anesthesia. Pt feeling better today with no N+V, constipation or diarrhea. Last BM PTA on 1/27.     Nutrition education deferred at this time.

## 2020-01-30 NOTE — PROGRESS NOTE ADULT - ATTENDING COMMENTS
Patient from [place]  with a history of sickle cell anemia; he was planning to return to his home country when he was asked to return to hospital for repair of intracranial aneurysm. He is 24 hours post surgery for intracranial aneurysm repair. No bleeding noted and stable examination as per Dr Law's note. Platelet count and HGB are acceptable postoperatively. No evidence of sickle cell crisis

## 2020-01-30 NOTE — PROGRESS NOTE ADULT - ASSESSMENT
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 Hematology consulted for matilda-operative management from perspective of Hb SS disease.    HbSS disease- s/p Craniotomy for aneurysm repair done on 1-  - hgb 11.2.  - monitor CBC with diff daily, check Hgb electrophoresis tomorrow  - post-operative management should focus on minimizing hypoxia, hypothermia, acidosis, and intravascular volume depletion.  -Please encourage frequent incentive spirometer use if safe from neurosurgical standpoint  -Thrombocytosis: recently started on ASA, would hold matilda-operatively.  -transfuse goal Hb ~10 to minimize sickle cell complications post op  - monitor for fevers, hypoxia. if spikes fever check STAT CXR 19 year old man with Hb SS  recent long hospitalization for acute chest syndrome requiring intubation and exchange transfusion c/b stroke who on MRI was found to have multiple aneurysms c/b recurrent headaches, who presents for neurosurgical intervention upon brain aneurysms prior to returning home to [facility]. Hematology consulted for matilda-operative management from perspective of Hb SS disease.    HbSS disease- s/p left Craniotomy with clipping of L ICA terminus and L SCA aneurysms   for aneurysm repair done on 1-  - hgb 11.2.  - monitor CBC with diff daily, check Hgb electrophoresis tomorrow  - post-operative management should focus on minimizing hypoxia, hypothermia, acidosis, and intravascular volume depletion.  -Please encourage frequent incentive spirometer use if safe from neurosurgical standpoint  -Thrombocytosis: recently started on ASA, would hold matilda-operatively.  -transfuse goal Hb ~10 to minimize sickle cell complications post op  - monitor for fevers, hypoxia. if spikes fever check STAT CXR   - appreciate excellent neuro ICU care

## 2020-01-30 NOTE — PROGRESS NOTE ADULT - SUBJECTIVE AND OBJECTIVE BOX
Febrile  small left frontal heme on CTH post op    vitals/labs/meds/imaging reviewed  EO to voice, does not maintain, L 3rdn palsy pupil 5mm not reactive, R 3mm reactive, minimal R facial, BUE no drift 5/5, BLE 5/5  w/ encouragement effort dependent exam

## 2020-01-30 NOTE — DIETITIAN INITIAL EVALUATION ADULT. - PERTINENT MEDS FT
folic acid, Humalog corrective sliding scale every 6 hours, senna, Decadron, Miralax, Lipitor, Reglan, Protonix, Hydrea

## 2020-01-30 NOTE — PROGRESS NOTE ADULT - SUBJECTIVE AND OBJECTIVE BOX
Patient seen and examined at bedside.    --Anticoagulation--    T(C): 37 (01-29-20 @ 23:00), Max: 37.4 (01-29-20 @ 19:00)  HR: 110 (01-30-20 @ 02:00) (53 - 127)  BP: 114/75 (01-29-20 @ 07:18) (114/75 - 114/75)  RR: 22 (01-30-20 @ 02:00) (16 - 28)  SpO2: 100% (01-30-20 @ 02:00) (97% - 100%)  Wt(kg): --    Exam:  AOx3, FC, PERRL, EOMI, no facial, L 3NP   5/5 throughout, no drift  SILT  no clonus

## 2020-01-30 NOTE — DIETITIAN INITIAL EVALUATION ADULT. - ENERGY NEEDS
Pertinent Information: Per medical record, pt is a 19 year old man with Sickle Cell Disease and several known unruptured aneurysms. Pt admitted on 1/27, currently POD#1 s/p L crani for clipping of L ICA terminus and L SCA aneurysm.

## 2020-01-30 NOTE — DIETITIAN INITIAL EVALUATION ADULT. - LAB (SPECIFY)
Blood glucose [facility]  [phone] [place] [id number]  Phone: [phone]  Fax: [phone]

## 2020-01-30 NOTE — PROGRESS NOTE ADULT - ASSESSMENT
POD#1 L crani aneurysm clips     repeat CTH in am  pain control  OOB as tolerated, PT/OT   cont decadron per neurosurgery  ?restart ASA when Ok with neurosurgery  cont keppra  incentive spirometry  hematology following  chemoppx if CTH in am stable  monitor for fevers, monitor off antibiotics, f/u cultures

## 2020-01-31 DIAGNOSIS — I67.1 CEREBRAL ANEURYSM, NONRUPTURED: ICD-10-CM

## 2020-01-31 DIAGNOSIS — R50.9 FEVER, UNSPECIFIED: ICD-10-CM

## 2020-01-31 DIAGNOSIS — D57.1 SICKLE-CELL DISEASE WITHOUT CRISIS: ICD-10-CM

## 2020-01-31 LAB
HAPTOGLOB SERPL-MCNC: 106 MG/DL — SIGNIFICANT CHANGE UP (ref 34–200)
LDH SERPL L TO P-CCNC: 245 U/L — HIGH (ref 50–242)
RBC # BLD: 4.1 M/UL — LOW (ref 4.2–5.8)
RETICS #: 106.2 K/UL — SIGNIFICANT CHANGE UP (ref 25–125)
RETICS/RBC NFR: 2.6 % — HIGH (ref 0.5–2.5)

## 2020-01-31 PROCEDURE — 99222 1ST HOSP IP/OBS MODERATE 55: CPT

## 2020-01-31 PROCEDURE — 70450 CT HEAD/BRAIN W/O DYE: CPT | Mod: 26

## 2020-01-31 PROCEDURE — 99233 SBSQ HOSP IP/OBS HIGH 50: CPT

## 2020-01-31 RX ORDER — ENOXAPARIN SODIUM 100 MG/ML
40 INJECTION SUBCUTANEOUS
Refills: 0 | Status: DISCONTINUED | OUTPATIENT
Start: 2020-01-31 | End: 2020-02-10

## 2020-01-31 RX ORDER — LABETALOL HCL 100 MG
10 TABLET ORAL ONCE
Refills: 0 | Status: COMPLETED | OUTPATIENT
Start: 2020-01-31 | End: 2020-01-31

## 2020-01-31 RX ORDER — ACETAMINOPHEN 500 MG
1000 TABLET ORAL ONCE
Refills: 0 | Status: COMPLETED | OUTPATIENT
Start: 2020-01-31 | End: 2020-01-31

## 2020-01-31 RX ADMIN — OXYCODONE HYDROCHLORIDE 10 MILLIGRAM(S): 5 TABLET ORAL at 18:38

## 2020-01-31 RX ADMIN — OXYCODONE HYDROCHLORIDE 10 MILLIGRAM(S): 5 TABLET ORAL at 12:51

## 2020-01-31 RX ADMIN — ATORVASTATIN CALCIUM 80 MILLIGRAM(S): 80 TABLET, FILM COATED ORAL at 21:03

## 2020-01-31 RX ADMIN — OXYCODONE HYDROCHLORIDE 10 MILLIGRAM(S): 5 TABLET ORAL at 19:08

## 2020-01-31 RX ADMIN — HYDROXYUREA 1000 MILLIGRAM(S): 500 CAPSULE ORAL at 11:30

## 2020-01-31 RX ADMIN — Medication 4 MILLIGRAM(S): at 06:04

## 2020-01-31 RX ADMIN — Medication 4 MILLIGRAM(S): at 23:08

## 2020-01-31 RX ADMIN — Medication 4 MILLIGRAM(S): at 18:37

## 2020-01-31 RX ADMIN — SODIUM CHLORIDE 100 MILLILITER(S): 9 INJECTION INTRAMUSCULAR; INTRAVENOUS; SUBCUTANEOUS at 21:03

## 2020-01-31 RX ADMIN — SODIUM CHLORIDE 100 MILLILITER(S): 9 INJECTION INTRAMUSCULAR; INTRAVENOUS; SUBCUTANEOUS at 03:43

## 2020-01-31 RX ADMIN — Medication 4 MILLIGRAM(S): at 11:29

## 2020-01-31 RX ADMIN — POLYETHYLENE GLYCOL 3350 17 GRAM(S): 17 POWDER, FOR SOLUTION ORAL at 18:37

## 2020-01-31 RX ADMIN — SODIUM CHLORIDE 100 MILLILITER(S): 9 INJECTION INTRAMUSCULAR; INTRAVENOUS; SUBCUTANEOUS at 11:30

## 2020-01-31 RX ADMIN — Medication 1 MILLIGRAM(S): at 11:29

## 2020-01-31 RX ADMIN — Medication 4 MILLIGRAM(S): at 00:19

## 2020-01-31 RX ADMIN — PANTOPRAZOLE SODIUM 40 MILLIGRAM(S): 20 TABLET, DELAYED RELEASE ORAL at 11:29

## 2020-01-31 RX ADMIN — OXYCODONE HYDROCHLORIDE 10 MILLIGRAM(S): 5 TABLET ORAL at 12:21

## 2020-01-31 RX ADMIN — Medication 400 MILLIGRAM(S): at 20:58

## 2020-01-31 RX ADMIN — ENOXAPARIN SODIUM 40 MILLIGRAM(S): 100 INJECTION SUBCUTANEOUS at 18:37

## 2020-01-31 RX ADMIN — POLYETHYLENE GLYCOL 3350 17 GRAM(S): 17 POWDER, FOR SOLUTION ORAL at 06:05

## 2020-01-31 RX ADMIN — CHLORHEXIDINE GLUCONATE 1 APPLICATION(S): 213 SOLUTION TOPICAL at 06:03

## 2020-01-31 NOTE — PROGRESS NOTE ADULT - ASSESSMENT
POD#1s/p L crani for clipping of L ICA terminus and L SCA aneurysms; intra op angio    NEURO: Neurocannieck q4H  CT/CTA - 1/30 - Aneurysm secured. No flow limiting stenosis.   3 day decadron taper per neurosurgery  Pain control w/ Tylenol prn and Oxy 5/10 prn  Activity: [x] OOB as tolerated [x] PT [x] OT [] PMNR    PULM:  Incentive spirometry, mobilize as tolerated    CV:  - 150  On Atorvastatin 80 qHS    RENAL: IVF    GI:  Diet: Regular diet  GI prophylaxis [x] not indicated [] PPI [] other:  Bowel regimen [] colace [x] senna [] other:    ENDO:   Goal euglycemia (-180)  ABRAHAM    HEME/ONC:  SCD--hematology consult appreciated, keep Hgb>10 matilda op, hydration, cont home meds  VTE prophylaxis: [x] SCDs [X] chemoprophylaxis - resume chemoprophylaxis if post-op heme stable    ID:  Matilda-op antibiotics    MISC:    SOCIAL/FAMILY:  [] awaiting [x] updated at bedside--sister [] family meeting    CODE STATUS:  [x] Full Code [] DNR [] DNI [] Palliative/Comfort Care    DISPOSITION: ICU    [x] Patient is at high risk of neurologic deterioration/death due to: post op hemorrhage, stroke, Sickle Cell crisis POD#1s/p L crani for clipping of L ICA terminus and L SCA aneurysms; intra op angio    NEURO: Sharick q4H  CT/CTA - 1/30 - Aneurysm secured. No flow limiting stenosis.   3 day decadron taper per neurosurgery  Pain control w/ Tylenol prn and Oxy 5/10 prn  Activity: [x] OOB as tolerated [x] PT [x] OT [] PMNR    PULM:  Incentive spirometry, mobilize as tolerated    CV:  - 150  On Atorvastatin 80 qHS    RENAL: IVF    GI:  Diet: Regular diet  GI prophylaxis [x] not indicated [] PPI [] other:  Bowel regimen [] colace [x] senna [] other:    ENDO:   Goal euglycemia (-180)  ABRAHAM    HEME/ONC:  Will check CBC, retic count, LDH and haptoglobin (concern for sickle cell crises)  SCD-hematology consult appreciated, keep Hgb>10 matilda op, hydration, cont home meds  VTE prophylaxis: [x] SCDs [X] chemoprophylaxis - resume chemoprophylaxis if post-op heme stable    ID:  Spiking fevers - Cultures sent  ?Drug fevers; monitor for now    MISC:    SOCIAL/FAMILY: Awaiting    CODE STATUS:  [x] Full Code [] DNR [] DNI [] Palliative/Comfort Care    DISPOSITION: Floor    [x] Patient is at high risk of neurologic deterioration/death due to: post op hemorrhage, stroke, Sickle Cell crisis POD#1s/p L crani for clipping of L ICA terminus and L SCA aneurysms; intra op angio    NEURO: Neurocheck q4H  CT/CTA - 1/30 - Aneurysm secured. No flow limiting stenosis.   3 day decadron taper per neurosurgery  Pain control w/ Tylenol prn and Oxy 5/10 prn  Activity: [x] OOB as tolerated [x] PT [x] OT [] PMNR    PULM:  Incentive spirometry, mobilize as tolerated    CV:  - 150  On Atorvastatin 80 qHS    RENAL: IVF    GI:  Diet: Regular diet  GI prophylaxis [x] not indicated [] PPI [] other:  Bowel regimen [] colace [x] senna [] other:    ENDO:   Goal euglycemia (-180)  ABRAHAM    HEME/ONC:  Will check CBC, retic count, LDH and haptoglobin (concern for sickle cell crises)  SCD-hematology consult appreciated, keep Hgb>10 matilda op, hydration, cont home meds  VTE prophylaxis: [x] SCDs [X] chemoprophylaxis - resume chemoprophylaxis if post-op heme stable    ID:  Spiking fevers - Cultures sent  ?Drug fevers; monitor for now    MISC:    SOCIAL/FAMILY: Awaiting    CODE STATUS:  [x] Full Code [] DNR [] DNI [] Palliative/Comfort Care    DISPOSITION: Floor

## 2020-01-31 NOTE — PHYSICAL THERAPY INITIAL EVALUATION ADULT - FOLLOWS COMMANDS/ANSWERS QUESTIONS, REHAB EVAL
able to follow single-step instructions/100% of the time/Repetition due to patient being uncooperative

## 2020-01-31 NOTE — OCCUPATIONAL THERAPY INITIAL EVALUATION ADULT - PLANNED THERAPY INTERVENTIONS, OT EVAL
transfer training/ADL retraining/balance training/bed mobility training/cognitive, visual perceptual

## 2020-01-31 NOTE — CONSULT NOTE ADULT - PROBLEM SELECTOR RECOMMENDATION 9
S/P left Craniotomy with clipping of L ICA terminus and L SCA aneurysms.    CT scan is showing evolving Rt Superior Cerebellar infarct  without hemorrhage and new hemorrhage and Edema of left inferior frontal lobe.   Mgt as per neurosurgery team   Cont Atorvastatin   pain control as per neurosurgery team

## 2020-01-31 NOTE — CONSULT NOTE ADULT - PROBLEM SELECTOR RECOMMENDATION 3
Unclear etiology / ? Hemorrhage / CXR with left upper lobe and middle lobe opacity / and pt has no current pulmonary symptoms   If pt continues to spike , please get RVP to r/o Flu or other viral infection   Get CT of chest without contrast for better definition of lung findings  Get CBC with auto diff   Might consider IV ABX if pt respikes and if having pulmonary symptoms   Blood CX done on 1/30/20 negative   cont close monitoring

## 2020-01-31 NOTE — PHYSICAL THERAPY INITIAL EVALUATION ADULT - PERTINENT HX OF CURRENT PROBLEM, REHAB EVAL
20 yo M with PMHx Sickle Cell Disease and several known unruptured aneurysms largest is L ICA s/p DSA 1/14/2020 presents to [facility] ED on 1/27 for gradual worsening headache since the day prior. Pt now presents POD#2 s/p L craniotomy for clipping of L ICA terminus and L SCA aneurysms. SEE BELOW

## 2020-01-31 NOTE — OCCUPATIONAL THERAPY INITIAL EVALUATION ADULT - SHORT TERM MEMORY, REHAB EVAL
impaired/2/3 delayed recall. Attempted to administer Short Blessed Test (cog screen) however pt uncooperative with questions stating he is tired.

## 2020-01-31 NOTE — OCCUPATIONAL THERAPY INITIAL EVALUATION ADULT - PERTINENT HX OF CURRENT PROBLEM, REHAB EVAL
19M pmhx SSD several known unruptured aneurysms largest is L ICA s/p DSA 1/14/2020 presents for gradual worsening headache since yesterday, neurointact. Now s/p left crani for pca and ica bifur aneurysms.

## 2020-01-31 NOTE — PHYSICAL THERAPY INITIAL EVALUATION ADULT - BALANCE TRAINING, PT EVAL
GOAL: Pt will improve static/dynamic sitting and standing balance by at least 1/2 grade to decrease fall risk during ambulation within 3-4 wks.

## 2020-01-31 NOTE — PHYSICAL THERAPY INITIAL EVALUATION ADULT - ADDITIONAL COMMENTS
CT BRAIN 1/30: revealed Interval left frontotemporal craniotomy. Extra-axial collection subjacent to the craniotomy measures 1 cm in greatest depth. Interval placement of prepontine and left parasellar aneurysm clips. New hemorrhage and edema in the inferior left frontal lobe. No midline shift or effacement of the basal cisterns. Evolving right superior cerebellar recent infarct without evidence for hemorrhagic transformation.    CT ANGIOGRAPHY BRAIN 1/30: No flow-limiting stenosis or patent vascular aneurysm Pt currently staying with uncle in private home with 1 step to enter, unclear if stairs to negotiate inside the home. Prior to admit, patient was ambulating independently without an AD performing all ADLs independently without assist.     CT BRAIN 1/30: revealed Interval left frontotemporal craniotomy. Extra-axial collection subjacent to the craniotomy measures 1 cm in greatest depth. Interval placement of prepontine and left parasellar aneurysm clips. New hemorrhage and edema in the inferior left frontal lobe. No midline shift or effacement of the basal cisterns. Evolving right superior cerebellar recent infarct without evidence for hemorrhagic transformation.    CT ANGIOGRAPHY BRAIN 1/30: No flow-limiting stenosis or patent vascular aneurysm

## 2020-01-31 NOTE — CONSULT NOTE ADULT - SUBJECTIVE AND OBJECTIVE BOX
Patient is a 19y old  Male who presents with a chief complaint of intracranial aneurysms (2020 06:48)      SUBJECTIVE / OVERNIGHT EVENTS:  19 year old male from [place] who has a history of Hb SS disease, brain aneurysm w/ recent admission - for pain crisis and subsequent acute chest syndrome as well as stroke requiring intubation and multiple exchange transfusions.   Pt had CT head and MRI/MRA which revealed multiple areas patchy ischemia w/ hemorrhagic transformation in the right superior cerebellar region as well as multiple areas of aneurysm. Pt was d/c home for outpt nsx f/up.  On 20 patient was admitted to [facility] due to c/o  headache.  He was noted to have  small CNS aneurysms and he is admitted for a planned procedure possibly coiling of aneurysm.  On , he underwent left Craniotomy with clipping of L ICA terminus and L SCA aneurysms.  On 3020, patient spiked temperature up to   Medical consultation is called for comanagement.         Patient History:   PAST MEDICAL HISTORY:  Brain aneurysm   Sickle cell anemia   Sickle cell disease.     PAST SURGICAL HISTORY:  No significant past surgical history.          Allergies and Intolerances:    No Known Allergies:    Home Medications:   	folic acid 1 mg oral tablet: 1 tab(s) orally once a day  	hydroxyurea 500 mg oral capsule: 2 cap(s) orally once a day   	atorvastatin 80 mg oral tablet: 1 tab(s) orally once a day  	acetaminophen 325 mg oral tablet: 2 tab(s) orally every 6 hours, As needed, Mild Pain (1 - 3)  	Aspirin  81 oral delayed release tablet: 1 tab(s) orally once a day    .      ADDITIONAL REVIEW OF SYSTEMS: Negative except for above    MEDICATIONS  (STANDING):  acetaminophen  IVPB .. 1000 milliGRAM(s) IV Intermittent once  atorvastatin 80 milliGRAM(s) Oral at bedtime  dexAMETHasone     Tablet 4 milliGRAM(s) Oral every 6 hours  enoxaparin Injectable 40 milliGRAM(s) SubCutaneous <User Schedule>  folic acid 1 milliGRAM(s) Oral daily  hydroxyurea 1000 milliGRAM(s) Oral daily  influenza   Vaccine 0.5 milliLiter(s) IntraMuscular once  labetalol Injectable 10 milliGRAM(s) IV Push once  pantoprazole    Tablet 40 milliGRAM(s) Oral before breakfast  polyethylene glycol 3350 17 Gram(s) Oral two times a day  senna 2 Tablet(s) Oral at bedtime  sodium chloride 0.9%. 1000 milliLiter(s) (100 mL/Hr) IV Continuous <Continuous>    MEDICATIONS  (PRN):  acetaminophen   Tablet .. 650 milliGRAM(s) Oral every 6 hours PRN Temp greater or equal to 38C (100.4F), Mild Pain (1 - 3)  metoclopramide Injectable 10 milliGRAM(s) IV Push every 6 hours PRN Nausea and/or Vomiting  oxyCODONE    IR 5 milliGRAM(s) Oral every 4 hours PRN Moderate Pain (4 - 6)  oxyCODONE    IR 10 milliGRAM(s) Oral every 4 hours PRN Severe Pain (7 - 10)  sodium chloride 0.9% lock flush 10 milliLiter(s) IV Push every 1 hour PRN Pre/post blood products, medications, blood draw, and to maintain line patency      CAPILLARY BLOOD GLUCOSE      POCT Blood Glucose.: 111 mg/dL (2020 06:12)  POCT Blood Glucose.: 128 mg/dL (2020 00:32)    I&O's Summary    2020 07:01  -  2020 07:00  --------------------------------------------------------  IN: 3680 mL / OUT: 4410 mL / NET: -730 mL    2020 07:01  -  2020 17:15  --------------------------------------------------------  IN: 640 mL / OUT: 0 mL / NET: 640 mL        PHYSICAL EXAM:  Vital Signs Last 24 Hrs  T(C): 37.6 (2020 15:46), Max: 38.4 (2020 23:00)  T(F): 99.7 (2020 15:46), Max: 101.1 (2020 23:00)  HR: 97 (2020 15:46) (70 - 104)  BP: 130/79 (2020 15:46) (115/68 - 143/84)  BP(mean): 98 (2020 11:00) (82 - 102)  RR: 18 (2020 15:46) (12 - 22)  SpO2: 97% (2020 15:46) (97% - 100%)    PHYSICAL EXAM:  GENERAL: NAD, well-developed  HEAD:  Atraumatic, Normocephalic  EYES:  conjunctiva and sclera clear  NECK: Supple, No JVD  CHEST/LUNG: Clear to auscultation bilaterally; No wheeze  HEART: Regular rate and rhythm; No murmurs, rubs, or gallops  ABDOMEN: Soft, Nontender, Nondistended; Bowel sounds present  EXTREMITIES:  2+ Peripheral Pulses, No clubbing, cyanosis, or edema  PSYCH: AAOx3  NEUROLOGY: non-focal  SKIN: No rashes or lesions      LABS:                        11.2   13.01 )-----------( 230      ( 2020 21:39 )             32.6         136  |  104  |  14  ----------------------------<  137<H>  4.2   |  19<L>  |  0.44<L>    Ca    9.3      2020 21:39  Phos  3.3       Mg     2.4                 Urinalysis Basic - ( 2020 16:20 )    Color: Light Yellow / Appearance: Clear / S.030 / pH: x  Gluc: x / Ketone: Negative  / Bili: Negative / Urobili: Negative   Blood: x / Protein: Trace / Nitrite: Negative   Leuk Esterase: Negative / RBC: 4 /hpf / WBC 1 /HPF   Sq Epi: x / Non Sq Epi: 0 /hpf / Bacteria: Negative          RADIOLOGY & ADDITIONAL TESTS:    Imaging Personally Reviewed:    Electrocardiogram Personally Reviewed:    COORDINATION OF CARE:  Care Discussed with Consultants/Other Providers [Y/N]:  Prior or Outpatient Records Reviewed [Y/N]: Patient is a 19y old  Male who presents with a chief complaint of intracranial aneurysms (2020 06:48)      SUBJECTIVE / OVERNIGHT EVENTS:  19 year old male from [place] who has a history of Hb SS disease, brain aneurysm w/ recent admission - for pain crisis and subsequent acute chest syndrome as well as stroke requiring intubation and multiple exchange transfusions.   Pt had CT head and MRI/MRA which revealed multiple areas patchy ischemia w/ hemorrhagic transformation in the right superior cerebellar region as well as multiple areas of aneurysm. Pt was d/c home for outpt nsx f/up.  On 20 patient was admitted to [facility] due to c/o  headache.  He was noted to have  small CNS aneurysms and he is admitted for a planned procedure possibly coiling of aneurysm.  On , he underwent left Craniotomy with clipping of L ICA terminus and L SCA aneurysms.  On 3020, patient spiked temperature up to 102.6F.    Medical consultation is called for comanagement.   Patient is seen in the neurosurgery floor and offers no current complaints.  NO pain , no cough, no diarrhea, no chest pain , no dyspnea, no abd pain.  NO palpitations.        Patient History:   PAST MEDICAL HISTORY:  Brain aneurysm   Sickle cell anemia   Sickle cell disease.     PAST SURGICAL HISTORY:  No significant past surgical history.      Allergies and Intolerances:    No Known Allergies:    Home Medications:   	folic acid 1 mg oral tablet: 1 tab(s) orally once a day  	hydroxyurea 500 mg oral capsule: 2 cap(s) orally once a day   	atorvastatin 80 mg oral tablet: 1 tab(s) orally once a day  	acetaminophen 325 mg oral tablet: 2 tab(s) orally every 6 hours, As needed, Mild Pain (1 - 3)  	Aspirin  81 oral delayed release tablet: 1 tab(s) orally once a day    .      ADDITIONAL REVIEW OF SYSTEMS: Negative except for above    MEDICATIONS  (STANDING):  acetaminophen  IVPB .. 1000 milliGRAM(s) IV Intermittent once  atorvastatin 80 milliGRAM(s) Oral at bedtime  dexAMETHasone     Tablet 4 milliGRAM(s) Oral every 6 hours  enoxaparin Injectable 40 milliGRAM(s) SubCutaneous <User Schedule>  folic acid 1 milliGRAM(s) Oral daily  hydroxyurea 1000 milliGRAM(s) Oral daily  influenza   Vaccine 0.5 milliLiter(s) IntraMuscular once  labetalol Injectable 10 milliGRAM(s) IV Push once  pantoprazole    Tablet 40 milliGRAM(s) Oral before breakfast  polyethylene glycol 3350 17 Gram(s) Oral two times a day  senna 2 Tablet(s) Oral at bedtime  sodium chloride 0.9%. 1000 milliLiter(s) (100 mL/Hr) IV Continuous <Continuous>    MEDICATIONS  (PRN):  acetaminophen   Tablet .. 650 milliGRAM(s) Oral every 6 hours PRN Temp greater or equal to 38C (100.4F), Mild Pain (1 - 3)  metoclopramide Injectable 10 milliGRAM(s) IV Push every 6 hours PRN Nausea and/or Vomiting  oxyCODONE    IR 5 milliGRAM(s) Oral every 4 hours PRN Moderate Pain (4 - 6)  oxyCODONE    IR 10 milliGRAM(s) Oral every 4 hours PRN Severe Pain (7 - 10)  sodium chloride 0.9% lock flush 10 milliLiter(s) IV Push every 1 hour PRN Pre/post blood products, medications, blood draw, and to maintain line patency      CAPILLARY BLOOD GLUCOSE      POCT Blood Glucose.: 111 mg/dL (2020 06:12)  POCT Blood Glucose.: 128 mg/dL (2020 00:32)    I&O's Summary    2020 07:01  -  2020 07:00  --------------------------------------------------------  IN: 3680 mL / OUT: 4410 mL / NET: -730 mL    2020 07:01  -  2020 17:15  --------------------------------------------------------  IN: 640 mL / OUT: 0 mL / NET: 640 mL        PHYSICAL EXAM:  Vital Signs Last 24 Hrs  T(C): 37.6 (2020 15:46), Max: 38.4 (2020 23:00)  T(F): 99.7 (2020 15:46), Max: 101.1 (2020 23:00)  HR: 97 (2020 15:46) (70 - 104)  BP: 130/79 (2020 15:46) (115/68 - 143/84)  BP(mean): 98 (2020 11:00) (82 - 102)  RR: 18 (2020 15:46) (12 - 22)  SpO2: 97% (2020 15:46) (97% - 100%)    PHYSICAL EXAM:  GENERAL: NAD, well-developed, sleepy but answers to questions   HEAD: left frontal surgical staples with no drainage noted   EYES:  conjunctiva and sclera clear  NECK: Supple, No JVD  CHEST/LUNG: Clear to auscultation bilaterally; No wheeze  HEART: Regular rate and rhythm; No murmurs, rubs, or gallops  ABDOMEN: Soft, Nontender, Nondistended; Bowel sounds present  EXTREMITIES:  2+ Peripheral Pulses, No clubbing, cyanosis, or edema  PSYCH: Sleepy and answers questions with nodding       LABS:                        11.2   13.01 )-----------( 230      ( 2020 21:39 )             32.6         136  |  104  |  14  ----------------------------<  137<H>  4.2   |  19<L>  |  0.44<L>    Ca    9.3      2020 21:39  Phos  3.3       Mg     2.4                 Urinalysis Basic - ( 2020 16:20 )    Color: Light Yellow / Appearance: Clear / S.030 / pH: x  Gluc: x / Ketone: Negative  / Bili: Negative / Urobili: Negative   Blood: x / Protein: Trace / Nitrite: Negative   Leuk Esterase: Negative / RBC: 4 /hpf / WBC 1 /HPF   Sq Epi: x / Non Sq Epi: 0 /hpf / Bacteria: Negative          RADIOLOGY & ADDITIONAL TESTS:    Imaging Personally Reviewed:    Electrocardiogram Personally Reviewed:    COORDINATION OF CARE:  Care Discussed with Consultants/Other Providers [Y/N]:  Prior or Outpatient Records Reviewed [Y/N]:

## 2020-01-31 NOTE — PROGRESS NOTE ADULT - SUBJECTIVE AND OBJECTIVE BOX
SUMMARY:18yo man with Sickle Cell Disease and several known unruptured aneurysms largest is L ICA s/p DSA 1/14/2020 presents for gradual worsening headache since 1d prior to presentation on 1/27.    ADMISSION SCORES:   GCS: 14     HOSPITAL COURSE:  1/29: s/p L crani for clipping of L ICA terminus and L SCA aneurysms    Overnight Events: Neurologically stable.     ROS: Negative unless specified    VITALS:   T(C): 37.2 (01-31-20 @ 07:00), Max: 39.2 (01-30-20 @ 16:00)  HR: 86 (01-31-20 @ 07:00) (76 - 117)  BP: 119/82 (01-31-20 @ 07:00) (115/68 - 128/80)  RR: 18 (01-31-20 @ 07:00) (13 - 25)  SpO2: 100% (01-31-20 @ 07:00) (97% - 100%)    01-30-20 @ 07:01  -  01-31-20 @ 07:00  --------------------------------------------------------  IN: 3580 mL / OUT: 4410 mL / NET: -830 mL      LABS:  ABG - ( 29 Jan 2020 16:42 )  pH, Arterial: 7.38  pH, Blood: x     /  pCO2: 37    /  pO2: 473   / HCO3: 22    / Base Excess: -2.7  /  SaO2: 100                   11.2   13.01 )-----------( 230      ( 30 Jan 2020 21:39 )             32.6     01-30    136  |  104  |  14  ----------------------------<  137<H>  4.2   |  19<L>  |  0.44<L>    Ca    9.3      30 Jan 2020 21:39  Phos  3.3     01-30  Mg     2.4     01-30        MEDS:  MEDICATIONS  (STANDING):  atorvastatin 80 milliGRAM(s) Oral at bedtime  chlorhexidine 4% Liquid 1 Application(s) Topical <User Schedule>  chlorhexidine 4% Liquid 1 Application(s) Topical <User Schedule>  dexAMETHasone     Tablet 4 milliGRAM(s) Oral every 6 hours  folic acid 1 milliGRAM(s) Oral daily  hydroxyurea 1000 milliGRAM(s) Oral daily  influenza   Vaccine 0.5 milliLiter(s) IntraMuscular once  insulin lispro (HumaLOG) corrective regimen sliding scale   SubCutaneous every 6 hours  pantoprazole    Tablet 40 milliGRAM(s) Oral before breakfast  polyethylene glycol 3350 17 Gram(s) Oral two times a day  senna 2 Tablet(s) Oral at bedtime  sodium chloride 0.9%. 1000 milliLiter(s) (100 mL/Hr) IV Continuous <Continuous>      MEDICATIONS  (PRN):  acetaminophen   Tablet .. 650 milliGRAM(s) Oral every 6 hours PRN Temp greater or equal to 38C (100.4F), Mild Pain (1 - 3)  metoclopramide Injectable 10 milliGRAM(s) IV Push every 6 hours PRN Nausea and/or Vomiting  oxyCODONE    IR 5 milliGRAM(s) Oral every 4 hours PRN Moderate Pain (4 - 6)  oxyCODONE    IR 10 milliGRAM(s) Oral every 4 hours PRN Severe Pain (7 - 10)  sodium chloride 0.9% lock flush 10 milliLiter(s) IV Push every 1 hour PRN Pre/post blood products, medications, blood draw, and to maintain line patency      EXAMINATION:  General: No acute distress  HEENT: Anicteric sclerae  Cardiac: [id number]  Lungs: Clear  Abdomen: Soft, non-tender, +BS  Extremities: No c/c/e  Skin/Incision Site: R groin clean, dry and intact, no hematoma  Neurologic: EO to voice, does not maintain, L 3rdn palsy pupil 5mm not reactive, R 3mm reactive, FS, BUE no drift 5/5, BLE 5/5  w/ encouragement effort dependent exam

## 2020-01-31 NOTE — PHYSICAL THERAPY INITIAL EVALUATION ADULT - STRENGTHENING, PT EVAL
GOAL: Pt will increase B LE strength by at least 1/2 grade to improve ease with functional mobility within 3-4 wks.

## 2020-01-31 NOTE — OCCUPATIONAL THERAPY INITIAL EVALUATION ADULT - DIAGNOSIS, OT EVAL
Pt presents with pain, decreased strength, endurance, balance, and coordination, all impacting ability to perform ADLs and functional mobility.

## 2020-01-31 NOTE — PROGRESS NOTE ADULT - SUBJECTIVE AND OBJECTIVE BOX
Patient seen and examined at bedside.    --Anticoagulation--    T(C): 37.8 (01-31-20 @ 01:00), Max: 39.2 (01-30-20 @ 16:00)  HR: 97 (01-31-20 @ 02:00) (85 - 123)  BP: --  RR: 14 (01-31-20 @ 02:00) (13 - 25)  SpO2: 97% (01-31-20 @ 02:00) (97% - 100%)  Wt(kg): --    Exam:  AOx3, FC, PERRL, EOMI, no facial, L 3NP   5/5 throughout, no drift  SILT  no clonus

## 2020-01-31 NOTE — OCCUPATIONAL THERAPY INITIAL EVALUATION ADULT - IMPAIRED TRANSFERS: SIT/STAND, REHAB EVAL
pain/impaired coordination/impaired balance/impaired sensory feedback/decreased strength/impaired motor control

## 2020-01-31 NOTE — PHYSICAL THERAPY INITIAL EVALUATION ADULT - DIAGNOSIS, PT EVAL
Decreased strength, balance, endurance, and coordination all impacting ability to perform ADLs and functional mobility independently

## 2020-01-31 NOTE — CONSULT NOTE ADULT - ATTENDING COMMENTS
Alessandra Scott   Hospitalist   
O Bia is a patient who was planning to return to [facility] ; he has been recently treated a [facility] for sickle cell disease with neurological problems. he is noted to have small CNS aneurysms and he is admitted for a planned procedure possibly coiling of aneurysm.  Please transfuse patient with packed red cells to a HGB of 10 in preparation for invasive procedure. Please continue oral folic aid

## 2020-01-31 NOTE — PHYSICAL THERAPY INITIAL EVALUATION ADULT - PLANNED THERAPY INTERVENTIONS, PT EVAL
balance training/bed mobility training/STAIR GOAL: Pt will negotiate 5 steps independently within 3-4 wks./strengthening/transfer training/gait training

## 2020-01-31 NOTE — OCCUPATIONAL THERAPY INITIAL EVALUATION ADULT - LIVES WITH, PROFILE
Pt reports he is from [place] & at this time is staying here with his uncle in a private house with 1 step to enter.

## 2020-01-31 NOTE — CONSULT NOTE ADULT - ASSESSMENT
19 year old male from [place] who has a history of Hb SS disease, brain aneurysm w/ recent admission 12/14-12/31 for pain crisis and subsequent acute chest syndrome as well as stroke requiring intubation and multiple exchange transfusions.   Pt had CT head and MRI/MRA which revealed multiple areas patchy ischemia w/ hemorrhagic transformation in the right superior cerebellar region as well as multiple areas of aneurysm. Pt was d/c home for outpt nsx f/up.  On 1/28/20 patient was admitted to [facility] due to c/o  headache.  He was noted to have  small CNS aneurysms and he is admitted for a planned procedure possibly coiling of aneurysm.  On 1/29, he underwent left Craniotomy with clipping of L ICA terminus and L SCA aneurysms.

## 2020-01-31 NOTE — PHYSICAL THERAPY INITIAL EVALUATION ADULT - GENERAL OBSERVATIONS, REHAB EVAL
Pt received Pt received semi-supine in NAD, +IVF, b/l venodynes, +tele, ICU monitoring, OK to be seen by KARLOS Rodriguez.

## 2020-02-01 LAB
ANION GAP SERPL CALC-SCNC: 12 MMOL/L — SIGNIFICANT CHANGE UP (ref 5–17)
APPEARANCE UR: CLEAR — SIGNIFICANT CHANGE UP
BILIRUB UR-MCNC: NEGATIVE — SIGNIFICANT CHANGE UP
BUN SERPL-MCNC: 20 MG/DL — SIGNIFICANT CHANGE UP (ref 7–23)
CALCIUM SERPL-MCNC: 9.5 MG/DL — SIGNIFICANT CHANGE UP (ref 8.4–10.5)
CHLORIDE SERPL-SCNC: 104 MMOL/L — SIGNIFICANT CHANGE UP (ref 96–108)
CO2 SERPL-SCNC: 20 MMOL/L — LOW (ref 22–31)
COLOR SPEC: SIGNIFICANT CHANGE UP
CREAT SERPL-MCNC: 0.43 MG/DL — LOW (ref 0.5–1.3)
DIFF PNL FLD: NEGATIVE — SIGNIFICANT CHANGE UP
GLUCOSE SERPL-MCNC: 139 MG/DL — HIGH (ref 70–99)
GLUCOSE UR QL: NEGATIVE — SIGNIFICANT CHANGE UP
HCT VFR BLD CALC: 33.5 % — LOW (ref 39–50)
HGB BLD-MCNC: 11.1 G/DL — LOW (ref 13–17)
KETONES UR-MCNC: NEGATIVE — SIGNIFICANT CHANGE UP
LEUKOCYTE ESTERASE UR-ACNC: NEGATIVE — SIGNIFICANT CHANGE UP
MCHC RBC-ENTMCNC: 29 PG — SIGNIFICANT CHANGE UP (ref 27–34)
MCHC RBC-ENTMCNC: 33.1 GM/DL — SIGNIFICANT CHANGE UP (ref 32–36)
MCV RBC AUTO: 87.5 FL — SIGNIFICANT CHANGE UP (ref 80–100)
NITRITE UR-MCNC: NEGATIVE — SIGNIFICANT CHANGE UP
NRBC # BLD: 0 /100 WBCS — SIGNIFICANT CHANGE UP (ref 0–0)
PH UR: 6.5 — SIGNIFICANT CHANGE UP (ref 5–8)
PLATELET # BLD AUTO: 205 K/UL — SIGNIFICANT CHANGE UP (ref 150–400)
POTASSIUM SERPL-MCNC: 4.2 MMOL/L — SIGNIFICANT CHANGE UP (ref 3.5–5.3)
POTASSIUM SERPL-SCNC: 4.2 MMOL/L — SIGNIFICANT CHANGE UP (ref 3.5–5.3)
PROT UR-MCNC: SIGNIFICANT CHANGE UP
RAPID RVP RESULT: SIGNIFICANT CHANGE UP
RBC # BLD: 3.83 M/UL — LOW (ref 4.2–5.8)
RBC # FLD: 21.1 % — HIGH (ref 10.3–14.5)
SODIUM SERPL-SCNC: 136 MMOL/L — SIGNIFICANT CHANGE UP (ref 135–145)
SP GR SPEC: 1.01 — SIGNIFICANT CHANGE UP (ref 1.01–1.02)
UROBILINOGEN FLD QL: NEGATIVE — SIGNIFICANT CHANGE UP
WBC # BLD: 9.83 K/UL — SIGNIFICANT CHANGE UP (ref 3.8–10.5)
WBC # FLD AUTO: 9.83 K/UL — SIGNIFICANT CHANGE UP (ref 3.8–10.5)

## 2020-02-01 PROCEDURE — 99232 SBSQ HOSP IP/OBS MODERATE 35: CPT

## 2020-02-01 PROCEDURE — 71045 X-RAY EXAM CHEST 1 VIEW: CPT | Mod: 26

## 2020-02-01 PROCEDURE — 93970 EXTREMITY STUDY: CPT | Mod: 26

## 2020-02-01 RX ORDER — ACETAMINOPHEN 500 MG
1000 TABLET ORAL ONCE
Refills: 0 | Status: COMPLETED | OUTPATIENT
Start: 2020-02-01 | End: 2020-02-01

## 2020-02-01 RX ADMIN — Medication 1 MILLIGRAM(S): at 13:18

## 2020-02-01 RX ADMIN — OXYCODONE HYDROCHLORIDE 10 MILLIGRAM(S): 5 TABLET ORAL at 21:00

## 2020-02-01 RX ADMIN — OXYCODONE HYDROCHLORIDE 10 MILLIGRAM(S): 5 TABLET ORAL at 06:33

## 2020-02-01 RX ADMIN — PANTOPRAZOLE SODIUM 40 MILLIGRAM(S): 20 TABLET, DELAYED RELEASE ORAL at 05:48

## 2020-02-01 RX ADMIN — OXYCODONE HYDROCHLORIDE 10 MILLIGRAM(S): 5 TABLET ORAL at 06:00

## 2020-02-01 RX ADMIN — POLYETHYLENE GLYCOL 3350 17 GRAM(S): 17 POWDER, FOR SOLUTION ORAL at 17:10

## 2020-02-01 RX ADMIN — Medication 4 MILLIGRAM(S): at 05:48

## 2020-02-01 RX ADMIN — OXYCODONE HYDROCHLORIDE 10 MILLIGRAM(S): 5 TABLET ORAL at 20:31

## 2020-02-01 RX ADMIN — ENOXAPARIN SODIUM 40 MILLIGRAM(S): 100 INJECTION SUBCUTANEOUS at 17:10

## 2020-02-01 RX ADMIN — Medication 1000 MILLIGRAM(S): at 00:00

## 2020-02-01 RX ADMIN — ATORVASTATIN CALCIUM 80 MILLIGRAM(S): 80 TABLET, FILM COATED ORAL at 20:31

## 2020-02-01 RX ADMIN — SODIUM CHLORIDE 100 MILLILITER(S): 9 INJECTION INTRAMUSCULAR; INTRAVENOUS; SUBCUTANEOUS at 18:00

## 2020-02-01 RX ADMIN — Medication 400 MILLIGRAM(S): at 13:18

## 2020-02-01 RX ADMIN — POLYETHYLENE GLYCOL 3350 17 GRAM(S): 17 POWDER, FOR SOLUTION ORAL at 05:48

## 2020-02-01 RX ADMIN — Medication 1000 MILLIGRAM(S): at 13:48

## 2020-02-01 RX ADMIN — HYDROXYUREA 1000 MILLIGRAM(S): 500 CAPSULE ORAL at 13:18

## 2020-02-01 NOTE — PROGRESS NOTE ADULT - SUBJECTIVE AND OBJECTIVE BOX
Patient is a 19y old  Male who presents with a chief complaint of Intracranial aneurysms (2020 17:14)      SUBJECTIVE / OVERNIGHT EVENTS: Sister is at the bedside.      Patient is seen with sister at the bedside .  pt has left sided scalp and eye swelling .   NO cough, no diarrhea, no joint pain .  Pt c/o pain on the left scalp.  Last febrile episode of was 101.3F at 7:48 PM on .  NO dysuria and no SOB or chest pain .        ADDITIONAL REVIEW OF SYSTEMS: Negative except for above    MEDICATIONS  (STANDING):  atorvastatin 80 milliGRAM(s) Oral at bedtime  enoxaparin Injectable 40 milliGRAM(s) SubCutaneous <User Schedule>  folic acid 1 milliGRAM(s) Oral daily  hydroxyurea 1000 milliGRAM(s) Oral daily  influenza   Vaccine 0.5 milliLiter(s) IntraMuscular once  labetalol Injectable 10 milliGRAM(s) IV Push once  pantoprazole    Tablet 40 milliGRAM(s) Oral before breakfast  polyethylene glycol 3350 17 Gram(s) Oral two times a day  senna 2 Tablet(s) Oral at bedtime  sodium chloride 0.9%. 1000 milliLiter(s) (100 mL/Hr) IV Continuous <Continuous>    MEDICATIONS  (PRN):  acetaminophen   Tablet .. 650 milliGRAM(s) Oral every 6 hours PRN Temp greater or equal to 38C (100.4F), Mild Pain (1 - 3)  metoclopramide Injectable 10 milliGRAM(s) IV Push every 6 hours PRN Nausea and/or Vomiting  oxyCODONE    IR 5 milliGRAM(s) Oral every 4 hours PRN Moderate Pain (4 - 6)  oxyCODONE    IR 10 milliGRAM(s) Oral every 4 hours PRN Severe Pain (7 - 10)  sodium chloride 0.9% lock flush 10 milliLiter(s) IV Push every 1 hour PRN Pre/post blood products, medications, blood draw, and to maintain line patency      CAPILLARY BLOOD GLUCOSE        I&O's Summary    2020 07:01  -  2020 07:00  --------------------------------------------------------  IN: 640 mL / OUT: 1550 mL / NET: -910 mL        PHYSICAL EXAM:  Vital Signs Last 24 Hrs  T(C): 37.7 (2020 11:45), Max: 38.5 (2020 19:48)  T(F): 99.9 (2020 11:45), Max: 101.3 (2020 19:48)  HR: 80 (2020 08:15) (80 - 97)  BP: 114/69 (2020 08:15) (114/69 - 132/76)  BP(mean): --  RR: 18 (2020 08:15) (18 - 19)  SpO2: 97% (2020 08:15) (97% - 98%)    PHYSICAL EXAM:  GENERAL: NAD, well-developed  HEAD:  Atraumatic, Normocephalic  EYES:  conjunctiva and sclera clear  NECK: Supple, No JVD  CHEST/LUNG: Clear to auscultation bilaterally; No wheeze  HEART: Regular rate and rhythm; No murmurs, rubs, or gallops  ABDOMEN: Soft, Nontender, Nondistended; Bowel sounds present  EXTREMITIES:  2+ Peripheral Pulses, No clubbing, cyanosis, or edema      LABS:                        11.1   9.83  )-----------( 205      ( 2020 06:57 )             33.5     02-    136  |  104  |  20  ----------------------------<  139<H>  4.2   |  20<L>  |  0.43<L>    Ca    9.5      2020 06:57  Phos  3.3     -30  Mg     2.4     01-30            Urinalysis Basic - ( 2020 16:20 )    Color: Light Yellow / Appearance: Clear / S.030 / pH: x  Gluc: x / Ketone: Negative  / Bili: Negative / Urobili: Negative   Blood: x / Protein: Trace / Nitrite: Negative   Leuk Esterase: Negative / RBC: 4 /hpf / WBC 1 /HPF   Sq Epi: x / Non Sq Epi: 0 /hpf / Bacteria: Negative        Culture - Blood (collected 2020 18:54)  Source: .Blood Blood  Preliminary Report (2020 19:02):    No growth to date.    Culture - Blood (collected 2020 18:54)  Source: .Blood Blood  Preliminary Report (2020 19:02):    No growth to date.        RADIOLOGY & ADDITIONAL TESTS:    Imaging Personally Reviewed:    Electrocardiogram Personally Reviewed:    COORDINATION OF CARE:  Care Discussed with Consultants/Other Providers [Y/N]:  Prior or Outpatient Records Reviewed [Y/N]:

## 2020-02-01 NOTE — PROGRESS NOTE ADULT - SUBJECTIVE AND OBJECTIVE BOX
INTERVAL HPI/OVERNIGHT EVENTS:  POD 3.  Patient sitting in chair.  Denies HA or pain.  Holding his head.  Had fever earlier.  No cough, diarrhea or dysuria.      Vital Signs Last 24 Hrs  T(C): 37.7 (01 Feb 2020 15:17), Max: 38.5 (31 Jan 2020 19:48)  T(F): 99.8 (01 Feb 2020 15:17), Max: 101.3 (31 Jan 2020 19:48)  HR: 83 (01 Feb 2020 15:17) (80 - 97)  BP: 123/72 (01 Feb 2020 15:17) (114/69 - 132/76)  BP(mean): --  RR: 18 (01 Feb 2020 15:17) (18 - 19)  SpO2: 98% (01 Feb 2020 15:17) (97% - 98%)    PHYSICAL EXAM:  Gen:  sleepy but arousable  HEENT:   MMM No icterus  Neck:  supple  Lungs;  Clear  CVS  S1 S2  Ext:  No edema  Skin:  No rash  Neuro:  unable to open left eye    MEDICATIONS  (STANDING):  atorvastatin 80 milliGRAM(s) Oral at bedtime  enoxaparin Injectable 40 milliGRAM(s) SubCutaneous <User Schedule>  folic acid 1 milliGRAM(s) Oral daily  hydroxyurea 1000 milliGRAM(s) Oral daily  influenza   Vaccine 0.5 milliLiter(s) IntraMuscular once  labetalol Injectable 10 milliGRAM(s) IV Push once  pantoprazole    Tablet 40 milliGRAM(s) Oral before breakfast  polyethylene glycol 3350 17 Gram(s) Oral two times a day  senna 2 Tablet(s) Oral at bedtime  sodium chloride 0.9%. 1000 milliLiter(s) (100 mL/Hr) IV Continuous <Continuous>    MEDICATIONS  (PRN):  acetaminophen   Tablet .. 650 milliGRAM(s) Oral every 6 hours PRN Temp greater or equal to 38C (100.4F), Mild Pain (1 - 3)  metoclopramide Injectable 10 milliGRAM(s) IV Push every 6 hours PRN Nausea and/or Vomiting  oxyCODONE    IR 5 milliGRAM(s) Oral every 4 hours PRN Moderate Pain (4 - 6)  oxyCODONE    IR 10 milliGRAM(s) Oral every 4 hours PRN Severe Pain (7 - 10)  sodium chloride 0.9% lock flush 10 milliLiter(s) IV Push every 1 hour PRN Pre/post blood products, medications, blood draw, and to maintain line patency    Allergies  No Known Allergies    LABS:                          11.1   9.83  )-----------( 205      ( 01 Feb 2020 06:57 )             33.5     02-01    136  |  104  |  20  ----------------------------<  139<H>  4.2   |  20<L>  |  0.43<L>    Ca    9.5      01 Feb 2020 06:57  Phos  3.3     01-30  Mg     2.4     01-30    Reviewed:   -CXR 2/1/20:  The heart is normal in size. The lungs appear to be clear. No pleural   effusion. No pneumothorax. No acute bony pathology could be identified.

## 2020-02-01 NOTE — PROGRESS NOTE ADULT - ASSESSMENT
HPI:  19M pmhx SSD several known unruptured aneurysms largest is L ICA s/p DSA 1/14/2020 presents for gradual worsening headache since yesterday, neurointact (27 Jan 2020 14:00)    PAST MEDICAL & SURGICAL HISTORY:  Brain aneurysm  Sickle cell anemia  Sickle cell disease  No significant past surgical history    PROCEDURE: 1/29/2020: s/p L crani for clipping of L ICA terminus and L SCA aneurysms    PLAN:  Neuro: completed decadron taper this am for HAs, fever workup given fevers last night, blood cultures 1/30 negative to date  oxycodone PRN pain   Respiratory: patient instructed on incentive spirometer  CV:   Heme/Onc: sickle cell patient with HG >10 and will continue hydration          DVT ppx: [] SQH [x] SQL and venodynes bilaterally  Renal: continue IVF hydration given fevers  ID: fever workup negative to date  GI: bowel regimen  PT/OT: progressing towards home with family assistance  Hospitalist medicine following   f/u am labs    Will discuss with Dr Nico Phipps # [id number]    Assessment:  Please Check When Present   []  GCS  E   V  M     Heart Failure: []Acute, [] acute on chronic , []chronic  Heart Failure:  [] Diastolic (HFpEF), [] Systolic (HFrEF), []Combined (HFpEF and HFrEF), [] RHF, [] Pulm HTN, [] Other    [] ISABELL, [] ATN, [] AIN, [] other  [] CKD1, [] CKD2, [] CKD 3, [] CKD 4, [] CKD 5, []ESRD    Encephalopathy: [] Metabolic, [] Hepatic, [] toxic, [] Neurological, [] Other    Abnormal Nutritional Status: [] malnutrition (see nutrition note), [ ]underweight: BMI < 19, [] morbid obesity: BMI >40, [] Cachexia    [] Sepsis  [] hypovolemic shock,[] cardiogenic shock, [] hemorrhagic shock, [] neurogenic shock  [] Acute Respiratory Failure  []Cerebral edema, [] Brain compression/ herniation,   [] Functional quadriplegia  [] Acute blood loss anemia

## 2020-02-01 NOTE — PROGRESS NOTE ADULT - PROBLEM SELECTOR PLAN 3
Unclear etiology / ? Hemorrhage / CXR with left upper lobe and middle lobe opacity / and upon review of chart from previous admission, pt had B/L lung opacities in December 2019 and pt has no current pulmonary symptoms   If pt continues to spike , please get RVP to r/o Flu or other viral infection   Get CT of chest without contrast for better definition of lung findings  F/UP  auto diff   Might consider IV ABX if pt respikes and if having pulmonary symptoms   Blood CX done on 1/30/20 negative   cont close monitoring.  consider Lower ext doppler and ID evaluation ( as discussed and suggested by neurosurgery team

## 2020-02-01 NOTE — PROGRESS NOTE ADULT - PROBLEM SELECTOR PLAN 1
S/P left Craniotomy with clipping of aneurysms.  Mgt as per neurosurgery team   Cont Atorvastatin   pain control as per neurosurgery team

## 2020-02-01 NOTE — PROGRESS NOTE ADULT - ASSESSMENT
19 year old male from [place] who has a history of Hb SS disease, brain aneurysm w/ recent admission 12/14-12/31 for pain crisis and subsequent acute chest syndrome as well as stroke requiring intubation and multiple exchange transfusions.   Pt had CT head and MRI/MRA which revealed multiple areas patchy ischemia w/ hemorrhagic transformation in the right superior cerebellar region as well as multiple areas of aneurysm. Pt was d/c home for outpt nsx f/up.  On 1/28/20 patient was admitted to [facility] due to c/o  headache.  He was noted to have  small CNS aneurysms and was admitted for a planned procedure possibly coiling of aneurysm.  On 1/29, he underwent left Craniotomy with clipping of L ICA terminus and L SCA aneurysms.

## 2020-02-01 NOTE — PROGRESS NOTE ADULT - SUBJECTIVE AND OBJECTIVE BOX
CHIEF COMPLAINT: patient c/o incisional pain, not wanting to get out of bed because his eye is swollen    OVERNIGHT EVENTS: [place] [facility] [place]    Vital Signs Last 24 Hrs  T(C): 37.7 (01 Feb 2020 15:17), Max: 38.5 (31 Jan 2020 19:48)  T(F): 99.8 (01 Feb 2020 15:17), Max: 101.3 (31 Jan 2020 19:48)  HR: 83 (01 Feb 2020 15:17) (80 - 97)  BP: 123/72 (01 Feb 2020 15:17) (114/69 - 132/76)  BP(mean): --  RR: 18 (01 Feb 2020 15:17) (18 - 19)  SpO2: 98% (01 Feb 2020 15:17) (97% - 98%)    PHYSICAL EXAM:    General: No Acute Distress     Neurological: Awake, alert oriented to person, place and time, Following Commands, Left pupil 5mm and non reactive, Right pupil 3mm and reactive; Face Symmetrical, Speech Fluent, Moving all extremities, Muscle Strength normal in all four extremities, No Drift, Sensation to   Light Touch Intact    Pulmonary: Clear to Auscultation, No Rales, No Rhonchi, No Wheezes     Cardiovascular: S1, S2, Regular Rate and Rhythm     Gastrointestinal: Soft, Nontender, Nondistended     Incision: +suture/staples c/d/i    LABS:                        11.1   9.83  )-----------( 205      ( 01 Feb 2020 06:57 )             33.5    02-01    136  |  104  |  20  ----------------------------<  139<H>  4.2   |  20<L>  |  0.43<L>    Ca    9.5      01 Feb 2020 06:57  Phos  3.3     01-30  Mg     2.4     01-30 01-31 @ 07:01  -  02-01 @ 07:00  --------------------------------------------------------  IN: 640 mL / OUT: 1550 mL / NET: -910 mL    02-01 @ 07:01 - 02-01 @ 16:59  --------------------------------------------------------  IN: 0 mL / OUT: 900 mL / NET: -900 mL    MEDICATIONS:  Anticoagulation:  enoxaparin Injectable 40 milliGRAM(s) SubCutaneous <User Schedule>    Endo:  atorvastatin 80 milliGRAM(s) Oral at bedtime    Neuro:  acetaminophen   Tablet .. 650 milliGRAM(s) Oral every 6 hours PRN Temp greater or equal to 38C (100.4F), Mild Pain (1 - 3)  metoclopramide Injectable 10 milliGRAM(s) IV Push every 6 hours PRN Nausea and/or Vomiting  oxyCODONE    IR 5 milliGRAM(s) Oral every 4 hours PRN Moderate Pain (4 - 6)  oxyCODONE    IR 10 milliGRAM(s) Oral every 4 hours PRN Severe Pain (7 - 10)    Cardiac:  labetalol Injectable 10 milliGRAM(s) IV Push once    GI/:  pantoprazole    Tablet 40 milliGRAM(s) Oral before breakfast  polyethylene glycol 3350 17 Gram(s) Oral two times a day  senna 2 Tablet(s) Oral at bedtime    Other:   folic acid 1 milliGRAM(s) Oral daily  hydroxyurea 1000 milliGRAM(s) Oral daily  influenza   Vaccine 0.5 milliLiter(s) IntraMuscular once  sodium chloride 0.9% lock flush 10 milliLiter(s) IV Push every 1 hour PRN Pre/post blood products, medications, blood draw, and to maintain line patency  sodium chloride 0.9%. 1000 milliLiter(s) IV Continuous <Continuous>    DIET: [x] Regular [] CCD [] Renal [] Puree [] Dysphagia [] Tube Feeds:     IMAGING:   < from: CT Head No Cont (01.31.20 @ 09:26) >  INTERPRETATION:  INDICATIONS:  f/u post op heme    TECHNIQUE:  Serial axial images were obtained from the skull base to the vertex without intravenous contrast.    COMPARISON EXAMINATION: 1/30/2020    FINDINGS:    VENTRICLES AND SULCI:  Mass effect on the left lateral ventricle as seen on the previous..  INTRA-AXIAL:  Evolving right cerebellar infarct also noted previously without associated hemorrhage. Postop changes in the left temporal lobe with edema and hemorrhage.  EXTRA-AXIAL: Aneurysm clips at the level of the basilar tip as well as in the distal left ICA terminus region as seen on the prior study. Evidence of residual pneumocephalus in the left frontal and temporal regions. Left subdural fluid with hemorrhage and air subjacent to the craniotomy.   VISUALIZED SINUSES: Subtotal right maxillary sinus opacification  VISUALIZED MASTOIDS:  Clear.  CALVARIUM: Left pterional craniotomy.  MISCELLANEOUS: Extracalvarial soft tissue swelling.    IMPRESSION:  Evolving right cerebellar infarct as noted on the prior as well. Status post basilar tip and distal left ICA aneurysm clipping with postop changes in the left temporal lobe. Spell small left subdural fluid collection with air fluid and hemorrhage subjacent to the craniotomy. Residual postop pneumocephalus    < end of copied text >

## 2020-02-01 NOTE — PROGRESS NOTE ADULT - ASSESSMENT
19 year old man with Hb SS  recent long hospitalization for acute chest syndrome requiring intubation and exchange transfusion c/b stroke who on MRI was found to have multiple aneurysms c/b recurrent headaches, who presents for neurosurgical intervention upon brain aneurysms prior to returning home to [facility]. Hematology consulted for matilda-operative management from perspective of Hb SS disease.    HbSS disease- s/p left Craniotomy with clipping of L ICA terminus and L SCA aneurysms   for aneurysm repair done on 1-  - Hgb above 11; no current role for transfusion  - monitor CBC with diff daily  -F/U Hgb electrophoresis   - post-operative management should focus on minimizing hypoxia, hypothermia, acidosis, and intravascular volume depletion.  -Please encourage frequent incentive spirometer use if safe from neurosurgical standpoint  -Thrombocytosis: recently started on ASA, would hold matilda-operatively.  -Would pursue fever work-up per protocol    Ananth Pompa MD (Weekend Coverage)  [phone]

## 2020-02-02 LAB
ALBUMIN SERPL ELPH-MCNC: 4 G/DL — SIGNIFICANT CHANGE UP (ref 3.3–5)
ALP SERPL-CCNC: 45 U/L — SIGNIFICANT CHANGE UP (ref 40–120)
ALT FLD-CCNC: 15 U/L — SIGNIFICANT CHANGE UP (ref 10–45)
ANION GAP SERPL CALC-SCNC: 12 MMOL/L — SIGNIFICANT CHANGE UP (ref 5–17)
ANION GAP SERPL CALC-SCNC: 15 MMOL/L — SIGNIFICANT CHANGE UP (ref 5–17)
APPEARANCE UR: CLEAR — SIGNIFICANT CHANGE UP
APTT BLD: 30 SEC — SIGNIFICANT CHANGE UP (ref 27.5–36.3)
AST SERPL-CCNC: 18 U/L — SIGNIFICANT CHANGE UP (ref 10–40)
BASE EXCESS BLDA CALC-SCNC: -11.5 MMOL/L — LOW (ref -2–2)
BILIRUB SERPL-MCNC: 1.6 MG/DL — HIGH (ref 0.2–1.2)
BILIRUB UR-MCNC: NEGATIVE — SIGNIFICANT CHANGE UP
BLD GP AB SCN SERPL QL: NEGATIVE — SIGNIFICANT CHANGE UP
BUN SERPL-MCNC: 13 MG/DL — SIGNIFICANT CHANGE UP (ref 7–23)
BUN SERPL-MCNC: 18 MG/DL — SIGNIFICANT CHANGE UP (ref 7–23)
CALCIUM SERPL-MCNC: 9.3 MG/DL — SIGNIFICANT CHANGE UP (ref 8.4–10.5)
CALCIUM SERPL-MCNC: 9.5 MG/DL — SIGNIFICANT CHANGE UP (ref 8.4–10.5)
CHLORIDE SERPL-SCNC: 104 MMOL/L — SIGNIFICANT CHANGE UP (ref 96–108)
CHLORIDE SERPL-SCNC: 109 MMOL/L — HIGH (ref 96–108)
CO2 BLDA-SCNC: 12 MMOL/L — LOW (ref 22–30)
CO2 SERPL-SCNC: 17 MMOL/L — LOW (ref 22–31)
CO2 SERPL-SCNC: 18 MMOL/L — LOW (ref 22–31)
COLOR SPEC: SIGNIFICANT CHANGE UP
CREAT SERPL-MCNC: 0.41 MG/DL — LOW (ref 0.5–1.3)
CREAT SERPL-MCNC: 0.42 MG/DL — LOW (ref 0.5–1.3)
DIFF PNL FLD: NEGATIVE — SIGNIFICANT CHANGE UP
GAS PNL BLDA: SIGNIFICANT CHANGE UP
GAS PNL BLDA: SIGNIFICANT CHANGE UP
GLUCOSE SERPL-MCNC: 105 MG/DL — HIGH (ref 70–99)
GLUCOSE SERPL-MCNC: 106 MG/DL — HIGH (ref 70–99)
GLUCOSE UR QL: NEGATIVE — SIGNIFICANT CHANGE UP
HAPTOGLOB SERPL-MCNC: 160 MG/DL — SIGNIFICANT CHANGE UP (ref 34–200)
HCO3 BLDA-SCNC: 12 MMOL/L — LOW (ref 21–29)
HCT VFR BLD CALC: 32.3 % — LOW (ref 39–50)
HCT VFR BLD CALC: 33.6 % — LOW (ref 39–50)
HGB BLD-MCNC: 10.5 G/DL — LOW (ref 13–17)
HGB BLD-MCNC: 10.9 G/DL — LOW (ref 13–17)
INR BLD: 1.1 RATIO — SIGNIFICANT CHANGE UP (ref 0.88–1.16)
KETONES UR-MCNC: NEGATIVE — SIGNIFICANT CHANGE UP
LDH SERPL L TO P-CCNC: 220 U/L — SIGNIFICANT CHANGE UP (ref 50–242)
LEUKOCYTE ESTERASE UR-ACNC: NEGATIVE — SIGNIFICANT CHANGE UP
MAGNESIUM SERPL-MCNC: 2 MG/DL — SIGNIFICANT CHANGE UP (ref 1.6–2.6)
MCHC RBC-ENTMCNC: 28.7 PG — SIGNIFICANT CHANGE UP (ref 27–34)
MCHC RBC-ENTMCNC: 28.7 PG — SIGNIFICANT CHANGE UP (ref 27–34)
MCHC RBC-ENTMCNC: 32.4 GM/DL — SIGNIFICANT CHANGE UP (ref 32–36)
MCHC RBC-ENTMCNC: 32.5 GM/DL — SIGNIFICANT CHANGE UP (ref 32–36)
MCV RBC AUTO: 88.3 FL — SIGNIFICANT CHANGE UP (ref 80–100)
MCV RBC AUTO: 88.4 FL — SIGNIFICANT CHANGE UP (ref 80–100)
NITRITE UR-MCNC: NEGATIVE — SIGNIFICANT CHANGE UP
NRBC # BLD: 0 /100 WBCS — SIGNIFICANT CHANGE UP (ref 0–0)
NRBC # BLD: 0 /100 WBCS — SIGNIFICANT CHANGE UP (ref 0–0)
PCO2 BLDA: 21 MMHG — LOW (ref 32–46)
PH BLDA: 7.38 — SIGNIFICANT CHANGE UP (ref 7.35–7.45)
PH UR: 7 — SIGNIFICANT CHANGE UP (ref 5–8)
PHOSPHATE SERPL-MCNC: 3.3 MG/DL — SIGNIFICANT CHANGE UP (ref 2.5–4.5)
PLATELET # BLD AUTO: 236 K/UL — SIGNIFICANT CHANGE UP (ref 150–400)
PLATELET # BLD AUTO: 237 K/UL — SIGNIFICANT CHANGE UP (ref 150–400)
PO2 BLDA: 171 MMHG — HIGH (ref 74–108)
POTASSIUM SERPL-MCNC: 3.1 MMOL/L — LOW (ref 3.5–5.3)
POTASSIUM SERPL-MCNC: 3.7 MMOL/L — SIGNIFICANT CHANGE UP (ref 3.5–5.3)
POTASSIUM SERPL-SCNC: 3.1 MMOL/L — LOW (ref 3.5–5.3)
POTASSIUM SERPL-SCNC: 3.7 MMOL/L — SIGNIFICANT CHANGE UP (ref 3.5–5.3)
PROT SERPL-MCNC: 7.3 G/DL — SIGNIFICANT CHANGE UP (ref 6–8.3)
PROT UR-MCNC: NEGATIVE — SIGNIFICANT CHANGE UP
PROTHROM AB SERPL-ACNC: 12.7 SEC — SIGNIFICANT CHANGE UP (ref 10–12.9)
RBC # BLD: 3.66 M/UL — LOW (ref 4.2–5.8)
RBC # BLD: 3.71 M/UL — LOW (ref 4.2–5.8)
RBC # BLD: 3.8 M/UL — LOW (ref 4.2–5.8)
RBC # FLD: 20.1 % — HIGH (ref 10.3–14.5)
RBC # FLD: 20.6 % — HIGH (ref 10.3–14.5)
RETICS #: 61.6 K/UL — SIGNIFICANT CHANGE UP (ref 25–125)
RETICS/RBC NFR: 1.7 % — SIGNIFICANT CHANGE UP (ref 0.5–2.5)
RH IG SCN BLD-IMP: POSITIVE — SIGNIFICANT CHANGE UP
SAO2 % BLDA: 99 % — HIGH (ref 92–96)
SODIUM SERPL-SCNC: 136 MMOL/L — SIGNIFICANT CHANGE UP (ref 135–145)
SODIUM SERPL-SCNC: 139 MMOL/L — SIGNIFICANT CHANGE UP (ref 135–145)
SP GR SPEC: 1.01 — SIGNIFICANT CHANGE UP (ref 1.01–1.02)
UROBILINOGEN FLD QL: NEGATIVE — SIGNIFICANT CHANGE UP
WBC # BLD: 11.87 K/UL — HIGH (ref 3.8–10.5)
WBC # BLD: 12.64 K/UL — HIGH (ref 3.8–10.5)
WBC # FLD AUTO: 11.87 K/UL — HIGH (ref 3.8–10.5)
WBC # FLD AUTO: 12.64 K/UL — HIGH (ref 3.8–10.5)

## 2020-02-02 PROCEDURE — 99291 CRITICAL CARE FIRST HOUR: CPT

## 2020-02-02 PROCEDURE — 99232 SBSQ HOSP IP/OBS MODERATE 35: CPT

## 2020-02-02 PROCEDURE — 93010 ELECTROCARDIOGRAM REPORT: CPT

## 2020-02-02 PROCEDURE — 99292 CRITICAL CARE ADDL 30 MIN: CPT

## 2020-02-02 PROCEDURE — 70450 CT HEAD/BRAIN W/O DYE: CPT | Mod: 26

## 2020-02-02 PROCEDURE — 71275 CT ANGIOGRAPHY CHEST: CPT | Mod: 26

## 2020-02-02 RX ORDER — MEROPENEM 1 G/30ML
1000 INJECTION INTRAVENOUS EVERY 8 HOURS
Refills: 0 | Status: DISCONTINUED | OUTPATIENT
Start: 2020-02-02 | End: 2020-02-03

## 2020-02-02 RX ORDER — LEVETIRACETAM 250 MG/1
1000 TABLET, FILM COATED ORAL ONCE
Refills: 0 | Status: COMPLETED | OUTPATIENT
Start: 2020-02-02 | End: 2020-02-02

## 2020-02-02 RX ORDER — SODIUM CHLORIDE 9 MG/ML
1000 INJECTION INTRAMUSCULAR; INTRAVENOUS; SUBCUTANEOUS ONCE
Refills: 0 | Status: COMPLETED | OUTPATIENT
Start: 2020-02-02 | End: 2020-02-02

## 2020-02-02 RX ORDER — MEROPENEM 1 G/30ML
1000 INJECTION INTRAVENOUS ONCE
Refills: 0 | Status: COMPLETED | OUTPATIENT
Start: 2020-02-02 | End: 2020-02-02

## 2020-02-02 RX ORDER — ACETAMINOPHEN 500 MG
1000 TABLET ORAL ONCE
Refills: 0 | Status: COMPLETED | OUTPATIENT
Start: 2020-02-02 | End: 2020-02-02

## 2020-02-02 RX ORDER — DEXMEDETOMIDINE HYDROCHLORIDE IN 0.9% SODIUM CHLORIDE 4 UG/ML
0.2 INJECTION INTRAVENOUS
Qty: 200 | Refills: 0 | Status: DISCONTINUED | OUTPATIENT
Start: 2020-02-02 | End: 2020-02-02

## 2020-02-02 RX ORDER — LEVETIRACETAM 250 MG/1
500 TABLET, FILM COATED ORAL EVERY 12 HOURS
Refills: 0 | Status: DISCONTINUED | OUTPATIENT
Start: 2020-02-02 | End: 2020-02-02

## 2020-02-02 RX ORDER — LEVETIRACETAM 250 MG/1
750 TABLET, FILM COATED ORAL EVERY 12 HOURS
Refills: 0 | Status: DISCONTINUED | OUTPATIENT
Start: 2020-02-02 | End: 2020-02-04

## 2020-02-02 RX ORDER — DEXMEDETOMIDINE HYDROCHLORIDE IN 0.9% SODIUM CHLORIDE 4 UG/ML
0.3 INJECTION INTRAVENOUS
Qty: 200 | Refills: 0 | Status: DISCONTINUED | OUTPATIENT
Start: 2020-02-02 | End: 2020-02-03

## 2020-02-02 RX ORDER — CHLORHEXIDINE GLUCONATE 213 G/1000ML
1 SOLUTION TOPICAL
Refills: 0 | Status: DISCONTINUED | OUTPATIENT
Start: 2020-02-02 | End: 2020-02-03

## 2020-02-02 RX ORDER — MEROPENEM 1 G/30ML
INJECTION INTRAVENOUS
Refills: 0 | Status: DISCONTINUED | OUTPATIENT
Start: 2020-02-02 | End: 2020-02-03

## 2020-02-02 RX ORDER — LEVETIRACETAM 250 MG/1
1000 TABLET, FILM COATED ORAL EVERY 12 HOURS
Refills: 0 | Status: DISCONTINUED | OUTPATIENT
Start: 2020-02-02 | End: 2020-02-02

## 2020-02-02 RX ORDER — VANCOMYCIN HCL 1 G
1000 VIAL (EA) INTRAVENOUS EVERY 12 HOURS
Refills: 0 | Status: DISCONTINUED | OUTPATIENT
Start: 2020-02-02 | End: 2020-02-03

## 2020-02-02 RX ADMIN — MEROPENEM 100 MILLIGRAM(S): 1 INJECTION INTRAVENOUS at 21:39

## 2020-02-02 RX ADMIN — Medication 1000 MILLIGRAM(S): at 18:36

## 2020-02-02 RX ADMIN — LEVETIRACETAM 400 MILLIGRAM(S): 250 TABLET, FILM COATED ORAL at 19:20

## 2020-02-02 RX ADMIN — CHLORHEXIDINE GLUCONATE 1 APPLICATION(S): 213 SOLUTION TOPICAL at 21:39

## 2020-02-02 RX ADMIN — POLYETHYLENE GLYCOL 3350 17 GRAM(S): 17 POWDER, FOR SOLUTION ORAL at 06:00

## 2020-02-02 RX ADMIN — MEROPENEM 100 MILLIGRAM(S): 1 INJECTION INTRAVENOUS at 17:47

## 2020-02-02 RX ADMIN — OXYCODONE HYDROCHLORIDE 10 MILLIGRAM(S): 5 TABLET ORAL at 06:01

## 2020-02-02 RX ADMIN — DEXMEDETOMIDINE HYDROCHLORIDE IN 0.9% SODIUM CHLORIDE 4.28 MICROGRAM(S)/KG/HR: 4 INJECTION INTRAVENOUS at 21:40

## 2020-02-02 RX ADMIN — OXYCODONE HYDROCHLORIDE 5 MILLIGRAM(S): 5 TABLET ORAL at 14:53

## 2020-02-02 RX ADMIN — OXYCODONE HYDROCHLORIDE 10 MILLIGRAM(S): 5 TABLET ORAL at 06:41

## 2020-02-02 RX ADMIN — SODIUM CHLORIDE 1000 MILLILITER(S): 9 INJECTION INTRAMUSCULAR; INTRAVENOUS; SUBCUTANEOUS at 17:48

## 2020-02-02 RX ADMIN — PANTOPRAZOLE SODIUM 40 MILLIGRAM(S): 20 TABLET, DELAYED RELEASE ORAL at 06:00

## 2020-02-02 RX ADMIN — HYDROXYUREA 1000 MILLIGRAM(S): 500 CAPSULE ORAL at 11:25

## 2020-02-02 RX ADMIN — Medication 400 MILLIGRAM(S): at 18:07

## 2020-02-02 RX ADMIN — ENOXAPARIN SODIUM 40 MILLIGRAM(S): 100 INJECTION SUBCUTANEOUS at 18:18

## 2020-02-02 RX ADMIN — SODIUM CHLORIDE 100 MILLILITER(S): 9 INJECTION INTRAMUSCULAR; INTRAVENOUS; SUBCUTANEOUS at 21:40

## 2020-02-02 RX ADMIN — Medication 250 MILLIGRAM(S): at 23:23

## 2020-02-02 RX ADMIN — OXYCODONE HYDROCHLORIDE 5 MILLIGRAM(S): 5 TABLET ORAL at 16:00

## 2020-02-02 RX ADMIN — Medication 1 MILLIGRAM(S): at 11:25

## 2020-02-02 NOTE — PROGRESS NOTE ADULT - ASSESSMENT
HPI:  19M pmhx SSD several known unruptured aneurysms largest is L ICA s/p DSA 1/14/2020 presents for gradual worsening headache since yesterday, neurointact (27 Jan 2020 14:00)    PAST MEDICAL & SURGICAL HISTORY:  Brain aneurysm  Sickle cell anemia  Sickle cell disease  No significant past surgical history    PROCEDURE: 1/29/2020: s/p L crani for clipping of L ICA terminus and L SCA aneurysms    PLAN:  Neuro: completed decadron taper 2/1 am for HAs, oxycodone PRN pain; long conversation with patient again today regarding need to increase activity- he is overall frustrated about being in the hospital  Respiratory: patient instructed on incentive spirometer again- doing better today  CV: lower extremity dopplers negative   Heme/Onc: sickle cell patient with HG >10 and will continue hydration; no active sign of crisis- heme/onc following          DVT ppx: [] SQH [x] SQL and venodynes bilaterally  Renal: continue IVF hydration given fevers and sickle cell  ID: will pan culture and start empiric antibiotics per house ID discussion- formal consult to follow tomorrow;  chest CT non contrast ordered to r/o PNA   leukocytosis today concerning given fevers but also recent decadron- will trend  blood cultures 1/30 negative to date  GI: bowel regimen  PT/OT: progressing towards home with family assistance  Hospitalist medicine following   f/u am labs    Will discuss with Dr Nico Phipps # [id number]    Assessment:  Please Check When Present   []  GCS  E   V  M     Heart Failure: []Acute, [] acute on chronic , []chronic  Heart Failure:  [] Diastolic (HFpEF), [] Systolic (HFrEF), []Combined (HFpEF and HFrEF), [] RHF, [] Pulm HTN, [] Other    [] ISABELL, [] ATN, [] AIN, [] other  [] CKD1, [] CKD2, [] CKD 3, [] CKD 4, [] CKD 5, []ESRD    Encephalopathy: [] Metabolic, [] Hepatic, [] toxic, [] Neurological, [] Other    Abnormal Nutritional Status: [] malnutrition (see nutrition note), [ ]underweight: BMI < 19, [] morbid obesity: BMI >40, [] Cachexia    [] Sepsis  [] hypovolemic shock,[] cardiogenic shock, [] hemorrhagic shock, [] neurogenic shock  [] Acute Respiratory Failure  []Cerebral edema, [] Brain compression/ herniation,   [] Functional quadriplegia  [] Acute blood loss anemia

## 2020-02-02 NOTE — PROGRESS NOTE ADULT - ASSESSMENT
ASSESSMENT/PLAN: Seizures  - Continue levetiracetam  - EEG given not at baseline  - Recent fever - on empiric antibiotics; f/u cultures; wound does not seem infected  - Delirium precautions  - PRN dexmedetomidine for restlessness/agitation if cannot be managed with environmental interventions  - Aspiration precautions; monitor for need for intubation; currently protecting airway    Updated sister at bedside

## 2020-02-02 NOTE — PROGRESS NOTE ADULT - SUBJECTIVE AND OBJECTIVE BOX
CHIEF COMPLAINT: patient c/o incisional pain- a little better today; states he got OOB earlier but still frustrated over left eye "swelling and incisional pain"  more interactive today    Vital Signs Last 24 Hrs  T(C): 37.5 (02 Feb 2020 15:07), Max: 37.6 (01 Feb 2020 23:23)  T(F): 99.5 (02 Feb 2020 15:07), Max: 99.6 (01 Feb 2020 23:23)  HR: 87 (02 Feb 2020 15:07) (71 - 87)  BP: 122/81 (02 Feb 2020 15:07) (117/68 - 129/78)  BP(mean): --  RR: 18 (02 Feb 2020 15:07) (18 - 22)  SpO2: 99% (02 Feb 2020 15:07) (97% - 100%)    PHYSICAL EXAM:    General: No Acute Distress     Neurological: Awake, alert oriented to person, place and time, Following Commands, left 3rd nerve palsy, Left pupil 5mm and non reactive, Right pupil 3mm and reactive; Face Symmetrical, Speech Fluent, Moving all extremities, Muscle Strength normal in all four extremities, No Drift, Sensation to Light Touch Intact    Pulmonary: Clear to Auscultation, No Rales, No Rhonchi, No Wheezes     Cardiovascular: S1, S2, Regular Rate and Rhythm     Gastrointestinal: Soft, Nontender, Nondistended     Incision: +suture/staples c/d/i; mild fluctuance/swelling incision not tense    LABS:                                   10.9   12.64 )-----------( 237      ( 02 Feb 2020 06:20 )             33.6   02-02    139  |  109<H>  |  18  ----------------------------<  106<H>  3.7   |  18<L>  |  0.41<L>    Ca    9.3      02 Feb 2020 06:20    TPro  7.3  /  Alb  4.0  /  TBili  1.6<H>  /  DBili  x   /  AST  18  /  ALT  15  /  AlkPhos  45  02-02    MEDICATIONS  (STANDING):  atorvastatin 80 milliGRAM(s) Oral at bedtime  enoxaparin Injectable 40 milliGRAM(s) SubCutaneous <User Schedule>  folic acid 1 milliGRAM(s) Oral daily  hydroxyurea 1000 milliGRAM(s) Oral daily  influenza   Vaccine 0.5 milliLiter(s) IntraMuscular once  labetalol Injectable 10 milliGRAM(s) IV Push once  meropenem  IVPB 1000 milliGRAM(s) IV Intermittent once  meropenem  IVPB      meropenem  IVPB 1000 milliGRAM(s) IV Intermittent every 8 hours  pantoprazole    Tablet 40 milliGRAM(s) Oral before breakfast  polyethylene glycol 3350 17 Gram(s) Oral two times a day  senna 2 Tablet(s) Oral at bedtime  sodium chloride 0.9%. 1000 milliLiter(s) (100 mL/Hr) IV Continuous <Continuous>  vancomycin  IVPB 1000 milliGRAM(s) IV Intermittent every 12 hours    MEDICATIONS  (PRN):  acetaminophen   Tablet .. 650 milliGRAM(s) Oral every 6 hours PRN Temp greater or equal to 38C (100.4F), Mild Pain (1 - 3)  metoclopramide Injectable 10 milliGRAM(s) IV Push every 6 hours PRN Nausea and/or Vomiting  oxyCODONE    IR 5 milliGRAM(s) Oral every 4 hours PRN Moderate Pain (4 - 6)  oxyCODONE    IR 10 milliGRAM(s) Oral every 4 hours PRN Severe Pain (7 - 10)  sodium chloride 0.9% lock flush 10 milliLiter(s) IV Push every 1 hour PRN Pre/post blood products, medications, blood draw, and to maintain line patency    DIET: [x] Regular [] CCD [] Renal [] Puree [] Dysphagia [] Tube Feeds:     IMAGING:   < from: [facility] Duplex Lower Ext Vein Scan, Bilat (02.01.20 @ 20:09) >  IMPRESSION:     No deep vein thrombosis in either lower extremity.    < end of copied text >  < from: Xray Chest 1 View- PORTABLE-Routine (02.01.20 @ 11:01) >  Impression:    The heart is normal in size. The lungs appear to be clear. No pleural effusion. No pneumothorax. No acute bony pathology could be identified.    < end of copied text >      < from: CT Head No Cont (01.31.20 @ 09:26) >  INTERPRETATION:  INDICATIONS:  f/u post op heme    TECHNIQUE:  Serial axial images were obtained from the skull base to the vertex without intravenous contrast.    COMPARISON EXAMINATION: 1/30/2020    FINDINGS:    VENTRICLES AND SULCI:  Mass effect on the left lateral ventricle as seen on the previous..  INTRA-AXIAL:  Evolving right cerebellar infarct also noted previously without associated hemorrhage. Postop changes in the left temporal lobe with edema and hemorrhage.  EXTRA-AXIAL: Aneurysm clips at the level of the basilar tip as well as in the distal left ICA terminus region as seen on the prior study. Evidence of residual pneumocephalus in the left frontal and temporal regions. Left subdural fluid with hemorrhage and air subjacent to the craniotomy.   VISUALIZED SINUSES: Subtotal right maxillary sinus opacification  VISUALIZED MASTOIDS:  Clear.  CALVARIUM: Left pterional craniotomy.  MISCELLANEOUS: Extracalvarial soft tissue swelling.    IMPRESSION:  Evolving right cerebellar infarct as noted on the prior as well. Status post basilar tip and distal left ICA aneurysm clipping with postop changes in the left temporal lobe. Spell small left subdural fluid collection with air fluid and hemorrhage subjacent to the craniotomy. Residual postop pneumocephalus    < end of copied text >

## 2020-02-02 NOTE — PROGRESS NOTE ADULT - SUBJECTIVE AND OBJECTIVE BOX
Patient is a 19y old  Male who presents with a chief complaint of : s/p L crani for clipping of L ICA terminus and L SCA aneurysms (2020 16:59)      SUBJECTIVE / OVERNIGHT EVENTS:          ADDITIONAL REVIEW OF SYSTEMS: Negative except for above    MEDICATIONS  (STANDING):  atorvastatin 80 milliGRAM(s) Oral at bedtime  enoxaparin Injectable 40 milliGRAM(s) SubCutaneous <User Schedule>  folic acid 1 milliGRAM(s) Oral daily  hydroxyurea 1000 milliGRAM(s) Oral daily  influenza   Vaccine 0.5 milliLiter(s) IntraMuscular once  labetalol Injectable 10 milliGRAM(s) IV Push once  pantoprazole    Tablet 40 milliGRAM(s) Oral before breakfast  polyethylene glycol 3350 17 Gram(s) Oral two times a day  senna 2 Tablet(s) Oral at bedtime  sodium chloride 0.9%. 1000 milliLiter(s) (100 mL/Hr) IV Continuous <Continuous>    MEDICATIONS  (PRN):  acetaminophen   Tablet .. 650 milliGRAM(s) Oral every 6 hours PRN Temp greater or equal to 38C (100.4F), Mild Pain (1 - 3)  metoclopramide Injectable 10 milliGRAM(s) IV Push every 6 hours PRN Nausea and/or Vomiting  oxyCODONE    IR 5 milliGRAM(s) Oral every 4 hours PRN Moderate Pain (4 - 6)  oxyCODONE    IR 10 milliGRAM(s) Oral every 4 hours PRN Severe Pain (7 - 10)  sodium chloride 0.9% lock flush 10 milliLiter(s) IV Push every 1 hour PRN Pre/post blood products, medications, blood draw, and to maintain line patency      CAPILLARY BLOOD GLUCOSE        I&O's Summary    2020 07  -  2020 07:00  --------------------------------------------------------  IN: 0 mL / OUT: 2700 mL / NET: -2700 mL      -  2020 16:40  --------------------------------------------------------  IN: 240 mL / OUT: 0 mL / NET: 240 mL        PHYSICAL EXAM:  Vital Signs Last 24 Hrs  T(C): 37.5 (2020 15:07), Max: 37.6 (2020 23:23)  T(F): 99.5 (2020 15:07), Max: 99.6 (2020 23:23)  HR: 87 (2020 15:07) (71 - 87)  BP: 122/81 (2020 15:07) (117/68 - 129/78)  BP(mean): --  RR: 18 (2020 15:07) (18 - 22)  SpO2: 99% (2020 15:07) (97% - 100%)    PHYSICAL EXAM:  GENERAL: NAD, well-developed  HEAD:  Atraumatic, Normocephalic  EYES:  conjunctiva and sclera clear  NECK: Supple, No JVD  CHEST/LUNG: Clear to auscultation bilaterally; No wheeze  HEART: Regular rate and rhythm; No murmurs, rubs, or gallops  ABDOMEN: Soft, Nontender, Nondistended; Bowel sounds present  EXTREMITIES:  2+ Peripheral Pulses, No clubbing, cyanosis, or edema  PSYCH: AAOx3  NEUROLOGY: non-focal  SKIN: No rashes or lesions      LABS:                        10.9   12.64 )-----------( 237      ( 2020 06:20 )             33.6     02-02    139  |  109<H>  |  18  ----------------------------<  106<H>  3.7   |  18<L>  |  0.41<L>    Ca    9.3      2020 06:20    TPro  7.3  /  Alb  4.0  /  TBili  1.6<H>  /  DBili  x   /  AST  18  /  ALT  15  /  AlkPhos  45  02-02          Urinalysis Basic - ( 2020 14:47 )    Color: Light Yellow / Appearance: Clear / S.015 / pH: x  Gluc: x / Ketone: Negative  / Bili: Negative / Urobili: Negative   Blood: x / Protein: Trace / Nitrite: Negative   Leuk Esterase: Negative / RBC: x / WBC x   Sq Epi: x / Non Sq Epi: x / Bacteria: x        Culture - Blood (collected 2020 18:54)  Source: .Blood Blood  Preliminary Report (2020 19:02):    No growth to date.    Culture - Blood (collected 2020 18:54)  Source: .Blood Blood  Preliminary Report (2020 19:02):    No growth to date.        RADIOLOGY & ADDITIONAL TESTS:    Imaging Personally Reviewed:    Electrocardiogram Personally Reviewed:    COORDINATION OF CARE:  Care Discussed with Consultants/Other Providers [Y/N]:  Prior or Outpatient Records Reviewed [Y/N]: Patient is a 19y old  Male who presents with a chief complaint of : s/p L crani for clipping of L ICA terminus and L SCA aneurysms (2020 16:59)      SUBJECTIVE / OVERNIGHT EVENTS:    Patient is with intermitted low grade fever .  NO Upper respiratory complaints, no GI or urinary complaints.  NO chest pain or SOB       ADDITIONAL REVIEW OF SYSTEMS: Negative except for above    MEDICATIONS  (STANDING):  atorvastatin 80 milliGRAM(s) Oral at bedtime  enoxaparin Injectable 40 milliGRAM(s) SubCutaneous <User Schedule>  folic acid 1 milliGRAM(s) Oral daily  hydroxyurea 1000 milliGRAM(s) Oral daily  influenza   Vaccine 0.5 milliLiter(s) IntraMuscular once  labetalol Injectable 10 milliGRAM(s) IV Push once  pantoprazole    Tablet 40 milliGRAM(s) Oral before breakfast  polyethylene glycol 3350 17 Gram(s) Oral two times a day  senna 2 Tablet(s) Oral at bedtime  sodium chloride 0.9%. 1000 milliLiter(s) (100 mL/Hr) IV Continuous <Continuous>    MEDICATIONS  (PRN):  acetaminophen   Tablet .. 650 milliGRAM(s) Oral every 6 hours PRN Temp greater or equal to 38C (100.4F), Mild Pain (1 - 3)  metoclopramide Injectable 10 milliGRAM(s) IV Push every 6 hours PRN Nausea and/or Vomiting  oxyCODONE    IR 5 milliGRAM(s) Oral every 4 hours PRN Moderate Pain (4 - 6)  oxyCODONE    IR 10 milliGRAM(s) Oral every 4 hours PRN Severe Pain (7 - 10)  sodium chloride 0.9% lock flush 10 milliLiter(s) IV Push every 1 hour PRN Pre/post blood products, medications, blood draw, and to maintain line patency      CAPILLARY BLOOD GLUCOSE        I&O's Summary    2020 07:  -  2020 07:00  --------------------------------------------------------  IN: 0 mL / OUT: 2700 mL / NET: -2700 mL    2020 07:  -  2020 16:40  --------------------------------------------------------  IN: 240 mL / OUT: 0 mL / NET: 240 mL        PHYSICAL EXAM:  Vital Signs Last 24 Hrs  T(C): 37.5 (2020 15:07), Max: 37.6 (2020 23:23)  T(F): 99.5 (2020 15:07), Max: 99.6 (2020 23:23)  HR: 87 (2020 15:07) (71 - 87)  BP: 122/81 (2020 15:07) (117/68 - 129/78)  BP(mean): --  RR: 18 (2020 15:07) (18 - 22)  SpO2: 99% (2020 15:07) (97% - 100%)    PHYSICAL EXAM:  GENERAL: NAD, well-developed  HEAD:  Atraumatic, Normocephalic  EYES:  conjunctiva and sclera clear  NECK: Supple, No JVD  CHEST/LUNG: Clear to auscultation bilaterally; No wheeze  HEART: Regular rate and rhythm; No murmurs, rubs, or gallops  ABDOMEN: Soft, Nontender, Nondistended; Bowel sounds present  EXTREMITIES:  2+ Peripheral Pulses, No clubbing, cyanosis, or edema  PSYCH: AAOx3  NEUROLOGY: non-focal  SKIN: No rashes or lesions      LABS:                        10.9   12.64 )-----------( 237      ( 2020 06:20 )             33.6     02-02    139  |  109<H>  |  18  ----------------------------<  106<H>  3.7   |  18<L>  |  0.41<L>    Ca    9.3      2020 06:20    TPro  7.3  /  Alb  4.0  /  TBili  1.6<H>  /  DBili  x   /  AST  18  /  ALT  15  /  AlkPhos  45  02-02          Urinalysis Basic - ( 2020 14:47 )    Color: Light Yellow / Appearance: Clear / S.015 / pH: x  Gluc: x / Ketone: Negative  / Bili: Negative / Urobili: Negative   Blood: x / Protein: Trace / Nitrite: Negative   Leuk Esterase: Negative / RBC: x / WBC x   Sq Epi: x / Non Sq Epi: x / Bacteria: x        Culture - Blood (collected 2020 18:54)  Source: .Blood Blood  Preliminary Report (2020 19:02):    No growth to date.    Culture - Blood (collected 2020 18:54)  Source: .Blood Blood  Preliminary Report (2020 19:02):    No growth to date.        RADIOLOGY & ADDITIONAL TESTS:    Imaging Personally Reviewed:    Electrocardiogram Personally Reviewed:    COORDINATION OF CARE:  Care Discussed with Consultants/Other Providers [Y/N]:  Prior or Outpatient Records Reviewed [Y/N]: Patient is a 19y old  Male who presents with a chief complaint of : s/p L crani for clipping of L ICA terminus and L SCA aneurysms (2020 16:59)      SUBJECTIVE / OVERNIGHT EVENTS:    Patient is with intermitted low grade fever .  NO Upper respiratory complaints, no GI or urinary complaints.  NO chest pain or SOB .  Pt states that he does not feel well.        ADDITIONAL REVIEW OF SYSTEMS: Negative except for above    MEDICATIONS  (STANDING):  atorvastatin 80 milliGRAM(s) Oral at bedtime  enoxaparin Injectable 40 milliGRAM(s) SubCutaneous <User Schedule>  folic acid 1 milliGRAM(s) Oral daily  hydroxyurea 1000 milliGRAM(s) Oral daily  influenza   Vaccine 0.5 milliLiter(s) IntraMuscular once  labetalol Injectable 10 milliGRAM(s) IV Push once  pantoprazole    Tablet 40 milliGRAM(s) Oral before breakfast  polyethylene glycol 3350 17 Gram(s) Oral two times a day  senna 2 Tablet(s) Oral at bedtime  sodium chloride 0.9%. 1000 milliLiter(s) (100 mL/Hr) IV Continuous <Continuous>    MEDICATIONS  (PRN):  acetaminophen   Tablet .. 650 milliGRAM(s) Oral every 6 hours PRN Temp greater or equal to 38C (100.4F), Mild Pain (1 - 3)  metoclopramide Injectable 10 milliGRAM(s) IV Push every 6 hours PRN Nausea and/or Vomiting  oxyCODONE    IR 5 milliGRAM(s) Oral every 4 hours PRN Moderate Pain (4 - 6)  oxyCODONE    IR 10 milliGRAM(s) Oral every 4 hours PRN Severe Pain (7 - 10)  sodium chloride 0.9% lock flush 10 milliLiter(s) IV Push every 1 hour PRN Pre/post blood products, medications, blood draw, and to maintain line patency      CAPILLARY BLOOD GLUCOSE        I&O's Summary    2020 07:  -  2020 07:00  --------------------------------------------------------  IN: 0 mL / OUT: 2700 mL / NET: -2700 mL    2020 07:01  -  2020 16:40  --------------------------------------------------------  IN: 240 mL / OUT: 0 mL / NET: 240 mL        PHYSICAL EXAM:  Vital Signs Last 24 Hrs  T(C): 37.5 (2020 15:07), Max: 37.6 (2020 23:23)  T(F): 99.5 (2020 15:07), Max: 99.6 (2020 23:23)  HR: 87 (2020 15:07) (71 - 87)  BP: 122/81 (2020 15:07) (117/68 - 129/78)  BP(mean): --  RR: 18 (2020 15:07) (18 - 22)  SpO2: 99% (2020 15:07) (97% - 100%)    PHYSICAL EXAM:  GENERAL: NAD, well-developed  HEAD:  Atraumatic, Normocephalic  EYES:  conjunctiva and sclera clear  NECK: Supple, No JVD  CHEST/LUNG: Clear to auscultation bilaterally; No wheeze  HEART: Regular rate and rhythm; No murmurs, rubs, or gallops  ABDOMEN: Soft, Nontender, Nondistended; Bowel sounds present  EXTREMITIES:  2+ Peripheral Pulses, No clubbing, cyanosis, or edema  PSYCH: AAOx3 ( takes time to answer but eventually answers questions appropriately   NEUROLOGY: non-focal  SKIN: left scalp staples       LABS:                        10.9   12.64 )-----------( 237      ( 2020 06:20 )             33.6     02-02    139  |  109<H>  |  18  ----------------------------<  106<H>  3.7   |  18<L>  |  0.41<L>    Ca    9.3      2020 06:20    TPro  7.3  /  Alb  4.0  /  TBili  1.6<H>  /  DBili  x   /  AST  18  /  ALT  15  /  AlkPhos  45  02-02          Urinalysis Basic - ( 2020 14:47 )    Color: Light Yellow / Appearance: Clear / S.015 / pH: x  Gluc: x / Ketone: Negative  / Bili: Negative / Urobili: Negative   Blood: x / Protein: Trace / Nitrite: Negative   Leuk Esterase: Negative / RBC: x / WBC x   Sq Epi: x / Non Sq Epi: x / Bacteria: x        Culture - Blood (collected 2020 18:54)  Source: .Blood Blood  Preliminary Report (2020 19:02):    No growth to date.    Culture - Blood (collected 2020 18:54)  Source: .Blood Blood  Preliminary Report (2020 19:02):    No growth to date.        RADIOLOGY & ADDITIONAL TESTS:    Imaging Personally Reviewed:    Electrocardiogram Personally Reviewed:    COORDINATION OF CARE:  Care Discussed with Consultants/Other Providers [Y/N]:  Prior or Outpatient Records Reviewed [Y/N]:

## 2020-02-02 NOTE — PROGRESS NOTE ADULT - ASSESSMENT
19 year old male from [place] who has a history of Hb SS disease, brain aneurysm w/ recent admission 12/14-12/31 for pain crisis and subsequent acute chest syndrome as well as stroke requiring intubation and multiple exchange transfusions.   Pt had CT head and MRI/MRA which revealed multiple areas patchy ischemia w/ hemorrhagic transformation in the right superior cerebellar region as well as multiple areas of aneurysm. Pt was d/c home for outpt nsx f/up.  On 1/28/20 patient was admitted to [facility] due to c/o  headache.  He was noted to have  small CNS aneurysms and was admitted for a planned procedure possibly coiling of aneurysm.  On 1/29, he underwent left Craniotomy with clipping of L ICA terminus and L SCA aneurysms.

## 2020-02-02 NOTE — PROGRESS NOTE ADULT - PROBLEM SELECTOR PLAN 3
Unclear etiology / ? Hemorrhage / VS infection VS SS crisis   CXR done on 2/1 clear / Neg RVP/ with mild incr in WBC   ID consult is to be called

## 2020-02-02 NOTE — PROVIDER CONTACT NOTE (OTHER) - SITUATION
post blood draw pt had increased shallow breathing, was becoming nonresponsive, and unable to follow commands; rips off oxygen when placed on.

## 2020-02-02 NOTE — PROVIDER CONTACT NOTE (OTHER) - ASSESSMENT
shallow breathing present, oxygen saturation 96%; unable to follow commands, stiffening present. eye reaction not tested at this time. pt kept taking off nonrebreather

## 2020-02-02 NOTE — PROGRESS NOTE ADULT - SUBJECTIVE AND OBJECTIVE BOX
SUMMARY: SUMMARY:   19 year-old [place] man with sickle cell disease and known brain aneurysm with recent admission in 12/14/19-12/31/19 for pain crisis and subsequent acute chest syndrome as well as stroke requiring intubation and multiple exchange transfusions. He had CT head and MRI/MRA which revealed multiple areas patchy ischemia with hemorrhagic transformation in the right superior cerebellar region as well as multiple aneurysms. He was discharged home for outpatient neurosurgical follow-up. On 1/28/20, he was admitted to [facility] for headache. On 1/29/20, he underwent left craniotomy with clipping of left ICA terminus and left SCA aneurysms.     On 2/2/20, he had an acute onset of unresponsiveness associated with tachypnea and hypoxemia. The nurse evaluated him 5 minutes before this episode at which time he was at his baseline. The daytime Neurocritical care team evaluated him, but he was beginning to return to his baseline, so the plan was to obtain imaging and observe patient. He was taken to CT where he had another episode of unresponsiveness, but this time with body shaking suggestive of seizure. He was given 4mg lorazepam. CT Head revealed an extra-axial collection (known) under incision site. CTA without PE. He was brought to the NeuroICU where he was given levetiracetam.     24 HOUR EVENTS:  Seizure x 2. Transfer to NSCU.    VITALS/DATA/ORDERS: [x] Reviewed    EXAMINATION:  Eyes open to loud voice (more responsive to sister than care team), non-verbal, follows (two fingers, thumbs up, moves toes), left CN III palsy, moves all limbs spontaneously SUMMARY:   19 year-old [place] man with sickle cell disease and known brain aneurysm with recent admission in 12/14/19-12/31/19 for pain crisis and subsequent acute chest syndrome as well as stroke requiring intubation and multiple exchange transfusions. He had CT head and MRI/MRA which revealed multiple areas patchy ischemia with hemorrhagic transformation in the right superior cerebellar region as well as multiple aneurysms. He was discharged home for outpatient neurosurgical follow-up. On 1/28/20, he was admitted to [facility] for headache. On 1/29/20, he underwent left craniotomy with clipping of left ICA terminus and left SCA aneurysms.     On 2/2/20, he had an acute onset of unresponsiveness associated with tachypnea and hypoxemia. The nurse evaluated him 5 minutes before this episode at which time he was at his baseline. The daytime Neurocritical care team evaluated him, but he was beginning to return to his baseline, so the plan was to obtain imaging and observe patient. He was taken to CT where he had another episode of unresponsiveness, but this time with body shaking suggestive of seizure. He was given 4mg lorazepam. CT Head revealed an extra-axial collection (known) under incision site. CTA without PE. He was brought to the NeuroICU where he was given levetiracetam.     24 HOUR EVENTS:  Seizure x 2. Transfer to NSCU.    VITALS/DATA/ORDERS: [x] Reviewed    EXAMINATION:  Eyes open to loud voice (more responsive to sister than care team), non-verbal, follows (two fingers, thumbs up, moves toes), left CN III palsy, moves all limbs spontaneously  Incision area with mild edema but clean, dry and intact - healing well  Lungs clear

## 2020-02-02 NOTE — CONSULT NOTE ADULT - SUBJECTIVE AND OBJECTIVE BOX
HPI:  19 year old male from [place] who has a history of Hb SS disease, brain aneurysm w/ recent admission 12/14-12/31 for pain crisis and subsequent acute chest syndrome as well as stroke requiring intubation and multiple exchange transfusions.   Pt had CT head and MRI/MRA which revealed multiple areas patchy ischemia w/ hemorrhagic transformation in the right superior cerebellar region as well as multiple areas of aneurysm. Pt was d/c home for outpt nsx f/up.  On 1/28/20 patient was admitted to [facility] due to c/o  headache.  He was noted to have  small CNS aneurysms and was admitted for a planned procedure possibly coiling of aneurysm.  On 1/29, he underwent left Craniotomy with clipping of L ICA terminus and L SCA aneurysms.       SURGERY: Craniotomy for aneurysm repair    EVENTS:   we were called because the patient had acute onset AMS, stopped responding, and tachypnea with hypoxemia.  The nurse evaluated him 5 minutes before this episode, and he was at his baseline  his neuro exam improved slowly       ICU Vital Signs Last 24 Hrs  T(C): 37.5 (02 Feb 2020 15:07), Max: 37.6 (01 Feb 2020 23:23)  T(F): 99.5 (02 Feb 2020 15:07), Max: 99.6 (01 Feb 2020 23:23)  HR: 87 (02 Feb 2020 15:07) (71 - 87)  BP: 122/81 (02 Feb 2020 15:07) (117/68 - 129/78)  BP(mean): --  ABP: --  ABP(mean): --  RR: 18 (02 Feb 2020 15:07) (18 - 22)  SpO2: 99% (02 Feb 2020 15:07) (97% - 100%)     02-01 @ 07:01  -  02-02 @ 07:00  --------------------------------------------------------  IN: 0 mL / OUT: 2700 mL / NET: -2700 mL    02-02 @ 07:01 - 02-02 @ 17:56  --------------------------------------------------------  IN: 240 mL / OUT: 0 mL / NET: 240 mL                             10.9   12.64 )-----------( 237      ( 02 Feb 2020 06:20 )             33.6    02-02    139  |  109<H>  |  18  ----------------------------<  106<H>  3.7   |  18<L>  |  0.41<L>    Ca    9.3      02 Feb 2020 06:20    TPro  7.3  /  Alb  4.0  /  TBili  1.6<H>  /  DBili  x   /  AST  18  /  ALT  15  /  AlkPhos  45  02-02   ABG - ( 02 Feb 2020 17:40 )  pH, Arterial: 7.38  pH, Blood: x     /  pCO2: 21    /  pO2: 171   / HCO3: 12    / Base Excess: -11.5 /  SaO2: 99                       PHYSICAL EXAM:    General: No Acute Distress     Neurological: Awake, alert, saying few words, following simple commands, has left eye ptosis, right eye intact EOM, moving all 4 extremities antigravity    Pulmonary: Clear to Auscultation, tachypnea     Cardiovascular: S1, S2, Regular Rate and Rhythm     Gastrointestinal: Soft, Nontender, Nondistended     Extremities: No calf tenderness     Incision:       MEDICATIONS:  Antibiotics:   meropenem  IVPB      meropenem  IVPB 1000 milliGRAM(s) IV Intermittent every 8 hours  vancomycin  IVPB 1000 milliGRAM(s) IV Intermittent every 12 hours     Neurological:   acetaminophen   Tablet .. 650 milliGRAM(s) Oral every 6 hours PRN  metoclopramide Injectable 10 milliGRAM(s) IV Push every 6 hours PRN  oxyCODONE    IR 5 milliGRAM(s) Oral every 4 hours PRN  oxyCODONE    IR 10 milliGRAM(s) Oral every 4 hours PRN    Cardiac:   labetalol Injectable 10 milliGRAM(s) IV Push once    Pulm:    Heme:   enoxaparin Injectable 40 milliGRAM(s) SubCutaneous <User Schedule>    Other:   atorvastatin 80 milliGRAM(s) Oral at bedtime  folic acid 1 milliGRAM(s) Oral daily  influenza   Vaccine 0.5 milliLiter(s) IntraMuscular once  pantoprazole    Tablet 40 milliGRAM(s) Oral before breakfast  polyethylene glycol 3350 17 Gram(s) Oral two times a day  senna 2 Tablet(s) Oral at bedtime  sodium chloride 0.9% lock flush 10 milliLiter(s) IV Push every 1 hour PRN Pre/post blood products, medications, blood draw, and to maintain line patency  sodium chloride 0.9%. 1000 milliLiter(s) IV Continuous <Continuous>       DEVICES: [] Restraints [] SEA/HMV []LD [] ET tube [] Trach [] Chest Tube [] A-line [] Grey [] NGT [] Rectal Tube       A/P:  19M pmhx SSD several known unruptured aneurysms largest is L ICA s/p DSA 1/14/2020 s/p Craniotomy for aneurysm repair  given the acute onset of respiratory distress, would like to r/o PE, will get CTPE protocol STAT, ABG, keep on nasal canula   will get ECG, cardiac enzymes   due to the improvement in LOC, would like to r/o seizures, will get a head CT and get video EEG   fever, panculutre and start empiric antibiotics, cefepime and vancomycin until cx are resulted, consult ID   1 L NS bolus, keep well hydrated     please call NSCU with CTPE results or if any change in exam or hemodynamics HPI:  19 year old male from [place] who has a history of Hb SS disease, brain aneurysm w/ recent admission 12/14-12/31 for pain crisis and subsequent acute chest syndrome as well as stroke requiring intubation and multiple exchange transfusions.   Pt had CT head and MRI/MRA which revealed multiple areas patchy ischemia w/ hemorrhagic transformation in the right superior cerebellar region as well as multiple areas of aneurysm. Pt was d/c home for outpt nsx f/up.  On 1/28/20 patient was admitted to [facility] due to c/o  headache.  He was noted to have  small CNS aneurysms and was admitted for a planned procedure possibly coiling of aneurysm.  On 1/29, he underwent left Craniotomy with clipping of L ICA terminus and L SCA aneurysms.       SURGERY: Craniotomy for aneurysm repair    EVENTS:   we were called because the patient had acute onset AMS, stopped responding, and tachypnea with hypoxemia.  The nurse evaluated him 5 minutes before this episode, and he was at his baseline  his neuro exam improved slowly       ICU Vital Signs Last 24 Hrs  T(C): 37.5 (02 Feb 2020 15:07), Max: 37.6 (01 Feb 2020 23:23)  T(F): 99.5 (02 Feb 2020 15:07), Max: 99.6 (01 Feb 2020 23:23)  HR: 87 (02 Feb 2020 15:07) (71 - 87)  BP: 122/81 (02 Feb 2020 15:07) (117/68 - 129/78)  BP(mean): --  ABP: --  ABP(mean): --  RR: 18 (02 Feb 2020 15:07) (18 - 22)  SpO2: 99% (02 Feb 2020 15:07) (97% - 100%)     02-01 @ 07:01  -  02-02 @ 07:00  --------------------------------------------------------  IN: 0 mL / OUT: 2700 mL / NET: -2700 mL    02-02 @ 07:01 - 02-02 @ 17:56  --------------------------------------------------------  IN: 240 mL / OUT: 0 mL / NET: 240 mL                             10.9   12.64 )-----------( 237      ( 02 Feb 2020 06:20 )             33.6    02-02    139  |  109<H>  |  18  ----------------------------<  106<H>  3.7   |  18<L>  |  0.41<L>    Ca    9.3      02 Feb 2020 06:20    TPro  7.3  /  Alb  4.0  /  TBili  1.6<H>  /  DBili  x   /  AST  18  /  ALT  15  /  AlkPhos  45  02-02   ABG - ( 02 Feb 2020 17:40 )  pH, Arterial: 7.38  pH, Blood: x     /  pCO2: 21    /  pO2: 171   / HCO3: 12    / Base Excess: -11.5 /  SaO2: 99                       PHYSICAL EXAM:    General: No Acute Distress     Neurological: Awake, alert, saying few words, following simple commands, has left eye ptosis, right eye intact EOM, moving all 4 extremities antigravity    Pulmonary: Clear to Auscultation, tachypnea     Cardiovascular: S1, S2, Regular Rate and Rhythm     Gastrointestinal: Soft, Nontender, Nondistended     Extremities: No calf tenderness     Incision:       MEDICATIONS:  Antibiotics:   meropenem  IVPB      meropenem  IVPB 1000 milliGRAM(s) IV Intermittent every 8 hours  vancomycin  IVPB 1000 milliGRAM(s) IV Intermittent every 12 hours     Neurological:   acetaminophen   Tablet .. 650 milliGRAM(s) Oral every 6 hours PRN  metoclopramide Injectable 10 milliGRAM(s) IV Push every 6 hours PRN  oxyCODONE    IR 5 milliGRAM(s) Oral every 4 hours PRN  oxyCODONE    IR 10 milliGRAM(s) Oral every 4 hours PRN    Cardiac:   labetalol Injectable 10 milliGRAM(s) IV Push once    Pulm:    Heme:   enoxaparin Injectable 40 milliGRAM(s) SubCutaneous <User Schedule>    Other:   atorvastatin 80 milliGRAM(s) Oral at bedtime  folic acid 1 milliGRAM(s) Oral daily  influenza   Vaccine 0.5 milliLiter(s) IntraMuscular once  pantoprazole    Tablet 40 milliGRAM(s) Oral before breakfast  polyethylene glycol 3350 17 Gram(s) Oral two times a day  senna 2 Tablet(s) Oral at bedtime  sodium chloride 0.9% lock flush 10 milliLiter(s) IV Push every 1 hour PRN Pre/post blood products, medications, blood draw, and to maintain line patency  sodium chloride 0.9%. 1000 milliLiter(s) IV Continuous <Continuous>       DEVICES: [] Restraints [] SEA/HMV []LD [] ET tube [] Trach [] Chest Tube [] A-line [] Grey [] NGT [] Rectal Tube       A/P:  19M pmhx SSD several known unruptured aneurysms largest is L ICA s/p DSA 1/14/2020 s/p Craniotomy for aneurysm repair  given the acute onset of respiratory distress, would like to r/o PE, will get CTPE protocol STAT, ABG, keep on nasal canula   will get ECG, cardiac enzymes   due to the improvement in LOC, would like to r/o seizures, will get a head CT and get video EEG   fever, panculutre and start empiric antibiotics, meropenem and vancomycin until cx are resulted, consult ID   1 L NS bolus, keep well hydrated     please call NSCU with CTPE results or if any change in exam or hemodynamics

## 2020-02-02 NOTE — RAPID RESPONSE TEAM SUMMARY - NSSITUATIONBACKGROUNDRRT_GEN_ALL_CORE
19 year old male from [place] who has a history of Hb SS disease, brain aneurysm w/ recent admission 12/14-12/31 for pain crisis and subsequent acute chest syndrome as well as stroke requiring intubation and multiple exchange transfusions.   Pt had CT head and MRI/MRA which revealed multiple areas patchy ischemia w/ hemorrhagic transformation in the right superior cerebellar region as well as multiple areas of aneurysm. Pt was d/c home for outpt nsx f/up.  On 1/28/20 patient was admitted to [facility] due to c/o  headache.  He was noted to have  small CNS aneurysms and was admitted for a planned procedure possibly coiling of aneurysm.  On 1/29, he underwent left Craniotomy with clipping of L ICA terminus and L SCA aneurysms.    On 2/2/20, patient was noted to be spiking temps by primary team. BCx drawn, abx ordered and the patient was sent to the CT scanner for CTH and CTA chest. While in the CT scanner, the patient became acutely agitated, started shaking, and became verbally unresponsive so an RRT was called for AMS with concern for seizure. During the RRT there was no available POCT FSG device, patient was saturating well on NRB, and pulse was tachy to low 110s. Patient was not following commands. 2mg of lorazepam was administered IV x2. Patient's BP and SpO2 were stable so the CTH and CTA chest were completed. Patient was brought back to the neuro ICU and the RRT was ended.

## 2020-02-03 LAB
ANION GAP SERPL CALC-SCNC: 12 MMOL/L — SIGNIFICANT CHANGE UP (ref 5–17)
APPEARANCE UR: CLEAR — SIGNIFICANT CHANGE UP
BACTERIA # UR AUTO: NEGATIVE — SIGNIFICANT CHANGE UP
BILIRUB UR-MCNC: NEGATIVE — SIGNIFICANT CHANGE UP
BUN SERPL-MCNC: 12 MG/DL — SIGNIFICANT CHANGE UP (ref 7–23)
CALCIUM SERPL-MCNC: 9.5 MG/DL — SIGNIFICANT CHANGE UP (ref 8.4–10.5)
CHLORIDE SERPL-SCNC: 104 MMOL/L — SIGNIFICANT CHANGE UP (ref 96–108)
CO2 SERPL-SCNC: 20 MMOL/L — LOW (ref 22–31)
COLOR SPEC: SIGNIFICANT CHANGE UP
CREAT SERPL-MCNC: 0.44 MG/DL — LOW (ref 0.5–1.3)
CULTURE RESULTS: SIGNIFICANT CHANGE UP
DIFF PNL FLD: NEGATIVE — SIGNIFICANT CHANGE UP
EPI CELLS # UR: 1 /HPF — SIGNIFICANT CHANGE UP
GLUCOSE SERPL-MCNC: 104 MG/DL — HIGH (ref 70–99)
GLUCOSE UR QL: NEGATIVE — SIGNIFICANT CHANGE UP
HYALINE CASTS # UR AUTO: 0 /LPF — SIGNIFICANT CHANGE UP (ref 0–2)
KETONES UR-MCNC: NEGATIVE — SIGNIFICANT CHANGE UP
LEUKOCYTE ESTERASE UR-ACNC: NEGATIVE — SIGNIFICANT CHANGE UP
MAGNESIUM SERPL-MCNC: 1.9 MG/DL — SIGNIFICANT CHANGE UP (ref 1.6–2.6)
NITRITE UR-MCNC: NEGATIVE — SIGNIFICANT CHANGE UP
PH UR: 6.5 — SIGNIFICANT CHANGE UP (ref 5–8)
PHOSPHATE SERPL-MCNC: 3.7 MG/DL — SIGNIFICANT CHANGE UP (ref 2.5–4.5)
POTASSIUM SERPL-MCNC: 3.6 MMOL/L — SIGNIFICANT CHANGE UP (ref 3.5–5.3)
POTASSIUM SERPL-SCNC: 3.6 MMOL/L — SIGNIFICANT CHANGE UP (ref 3.5–5.3)
PROT UR-MCNC: NEGATIVE — SIGNIFICANT CHANGE UP
RBC CASTS # UR COMP ASSIST: 4 /HPF — SIGNIFICANT CHANGE UP (ref 0–4)
SODIUM SERPL-SCNC: 136 MMOL/L — SIGNIFICANT CHANGE UP (ref 135–145)
SP GR SPEC: 1.02 — SIGNIFICANT CHANGE UP (ref 1.01–1.02)
SPECIMEN SOURCE: SIGNIFICANT CHANGE UP
UROBILINOGEN FLD QL: NEGATIVE — SIGNIFICANT CHANGE UP
WBC UR QL: 1 /HPF — SIGNIFICANT CHANGE UP (ref 0–5)

## 2020-02-03 PROCEDURE — 95816 EEG AWAKE AND DROWSY: CPT | Mod: 26

## 2020-02-03 PROCEDURE — 70450 CT HEAD/BRAIN W/O DYE: CPT | Mod: 26

## 2020-02-03 PROCEDURE — 99292 CRITICAL CARE ADDL 30 MIN: CPT

## 2020-02-03 PROCEDURE — 99291 CRITICAL CARE FIRST HOUR: CPT

## 2020-02-03 RX ORDER — POTASSIUM CHLORIDE 20 MEQ
10 PACKET (EA) ORAL
Refills: 0 | Status: COMPLETED | OUTPATIENT
Start: 2020-02-03 | End: 2020-02-03

## 2020-02-03 RX ORDER — ACETAMINOPHEN 500 MG
1000 TABLET ORAL ONCE
Refills: 0 | Status: COMPLETED | OUTPATIENT
Start: 2020-02-03 | End: 2020-02-03

## 2020-02-03 RX ORDER — POTASSIUM CHLORIDE 20 MEQ
40 PACKET (EA) ORAL ONCE
Refills: 0 | Status: DISCONTINUED | OUTPATIENT
Start: 2020-02-03 | End: 2020-02-03

## 2020-02-03 RX ORDER — SODIUM CHLORIDE 9 MG/ML
1000 INJECTION INTRAMUSCULAR; INTRAVENOUS; SUBCUTANEOUS
Refills: 0 | Status: DISCONTINUED | OUTPATIENT
Start: 2020-02-03 | End: 2020-02-10

## 2020-02-03 RX ADMIN — Medication 100 MILLIEQUIVALENT(S): at 01:01

## 2020-02-03 RX ADMIN — LEVETIRACETAM 400 MILLIGRAM(S): 250 TABLET, FILM COATED ORAL at 05:00

## 2020-02-03 RX ADMIN — ATORVASTATIN CALCIUM 80 MILLIGRAM(S): 80 TABLET, FILM COATED ORAL at 22:12

## 2020-02-03 RX ADMIN — LEVETIRACETAM 400 MILLIGRAM(S): 250 TABLET, FILM COATED ORAL at 17:27

## 2020-02-03 RX ADMIN — Medication 100 MILLIEQUIVALENT(S): at 12:21

## 2020-02-03 RX ADMIN — ENOXAPARIN SODIUM 40 MILLIGRAM(S): 100 INJECTION SUBCUTANEOUS at 17:27

## 2020-02-03 RX ADMIN — OXYCODONE HYDROCHLORIDE 10 MILLIGRAM(S): 5 TABLET ORAL at 14:30

## 2020-02-03 RX ADMIN — Medication 100 MILLIEQUIVALENT(S): at 03:06

## 2020-02-03 RX ADMIN — OXYCODONE HYDROCHLORIDE 10 MILLIGRAM(S): 5 TABLET ORAL at 13:27

## 2020-02-03 RX ADMIN — MEROPENEM 100 MILLIGRAM(S): 1 INJECTION INTRAVENOUS at 05:03

## 2020-02-03 RX ADMIN — OXYCODONE HYDROCHLORIDE 10 MILLIGRAM(S): 5 TABLET ORAL at 22:05

## 2020-02-03 RX ADMIN — Medication 100 MILLIEQUIVALENT(S): at 02:05

## 2020-02-03 RX ADMIN — Medication 5 MILLIGRAM(S): at 22:12

## 2020-02-03 RX ADMIN — Medication 1000 MILLIGRAM(S): at 11:00

## 2020-02-03 RX ADMIN — Medication 400 MILLIGRAM(S): at 10:38

## 2020-02-03 RX ADMIN — Medication 100 MILLIEQUIVALENT(S): at 10:38

## 2020-02-03 RX ADMIN — HYDROXYUREA 1000 MILLIGRAM(S): 500 CAPSULE ORAL at 22:06

## 2020-02-03 RX ADMIN — CHLORHEXIDINE GLUCONATE 1 APPLICATION(S): 213 SOLUTION TOPICAL at 22:12

## 2020-02-03 RX ADMIN — Medication 400 MILLIGRAM(S): at 03:00

## 2020-02-03 RX ADMIN — SENNA PLUS 2 TABLET(S): 8.6 TABLET ORAL at 22:10

## 2020-02-03 RX ADMIN — POLYETHYLENE GLYCOL 3350 17 GRAM(S): 17 POWDER, FOR SOLUTION ORAL at 17:26

## 2020-02-03 RX ADMIN — Medication 1000 MILLIGRAM(S): at 03:30

## 2020-02-03 NOTE — PROGRESS NOTE ADULT - ASSESSMENT
s/p L crani for clipping of L ICA terminus and L SCA aneurysms (1/29)    NEURO: Neurocheck q1H  Will get CT head today  v-EEG monitoring  Continue Keppra 750 BID  Pain control w/ Tylenol prn and Oxy 5/10 prn  Sedation with Precedex if needed  Activity: [x] OOB as tolerated [x] PT [x] OT [] PMNR    PULM:  Incentive spirometry, mobilize as tolerated    CV:  - 150  On Atorvastatin 80 qHS    RENAL: IVF    GI:  Diet: Regular diet  GI prophylaxis [x] not indicated [] PPI [] other:  Bowel regimen [] colace [x] senna [] other:    ENDO:   Goal euglycemia (-180)  ABRAHAM    HEME/ONC:  Will check CBC, retic count, LDH and haptoglobin (concern for sickle cell crises)  SCD-hematology consult appreciated, keep Hgb>10 matilda op, hydration, cont home meds  VTE prophylaxis: [x] SCDs [X] chemoprophylaxis - Lovenox 40 sc daily    ID:  On Vanc/Meropenem    MISC:    SOCIAL/FAMILY: Awaiting    CODE STATUS:  [x] Full Code [] DNR [] DNI [] Palliative/Comfort Care    DISPOSITION: Floor s/p L crani for clipping of L ICA terminus and L SCA aneurysms (1/29)  now with possible seizures, given recurrent AMS with improvement     NEURO: Neurocheck q1H  slight increase in subdural collection   repeat head CT todday  v-EEG monitoring   Continue Keppra 750 BID  Pain control w/ Tylenol prn and Oxy 5/10 prn  Sedation with Precedex if needed  Activity: [x] OOB as tolerated [x] PT [x] OT [] PMNR    PULM:  Incentive spirometry, mobilize as tolerated  CTPE protocol , no PE     CV:  - 150  On Atorvastatin 80 qHS    RENAL:  ml /hr     GI:  Diet: NPO as he is lethargic   GI prophylaxis [x] not indicated [] PPI [] other:  Bowel regimen [] colace [x] senna [] other:    ENDO:   Goal euglycemia (-180)  ABRAHAM    HEME/ONC:   retic count nl,  LDH and haptoglobin (concern for sickle cell crises) less likely, hem on consult   SCD-hematology consult appreciated, keep Hgb>10 matilda op, hydration, cont home meds  VTE prophylaxis: [x] SCDs [X] chemoprophylaxis - Lovenox 40 sc daily    ID:  On Vanc/Meropenem  however no signs of ingfection  d/c ABX and consult ID     MISC:    SOCIAL/FAMILY: Awaiting    CODE STATUS:  [x] Full Code [] DNR [] DNI [] Palliative/Comfort Care    DISPOSITION: ICU

## 2020-02-03 NOTE — PROGRESS NOTE ADULT - SUBJECTIVE AND OBJECTIVE BOX
SUMMARY:20yo man with Sickle Cell Disease and several known unruptured aneurysms largest is L ICA s/p DSA 1/14/2020 presents for gradual worsening headache since 1d prior to presentation on 1/27.    ADMISSION SCORES:   GCS: 14     HOSPITAL COURSE:  1/29: s/p L crani for clipping of L ICA terminus and L SCA aneurysms  2/2: Patient was tachypneic and hypoxic on the floor. Was also unresponsive.    Overnight Events: Transferred to the unit due to concern for seizures. Was loaded w/ Keppra 1g and placed on Keppra 750 BID    ROS: Negative unless specified.     VITALS:   T(C): 37.2 (02-03-20 @ 05:00), Max: 37.5 (02-02-20 @ 15:07)  HR: 82 (02-03-20 @ 06:00) (78 - 99)  BP: 118/70 (02-03-20 @ 06:00) (115/72 - 141/96)  RR: 16 (02-03-20 @ 06:00) (12 - 18)  SpO2: 100% (02-03-20 @ 06:00) (97% - 100%)    02-02-20 @ 07:01  -  02-03-20 @ 07:00  --------------------------------------------------------  IN: 1861.5 mL / OUT: 1250 mL / NET: 611.5 mL      LABS:  ABG - ( 02 Feb 2020 22:19 )  pH, Arterial: 7.45  pH, Blood: x     /  pCO2: 29    /  pO2: 193   / HCO3: 20    / Base Excess: -2.6  /  SaO2: 100                10.5   11.87 )-----------( 236      ( 02 Feb 2020 22:33 )             32.3     02-03    136  |  104  |  12  ----------------------------<  104<H>  3.6   |  20<L>  |  0.44<L>    Ca    9.5      03 Feb 2020 05:10  Phos  3.7     02-03  Mg     1.9     02-03    TPro  7.3  /  Alb  4.0  /  TBili  1.6<H>  /  DBili  x   /  AST  18  /  ALT  15  /  AlkPhos  45  02-02    PT/INR - ( 02 Feb 2020 22:33 )   PT: 12.7 sec;   INR: 1.10 ratio         PTT - ( 02 Feb 2020 22:33 )  PTT:30.0 sec  MEDS:  MEDICATIONS  (STANDING):  atorvastatin 80 milliGRAM(s) Oral at bedtime  chlorhexidine 4% Liquid 1 Application(s) Topical <User Schedule>  dexMEDEtomidine Infusion 0.3 MICROgram(s)/kG/Hr (4.275 mL/Hr) IV Continuous <Continuous>  enoxaparin Injectable 40 milliGRAM(s) SubCutaneous <User Schedule>  folic acid 1 milliGRAM(s) Oral daily  hydroxyurea 1000 milliGRAM(s) Oral daily  influenza   Vaccine 0.5 milliLiter(s) IntraMuscular once  levETIRAcetam  IVPB 750 milliGRAM(s) IV Intermittent every 12 hours  meropenem  IVPB      meropenem  IVPB 1000 milliGRAM(s) IV Intermittent every 8 hours  pantoprazole    Tablet 40 milliGRAM(s) Oral before breakfast  polyethylene glycol 3350 17 Gram(s) Oral two times a day  senna 2 Tablet(s) Oral at bedtime  sodium chloride 0.9%. 1000 milliLiter(s) (100 mL/Hr) IV Continuous <Continuous>  vancomycin  IVPB 1000 milliGRAM(s) IV Intermittent every 12 hours    MEDICATIONS  (PRN):  acetaminophen   Tablet .. 650 milliGRAM(s) Oral every 6 hours PRN Temp greater or equal to 38C (100.4F), Mild Pain (1 - 3)  metoclopramide Injectable 10 milliGRAM(s) IV Push every 6 hours PRN Nausea and/or Vomiting  oxyCODONE    IR 5 milliGRAM(s) Oral every 4 hours PRN Moderate Pain (4 - 6)  oxyCODONE    IR 10 milliGRAM(s) Oral every 4 hours PRN Severe Pain (7 - 10)  sodium chloride 0.9% lock flush 10 milliLiter(s) IV Push every 1 hour PRN Pre/post blood products, medications, blood draw, and to maintain line patency    EXAMINATION:  General: No acute distress  HEENT: Anicteric sclerae  Cardiac: [id number]  Lungs: Clear  Abdomen: Soft, non-tender, +BS  Extremities: No c/c/e  Skin/Incision Site: R groin clean, dry and intact, no hematoma  Neurologic: EO to voice, does not maintain, L 3rdn palsy pupil 5mm not reactive, R 3mm reactive, FS, BUE no drift 5/5, BLE 5/5  w/ encouragement effort dependent exam SUMMARY:18yo man with Sickle Cell Disease and several known unruptured aneurysms largest is L ICA s/p DSA 1/14/2020 presents for gradual worsening headache since 1d prior to presentation on 1/27.    ADMISSION SCORES:   GCS: 14     HOSPITAL COURSE:  1/29: s/p L crani for clipping of L ICA terminus and L SCA aneurysms  2/2: Patient was tachypneic and hypoxic on the floor. Was also unresponsive.    Overnight Events: Transferred to the unit due to concern for seizures. Was loaded w/ Keppra 1g and placed on Keppra 750 BID    ROS: Negative unless specified.     VITALS:   T(C): 37.2 (02-03-20 @ 05:00), Max: 37.5 (02-02-20 @ 15:07)  HR: 82 (02-03-20 @ 06:00) (78 - 99)  BP: 118/70 (02-03-20 @ 06:00) (115/72 - 141/96)  RR: 16 (02-03-20 @ 06:00) (12 - 18)  SpO2: 100% (02-03-20 @ 06:00) (97% - 100%)    02-02-20 @ 07:01  -  02-03-20 @ 07:00  --------------------------------------------------------  IN: 1861.5 mL / OUT: 1250 mL / NET: 611.5 mL      LABS:  ABG - ( 02 Feb 2020 22:19 )  pH, Arterial: 7.45  pH, Blood: x     /  pCO2: 29    /  pO2: 193   / HCO3: 20    / Base Excess: -2.6  /  SaO2: 100                10.5   11.87 )-----------( 236      ( 02 Feb 2020 22:33 )             32.3     02-03    136  |  104  |  12  ----------------------------<  104<H>  3.6   |  20<L>  |  0.44<L>    Ca    9.5      03 Feb 2020 05:10  Phos  3.7     02-03  Mg     1.9     02-03    TPro  7.3  /  Alb  4.0  /  TBili  1.6<H>  /  DBili  x   /  AST  18  /  ALT  15  /  AlkPhos  45  02-02    PT/INR - ( 02 Feb 2020 22:33 )   PT: 12.7 sec;   INR: 1.10 ratio         PTT - ( 02 Feb 2020 22:33 )  PTT:30.0 sec  MEDS:  MEDICATIONS  (STANDING):  atorvastatin 80 milliGRAM(s) Oral at bedtime  chlorhexidine 4% Liquid 1 Application(s) Topical <User Schedule>  dexMEDEtomidine Infusion 0.3 MICROgram(s)/kG/Hr (4.275 mL/Hr) IV Continuous <Continuous>  enoxaparin Injectable 40 milliGRAM(s) SubCutaneous <User Schedule>  folic acid 1 milliGRAM(s) Oral daily  hydroxyurea 1000 milliGRAM(s) Oral daily  influenza   Vaccine 0.5 milliLiter(s) IntraMuscular once  levETIRAcetam  IVPB 750 milliGRAM(s) IV Intermittent every 12 hours  meropenem  IVPB      meropenem  IVPB 1000 milliGRAM(s) IV Intermittent every 8 hours  pantoprazole    Tablet 40 milliGRAM(s) Oral before breakfast  polyethylene glycol 3350 17 Gram(s) Oral two times a day  senna 2 Tablet(s) Oral at bedtime  sodium chloride 0.9%. 1000 milliLiter(s) (100 mL/Hr) IV Continuous <Continuous>  vancomycin  IVPB 1000 milliGRAM(s) IV Intermittent every 12 hours    MEDICATIONS  (PRN):  acetaminophen   Tablet .. 650 milliGRAM(s) Oral every 6 hours PRN Temp greater or equal to 38C (100.4F), Mild Pain (1 - 3)  metoclopramide Injectable 10 milliGRAM(s) IV Push every 6 hours PRN Nausea and/or Vomiting  oxyCODONE    IR 5 milliGRAM(s) Oral every 4 hours PRN Moderate Pain (4 - 6)  oxyCODONE    IR 10 milliGRAM(s) Oral every 4 hours PRN Severe Pain (7 - 10)  sodium chloride 0.9% lock flush 10 milliLiter(s) IV Push every 1 hour PRN Pre/post blood products, medications, blood draw, and to maintain line patency    EXAMINATION:  General: No acute distress  HEENT: Anicteric sclerae  Cardiac: [id number]  Lungs: Clear  Abdomen: Soft, non-tender, +BS  Extremities: No c/c/e  Skin/Incision Site: R groin clean, dry and intact, no hematoma  Neurologic: EO to voice, does not maintain, L 3rdn palsy pupil 5mm not reactive, R 3mm reactive, follows simple commands, BUE no drift 5/5, BLE 5/5  w/ encouragement effort dependent exam       LABS:  Na: 136 (02-03 @ 05:10), 136 (02-02 @ 22:33), 139 (02-02 @ 06:20), 136 (02-01 @ 06:57)  K: 3.6 (02-03 @ 05:10), 3.1 (02-02 @ 22:33), 3.7 (02-02 @ 06:20), 4.2 (02-01 @ 06:57)  Cl: 104 (02-03 @ 05:10), 104 (02-02 @ 22:33), 109 (02-02 @ 06:20), 104 (02-01 @ 06:57)  CO2: 20 (02-03 @ 05:10), 17 (02-02 @ 22:33), 18 (02-02 @ 06:20), 20 (02-01 @ 06:57)  BUN: 12 (02-03 @ 05:10), 13 (02-02 @ 22:33), 18 (02-02 @ 06:20), 20 (02-01 @ 06:57)  Cr: 0.44 (02-03 @ 05:10), 0.42 (02-02 @ 22:33), 0.41 (02-02 @ 06:20), 0.43 (02-01 @ 06:57)  Glu: 104(02-03 @ 05:10), 105(02-02 @ 22:33), 106(02-02 @ 06:20), 139(02-01 @ 06:57)    Hgb: 10.5 (02-02 @ 22:33), 10.9 (02-02 @ 06:20), 11.1 (02-01 @ 06:57)  Hct: 32.3 (02-02 @ 22:33), 33.6 (02-02 @ 06:20), 33.5 (02-01 @ 06:57)  WBC: 11.87 (02-02 @ 22:33), 12.64 (02-02 @ 06:20), 9.83 (02-01 @ 06:57)  Plt: 236 (02-02 @ 22:33), 237 (02-02 @ 06:20), 205 (02-01 @ 06:57)    INR: 1.10 02-02-20 @ 22:33  PTT: 30.0 02-02-20 @ 22:33      CT brain   IMPRESSION:    Mild increase in size of left sided extra-axial fluid collection.    Evolving right cerebellar infarct.    Low-density changes in the left frontal and temporal lobes with associated hemorrhage, stable in appearance.    Findings were discussed with neurosurgical physicians assistant Salguero on 2/3/2020 8:36 AM with read back.

## 2020-02-03 NOTE — PROGRESS NOTE ADULT - ASSESSMENT
s/p clipping of aneurysm. Found to be unresponsive/starring spells likely seizuresX2. Also tachycardic.   - CTH stable no acute changes  - Increased Keppra  - EEG   - Recent fever - on empiric antibiotics; f/u cultures; wound does not seem infected    Updated sister at bedside

## 2020-02-03 NOTE — PROGRESS NOTE ADULT - SUBJECTIVE AND OBJECTIVE BOX
Patient seen and examined at bedside.    --Anticoagulation--  enoxaparin Injectable 40 milliGRAM(s) SubCutaneous <User Schedule>    T(C): 37.5 (02-03-20 @ 03:00), Max: 37.5 (02-02-20 @ 15:07)  HR: 84 (02-03-20 @ 03:00) (78 - 99)  BP: 122/83 (02-03-20 @ 02:00) (117/68 - 141/96)  RR: 16 (02-03-20 @ 03:00) (12 - 18)  SpO2: 100% (02-03-20 @ 03:00) (97% - 100%)  Wt(kg): --    Exam:  AOx3, FC, PERRL, EOMI, no facial, non verbal  5/5 throughout, no drift,   SILT  no clonus

## 2020-02-03 NOTE — EEG REPORT - NS EEG TEXT BOX
TIFFANY LUONG [id number]     Study Date: 		02-03-20    ROUTINE EEG    Technical Information:			  		  Placement and Labeling of Electrodes:  The EEG was performed utilizing 20 channels referential EEG connections (coronal over temporal over parasagittal montage) using all standard 10-20 electrode placements with EKG.  Recording was at a sampling rate of 256 samples per second per channel.  Time synchronized digital video recording was done simultaneously with EEG recording.  A low light infrared camera was used for low light recording.  Jose and seizure detection algorithms were utilized.    CSA Technical Component:  Quantitative EEG analysis using a separate Compressed Spectral Array (CSA) software package was conducted in real-time and run at bedside after set up by the technician, digitally displaying the power of electrographic frequencies included in the 1-30Hz band using a graded color map.  This data was reviewed and interpreted independently, and is reported in a separate section below.    --------------------------------------------------------------------------------------------------  History:  CC/ HPI Patient is a 19y old  Male who presents with a chief complaint of intracranial aneurysms (03 Feb 2020 11:26)    MEDICATIONS  (STANDING):  atorvastatin 80 milliGRAM(s) Oral at bedtime  bisacodyl 5 milliGRAM(s) Oral at bedtime  chlorhexidine 4% Liquid 1 Application(s) Topical <User Schedule>  dexMEDEtomidine Infusion 0.3 MICROgram(s)/kG/Hr (4.275 mL/Hr) IV Continuous <Continuous>  enoxaparin Injectable 40 milliGRAM(s) SubCutaneous <User Schedule>  folic acid 1 milliGRAM(s) Oral daily  hydroxyurea 1000 milliGRAM(s) Oral daily  influenza   Vaccine 0.5 milliLiter(s) IntraMuscular once  levETIRAcetam  IVPB 750 milliGRAM(s) IV Intermittent every 12 hours  pantoprazole    Tablet 40 milliGRAM(s) Oral before breakfast  polyethylene glycol 3350 17 Gram(s) Oral two times a day  senna 2 Tablet(s) Oral at bedtime  sodium chloride 0.9%. 1000 milliLiter(s) (100 mL/Hr) IV Continuous <Continuous>    --------------------------------------------------------------------------------------------------  Study Interpretation:    [[[Abbreviation Key:  PDR=alpha rhythm/posterior dominant rhythm. A-P=anterior posterior gradient.  Amplitude: ‘very low’:<20; ‘low’:20-50; ‘medium’:; ‘high’:>200uV.  Persistence for periodic/rhythmic patterns (% of epoch) ‘rare’:<1%; ‘occasional’:1-10%; ‘frequent’:10-50%; ‘abundant’:50-90%; ‘continuous’:>90%.  Persistence for sporadic discharges: ‘rare’:<1/hr; ‘occasional’:1/min-1/hr; ‘frequent’:>1/min; ‘abundant’:>1/10 sec.  GRDA=generalized rhythmic delta activity, LRDA=lateralized rhythmic delta activity, TIRDA=temporal intermittent rhythmic delta activity, FIRDA=frontal intermittent rhythmic activity. LPD=PLED=lateralized periodic discharges, GPD=generalized periodic discharges, BiPDs=BiPLEDs=bilateral independent periodic epileptiform discharges, SIRPID=stimulus induced rhythmic, periodic, or ictal appearing discharges.  Modifiers: +F=with fast component, +S=with spike component, +R=with rhythmic component.  S-B=burst suppression pattern.  Max=maximal. N1-drowsy, N2-stage II sleep, N3-slow wave sleep.  HV=hyperventilation, PS=photic stimulation]]]    FINDINGS:  The background was continuous, spontaneously variable and reactive.  During wakefulness, the posteriorly dominant rhythm was up to 9hz over the right only.  There was diffuse irregular theta and delta activity present, irregular delta persistently more prominent over the left frontal>temporal region.    Sleep Background:  Drowsiness was characterized by fragmentation, attenuation, and slowing of the background activity.    Stage II sleep transients were not recorded.    Epileptiform Activity:   No epileptiform discharges were present.    Events:  No clinical events were recorded.  No seizures were recorded.    Activation Procedures:   -Hyperventilation was not performed.    -Photic stimulation was not performed.    Artifacts:  Intermittent myogenic and movement artifacts were noted.    ECG:  The heart rate on single channel ECG at baseline was predominantly near BPM = 60-70  -----------------------------------------------------------------------------------------------------    EEG Classification / Summary:  Abnormal EEG study  There was diffuse irregular theta and delta activity present, irregular delta persistently more prominent over the left frontal>temporal region.  Left sided attenuation of fast activity    -----------------------------------------------------------------------------------------------------    Clinical Impression:  Moderate multifocal cerebral dysfunction, persistently worse in the left hemisphere  There were no epileptiform abnormalities recorded.      -------------------------------------------------------------------------------------------------------  Ignacio Mary M.D.   of Neurology, [facility] [place]

## 2020-02-03 NOTE — PROGRESS NOTE ADULT - ASSESSMENT
seizures  - EEG negative, d/c  - continue levetiracetam  - pain control  - HbSS: hydroxyurea, avoid dehydration    updated sister at bedside

## 2020-02-03 NOTE — PROGRESS NOTE ADULT - SUBJECTIVE AND OBJECTIVE BOX
EEG: no seizures    Awakens to voice, fully oriented left CN III palsy, all limbs symmetric and antigravity but motor exam effort dependent (at least 4+/5 but likely full strength)

## 2020-02-03 NOTE — CONSULT NOTE ADULT - SUBJECTIVE AND OBJECTIVE BOX
HPI:   Patient is a 19y male visiting from [facility] ,  has sickle cell disease. In December he developed crisis with acute chest syndrome, bilateral pneumothorax required chest tubes , had exchange transfusion, started hydroxy urea , required antibiotics for pneumonia, was encephalopathic. 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 He had angiogram 2 weeks ago. A week ago he was admitted for worsening headache and he underwent craniotomy on  for aneurysm clipping. He got tapered off decadron. He had temperature to 102 post op and lower down over the past 24hours. He has had post op changes on CT head with some local bleed near the craniotomy and fluid and evolving cerebellar cva.  Yesterday vanco and meropenem were commenced for ongoing low grade temp the day before and then in afternoon he had abrupt onset of altered mental status and hypoxia which got better. CT angio lung negative, ct head local post op changes. He remains encephalopathic but is responsive. He had concern for seizure so to have eeg. He has left eye ptosis since the surgery. Patient cannot give detailed ros due to lethargy and just asks to be left alone.     REVIEW OF SYSTEMS:  All other review of systems negative (Comprehensive ROS)    PAST MEDICAL & SURGICAL HISTORY:  Brain aneurysm  Sickle cell anemia  Sickle cell disease  No significant past surgical history      Allergies    No Known Allergies    Intolerances        Antimicrobials Day #  :2  meropenem  IVPB      meropenem  IVPB 1000 milliGRAM(s) IV Intermittent every 8 hours  vancomycin  IVPB 1000 milliGRAM(s) IV Intermittent every 12 hours    Other Medications:  acetaminophen   Tablet .. 650 milliGRAM(s) Oral every 6 hours PRN  atorvastatin 80 milliGRAM(s) Oral at bedtime  bisacodyl 5 milliGRAM(s) Oral at bedtime  chlorhexidine 4% Liquid 1 Application(s) Topical <User Schedule>  dexMEDEtomidine Infusion 0.3 MICROgram(s)/kG/Hr IV Continuous <Continuous>  enoxaparin Injectable 40 milliGRAM(s) SubCutaneous <User Schedule>  folic acid 1 milliGRAM(s) Oral daily  hydroxyurea 1000 milliGRAM(s) Oral daily  influenza   Vaccine 0.5 milliLiter(s) IntraMuscular once  levETIRAcetam  IVPB 750 milliGRAM(s) IV Intermittent every 12 hours  metoclopramide Injectable 10 milliGRAM(s) IV Push every 6 hours PRN  oxyCODONE    IR 5 milliGRAM(s) Oral every 4 hours PRN  oxyCODONE    IR 10 milliGRAM(s) Oral every 4 hours PRN  pantoprazole    Tablet 40 milliGRAM(s) Oral before breakfast  polyethylene glycol 3350 17 Gram(s) Oral two times a day  potassium chloride  10 mEq/100 mL IVPB 10 milliEquivalent(s) IV Intermittent every 1 hour  senna 2 Tablet(s) Oral at bedtime  sodium chloride 0.9% lock flush 10 milliLiter(s) IV Push every 1 hour PRN  sodium chloride 0.9%. 1000 milliLiter(s) IV Continuous <Continuous>      FAMILY HISTORY:      SOCIAL HISTORY:  Smoking: [ ]Yes [ x]No  ETOH: [ ]Yes [x ]No  Drug Use: [ ]Yes [ ]No   [x ] Single[ ]    T(F): 99.5 (20 @ 11:00), Max: 99.5 (20 @ 15:07)  HR: 76 (20 @ 11:00)  BP: 117/78 (20 @ 11:00)  RR: 16 (20 @ 11:00)  SpO2: 100% (20 @ 11:00)  Wt(kg): --    PHYSICAL EXAM:  General: lethargic, head wound clean, dry, intact, no acute distress  Eyes:  anicteric, no conjunctival injection, no discharge, left eye lid not opening but eye looks ok, no movement  Oropharynx: no lesions or injection 	  Neck: supple, without adenopathy  Lungs: clear to auscultation  Heart: regular rate and rhythm; no murmur, rubs or gallops  Abdomen: soft, nondistended, nontender, without mass or organomegaly  Skin: no lesions  Extremities: no clubbing, cyanosis, or edema  Neurologic: sleepy, oriented to self, time and place, moves all extremities    LAB RESULTS:                        10.   11 )-----------( 236      ( 2020 22:33 )             32.3         136  |  104  |  12  ----------------------------<  104<H>  3.6   |  20<L>  |  0.44<L>    Ca    9.5      2020 05:10  Phos  3.7     02-03  Mg     1.9     02-03    TPro  7.3  /  Alb  4.0  /  TBili  1.6<H>  /  DBili  x   /  AST  18  /  ALT  15  /  AlkPhos  45  02-    LIVER FUNCTIONS - ( 2020 06:20 )  Alb: 4.0 g/dL / Pro: 7.3 g/dL / ALK PHOS: 45 U/L / ALT: 15 U/L / AST: 18 U/L / GGT: x           Urinalysis Basic - ( 2020 05:44 )    Color: Light Yellow / Appearance: Clear / S.020 / pH: x  Gluc: x / Ketone: Negative  / Bili: Negative / Urobili: Negative   Blood: x / Protein: Negative / Nitrite: Negative   Leuk Esterase: Negative / RBC: 4 /hpf / WBC 1 /HPF   Sq Epi: x / Non Sq Epi: 1 /hpf / Bacteria: Negative        MICROBIOLOGY:  RECENT CULTURES:   @ 18:54 .Blood Blood     No growth to date.            RADIOLOGY REVIEWED:  < from: CT Head No Cont (20 @ 09:26) >  IMPRESSION:  Evolving right cerebellar infarct as noted on the prior as well. Status post basilar tip and distal left ICA aneurysm clipping with postop changes in the left temporal lobe. Spell small left subdural fluid collection with air fluid and hemorrhage subjacent to the craniotomy. Residual postop pneumocephalus    < from: CT Head No Cont (20 @ 09:51) >    Impression:    Decreasing left size of frontal extra-axial mixed density fluid collection.  Evolving right cerebellar hemisphere infarct without hemorrhagic transformation.  Similar-appearing a postsurgical/ischemic changes in the inferior left frontal and temporal region.    < end of copied text >    < end of copied text >  < from: CT Angio Chest w/ IV Cont (20 @ 19:12) >    EXAM:  CT ANGIO CHEST (W)AW IC                            PROCEDURE DATE:  2020            INTERPRETATION:  CLINICAL INFORMATION: Tachypnea, sickle cell disease, reported seizure during CT. Rapid response.    COMPARISON: None.    PROCEDURE:   CT Angiography of the Chest.  73 ml of Omnipaque 350 was injected intravenously. 27 ml were discarded.  Sagittal and coronal reformats were performed as well as 3D (MIP) reconstructions.    FINDINGS:    LUNGS AND AIRWAYS: Patent central airways.  No consolidations. Nonspecific groundglass attenuation in the lower lobes. Basilar subsegmental atelectasis.    PLEURA: No pleural effusion.    MEDIASTINUM AND SENDY: Nonspecific 1.6 cm right hilar lymph node. No supraclavicular, mediastinal or axillary adenopathy.    VESSELS: No pulmonary embolus.    HEART: The heart is enlarged. No pericardial effusion.    CHEST WALL AND LOWER NECK: Within normal limits.    VISUALIZED UPPER ABDOMEN: Hepatomegaly and calcified atrophic spleen compatible with history of sickle cell disease.    BONES: No acute finding.    IMPRESSION:     No pulmonary embolus.    < end of copied text >          Impression:Patient with sickle cell disease, recent episode of crisis, chest syndrome, ptx , chest tubes, exchange transfusion , cerebellar stroke and finding of internal carotid and cerebellar aneurysms. On  he had craniotomy for aneurysm clip, had decadron until , had postop fever initially 102 but afebrile for past 24 hours. Has left eye ptosis and not moving. CT head shows stable post op changes, decrease in fluid and a little local bleeding. started on meropenem and vanco yesterday and had acute episode of loc and hypoxia, ct angio chest neg, ct head stable, remains lethargic but is oriented. I dont see specific focus of infection apparent. He has no pneumonia, uti, line sepsis apparent. He could have primary cns infection but scans no particularly supportive of that and no leak apparent to give high risk. I am concerned that since the decadron was stopped the day before the mental change and to my eye the brain looks a little tight maybe he needs slower decadron taper.           Recommendations:  would favor to monitor off antibiotics  would reimage if mental status worsens and consider LP but not sure if can do safe- defer to nsgy  consider restarting decadron slow taper  follow up cultures  supportive care

## 2020-02-04 DIAGNOSIS — R56.9 UNSPECIFIED CONVULSIONS: ICD-10-CM

## 2020-02-04 DIAGNOSIS — E87.6 HYPOKALEMIA: ICD-10-CM

## 2020-02-04 LAB
ANION GAP SERPL CALC-SCNC: 13 MMOL/L — SIGNIFICANT CHANGE UP (ref 5–17)
BUN SERPL-MCNC: 11 MG/DL — SIGNIFICANT CHANGE UP (ref 7–23)
CALCIUM SERPL-MCNC: 9.3 MG/DL — SIGNIFICANT CHANGE UP (ref 8.4–10.5)
CHLORIDE SERPL-SCNC: 104 MMOL/L — SIGNIFICANT CHANGE UP (ref 96–108)
CO2 SERPL-SCNC: 20 MMOL/L — LOW (ref 22–31)
CREAT SERPL-MCNC: 0.41 MG/DL — LOW (ref 0.5–1.3)
CULTURE RESULTS: SIGNIFICANT CHANGE UP
CULTURE RESULTS: SIGNIFICANT CHANGE UP
GLUCOSE SERPL-MCNC: 104 MG/DL — HIGH (ref 70–99)
HCT VFR BLD CALC: 31.8 % — LOW (ref 39–50)
HGB BLD-MCNC: 10.4 G/DL — LOW (ref 13–17)
MAGNESIUM SERPL-MCNC: 1.8 MG/DL — SIGNIFICANT CHANGE UP (ref 1.6–2.6)
MCHC RBC-ENTMCNC: 28.7 PG — SIGNIFICANT CHANGE UP (ref 27–34)
MCHC RBC-ENTMCNC: 32.7 GM/DL — SIGNIFICANT CHANGE UP (ref 32–36)
MCV RBC AUTO: 87.6 FL — SIGNIFICANT CHANGE UP (ref 80–100)
NRBC # BLD: 0 /100 WBCS — SIGNIFICANT CHANGE UP (ref 0–0)
PHOSPHATE SERPL-MCNC: 3.6 MG/DL — SIGNIFICANT CHANGE UP (ref 2.5–4.5)
PLATELET # BLD AUTO: 241 K/UL — SIGNIFICANT CHANGE UP (ref 150–400)
POTASSIUM SERPL-MCNC: 3.4 MMOL/L — LOW (ref 3.5–5.3)
POTASSIUM SERPL-SCNC: 3.4 MMOL/L — LOW (ref 3.5–5.3)
RBC # BLD: 3.63 M/UL — LOW (ref 4.2–5.8)
RBC # FLD: 19.9 % — HIGH (ref 10.3–14.5)
SODIUM SERPL-SCNC: 137 MMOL/L — SIGNIFICANT CHANGE UP (ref 135–145)
SPECIMEN SOURCE: SIGNIFICANT CHANGE UP
SPECIMEN SOURCE: SIGNIFICANT CHANGE UP
WBC # BLD: 9.47 K/UL — SIGNIFICANT CHANGE UP (ref 3.8–10.5)
WBC # FLD AUTO: 9.47 K/UL — SIGNIFICANT CHANGE UP (ref 3.8–10.5)

## 2020-02-04 PROCEDURE — 70450 CT HEAD/BRAIN W/O DYE: CPT | Mod: 26

## 2020-02-04 PROCEDURE — 95720 EEG PHY/QHP EA INCR W/VEEG: CPT

## 2020-02-04 PROCEDURE — 99221 1ST HOSP IP/OBS SF/LOW 40: CPT

## 2020-02-04 PROCEDURE — 99233 SBSQ HOSP IP/OBS HIGH 50: CPT

## 2020-02-04 RX ORDER — LEVETIRACETAM 250 MG/1
1000 TABLET, FILM COATED ORAL EVERY 12 HOURS
Refills: 0 | Status: DISCONTINUED | OUTPATIENT
Start: 2020-02-04 | End: 2020-02-05

## 2020-02-04 RX ORDER — MAGNESIUM SULFATE 500 MG/ML
1 VIAL (ML) INJECTION ONCE
Refills: 0 | Status: COMPLETED | OUTPATIENT
Start: 2020-02-04 | End: 2020-02-04

## 2020-02-04 RX ORDER — LEVETIRACETAM 250 MG/1
500 TABLET, FILM COATED ORAL ONCE
Refills: 0 | Status: COMPLETED | OUTPATIENT
Start: 2020-02-04 | End: 2020-02-04

## 2020-02-04 RX ORDER — LACOSAMIDE 50 MG/1
100 TABLET ORAL
Refills: 0 | Status: DISCONTINUED | OUTPATIENT
Start: 2020-02-04 | End: 2020-02-05

## 2020-02-04 RX ORDER — POTASSIUM CHLORIDE 20 MEQ
20 PACKET (EA) ORAL
Refills: 0 | Status: COMPLETED | OUTPATIENT
Start: 2020-02-04 | End: 2020-02-04

## 2020-02-04 RX ADMIN — POLYETHYLENE GLYCOL 3350 17 GRAM(S): 17 POWDER, FOR SOLUTION ORAL at 17:33

## 2020-02-04 RX ADMIN — LEVETIRACETAM 400 MILLIGRAM(S): 250 TABLET, FILM COATED ORAL at 17:31

## 2020-02-04 RX ADMIN — POLYETHYLENE GLYCOL 3350 17 GRAM(S): 17 POWDER, FOR SOLUTION ORAL at 05:34

## 2020-02-04 RX ADMIN — LEVETIRACETAM 400 MILLIGRAM(S): 250 TABLET, FILM COATED ORAL at 12:14

## 2020-02-04 RX ADMIN — Medication 20 MILLIEQUIVALENT(S): at 13:59

## 2020-02-04 RX ADMIN — Medication 20 MILLIEQUIVALENT(S): at 10:41

## 2020-02-04 RX ADMIN — LEVETIRACETAM 400 MILLIGRAM(S): 250 TABLET, FILM COATED ORAL at 06:00

## 2020-02-04 RX ADMIN — ENOXAPARIN SODIUM 40 MILLIGRAM(S): 100 INJECTION SUBCUTANEOUS at 17:32

## 2020-02-04 RX ADMIN — Medication 100 GRAM(S): at 14:04

## 2020-02-04 RX ADMIN — Medication 1 MILLIGRAM(S): at 16:23

## 2020-02-04 RX ADMIN — HYDROXYUREA 1000 MILLIGRAM(S): 500 CAPSULE ORAL at 21:11

## 2020-02-04 RX ADMIN — Medication 650 MILLIGRAM(S): at 23:36

## 2020-02-04 RX ADMIN — Medication 650 MILLIGRAM(S): at 16:23

## 2020-02-04 RX ADMIN — Medication 650 MILLIGRAM(S): at 17:18

## 2020-02-04 RX ADMIN — Medication 10 MILLIGRAM(S): at 18:39

## 2020-02-04 RX ADMIN — OXYCODONE HYDROCHLORIDE 10 MILLIGRAM(S): 5 TABLET ORAL at 09:34

## 2020-02-04 RX ADMIN — LACOSAMIDE 100 MILLIGRAM(S): 50 TABLET ORAL at 17:32

## 2020-02-04 RX ADMIN — OXYCODONE HYDROCHLORIDE 10 MILLIGRAM(S): 5 TABLET ORAL at 10:45

## 2020-02-04 RX ADMIN — OXYCODONE HYDROCHLORIDE 10 MILLIGRAM(S): 5 TABLET ORAL at 05:27

## 2020-02-04 RX ADMIN — ATORVASTATIN CALCIUM 80 MILLIGRAM(S): 80 TABLET, FILM COATED ORAL at 21:11

## 2020-02-04 NOTE — EEG REPORT - NS EEG TEXT BOX
TIFFANY LUONG [id number]     Study Date: 		02-03-20     Continuous EEG    Technical Information:			  		  Placement and Labeling of Electrodes:  The EEG was performed utilizing 20 channels referential EEG connections (coronal over temporal over parasagittal montage) using all standard 10-20 electrode placements with EKG.  Recording was at a sampling rate of 256 samples per second per channel.  Time synchronized digital video recording was done simultaneously with EEG recording.  A low light infrared camera was used for low light recording.  Jose and seizure detection algorithms were utilized.    CSA Technical Component:  Quantitative EEG analysis using a separate Compressed Spectral Array (CSA) software package was conducted in real-time and run at bedside after set up by the technician, digitally displaying the power of electrographic frequencies included in the 1-30Hz band using a graded color map.  This data was reviewed and interpreted independently, and is reported in a separate section below.    --------------------------------------------------------------------------------------------------  History:  CC/ HPI Patient is a 19y old  Male who presents with a chief complaint of intracranial aneurysms (03 Feb 2020 11:26)    MEDICATIONS  (STANDING):  atorvastatin 80 milliGRAM(s) Oral at bedtime  bisacodyl 5 milliGRAM(s) Oral at bedtime  chlorhexidine 4% Liquid 1 Application(s) Topical <User Schedule>  dexMEDEtomidine Infusion 0.3 MICROgram(s)/kG/Hr (4.275 mL/Hr) IV Continuous <Continuous>  enoxaparin Injectable 40 milliGRAM(s) SubCutaneous <User Schedule>  folic acid 1 milliGRAM(s) Oral daily  hydroxyurea 1000 milliGRAM(s) Oral daily  influenza   Vaccine 0.5 milliLiter(s) IntraMuscular once  levETIRAcetam  IVPB 750 milliGRAM(s) IV Intermittent every 12 hours  pantoprazole    Tablet 40 milliGRAM(s) Oral before breakfast  polyethylene glycol 3350 17 Gram(s) Oral two times a day  senna 2 Tablet(s) Oral at bedtime  sodium chloride 0.9%. 1000 milliLiter(s) (100 mL/Hr) IV Continuous <Continuous>    --------------------------------------------------------------------------------------------------  Study Interpretation:    [[[Abbreviation Key:  PDR=alpha rhythm/posterior dominant rhythm. A-P=anterior posterior gradient.  Amplitude: ‘very low’:<20; ‘low’:20-50; ‘medium’:; ‘high’:>200uV.  Persistence for periodic/rhythmic patterns (% of epoch) ‘rare’:<1%; ‘occasional’:1-10%; ‘frequent’:10-50%; ‘abundant’:50-90%; ‘continuous’:>90%.  Persistence for sporadic discharges: ‘rare’:<1/hr; ‘occasional’:1/min-1/hr; ‘frequent’:>1/min; ‘abundant’:>1/10 sec.  GRDA=generalized rhythmic delta activity, LRDA=lateralized rhythmic delta activity, TIRDA=temporal intermittent rhythmic delta activity, FIRDA=frontal intermittent rhythmic activity. LPD=PLED=lateralized periodic discharges, GPD=generalized periodic discharges, BiPDs=BiPLEDs=bilateral independent periodic epileptiform discharges, SIRPID=stimulus induced rhythmic, periodic, or ictal appearing discharges.  Modifiers: +F=with fast component, +S=with spike component, +R=with rhythmic component.  S-B=burst suppression pattern.  Max=maximal. N1-drowsy, N2-stage II sleep, N3-slow wave sleep.  HV=hyperventilation, PS=photic stimulation]]]    FINDINGS:  The background was continuous, spontaneously variable and reactive.  During wakefulness, the posteriorly dominant rhythm was up to 8hz over the right only.  There was diffuse irregular theta and delta activity present, brief runs of rhythmic delta persistently more prominent over the left frontal>temporal region.    Sleep Background:  Drowsiness was characterized by fragmentation, attenuation, and slowing of the background activity.    Stage II sleep transients were characterized by sleep spindles and K complexes.    Epileptiform Activity:   No epileptiform discharges were present.    Events:  No clinical events were recorded.  No seizures were recorded.    Activation Procedures:   -Hyperventilation was not performed.    -Photic stimulation was not performed.    Artifacts:  Intermittent myogenic and movement artifacts were noted.    ECG:  The heart rate on single channel ECG at baseline was predominantly near BPM = 60-70  -----------------------------------------------------------------------------------------------------    EEG Classification / Summary:  Abnormal EEG study  There was diffuse irregular theta and delta activity present, brief runs of rhythmic delta persistently more prominent over the left frontal>temporal region.  Left sided attenuation of fast activity    -----------------------------------------------------------------------------------------------------    Clinical Impression:  Moderate multifocal cerebral dysfunction, persistently worse in the left hemisphere  There were no epileptiform abnormalities recorded.      -------------------------------------------------------------------------------------------------------  Dee Arce DO  Epilepsy Fellow, [facility] [place] TIFFANY LUONG [id number]     Study Date: 		  02-03-20 12:29  02-04-20 08:00  17h49m    Continuous EEG    Technical Information:			  		  Placement and Labeling of Electrodes:  The EEG was performed utilizing 20 channels referential EEG connections (coronal over temporal over parasagittal montage) using all standard 10-20 electrode placements with EKG.  Recording was at a sampling rate of 256 samples per second per channel.  Time synchronized digital video recording was done simultaneously with EEG recording.  A low light infrared camera was used for low light recording.  Jose and seizure detection algorithms were utilized.    CSA Technical Component:  Quantitative EEG analysis using a separate Compressed Spectral Array (CSA) software package was conducted in real-time and run at bedside after set up by the technician, digitally displaying the power of electrographic frequencies included in the 1-30Hz band using a graded color map.  This data was reviewed and interpreted independently, and is reported in a separate section below.    --------------------------------------------------------------------------------------------------  History:  CC/ HPI Patient is a 19y old  Male who presents with a chief complaint of intracranial aneurysms (03 Feb 2020 11:26)    MEDICATIONS  (STANDING):  atorvastatin 80 milliGRAM(s) Oral at bedtime  bisacodyl 5 milliGRAM(s) Oral at bedtime  chlorhexidine 4% Liquid 1 Application(s) Topical <User Schedule>  dexMEDEtomidine Infusion 0.3 MICROgram(s)/kG/Hr (4.275 mL/Hr) IV Continuous <Continuous>  enoxaparin Injectable 40 milliGRAM(s) SubCutaneous <User Schedule>  folic acid 1 milliGRAM(s) Oral daily  hydroxyurea 1000 milliGRAM(s) Oral daily  influenza   Vaccine 0.5 milliLiter(s) IntraMuscular once  levETIRAcetam  IVPB 750 milliGRAM(s) IV Intermittent every 12 hours  pantoprazole    Tablet 40 milliGRAM(s) Oral before breakfast  polyethylene glycol 3350 17 Gram(s) Oral two times a day  senna 2 Tablet(s) Oral at bedtime  sodium chloride 0.9%. 1000 milliLiter(s) (100 mL/Hr) IV Continuous <Continuous>    --------------------------------------------------------------------------------------------------  Study Interpretation:    [[[Abbreviation Key:  PDR=alpha rhythm/posterior dominant rhythm. A-P=anterior posterior gradient.  Amplitude: ‘very low’:<20; ‘low’:20-50; ‘medium’:; ‘high’:>200uV.  Persistence for periodic/rhythmic patterns (% of epoch) ‘rare’:<1%; ‘occasional’:1-10%; ‘frequent’:10-50%; ‘abundant’:50-90%; ‘continuous’:>90%.  Persistence for sporadic discharges: ‘rare’:<1/hr; ‘occasional’:1/min-1/hr; ‘frequent’:>1/min; ‘abundant’:>1/10 sec.  GRDA=generalized rhythmic delta activity, LRDA=lateralized rhythmic delta activity, TIRDA=temporal intermittent rhythmic delta activity, FIRDA=frontal intermittent rhythmic activity. LPD=PLED=lateralized periodic discharges, GPD=generalized periodic discharges, BiPDs=BiPLEDs=bilateral independent periodic epileptiform discharges, SIRPID=stimulus induced rhythmic, periodic, or ictal appearing discharges.  Modifiers: +F=with fast component, +S=with spike component, +R=with rhythmic component.  S-B=burst suppression pattern.  Max=maximal. N1-drowsy, N2-stage II sleep, N3-slow wave sleep.  HV=hyperventilation, PS=photic stimulation]]]    FINDINGS:  The background was continuous, spontaneously variable and reactive.  During wakefulness, the posteriorly dominant rhythm was up to 8hz over the right only.  There was diffuse irregular theta and delta activity present, brief runs of rhythmic delta persistently more prominent over the left frontal>temporal region.    Sleep Background:  Drowsiness was characterized by fragmentation, attenuation, and slowing of the background activity.    Stage II sleep transients were characterized by sleep spindles and K complexes.    Epileptiform Activity:   No epileptiform discharges were present.    Events:  No clinical events were recorded.  No seizures were recorded.    Activation Procedures:   -Hyperventilation was not performed.    -Photic stimulation was not performed.    Artifacts:  Intermittent myogenic and movement artifacts were noted.    ECG:  The heart rate on single channel ECG at baseline was predominantly near BPM = 60-70  -----------------------------------------------------------------------------------------------------    EEG Classification / Summary:  Abnormal EEG study  There was diffuse irregular theta and delta activity present, brief runs of rhythmic delta persistently more prominent over the left frontal>temporal region.  Left sided attenuation of fast activity    -----------------------------------------------------------------------------------------------------    Clinical Impression:  Moderate multifocal cerebral dysfunction, persistently worse in the left hemisphere  There were no epileptiform abnormalities recorded.      -------------------------------------------------------------------------------------------------------  Dee Arce DO  Epilepsy Fellow, [facility]    MD CATHERINE Duarte  Director, Continuous EEG Monitoring Service, [facility]    and Epilepsy Fellowship , Department of Neurology  [facility]

## 2020-02-04 NOTE — PROGRESS NOTE ADULT - PROBLEM SELECTOR PLAN 3
Hemoglobin is stable  H/O recent exchange transfusion.   NO joint pain ,. no CP and no dyspnea   hem/onc follow up

## 2020-02-04 NOTE — PROGRESS NOTE ADULT - ASSESSMENT
19 year old male from [place] who has a history of Hb SS disease, brain aneurysm w/ recent admission 12/14-12/31 for pain crisis and subsequent acute chest syndrome as well as stroke requiring intubation and multiple exchange transfusions.   Pt had CT head and MRI/MRA which revealed multiple areas patchy ischemia w/ hemorrhagic transformation in the right superior cerebellar region as well as multiple areas of aneurysm. Pt was d/c home for outpt nsx f/up.  On 1/28/20 patient was admitted to [facility] due to c/o  headache.  He was noted to have  small CNS aneurysms and was admitted for a planned procedure possibly coiling of aneurysm.  On 1/29, he underwent left Craniotomy with clipping of L ICA terminus and L SCA aneurysms. c/w by RRT, post-seizure, for tachypnea/hypoxia 2/2/20. Seizure 2/3/20.

## 2020-02-04 NOTE — PROGRESS NOTE ADULT - ASSESSMENT
HPI:  19M pmhx SSD several known unruptured aneurysms largest is L ICA s/p DSA 1/14/2020 presents for gradual worsening headache since yesterday, neurointact (27 Jan 2020 14:00)    PROCEDURE: Craniotomy for aneurysm repair   s/p left craniotomy and clipping of left ica terminus aneurysm and left pca artery aneurysm on 1/29   POD#  6  PLAN:  1 continue eeg   2 increased keppra dose due to new seizure   3 epilepsy consult called   4 prn pain meds   5 room air   6 bp stable   7 regular diet   8 stool softeners   9 continue hydroxyurea -for sickle cell   10 continue iv fluids   11 dvt ppx sql and scds   12 dispo :p likely home once medically stable     Discharge planning:     Assessment:  Please Check When Present   []  GCS  E   V  M     Heart Failure: []Acute, [] acute on chronic , []chronic  Heart Failure:  [] Diastolic (HFpEF), [] Systolic (HFrEF), []Combined (HFpEF and HFrEF), [] RHF, [] Pulm HTN, [] Other    [] ISABELL, [] ATN, [] AIN, [] other  [] CKD1, [] CKD2, [] CKD 3, [] CKD 4, [] CKD 5, []ESRD    Encephalopathy: [] Metabolic, [] Hepatic, [] toxic, [] Neurological, [] Other    Abnormal Nurtitional Status: [] malnurtition (see nutrition note), [ ]underweight: BMI < 19, [] morbid obesity: BMI >40, [] Cachexia    [] Sepsis  [] hypovolemic shock,[] cardiogenic shock, [] hemorrhagic shock, [] neuogenic shock  [] Acute Respiratory Failure  []Cerebral edema, [] Brain compression/ herniation,   [] Functional quadriplegia  [] Acute blood loss anemia

## 2020-02-04 NOTE — PROGRESS NOTE ADULT - SUBJECTIVE AND OBJECTIVE BOX
Clem Olsen   Pager [phone]  Office [phone]      CC: Patient is a 19y old  Male who presents with a chief complaint of intracranial aneurysms (2020 21:43)      SUBJECTIVE / OVERNIGHT EVENTS:    MEDICATIONS  (STANDING):  atorvastatin 80 milliGRAM(s) Oral at bedtime  bisacodyl 5 milliGRAM(s) Oral at bedtime  enoxaparin Injectable 40 milliGRAM(s) SubCutaneous <User Schedule>  folic acid 1 milliGRAM(s) Oral daily  hydroxyurea 1000 milliGRAM(s) Oral daily  influenza   Vaccine 0.5 milliLiter(s) IntraMuscular once  levETIRAcetam  IVPB 1000 milliGRAM(s) IV Intermittent every 12 hours  pantoprazole    Tablet 40 milliGRAM(s) Oral before breakfast  polyethylene glycol 3350 17 Gram(s) Oral two times a day  potassium chloride    Tablet ER 20 milliEquivalent(s) Oral every 2 hours  senna 2 Tablet(s) Oral at bedtime  sodium chloride 0.9%. 1000 milliLiter(s) (100 mL/Hr) IV Continuous <Continuous>    MEDICATIONS  (PRN):  acetaminophen   Tablet .. 650 milliGRAM(s) Oral every 6 hours PRN Temp greater or equal to 38C (100.4F), Mild Pain (1 - 3)  metoclopramide Injectable 10 milliGRAM(s) IV Push every 6 hours PRN Nausea and/or Vomiting  oxyCODONE    IR 5 milliGRAM(s) Oral every 4 hours PRN Moderate Pain (4 - 6)  oxyCODONE    IR 10 milliGRAM(s) Oral every 4 hours PRN Severe Pain (7 - 10)  sodium chloride 0.9% lock flush 10 milliLiter(s) IV Push every 1 hour PRN Pre/post blood products, medications, blood draw, and to maintain line patency      Vital Signs Last 24 Hrs  T(C): 36.7 (2020 11:23), Max: 37.3 (2020 19:00)  T(F): 98.1 (2020 11:23), Max: 99.2 (2020 19:00)  HR: 76 (2020 11:23) (66 - 84)  BP: 113/73 (2020 11:23) (112/69 - 130/83)  BP(mean): 85 (2020 23:55) (81 - 96)  RR: 18 (2020 11:23) (13 - 21)  SpO2: 98% (2020 11:23) (98% - 100%)  CAPILLARY BLOOD GLUCOSE        I&O's Summary    2020 07:01  -  2020 07:00  --------------------------------------------------------  IN: 2260 mL / OUT: 2300 mL / NET: -40 mL      tele:    PHYSICAL EXAM:    GENERAL: NAD   HEENT: EOMI, PERRL  PULM: Clear to auscultation bilaterally  CV: Regular rate and rhythm; nl S1, S2; No murmurs, rubs, or gallops  ABDOMEN: Soft, Nontender, Nondistended; Bowel sounds present  EXTREMITIES/MSK:  No edema, calf tenderness   PSYCH: AAOx3  NEUROLOGY: non-focal          LABS:                        10.4   9.47  )-----------( 241      ( 2020 06:24 )             31.8     02-04    137  |  104  |  11  ----------------------------<  104<H>  3.4<L>   |  20<L>  |  0.41<L>    Ca    9.3      2020 06:22  Phos  3.6     02-04  Mg     1.8     02-04      PT/INR - ( 2020 22:33 )   PT: 12.7 sec;   INR: 1.10 ratio         PTT - ( 2020 22:33 )  PTT:30.0 sec      Urinalysis Basic - ( 2020 05:44 )    Color: Light Yellow / Appearance: Clear / S.020 / pH: x  Gluc: x / Ketone: Negative  / Bili: Negative / Urobili: Negative   Blood: x / Protein: Negative / Nitrite: Negative   Leuk Esterase: Negative / RBC: 4 /hpf / WBC 1 /HPF   Sq Epi: x / Non Sq Epi: 1 /hpf / Bacteria: Negative      ABG - ( 2020 22:19 )  pH, Arterial: 7.45  pH, Blood: x     /  pCO2: 29    /  pO2: 193   / HCO3: 20    / Base Excess: -2.6  /  SaO2: 100                 Culture - Blood (collected 2020 22:05)  Source: .Blood Blood  Preliminary Report (2020 23:01):    No growth to date.    Culture - Blood (collected 2020 21:33)  Source: .Blood Blood  Preliminary Report (2020 22:01):    No growth to date.    Culture - Urine (collected 2020 21:09)  Source: .Urine Clean Catch (Midstream)  Final Report (2020 16:47):    <10,000 CFU/mL Normal Urogenital Mera      RADIOLOGY & ADDITIONAL TESTS:    Imaging Personally Reviewed:    Consultant(s) Notes Reviewed:      Care Discussed with Consultants/Other Providers: Clem Olsen   Pager [phone]  Office [phone]      CC: Patient is a 19y old  Male who presents with a chief complaint of intracranial aneurysms (2020 21:43)      SUBJECTIVE / OVERNIGHT EVENTS: Pt denies any complaints but during interview, pt started staring and proceeded to have a tonic clonic seizure that lasted ~ 1min and broke before administering Ativan.     MEDICATIONS  (STANDING):  atorvastatin 80 milliGRAM(s) Oral at bedtime  bisacodyl 5 milliGRAM(s) Oral at bedtime  enoxaparin Injectable 40 milliGRAM(s) SubCutaneous <User Schedule>  folic acid 1 milliGRAM(s) Oral daily  hydroxyurea 1000 milliGRAM(s) Oral daily  influenza   Vaccine 0.5 milliLiter(s) IntraMuscular once  levETIRAcetam  IVPB 1000 milliGRAM(s) IV Intermittent every 12 hours  pantoprazole    Tablet 40 milliGRAM(s) Oral before breakfast  polyethylene glycol 3350 17 Gram(s) Oral two times a day  potassium chloride    Tablet ER 20 milliEquivalent(s) Oral every 2 hours  senna 2 Tablet(s) Oral at bedtime  sodium chloride 0.9%. 1000 milliLiter(s) (100 mL/Hr) IV Continuous <Continuous>    MEDICATIONS  (PRN):  acetaminophen   Tablet .. 650 milliGRAM(s) Oral every 6 hours PRN Temp greater or equal to 38C (100.4F), Mild Pain (1 - 3)  metoclopramide Injectable 10 milliGRAM(s) IV Push every 6 hours PRN Nausea and/or Vomiting  oxyCODONE    IR 5 milliGRAM(s) Oral every 4 hours PRN Moderate Pain (4 - 6)  oxyCODONE    IR 10 milliGRAM(s) Oral every 4 hours PRN Severe Pain (7 - 10)  sodium chloride 0.9% lock flush 10 milliLiter(s) IV Push every 1 hour PRN Pre/post blood products, medications, blood draw, and to maintain line patency      Vital Signs Last 24 Hrs  T(C): 36.7 (2020 11:23), Max: 37.3 (2020 19:00)  T(F): 98.1 (2020 11:23), Max: 99.2 (2020 19:00)  HR: 76 (2020 11:23) (66 - 84)  BP: 113/73 (2020 11:23) (112/69 - 130/83)  BP(mean): 85 (2020 23:55) (81 - 96)  RR: 18 (2020 11:23) (13 - 21)  SpO2: 98% (2020 11:23) (98% - 100%)  CAPILLARY BLOOD GLUCOSE        I&O's Summary    2020 07:01  -  2020 07:00  --------------------------------------------------------  IN: 2260 mL / OUT: 2300 mL / NET: -40 mL          PHYSICAL EXAM:    GENERAL: NAD   HEENT: left eye pupil dilated  PULM: Clear to auscultation bilaterally  CV: Regular rate and rhythm; nl S1, S2; No murmurs, rubs, or gallops  ABDOMEN: Soft, Nontender, Nondistended; Bowel sounds present  EXTREMITIES/MSK:  No edema, calf tenderness   PSYCH: AAOx3  NEUROLOGY: non-focal except left eye palsy          LABS:                        10.4   9.47  )-----------( 241      ( 2020 06:24 )             31.8     02-04    137  |  104  |  11  ----------------------------<  104<H>  3.4<L>   |  20<L>  |  0.41<L>    Ca    9.3      2020 06:22  Phos  3.6     02-04  Mg     1.8     02-04      PT/INR - ( 2020 22:33 )   PT: 12.7 sec;   INR: 1.10 ratio         PTT - ( 2020 22:33 )  PTT:30.0 sec      Urinalysis Basic - ( 2020 05:44 )    Color: Light Yellow / Appearance: Clear / S.020 / pH: x  Gluc: x / Ketone: Negative  / Bili: Negative / Urobili: Negative   Blood: x / Protein: Negative / Nitrite: Negative   Leuk Esterase: Negative / RBC: 4 /hpf / WBC 1 /HPF   Sq Epi: x / Non Sq Epi: 1 /hpf / Bacteria: Negative      ABG - ( 2020 22:19 )  pH, Arterial: 7.45  pH, Blood: x     /  pCO2: 29    /  pO2: 193   / HCO3: 20    / Base Excess: -2.6  /  SaO2: 100                 Culture - Blood (collected 2020 22:05)  Source: .Blood Blood  Preliminary Report (2020 23:01):    No growth to date.    Culture - Blood (collected 2020 21:33)  Source: .Blood Blood  Preliminary Report (2020 22:01):    No growth to date.    Culture - Urine (collected 2020 21:09)  Source: .Urine Clean Catch (Midstream)  Final Report (2020 16:47):    <10,000 CFU/mL Normal Urogenital Mera      RADIOLOGY & ADDITIONAL TESTS:    Imaging Personally Reviewed:    Consultant(s) Notes Reviewed:      Care Discussed with Consultants/Other Providers: neurosurg regard seizure

## 2020-02-04 NOTE — CONSULT NOTE ADULT - ASSESSMENT
***NOTE IS INCOMPLETE AT THIS TIME*** Assessment: 20yo man with sickle cell disease admitted for headaches s/p aneurysm clipping with Neurology consult for new onset seizures. Was notified by Neurosurgery PA that they witnessed patient having staring episode and then full body GTC which lasted about 30-40 seconds without tongue biting, urinary incontinence. Pending EEG correlate. Neuroimaging does not show new acute intracranial pathology.     Impression: ?seizures rule out infectious versus metabolic versus structural (had stroke in right frontal lobe that could be a potential source) versus potential moyamoya given several small vessel strokes (though may more likely be associated with sludging from sickle cell)    Plan:  Imaging/Studies:   [ ] MRI brain w/o contrast  [ ] MRA head w/o contrast  [ ] MRA neck w/w/o contrast    Labs:   [ ] infectious workup per primary team    Meds:   [ ] keppra to be addressed with other potential AED mgmt contingent upon the EEG read    -Management & disposition to be discussed with Neurology Attending Dr. Zhu    ***NOTE IS INCOMPLETE AT THIS TIME*** Assessment: 18yo man with sickle cell disease admitted for headaches s/p aneurysm clipping with Neurology consult for new onset seizures. Was notified by Neurosurgery PA that they witnessed patient having staring episode and then full body GTC which lasted about 30-40 seconds without tongue biting, urinary incontinence. Pending EEG correlate. Neuroimaging does not show new acute intracranial pathology.     Impression: ?seizures rule out infectious versus metabolic versus structural (had stroke in right frontal lobe that could be a potential source) versus potential moyamoya given several small vessel strokes (though may more likely be associated with sludging from sickle cell)    Plan:  Imaging/Studies:   [ ] primary team can consider re-imaging with MRI   [ ] pending EEG read from Epilepsy team    Labs:   [ ] infectious workup per primary team    Meds:   [ ] keep keppra at 1g q12h  [ ] please add vimpat 100 q12h, no load for vimpat needed    -Management & disposition discussed with Neurology Attending Dr. Zhu

## 2020-02-04 NOTE — PROGRESS NOTE ADULT - SUBJECTIVE AND OBJECTIVE BOX
SUBJECTIVE:   Patient was seen and evaluated at bedside. Patient is resting in bed and is in no new acute distress.   OVERNIGHT EVENTS:   none   Vital Signs Last 24 Hrs  T(C): 36.7 (2020 11:23), Max: 37.3 (2020 19:00)  T(F): 98.1 (:23), Max: 99.2 (2020 19:00)  HR: 76 (:) (66 - 84)  BP: 113/73 (:23) (112/69 - 130/83)  BP(mean): 85 (2020 23:55) (81 - 96)  RR: 18 (:23) (13 - 21)  SpO2: 98% (:) (98% - 100%)    PHYSICAL EXAM:    General: No Acute Distress     Neurological: awake alert oriented to person place and month, slow to respond, has mild right facial weakness and left 3rd nerve palsy. moving all extremities antigravity    Pulmonary: Clear to Auscultation, No Rales, No Rhonchi, No Wheezes     Cardiovascular: S1, S2, Regular Rate and Rhythm     Gastrointestinal: Soft, Nontender, Nondistended     Incision: clean     LABS:                        10.4   9.47  )-----------( 241      ( 2020 06:24 )             31.8    02-04    137  |  104  |  11  ----------------------------<  104<H>  3.4<L>   |  20<L>  |  0.41<L>    Ca    9.3      2020 06:22  Phos  3.6     -04  Mg     1.8     -04    PT/INR - ( 2020 22:33 )   PT: 12.7 sec;   INR: 1.10 ratio         PTT - ( 2020 22:33 )  PTT:30.0 sec      02-03 @ 07:01  -  02-04 @ 07:00  --------------------------------------------------------  IN: 2260 mL / OUT: 2300 mL / NET: -40 mL      DRAINS:     MEDICATIONS:  Antibiotics:    Neuro:  acetaminophen   Tablet .. 650 milliGRAM(s) Oral every 6 hours PRN Temp greater or equal to 38C (100.4F), Mild Pain (1 - 3)  levETIRAcetam  IVPB 1000 milliGRAM(s) IV Intermittent every 12 hours  metoclopramide Injectable 10 milliGRAM(s) IV Push every 6 hours PRN Nausea and/or Vomiting  oxyCODONE    IR 5 milliGRAM(s) Oral every 4 hours PRN Moderate Pain (4 - 6)  oxyCODONE    IR 10 milliGRAM(s) Oral every 4 hours PRN Severe Pain (7 - 10)    Cardiac:    Pulm:    GI/:  bisacodyl 5 milliGRAM(s) Oral at bedtime  pantoprazole    Tablet 40 milliGRAM(s) Oral before breakfast  polyethylene glycol 3350 17 Gram(s) Oral two times a day  senna 2 Tablet(s) Oral at bedtime    Other:   atorvastatin 80 milliGRAM(s) Oral at bedtime  enoxaparin Injectable 40 milliGRAM(s) SubCutaneous <User Schedule>  folic acid 1 milliGRAM(s) Oral daily  hydroxyurea 1000 milliGRAM(s) Oral daily  influenza   Vaccine 0.5 milliLiter(s) IntraMuscular once  potassium chloride    Tablet ER 20 milliEquivalent(s) Oral every 2 hours  sodium chloride 0.9% lock flush 10 milliLiter(s) IV Push every 1 hour PRN Pre/post blood products, medications, blood draw, and to maintain line patency  sodium chloride 0.9%. 1000 milliLiter(s) IV Continuous <Continuous>    DIET: [] Regular [] CCD [] Renal [] Puree [] Dysphagia [] Tube Feeds: regular     IMAGIN/4 head ct : stable

## 2020-02-04 NOTE — PROGRESS NOTE ADULT - SUBJECTIVE AND OBJECTIVE BOX
CC: f/u for  fever  Patient reports he is hungry and wants to eat    REVIEW OF SYSTEMS:  All other review of systems negative (Comprehensive ROS)    Antimicrobials Day #  :    Other Medications Reviewed    T(F): 99.4 (20 @ 16:30), Max: 99.4 (20 @ 16:30)  HR: 87 (20 @ 16:30)  BP: 110/67 (20 @ 16:30)  RR: 20 (20 @ 16:30)  SpO2: 99% (20 @ 16:30)  Wt(kg): --    PHYSICAL EXAM:  General: awake but groggy no acute distress, head wound intact  Eyes:  anicteric, no conjunctival injection, no discharge  Oropharynx: no lesions or injection 	  Neck: supple, without adenopathy  Lungs: clear to auscultation  Heart: regular rate and rhythm; no murmur, rubs or gallops  Abdomen: soft, nondistended, nontender, without mass or organomegaly  Skin: no lesions  Extremities: no clubbing, cyanosis, or edema  Neurologic: awake, moves all extremities    LAB RESULTS:                        10.4   9.47  )-----------( 241      ( 2020 06:24 )             31.8     02-    137  |  104  |  11  ----------------------------<  104<H>  3.4<L>   |  20<L>  |  0.41<L>    Ca    9.3      2020 06:22  Phos  3.6       Mg     1.8             Urinalysis Basic - ( 2020 05:44 )    Color: Light Yellow / Appearance: Clear / S.020 / pH: x  Gluc: x / Ketone: Negative  / Bili: Negative / Urobili: Negative   Blood: x / Protein: Negative / Nitrite: Negative   Leuk Esterase: Negative / RBC: 4 /hpf / WBC 1 /HPF   Sq Epi: x / Non Sq Epi: 1 /hpf / Bacteria: Negative      MICROBIOLOGY:  RECENT CULTURES:   @ 22:05 .Blood Blood     No growth to date.       @ 21:33 .Blood Blood     No growth to date.       @ 21:09 .Urine Clean Catch (Midstream)     <10,000 CFU/mL Normal Urogenital Mera       @ 18:54 .Blood Blood     No growth to date.          RADIOLOGY REVIEWED:    < from: CT Head No Cont (20 @ 12:04) >  Impression:    Increasing size of left frontotemporal extra-axial mixed density collection.    Evolving right cerebellar infarct without hemorrhagic transformation.    Postsurgical changes and/or ischemia with hemorrhage along the inferior left frontal lobe    The results of this examination were discussed with Dr. Gracia at 12:25 PM on 2020          < end of copied text >            Assessment:  Patient visiting from [facility], developed sickle cell crisis with acute chest syndrome, pneumothoraxes, chest tubes, cerebellar cva, required exchange transfusion, found to have cns aneurysms, went home and now returned and had craniotomy for the aneurysm clipping. He has had some fever and seizures but no infection is apparent. Fever likely related to some blood in head  Plan:  continue to monitor off antibiotics  supportive care, seizure management , aspiration precautions

## 2020-02-04 NOTE — CONSULT NOTE ADULT - SUBJECTIVE AND OBJECTIVE BOX
HPI: 18yo man with sickle cell disease admitted for headaches s/p aneurysm clipping with Neurology consult for new onset seizures. Per the rapid note from 2020, patient spiked fever of 101F, infectious workup was performed by primary team and when patient was in CTH/CTA [chest], patient noted to be agitated, shaking and then becoming verbally unresponsive, patient's pulse was noted to be low 110s. The imaging was completed which did not show acute intracranial pathology aside from known changes from clipping and the evolving R cerebellar infarct. He has had 2 EEGs which have not shown epileptiform activity/seizure activity. He had been on vancomycin and meropenem for empiric coverage as he was noted to have low grade temperatures. Per documentation had also been suddenly stopped on decadron and taper is being resumed. Currently being observed off of antibiotics.       REVIEW OF SYSTEMS	    A 10-system ROS was performed and is negative except for those items noted above and/or in the HPI.    PAST MEDICAL & SURGICAL HISTORY:  Brain aneurysm  Sickle cell anemia  Sickle cell disease  No significant past surgical history    MEDICATIONS (HOME):  Home Medications:  acetaminophen 325 mg oral tablet: 2 tab(s) orally every 6 hours, As needed, Mild Pain (1 - 3) (15 Truman 2020 15:46)  Aspir 81 oral delayed release tablet: 1 tab(s) orally once a day (15 Truman 2020 10:22)    MEDICATIONS  (STANDING):  atorvastatin 80 milliGRAM(s) Oral at bedtime  bisacodyl 5 milliGRAM(s) Oral at bedtime  enoxaparin Injectable 40 milliGRAM(s) SubCutaneous <User Schedule>  folic acid 1 milliGRAM(s) Oral daily  hydroxyurea 1000 milliGRAM(s) Oral daily  influenza   Vaccine 0.5 milliLiter(s) IntraMuscular once  levETIRAcetam  IVPB 1000 milliGRAM(s) IV Intermittent every 12 hours  pantoprazole    Tablet 40 milliGRAM(s) Oral before breakfast  polyethylene glycol 3350 17 Gram(s) Oral two times a day  senna 2 Tablet(s) Oral at bedtime  sodium chloride 0.9%. 1000 milliLiter(s) (100 mL/Hr) IV Continuous <Continuous>    MEDICATIONS  (PRN):  acetaminophen   Tablet .. 650 milliGRAM(s) Oral every 6 hours PRN Temp greater or equal to 38C (100.4F), Mild Pain (1 - 3)  metoclopramide Injectable 10 milliGRAM(s) IV Push every 6 hours PRN Nausea and/or Vomiting  oxyCODONE    IR 5 milliGRAM(s) Oral every 4 hours PRN Moderate Pain (4 - 6)  oxyCODONE    IR 10 milliGRAM(s) Oral every 4 hours PRN Severe Pain (7 - 10)  sodium chloride 0.9% lock flush 10 milliLiter(s) IV Push every 1 hour PRN Pre/post blood products, medications, blood draw, and to maintain line patency    ALLERGIES/INTOLERANCES:  Allergies  No Known Allergies    VITALS & EXAMINATION:  Vital Signs Last 24 Hrs  T(C): 36.7 (2020 11:23), Max: 37.3 (2020 19:00)  T(F): 98.1 (2020 11:23), Max: 99.2 (2020 19:00)  HR: 76 (2020 11:23) (66 - 81)  BP: 113/73 (2020 11:23) (112/69 - 128/79)  BP(mean): 85 (2020 23:55) (81 - 93)  RR: 18 (2020 11:23) (13 - 21)  SpO2: 98% (2020 11:23) (98% - 100%)    Neurological (>12):  MS: Awake, alert, oriented to person, place, situation, time. Normal affect. Follows all commands.    Language: Speech is clear, fluent with good repetition & comprehension (able to name objects___)    CNs: PERRLA (R = 3mm, L = 3mm). VFF. EOMI no nystagmus, no diplopia. V1-3 intact to LT/pinprick, well developed masseter muscles b/l. No facial asymmetry b/l, full eye closure strength b/l. Hearing grossly normal (rubbing fingers) b/l. Symmetric palate elevation in midline. Gag reflex deferred. Head turning & shoulder shrug intact b/l. Tongue midline, normal movements, no atrophy.    Fundoscopic: pale w/ sharp discs margins No vascular changes.      Motor: Normal muscle bulk & tone. No noticeable tremor or seizure. No pronator drift.              Deltoid	Biceps	Triceps	Wrist	Finger ABd	   R	5	5	5	5	5		5 	  L	5	5	5	5	5		5    	H-Flex	H-Ext	H-ABd	H-ADd	K-Flex	K-Ext	D-Flex	P-Flex  R	5	5	5	5	5	5	5	5 	   L	5	5	5	5	5	5	5	5	     Sensation: Intact to LT/PP/Temp/Vibration/Position b/l throughout.     Cortical: Extinction on DSS (neglect): none    Reflexes:              Biceps(C5)       BR(C6)     Triceps(C7)               Patellar(L4)    Achilles(S1)    Plantar Resp  R	2	          2	             2		        2		    2		Down   L	2	          2	             2		        2		    2		Down     Coordination: intact rapid-alt movements. No dysmetria to FTN/HTS    Gait: Normal Romberg. No postural instability. Normal stance and tandem gait.     LABORATORY:  CBC                       10.4   9.47  )-----------( 241      ( 2020 06:24 )             31.8     Chem 02-04    137  |  104  |  11  ----------------------------<  104<H>  3.4<L>   |  20<L>  |  0.41<L>    Ca    9.3      2020 06:22  Phos  3.6     02-04  Mg     1.8     02-04      LFTs   Coagulopathy PT/INR - ( 2020 22:33 )   PT: 12.7 sec;   INR: 1.10 ratio         PTT - ( 2020 22:33 )  PTT:30.0 sec  Lipid Panel   A1c   Cardiac enzymes     U/A Urinalysis Basic - ( 2020 05:44 )    Color: Light Yellow / Appearance: Clear / S.020 / pH: x  Gluc: x / Ketone: Negative  / Bili: Negative / Urobili: Negative   Blood: x / Protein: Negative / Nitrite: Negative   Leuk Esterase: Negative / RBC: 4 /hpf / WBC 1 /HPF   Sq Epi: x / Non Sq Epi: 1 /hpf / Bacteria: Negative      CSF  Immunological  Other    STUDIES & IMAGING:  Studies (EKG, EEG, EMG, etc):     Radiology (XR, CT, MR, U/S, TTE/SHEKHAR): HPI: 18yo man with sickle cell disease admitted for headaches s/p aneurysm clipping with Neurology consult for new onset seizures. Per the rapid note from 2020, patient spiked fever of 101F, infectious workup was performed by primary team and when patient was in CTH/CTA [chest], patient noted to be agitated, shaking and then becoming verbally unresponsive, patient's pulse was noted to be low 110s. The imaging was completed which did not show acute intracranial pathology aside from known changes from clipping and the evolving R cerebellar infarct. He has had 2 EEGs which have not shown epileptiform activity/seizure activity. He had been on vancomycin and meropenem for empiric coverage as he was noted to have low grade temperatures. Per documentation had also been suddenly stopped on decadron and taper is being resumed. Currently being observed off of antibiotics. Patient is currently sleeping and does not want to participate in encounter. However, when I ask him if he is in distress or pain, he says "nuh uh" and continues to rest.    Per Neurosurgery PA, around 11:30a 20, patient was noted to have an episode of staring followed by full body GTC without associated tongue biting, urinary incontinence, eyes rolling back, with the episode lasting about 30-40 seconds.     Pending EEG read from that time from Epilepsy team.     REVIEW OF SYSTEMS	    A 10-system ROS was performed and is negative except for those items noted above and/or in the HPI.    PAST MEDICAL & SURGICAL HISTORY:  Brain aneurysm  Sickle cell anemia  Sickle cell disease  No significant past surgical history    MEDICATIONS (HOME):  Home Medications:  acetaminophen 325 mg oral tablet: 2 tab(s) orally every 6 hours, As needed, Mild Pain (1 - 3) (15 Truman 2020 15:46)  Aspir 81 oral delayed release tablet: 1 tab(s) orally once a day (15 Truman 2020 10:22)    MEDICATIONS  (STANDING):  atorvastatin 80 milliGRAM(s) Oral at bedtime  bisacodyl 5 milliGRAM(s) Oral at bedtime  enoxaparin Injectable 40 milliGRAM(s) SubCutaneous <User Schedule>  folic acid 1 milliGRAM(s) Oral daily  hydroxyurea 1000 milliGRAM(s) Oral daily  influenza   Vaccine 0.5 milliLiter(s) IntraMuscular once  levETIRAcetam  IVPB 1000 milliGRAM(s) IV Intermittent every 12 hours  pantoprazole    Tablet 40 milliGRAM(s) Oral before breakfast  polyethylene glycol 3350 17 Gram(s) Oral two times a day  senna 2 Tablet(s) Oral at bedtime  sodium chloride 0.9%. 1000 milliLiter(s) (100 mL/Hr) IV Continuous <Continuous>    MEDICATIONS  (PRN):  acetaminophen   Tablet .. 650 milliGRAM(s) Oral every 6 hours PRN Temp greater or equal to 38C (100.4F), Mild Pain (1 - 3)  metoclopramide Injectable 10 milliGRAM(s) IV Push every 6 hours PRN Nausea and/or Vomiting  oxyCODONE    IR 5 milliGRAM(s) Oral every 4 hours PRN Moderate Pain (4 - 6)  oxyCODONE    IR 10 milliGRAM(s) Oral every 4 hours PRN Severe Pain (7 - 10)  sodium chloride 0.9% lock flush 10 milliLiter(s) IV Push every 1 hour PRN Pre/post blood products, medications, blood draw, and to maintain line patency    ALLERGIES/INTOLERANCES:  Allergies  No Known Allergies    VITALS & EXAMINATION:  Vital Signs Last 24 Hrs  T(C): 36.7 (2020 11:23), Max: 37.3 (2020 19:00)  T(F): 98.1 (2020 11:23), Max: 99.2 (2020 19:00)  HR: 76 (2020 11:23) (66 - 81)  BP: 113/73 (2020 11:23) (112/69 - 128/79)  BP(mean): 85 (2020 23:55) (81 - 93)  RR: 18 (2020 11:23) (13 - 21)  SpO2: 98% (2020 11:23) (98% - 100%)    Neurological (>12):  MS: Awake, alert, patient refuses to participate in the mental status portion of the examination.     Language: From the few words he uttered, patient appears to have clear speech, he refuses to name, repeat, but comprehension grossly appears intact as he is able to squeeze my fingers and let go of my fingers when asked.     CNs: btt intact, mild right nasolabial flattening (appears to be a baseline finding), hearing appears to be intact, furrows eyebrows symmetrically (though has the EEG monitoring head gear on)    Motor: moves all extremities equally, squeezes fingers with equal strength, no drift appreciated b/l    Sensation: intact sensation to light touch and temperature upon upper and lower extremities b/l      LABORATORY:  CBC                       10.4   9.47  )-----------( 241      ( 2020 06:24 )             31.8     Chem 02-    137  |  104  |  11  ----------------------------<  104<H>  3.4<L>   |  20<L>  |  0.41<L>    Ca    9.3      2020 06:22  Phos  3.6       Mg     1.8           LFTs   Coagulopathy PT/INR - ( 2020 22:33 )   PT: 12.7 sec;   INR: 1.10 ratio         PTT - ( 2020 22:33 )  PTT:30.0 sec  Lipid Panel   A1c   Cardiac enzymes     U/A Urinalysis Basic - ( 2020 05:44 )    Color: Light Yellow / Appearance: Clear / S.020 / pH: x  Gluc: x / Ketone: Negative  / Bili: Negative / Urobili: Negative   Blood: x / Protein: Negative / Nitrite: Negative   Leuk Esterase: Negative / RBC: 4 /hpf / WBC 1 /HPF   Sq Epi: x / Non Sq Epi: 1 /hpf / Bacteria: Negative    STUDIES & IMAGING:  Studies (EKG, EEG, EMG, etc):     Radiology (XR, CT, MR, U/S, TTE/SHEKHAR):    < from: CT Head No Cont (20 @ 12:04) >  Impression:    Increasing size of left frontotemporal extra-axial mixed density collection.    Evolving right cerebellar infarct without hemorrhagic transformation.    Postsurgical changes and/or ischemia with hemorrhage along the inferior left frontal lobe    < end of copied text >

## 2020-02-05 LAB
BLD GP AB SCN SERPL QL: NEGATIVE — SIGNIFICANT CHANGE UP
HCT VFR BLD CALC: 32.7 % — LOW (ref 39–50)
HGB BLD-MCNC: 10.6 G/DL — LOW (ref 13–17)
LDH SERPL L TO P-CCNC: 226 U/L — SIGNIFICANT CHANGE UP (ref 50–242)
MCHC RBC-ENTMCNC: 28.2 PG — SIGNIFICANT CHANGE UP (ref 27–34)
MCHC RBC-ENTMCNC: 32.4 GM/DL — SIGNIFICANT CHANGE UP (ref 32–36)
MCV RBC AUTO: 87 FL — SIGNIFICANT CHANGE UP (ref 80–100)
NRBC # BLD: 0 /100 WBCS — SIGNIFICANT CHANGE UP (ref 0–0)
PLATELET # BLD AUTO: 283 K/UL — SIGNIFICANT CHANGE UP (ref 150–400)
RBC # BLD: 3.76 M/UL — LOW (ref 4.2–5.8)
RBC # FLD: 19.9 % — HIGH (ref 10.3–14.5)
RH IG SCN BLD-IMP: POSITIVE — SIGNIFICANT CHANGE UP
WBC # BLD: 9.74 K/UL — SIGNIFICANT CHANGE UP (ref 3.8–10.5)
WBC # FLD AUTO: 9.74 K/UL — SIGNIFICANT CHANGE UP (ref 3.8–10.5)

## 2020-02-05 PROCEDURE — 99231 SBSQ HOSP IP/OBS SF/LOW 25: CPT

## 2020-02-05 PROCEDURE — 99232 SBSQ HOSP IP/OBS MODERATE 35: CPT | Mod: GC

## 2020-02-05 PROCEDURE — 70450 CT HEAD/BRAIN W/O DYE: CPT | Mod: 26

## 2020-02-05 PROCEDURE — 99233 SBSQ HOSP IP/OBS HIGH 50: CPT

## 2020-02-05 PROCEDURE — 95718 EEG PHYS/QHP 2-12 HR W/VEEG: CPT

## 2020-02-05 RX ORDER — OXYCODONE HYDROCHLORIDE 5 MG/1
5 TABLET ORAL EVERY 4 HOURS
Refills: 0 | Status: DISCONTINUED | OUTPATIENT
Start: 2020-02-05 | End: 2020-02-10

## 2020-02-05 RX ORDER — LEVETIRACETAM 250 MG/1
1500 TABLET, FILM COATED ORAL EVERY 12 HOURS
Refills: 0 | Status: DISCONTINUED | OUTPATIENT
Start: 2020-02-05 | End: 2020-02-10

## 2020-02-05 RX ORDER — LACOSAMIDE 50 MG/1
100 TABLET ORAL EVERY 12 HOURS
Refills: 0 | Status: DISCONTINUED | OUTPATIENT
Start: 2020-02-05 | End: 2020-02-06

## 2020-02-05 RX ADMIN — ATORVASTATIN CALCIUM 80 MILLIGRAM(S): 80 TABLET, FILM COATED ORAL at 23:28

## 2020-02-05 RX ADMIN — OXYCODONE HYDROCHLORIDE 10 MILLIGRAM(S): 5 TABLET ORAL at 08:20

## 2020-02-05 RX ADMIN — Medication 5 MILLIGRAM(S): at 23:28

## 2020-02-05 RX ADMIN — POLYETHYLENE GLYCOL 3350 17 GRAM(S): 17 POWDER, FOR SOLUTION ORAL at 18:31

## 2020-02-05 RX ADMIN — SENNA PLUS 2 TABLET(S): 8.6 TABLET ORAL at 23:28

## 2020-02-05 RX ADMIN — Medication 10 MILLIGRAM(S): at 09:06

## 2020-02-05 RX ADMIN — Medication 1 MILLIGRAM(S): at 11:52

## 2020-02-05 RX ADMIN — LACOSAMIDE 100 MILLIGRAM(S): 50 TABLET ORAL at 05:47

## 2020-02-05 RX ADMIN — ENOXAPARIN SODIUM 40 MILLIGRAM(S): 100 INJECTION SUBCUTANEOUS at 18:32

## 2020-02-05 RX ADMIN — HYDROXYUREA 1000 MILLIGRAM(S): 500 CAPSULE ORAL at 23:28

## 2020-02-05 RX ADMIN — Medication 650 MILLIGRAM(S): at 14:49

## 2020-02-05 RX ADMIN — Medication 650 MILLIGRAM(S): at 15:15

## 2020-02-05 RX ADMIN — PANTOPRAZOLE SODIUM 40 MILLIGRAM(S): 20 TABLET, DELAYED RELEASE ORAL at 05:47

## 2020-02-05 RX ADMIN — Medication 2 MILLIGRAM(S): at 01:30

## 2020-02-05 RX ADMIN — LEVETIRACETAM 400 MILLIGRAM(S): 250 TABLET, FILM COATED ORAL at 18:31

## 2020-02-05 RX ADMIN — LEVETIRACETAM 400 MILLIGRAM(S): 250 TABLET, FILM COATED ORAL at 05:47

## 2020-02-05 RX ADMIN — LACOSAMIDE 100 MILLIGRAM(S): 50 TABLET ORAL at 18:31

## 2020-02-05 RX ADMIN — OXYCODONE HYDROCHLORIDE 10 MILLIGRAM(S): 5 TABLET ORAL at 09:08

## 2020-02-05 NOTE — PROGRESS NOTE ADULT - SUBJECTIVE AND OBJECTIVE BOX
Patient is a 19y old  Male who presents with a chief complaint of intracranial aneurysms (05 Feb 2020 10:58)    SUBJECTIVE / OVERNIGHT EVENTS: Patient seen and examined at bedside. Patient a little more tired than last week, but overall getting better per the sister at bedside. He is able to talk and converse with me, although tired and thus slightly non-participatory. Currently with some headache which apparently improved since the administration of oxycodone. He is not able to open his left eyelid. Having new seizures.    MEDICATIONS  (STANDING):  atorvastatin 80 milliGRAM(s) Oral at bedtime  bisacodyl 5 milliGRAM(s) Oral at bedtime  enoxaparin Injectable 40 milliGRAM(s) SubCutaneous <User Schedule>  folic acid 1 milliGRAM(s) Oral daily  hydroxyurea 1000 milliGRAM(s) Oral daily  influenza   Vaccine 0.5 milliLiter(s) IntraMuscular once  lacosamide 100 milliGRAM(s) Oral every 12 hours  levETIRAcetam  IVPB 1500 milliGRAM(s) IV Intermittent every 12 hours  LORazepam   Injectable 1 milliGRAM(s) IV Push once  pantoprazole    Tablet 40 milliGRAM(s) Oral before breakfast  polyethylene glycol 3350 17 Gram(s) Oral two times a day  senna 2 Tablet(s) Oral at bedtime  sodium chloride 0.9%. 1000 milliLiter(s) (50 mL/Hr) IV Continuous <Continuous>    MEDICATIONS  (PRN):  acetaminophen   Tablet .. 650 milliGRAM(s) Oral every 6 hours PRN Temp greater or equal to 38C (100.4F), Mild Pain (1 - 3)  metoclopramide Injectable 10 milliGRAM(s) IV Push every 6 hours PRN Nausea and/or Vomiting  oxyCODONE    IR 5 milliGRAM(s) Oral every 4 hours PRN Moderate Pain (4 - 6)  oxyCODONE    IR 10 milliGRAM(s) Oral every 4 hours PRN Severe Pain (7 - 10)  sodium chloride 0.9% lock flush 10 milliLiter(s) IV Push every 1 hour PRN Pre/post blood products, medications, blood draw, and to maintain line patency    Vital Signs Last 24 Hrs  T(C): 37 (05 Feb 2020 11:16), Max: 37.4 (04 Feb 2020 16:30)  T(F): 98.6 (05 Feb 2020 11:16), Max: 99.4 (04 Feb 2020 16:30)  HR: 74 (05 Feb 2020 11:16) (59 - 87)  BP: 104/63 (05 Feb 2020 11:16) (103/64 - 113/71)  RR: 18 (05 Feb 2020 11:16) (18 - 20)  SpO2: 100% (05 Feb 2020 11:16) (97% - 100%)  CAPILLARY BLOOD GLUCOSE    I&O's Summary  04 Feb 2020 07:01  -  05 Feb 2020 07:00  --------------------------------------------------------  IN: 720 mL / OUT: 2200 mL / NET: -1480 mL    05 Feb 2020 07:01  -  05 Feb 2020 13:03  --------------------------------------------------------  IN: 240 mL / OUT: 0 mL / NET: 240 mL    PHYSICAL EXAM:  Patient speech grossly intact but with slight psychomotor retardation, appears tired, lying on right side. Cannot open left eyelid voluntarily. Left pupil dilated relative to right, about 6 mm, minimally reactive to light, right pupil and eyelid appear normal. Mouth smile is equal, eyebrows symmetric. Hearing grossly intact bilaterally. Tongue midline. Sensation grossly intact to touch on the fact bilaterally. Strength maybe some relative left sided weakness, but difficult to tell as patient is somewhat non-participatory. Sensation gross intact bilaterally. Heart RRR, no murmur, CTA bilaterally in posterior fields saturating well on RA.    LABS:                        10.6   9.74  )-----------( 283      ( 05 Feb 2020 11:30 )             32.7     02-04    137  |  104  |  11  ----------------------------<  104<H>  3.4<L>   |  20<L>  |  0.41<L>    Ca    9.3      04 Feb 2020 06:22  Phos  3.6     02-04  Mg     1.8     02-04    RADIOLOGY & ADDITIONAL TESTS:    CT reviewed, negative for acute ischemia some blood noted in area of recent instrumentation.    Consultant(s) Notes Reviewed: neurology noted, neurosurgery progress note noted, discussed with neurology and neurosurgery    Hector Herron MD, Internal Medicine Resident  [facility] Pager: [phone]; [facility] Pager: [phone] Patient is a 19y old  Male who presents with a chief complaint of intracranial aneurysms (05 Feb 2020 10:58)    SUBJECTIVE / OVERNIGHT EVENTS: Patient seen and examined at bedside. Patient a little more tired than last week, but overall getting better per the sister at bedside. He is able to talk and converse with me, although tired and thus slightly non-participatory. Currently with some headache which apparently improved since the administration of oxycodone. He is not able to open his left eyelid. Having new seizures.    MEDICATIONS  (STANDING):  atorvastatin 80 milliGRAM(s) Oral at bedtime  bisacodyl 5 milliGRAM(s) Oral at bedtime  enoxaparin Injectable 40 milliGRAM(s) SubCutaneous <User Schedule>  folic acid 1 milliGRAM(s) Oral daily  hydroxyurea 1000 milliGRAM(s) Oral daily  influenza   Vaccine 0.5 milliLiter(s) IntraMuscular once  lacosamide 100 milliGRAM(s) Oral every 12 hours  levETIRAcetam  IVPB 1500 milliGRAM(s) IV Intermittent every 12 hours  LORazepam   Injectable 1 milliGRAM(s) IV Push once  pantoprazole    Tablet 40 milliGRAM(s) Oral before breakfast  polyethylene glycol 3350 17 Gram(s) Oral two times a day  senna 2 Tablet(s) Oral at bedtime  sodium chloride 0.9%. 1000 milliLiter(s) (50 mL/Hr) IV Continuous <Continuous>    MEDICATIONS  (PRN):  acetaminophen   Tablet .. 650 milliGRAM(s) Oral every 6 hours PRN Temp greater or equal to 38C (100.4F), Mild Pain (1 - 3)  metoclopramide Injectable 10 milliGRAM(s) IV Push every 6 hours PRN Nausea and/or Vomiting  oxyCODONE    IR 5 milliGRAM(s) Oral every 4 hours PRN Moderate Pain (4 - 6)  oxyCODONE    IR 10 milliGRAM(s) Oral every 4 hours PRN Severe Pain (7 - 10)  sodium chloride 0.9% lock flush 10 milliLiter(s) IV Push every 1 hour PRN Pre/post blood products, medications, blood draw, and to maintain line patency    Vital Signs Last 24 Hrs  T(C): 37 (05 Feb 2020 11:16), Max: 37.4 (04 Feb 2020 16:30)  T(F): 98.6 (05 Feb 2020 11:16), Max: 99.4 (04 Feb 2020 16:30)  HR: 74 (05 Feb 2020 11:16) (59 - 87)  BP: 104/63 (05 Feb 2020 11:16) (103/64 - 113/71)  RR: 18 (05 Feb 2020 11:16) (18 - 20)  SpO2: 100% (05 Feb 2020 11:16) (97% - 100%)  CAPILLARY BLOOD GLUCOSE    I&O's Summary  04 Feb 2020 07:01  -  05 Feb 2020 07:00  --------------------------------------------------------  IN: 720 mL / OUT: 2200 mL / NET: -1480 mL    05 Feb 2020 07:01  -  05 Feb 2020 13:03  --------------------------------------------------------  IN: 240 mL / OUT: 0 mL / NET: 240 mL    PHYSICAL EXAM:  General: NAD, lying in bed  Neuro: Patient speech grossly intact but with slight psychomotor retardation, appears tired, lying on right side. Cannot open left eyelid voluntarily. Left pupil dilated relative to right, about 6 mm, minimally reactive to light, right pupil and eyelid appear normal. Mouth smile is equal, eyebrows symmetric. Hearing grossly intact bilaterally. Tongue midline. Sensation grossly intact to touch on the fact bilaterally. Strength maybe some relative left sided weakness, but difficult to tell as patient is somewhat non-participatory. Sensation gross intact bilaterally.   Heart: RRR, no murmur, no rub  Lungs: CTA bilaterally in posterior fields saturating well on RA.  Abdomen: no spleen palpated, nontender  Legs: No edema, no skin color changes no finger swelling      LABS:                        10.6   9.74  )-----------( 283      ( 05 Feb 2020 11:30 )             32.7     02-04    137  |  104  |  11  ----------------------------<  104<H>  3.4<L>   |  20<L>  |  0.41<L>    Ca    9.3      04 Feb 2020 06:22  Phos  3.6     02-04  Mg     1.8     02-04    RADIOLOGY & ADDITIONAL TESTS:    CT reviewed, negative for acute ischemia some blood noted in area of recent instrumentation.    Consultant(s) Notes Reviewed: neurology noted, neurosurgery progress note noted, discussed with neurology and neurosurgery    Hector Herron MD, Internal Medicine Resident  [facility] Pager: [phone]; [facility] Pager: [phone]

## 2020-02-05 NOTE — PROGRESS NOTE ADULT - ASSESSMENT
Assessment: 20yo man with sickle cell disease admitted for headaches s/p aneurysm clipping with Neurology consult for new onset seizures. Was notified by Neurosurgery PA that they witnessed patient having staring episode and then full body GTC which lasted about 30-40 seconds without tongue biting, urinary incontinence. Pending EEG correlate. Neuroimaging does not show new acute intracranial pathology.     Impression: ?seizures rule out infectious versus metabolic versus structural (had stroke in right frontal lobe that could be a potential source) versus potential moyamoya given several small vessel strokes (though may more likely be associated with sludging from sickle cell)    2/5/20: yesterday event likely seizure - pending EEG read from epilepsy team    Plan:  Imaging/Studies:   [ ] primary team can consider re-imaging with MRI   [ ] pending EEG read from Epilepsy team    Labs:   [ ] infectious workup per primary team    Meds:   [ ] keep keppra at 1g q12h  [ ] c/w vimpat 100 q12h    -Management & disposition discussed with Neurology Attending Dr. Zhu    ***NOTE INCOMPLETE AT THIS TIME**** Assessment: 20yo man with sickle cell disease admitted for headaches s/p aneurysm clipping with Neurology consult for new onset seizures. Was notified by Neurosurgery PA that they witnessed patient having staring episode and then full body GTC which lasted about 30-40 seconds without tongue biting, urinary incontinence. Pending EEG correlate. Neuroimaging does not show new acute intracranial pathology.     Impression: ?seizures rule out infectious versus metabolic versus structural (had stroke in right frontal lobe that could be a potential source) versus potential moyamoya given several small vessel strokes (though may more likely be associated with sludging from sickle cell)    2/5/20: yesterday event likely seizure - pending EEG read from epilepsy team    Plan:  Imaging/Studies:   [ ] primary team can consider re-imaging with MRI   [ ] pending EEG read from Epilepsy team  [ ] c/w EEG monitoring  [ ] seizure, aspiration precautions    Labs:   [ ] infectious workup per primary team    Meds:   [ ] increase keppra to 1500 q12h  [ ] c/w vimpat 100 q12h  [ ] will consider transition to ?dilantin    Primary team/ to ask family at bedside if they can inquire availability of AEDs in [place] and patient's financial status when he goes back if medications affordable    -Management & disposition discussed with Neurology Attending Dr. Zhu Assessment: 18yo man with sickle cell disease admitted for headaches s/p aneurysm clipping with Neurology consult for new onset seizures. Was notified by Neurosurgery PA that they witnessed patient having staring episode and then full body GTC which lasted about 30-40 seconds without tongue biting, urinary incontinence. Pending EEG correlate. Neuroimaging does not show new acute intracranial pathology.     Impression: ?seizures rule out infectious versus metabolic versus structural (had stroke in right frontal lobe that could be a potential source) versus potential moyamoya given several small vessel strokes (though may more likely be associated with sludging from sickle cell)    2/5/20: yesterday event likely seizure - pending EEG read from epilepsy team    Plan:  Imaging/Studies:   [ ] primary team can consider re-imaging with MRI   [ ] EEG - left frontal seizure is active focus  [ ] c/w EEG monitoring  [ ] seizure, aspiration precautions    Labs:   [ ] infectious workup per primary team    Meds:   [ ] increase keppra to 1500 q12h  [ ] c/w vimpat 100 q12h  [ ] will consider transition to ?dilantin    Primary team/ to ask family at bedside if they can inquire availability of AEDs (KEPPRA, DILANTIN, PHENOBARBITAL, CARBAMAZEPINE) in [place] and patient's financial status when he goes back if medications affordable    -Management & disposition discussed with Neurology Attending Dr. Zhu Assessment: 20yo man with sickle cell disease admitted for headaches s/p aneurysm clipping with Neurology consult for new onset seizures. Was notified by Neurosurgery PA that they witnessed patient having staring episode and then full body GTC which lasted about 30-40 seconds without tongue biting, urinary incontinence. Pending EEG correlate. Neuroimaging does not show new acute intracranial pathology.     Impression: ?seizures rule out infectious versus metabolic versus structural (had a PAST/OLD stroke in right frontal lobe that could be a potential source) versus potential moyamoya given several small vessel strokes (though may more likely be associated with sludging from sickle cell)    2/5/20: yesterday event likely seizure - pending EEG read from epilepsy team    Plan:  Imaging/Studies:   [ ] primary team can consider re-imaging with MRI   [ ] EEG - left frontal seizure is active focus  [ ] c/w EEG monitoring  [ ] seizure, aspiration precautions    Labs:   [ ] infectious workup per primary team    Meds:   [ ] increase keppra to 1500 q12h  [ ] c/w vimpat 100 q12h  [ ] will consider transition to ?dilantin    Primary team/ to ask family at bedside if they can inquire availability of AEDs (KEPPRA, DILANTIN, PHENOBARBITAL, CARBAMAZEPINE) in [place] and patient's financial status when he goes back if medications affordable    -Management & disposition discussed with Neurology Attending Dr. Zhu

## 2020-02-05 NOTE — PROGRESS NOTE ADULT - ASSESSMENT
19 year old male from [place] who has a history of Hb SS disease, brain aneurysm w/ recent admission 12/14-12/31 for pain crisis and subsequent acute chest syndrome as well as stroke requiring intubation and multiple exchange transfusions.   Pt had CT head and MRI/MRA which revealed multiple areas patchy ischemia w/ hemorrhagic transformation in the right superior cerebellar region as well as multiple areas of aneurysm. Pt was d/c home for outpt nsx f/up.  On 1/28/20 patient was admitted to [facility] due to c/o  headache.  He was noted to have  small CNS aneurysms and was admitted for a planned procedure possibly coiling of aneurysm.  On 1/29, he underwent left Craniotomy with clipping of L ICA terminus and L SCA aneurysms. c/w by RRT, post-seizure, for tachypnea/hypoxia 2/2/20. Seizure 2/3/20. arm

## 2020-02-05 NOTE — PROGRESS NOTE ADULT - SUBJECTIVE AND OBJECTIVE BOX
Clem Olsen   Pager [phone]  Office [phone]      CC: Patient is a 19y old  Male who presents with a chief complaint of intracranial aneurysms (05 Feb 2020 10:06)      SUBJECTIVE / OVERNIGHT EVENTS: Overnight events reviewed: Pt had another seizure overnight, witnessed by family. Pt denies f/c/r. Has an intermittent, left sided HA, 6/10, since surgery, that improves with meds. No CP/SOB. 1 episode of emesis- food, after eating yest.     MEDICATIONS  (STANDING):  atorvastatin 80 milliGRAM(s) Oral at bedtime  bisacodyl 5 milliGRAM(s) Oral at bedtime  enoxaparin Injectable 40 milliGRAM(s) SubCutaneous <User Schedule>  folic acid 1 milliGRAM(s) Oral daily  hydroxyurea 1000 milliGRAM(s) Oral daily  influenza   Vaccine 0.5 milliLiter(s) IntraMuscular once  lacosamide 100 milliGRAM(s) Oral every 12 hours  levETIRAcetam  IVPB 1500 milliGRAM(s) IV Intermittent every 12 hours  LORazepam   Injectable 1 milliGRAM(s) IV Push once  pantoprazole    Tablet 40 milliGRAM(s) Oral before breakfast  polyethylene glycol 3350 17 Gram(s) Oral two times a day  senna 2 Tablet(s) Oral at bedtime  sodium chloride 0.9%. 1000 milliLiter(s) (50 mL/Hr) IV Continuous <Continuous>    MEDICATIONS  (PRN):  acetaminophen   Tablet .. 650 milliGRAM(s) Oral every 6 hours PRN Temp greater or equal to 38C (100.4F), Mild Pain (1 - 3)  metoclopramide Injectable 10 milliGRAM(s) IV Push every 6 hours PRN Nausea and/or Vomiting  oxyCODONE    IR 5 milliGRAM(s) Oral every 4 hours PRN Moderate Pain (4 - 6)  oxyCODONE    IR 10 milliGRAM(s) Oral every 4 hours PRN Severe Pain (7 - 10)  sodium chloride 0.9% lock flush 10 milliLiter(s) IV Push every 1 hour PRN Pre/post blood products, medications, blood draw, and to maintain line patency      Vital Signs Last 24 Hrs  T(C): 36.7 (05 Feb 2020 07:29), Max: 37.4 (04 Feb 2020 16:30)  T(F): 98.1 (05 Feb 2020 07:29), Max: 99.4 (04 Feb 2020 16:30)  HR: 59 (05 Feb 2020 07:29) (59 - 87)  BP: 113/71 (05 Feb 2020 07:29) (103/64 - 113/73)  BP(mean): --  RR: 18 (05 Feb 2020 07:29) (18 - 20)  SpO2: 97% (05 Feb 2020 07:29) (97% - 100%)  CAPILLARY BLOOD GLUCOSE        I&O's Summary    04 Feb 2020 07:01  -  05 Feb 2020 07:00  --------------------------------------------------------  IN: 720 mL / OUT: 2200 mL / NET: -1480 mL          PHYSICAL EXAM:    GENERAL: NAD   HEENT: improved EOMI left eye  PULM: Clear to auscultation bilaterally  CV: Regular rate and rhythm; nl S1, S2; No murmurs, rubs, or gallops  ABDOMEN: Soft, Nontender, Nondistended; Bowel sounds present  EXTREMITIES/MSK:  No edema, calf tenderness   PSYCH: AAOx3  NEUROLOGY: non-focal          LABS:                        10.4   9.47  )-----------( 241      ( 04 Feb 2020 06:24 )             31.8     02-04    137  |  104  |  11  ----------------------------<  104<H>  3.4<L>   |  20<L>  |  0.41<L>    Ca    9.3      04 Feb 2020 06:22  Phos  3.6     02-04  Mg     1.8     02-04                  Culture - Blood (collected 02 Feb 2020 22:05)  Source: .Blood Blood  Preliminary Report (03 Feb 2020 23:01):    No growth to date.    Culture - Blood (collected 02 Feb 2020 21:33)  Source: .Blood Blood  Preliminary Report (03 Feb 2020 22:01):    No growth to date.    Culture - Urine (collected 02 Feb 2020 21:09)  Source: .Urine Clean Catch (Midstream)  Final Report (03 Feb 2020 16:47):    <10,000 CFU/mL Normal Urogenital Mera      RADIOLOGY & ADDITIONAL TESTS:    Imaging Personally Reviewed:    Consultant(s) Notes Reviewed:      Care Discussed with Consultants/Other Providers: neurosurg regard seizures

## 2020-02-05 NOTE — PROGRESS NOTE ADULT - SUBJECTIVE AND OBJECTIVE BOX
SUBJECTIVE:   Patient was seen and evaluated at bedside. Patient is resting in bed and is in no new acute distress.   OVERNIGHT EVENTS:   none   Vital Signs Last 24 Hrs  T(C): 36.7 (2020 07:29), Max: 37.4 (2020 16:30)  T(F): 98.1 (2020 07:29), Max: 99.4 (2020 16:30)  HR: 59 (2020 07:29) (59 - 87)  BP: 113/71 (2020 07:29) (103/64 - 113/73)  BP(mean): --  RR: 18 (2020 07:29) (18 - 20)  SpO2: 97% (2020 07:29) (97% - 100%)    PHYSICAL EXAM:    General: No Acute Distress     Neurological: wake alert oriented to person place and month, slow to respond, has mild right facial weakness and left 3rd nerve palsy. moving all extremities antigravity    Pulmonary: Clear to Auscultation, No Rales, No Rhonchi, No Wheezes     Cardiovascular: S1, S2, Regular Rate and Rhythm     Gastrointestinal: Soft, Nontender, Nondistended     Incision:   clean and dry   LABS:                        10.4   9.47  )-----------( 241      ( 2020 06:24 )             31.8        137  |  104  |  11  ----------------------------<  104<H>  3.4<L>   |  20<L>  |  0.41<L>    Ca    9.3      2020 06:22  Phos  3.6     02-04  Mg     1.8      @ 07:01  -  02-05 @ 07:00  --------------------------------------------------------  IN: 720 mL / OUT: 2200 mL / NET: -1480 mL      DRAINS:     MEDICATIONS:  Antibiotics:    Neuro:  acetaminophen   Tablet .. 650 milliGRAM(s) Oral every 6 hours PRN Temp greater or equal to 38C (100.4F), Mild Pain (1 - 3)  lacosamide 100 milliGRAM(s) Oral two times a day  levETIRAcetam  IVPB 1000 milliGRAM(s) IV Intermittent every 12 hours  metoclopramide Injectable 10 milliGRAM(s) IV Push every 6 hours PRN Nausea and/or Vomiting  oxyCODONE    IR 5 milliGRAM(s) Oral every 4 hours PRN Moderate Pain (4 - 6)  oxyCODONE    IR 10 milliGRAM(s) Oral every 4 hours PRN Severe Pain (7 - 10)    Cardiac:    Pulm:    GI/:  bisacodyl 5 milliGRAM(s) Oral at bedtime  pantoprazole    Tablet 40 milliGRAM(s) Oral before breakfast  polyethylene glycol 3350 17 Gram(s) Oral two times a day  senna 2 Tablet(s) Oral at bedtime    Other:   atorvastatin 80 milliGRAM(s) Oral at bedtime  enoxaparin Injectable 40 milliGRAM(s) SubCutaneous <User Schedule>  folic acid 1 milliGRAM(s) Oral daily  hydroxyurea 1000 milliGRAM(s) Oral daily  influenza   Vaccine 0.5 milliLiter(s) IntraMuscular once  sodium chloride 0.9% lock flush 10 milliLiter(s) IV Push every 1 hour PRN Pre/post blood products, medications, blood draw, and to maintain line patency  sodium chloride 0.9%. 1000 milliLiter(s) IV Continuous <Continuous>    DIET: [] Regular [] CCD [] Renal [] Puree [] Dysphagia [] Tube Feeds:   regular diet   IMAGIN/5 head ct : stable

## 2020-02-05 NOTE — PROGRESS NOTE ADULT - SUBJECTIVE AND OBJECTIVE BOX
SUBJECTIVE: patient seen and examined at bedside. EEG monitoring on. Per verbal read from Epilepsy, 2/4/20 witnessed seizure activity correlated with likely seizure focus left side more than right - pending vEEG read.    PAST MEDICAL & SURGICAL HISTORY:  Brain aneurysm  Sickle cell anemia  Sickle cell disease  No significant past surgical history      MEDICATIONS (HOME):  Home Medications:  acetaminophen 325 mg oral tablet: 2 tab(s) orally every 6 hours, As needed, Mild Pain (1 - 3) (15 Truman 2020 15:46)  Aspir 81 oral delayed release tablet: 1 tab(s) orally once a day (15 Truman 2020 10:22)    MEDICATIONS  (STANDING):  atorvastatin 80 milliGRAM(s) Oral at bedtime  bisacodyl 5 milliGRAM(s) Oral at bedtime  enoxaparin Injectable 40 milliGRAM(s) SubCutaneous <User Schedule>  folic acid 1 milliGRAM(s) Oral daily  hydroxyurea 1000 milliGRAM(s) Oral daily  influenza   Vaccine 0.5 milliLiter(s) IntraMuscular once  lacosamide 100 milliGRAM(s) Oral two times a day  levETIRAcetam  IVPB 1000 milliGRAM(s) IV Intermittent every 12 hours  pantoprazole    Tablet 40 milliGRAM(s) Oral before breakfast  polyethylene glycol 3350 17 Gram(s) Oral two times a day  senna 2 Tablet(s) Oral at bedtime  sodium chloride 0.9%. 1000 milliLiter(s) (50 mL/Hr) IV Continuous <Continuous>    MEDICATIONS  (PRN):  acetaminophen   Tablet .. 650 milliGRAM(s) Oral every 6 hours PRN Temp greater or equal to 38C (100.4F), Mild Pain (1 - 3)  metoclopramide Injectable 10 milliGRAM(s) IV Push every 6 hours PRN Nausea and/or Vomiting  oxyCODONE    IR 5 milliGRAM(s) Oral every 4 hours PRN Moderate Pain (4 - 6)  oxyCODONE    IR 10 milliGRAM(s) Oral every 4 hours PRN Severe Pain (7 - 10)  sodium chloride 0.9% lock flush 10 milliLiter(s) IV Push every 1 hour PRN Pre/post blood products, medications, blood draw, and to maintain line patency    ALLERGIES/INTOLERANCES:  Allergies  No Known Allergies    VITALS & EXAMINATION:  Vital Signs Last 24 Hrs  T(C): 36.7 (05 Feb 2020 07:29), Max: 37.4 (04 Feb 2020 16:30)  T(F): 98.1 (05 Feb 2020 07:29), Max: 99.4 (04 Feb 2020 16:30)  HR: 59 (05 Feb 2020 07:29) (59 - 87)  BP: 113/71 (05 Feb 2020 07:29) (103/64 - 113/73)  BP(mean): --  RR: 18 (05 Feb 2020 07:29) (18 - 20)  SpO2: 97% (05 Feb 2020 07:29) (97% - 100%)    Neurological (>12):  MS: Awake, alert, patient refuses to participate in the mental status portion of the examination.     Language: From the few words he uttered, patient appears to have clear speech, he refuses to name, repeat, but comprehension grossly appears intact as he is able to squeeze my fingers and let go of my fingers when asked.     CNs: btt intact, mild right nasolabial flattening (appears to be a baseline finding), hearing appears to be intact, furrows eyebrows symmetrically (though has the EEG monitoring head gear on)    Motor: moves all extremities equally, squeezes fingers with equal strength, no drift appreciated b/l    Sensation: intact sensation to light touch and temperature upon upper and lower extremities b/l    LABORATORY:  CBC                       10.4   9.47  )-----------( 241      ( 04 Feb 2020 06:24 )             31.8     Chem 02-04    137  |  104  |  11  ----------------------------<  104<H>  3.4<L>   |  20<L>  |  0.41<L>    Ca    9.3      04 Feb 2020 06:22  Phos  3.6     02-04  Mg     1.8     02-04      STUDIES & IMAGING:  Studies (EKG, EEG, EMG, etc):     Radiology (XR, CT, MR, U/S, TTE/SHEKHAR):    < from: CT Head No Cont (02.04.20 @ 12:04) >  Impression:    Increasing size of left frontotemporal extra-axial mixed density collection.    Evolving right cerebellar infarct without hemorrhagic transformation.    Postsurgical changes and/or ischemia with hemorrhage along the inferior left frontal lobe    < end of copied text > SUBJECTIVE: patient seen and examined at bedside. EEG monitoring on. Per verbal read from Epilepsy, 2/4/20 witnessed seizure activity correlated with left frontal seizure - pending vEEG read.     PAST MEDICAL & SURGICAL HISTORY:  Brain aneurysm  Sickle cell anemia  Sickle cell disease  No significant past surgical history      MEDICATIONS (HOME):  Home Medications:  acetaminophen 325 mg oral tablet: 2 tab(s) orally every 6 hours, As needed, Mild Pain (1 - 3) (15 Truman 2020 15:46)  Aspir 81 oral delayed release tablet: 1 tab(s) orally once a day (15 Truman 2020 10:22)    MEDICATIONS  (STANDING):  atorvastatin 80 milliGRAM(s) Oral at bedtime  bisacodyl 5 milliGRAM(s) Oral at bedtime  enoxaparin Injectable 40 milliGRAM(s) SubCutaneous <User Schedule>  folic acid 1 milliGRAM(s) Oral daily  hydroxyurea 1000 milliGRAM(s) Oral daily  influenza   Vaccine 0.5 milliLiter(s) IntraMuscular once  lacosamide 100 milliGRAM(s) Oral two times a day  levETIRAcetam  IVPB 1000 milliGRAM(s) IV Intermittent every 12 hours  pantoprazole    Tablet 40 milliGRAM(s) Oral before breakfast  polyethylene glycol 3350 17 Gram(s) Oral two times a day  senna 2 Tablet(s) Oral at bedtime  sodium chloride 0.9%. 1000 milliLiter(s) (50 mL/Hr) IV Continuous <Continuous>    MEDICATIONS  (PRN):  acetaminophen   Tablet .. 650 milliGRAM(s) Oral every 6 hours PRN Temp greater or equal to 38C (100.4F), Mild Pain (1 - 3)  metoclopramide Injectable 10 milliGRAM(s) IV Push every 6 hours PRN Nausea and/or Vomiting  oxyCODONE    IR 5 milliGRAM(s) Oral every 4 hours PRN Moderate Pain (4 - 6)  oxyCODONE    IR 10 milliGRAM(s) Oral every 4 hours PRN Severe Pain (7 - 10)  sodium chloride 0.9% lock flush 10 milliLiter(s) IV Push every 1 hour PRN Pre/post blood products, medications, blood draw, and to maintain line patency    ALLERGIES/INTOLERANCES:  Allergies  No Known Allergies    VITALS & EXAMINATION:  Vital Signs Last 24 Hrs  T(C): 36.7 (05 Feb 2020 07:29), Max: 37.4 (04 Feb 2020 16:30)  T(F): 98.1 (05 Feb 2020 07:29), Max: 99.4 (04 Feb 2020 16:30)  HR: 59 (05 Feb 2020 07:29) (59 - 87)  BP: 113/71 (05 Feb 2020 07:29) (103/64 - 113/73)  BP(mean): --  RR: 18 (05 Feb 2020 07:29) (18 - 20)  SpO2: 97% (05 Feb 2020 07:29) (97% - 100%)    Neurological (>12):  MS: Awake, alert, patient refuses to participate in the mental status portion of the examination.     Language: From the few words he uttered, patient appears to have clear speech, he refuses to name, repeat, but comprehension grossly appears intact as he is able to squeeze my fingers and let go of my fingers when asked.     CNs: btt intact, mild right nasolabial flattening (appears to be a baseline finding), hearing appears to be intact, furrows eyebrows symmetrically (though has the EEG monitoring head gear on)    Motor: moves all extremities equally, squeezes fingers with equal strength, no drift appreciated b/l    Sensation: intact sensation to light touch and temperature upon upper and lower extremities b/l    LABORATORY:  CBC                       10.4   9.47  )-----------( 241      ( 04 Feb 2020 06:24 )             31.8     Chem 02-04    137  |  104  |  11  ----------------------------<  104<H>  3.4<L>   |  20<L>  |  0.41<L>    Ca    9.3      04 Feb 2020 06:22  Phos  3.6     02-04  Mg     1.8     02-04      STUDIES & IMAGING:  Studies (EKG, EEG, EMG, etc):     Radiology (XR, CT, MR, U/S, TTE/SHEKHAR):    < from: CT Head No Cont (02.04.20 @ 12:04) >  Impression:    Increasing size of left frontotemporal extra-axial mixed density collection.    Evolving right cerebellar infarct without hemorrhagic transformation.    Postsurgical changes and/or ischemia with hemorrhage along the inferior left frontal lobe    < end of copied text > SUBJECTIVE: patient seen and examined at bedside. EEG monitoring on. Per verbal read from Epilepsy, 2/4/20 witnessed seizure activity correlated with left frontal seizure - pending vEEG read.     PAST MEDICAL & SURGICAL HISTORY:  Brain aneurysm  Sickle cell anemia  Sickle cell disease  No significant past surgical history      MEDICATIONS (HOME):  Home Medications:  acetaminophen 325 mg oral tablet: 2 tab(s) orally every 6 hours, As needed, Mild Pain (1 - 3) (15 Truman 2020 15:46)  Aspir 81 oral delayed release tablet: 1 tab(s) orally once a day (15 Truman 2020 10:22)    MEDICATIONS  (STANDING):  atorvastatin 80 milliGRAM(s) Oral at bedtime  bisacodyl 5 milliGRAM(s) Oral at bedtime  enoxaparin Injectable 40 milliGRAM(s) SubCutaneous <User Schedule>  folic acid 1 milliGRAM(s) Oral daily  hydroxyurea 1000 milliGRAM(s) Oral daily  influenza   Vaccine 0.5 milliLiter(s) IntraMuscular once  lacosamide 100 milliGRAM(s) Oral two times a day  levETIRAcetam  IVPB 1000 milliGRAM(s) IV Intermittent every 12 hours  pantoprazole    Tablet 40 milliGRAM(s) Oral before breakfast  polyethylene glycol 3350 17 Gram(s) Oral two times a day  senna 2 Tablet(s) Oral at bedtime  sodium chloride 0.9%. 1000 milliLiter(s) (50 mL/Hr) IV Continuous <Continuous>    MEDICATIONS  (PRN):  acetaminophen   Tablet .. 650 milliGRAM(s) Oral every 6 hours PRN Temp greater or equal to 38C (100.4F), Mild Pain (1 - 3)  metoclopramide Injectable 10 milliGRAM(s) IV Push every 6 hours PRN Nausea and/or Vomiting  oxyCODONE    IR 5 milliGRAM(s) Oral every 4 hours PRN Moderate Pain (4 - 6)  oxyCODONE    IR 10 milliGRAM(s) Oral every 4 hours PRN Severe Pain (7 - 10)  sodium chloride 0.9% lock flush 10 milliLiter(s) IV Push every 1 hour PRN Pre/post blood products, medications, blood draw, and to maintain line patency    ALLERGIES/INTOLERANCES:  Allergies  No Known Allergies    VITALS & EXAMINATION:  Vital Signs Last 24 Hrs  T(C): 36.7 (05 Feb 2020 07:29), Max: 37.4 (04 Feb 2020 16:30)  T(F): 98.1 (05 Feb 2020 07:29), Max: 99.4 (04 Feb 2020 16:30)  HR: 59 (05 Feb 2020 07:29) (59 - 87)  BP: 113/71 (05 Feb 2020 07:29) (103/64 - 113/73)  BP(mean): --  RR: 18 (05 Feb 2020 07:29) (18 - 20)  SpO2: 97% (05 Feb 2020 07:29) (97% - 100%)    Neurological (>12):  MS: Awake, alert, patient refuses to participate in the mental status portion of the examination.     Language: From the few words he uttered, patient appears to have clear speech, he refuses to name, repeat, but comprehension grossly appears intact as he is able to squeeze my fingers and let go of my fingers when asked.     CNs: btt intact, mild right nasolabial flattening (appears to be a baseline finding), hearing appears to be intact, furrows eyebrows symmetrically (though has the EEG monitoring head gear on)    Motor: moves all extremities equally, squeezes fingers with equal strength, no drift appreciated b/l    Sensation: intact sensation to light touch and temperature upon upper and lower extremities b/l    LABORATORY:  CBC                       10.4   9.47  )-----------( 241      ( 04 Feb 2020 06:24 )             31.8     Chem 02-04    137  |  104  |  11  ----------------------------<  104<H>  3.4<L>   |  20<L>  |  0.41<L>    Ca    9.3      04 Feb 2020 06:22  Phos  3.6     02-04  Mg     1.8     02-04      STUDIES & IMAGING:  Studies (EKG, EEG, EMG, etc):     Radiology (XR, CT, MR, U/S, TTE/SHEKHAR):    < from: CT Head No Cont (02.04.20 @ 12:04) >  Impression:    Increasing size of left frontotemporal extra-axial mixed density collection.    Evolving right cerebellar infarct without hemorrhagic transformation.    Postsurgical changes and/or ischemia with hemorrhage along the inferior left frontal lobe    < end of copied text >    EEG from 2/4:    Clinical Impression:  Active seizure focus in the left frontal region  Moderate multifocal cerebral dysfunction, persistently worse in the left hemisphere  There were no epileptiform abnormalities recorded.      Patient pulled off the electrodes after 17:30 sequentially and refused to have them reconnected.

## 2020-02-05 NOTE — PROGRESS NOTE ADULT - SUBJECTIVE AND OBJECTIVE BOX
CC: f/u for   fever  Patient reports  feels ok  REVIEW OF SYSTEMS:  All other review of systems negative (Comprehensive ROS)    Antimicrobials Day #  :    Other Medications Reviewed    T(F): 99 (02-05-20 @ 15:25), Max: 99.1 (02-04-20 @ 19:47)  HR: 73 (02-05-20 @ 15:25)  BP: 117/73 (02-05-20 @ 15:25)  RR: 18 (02-05-20 @ 15:25)  SpO2: 100% (02-05-20 @ 15:25)  Wt(kg): --    PHYSICAL EXAM:  General: sleeps but rouses no acute distress  head wound clean and intact, dry  Eyes:  anicteric, no conjunctival injection, no discharge  Oropharynx: no lesions or injection 	  Neck: supple, without adenopathy  Lungs: clear to auscultation  Heart: regular rate and rhythm; no murmur, rubs or gallops  Abdomen: soft, nondistended, nontender, without mass or organomegaly  Skin: no lesions  Extremities: no clubbing, cyanosis, or edema  Neurologic:  oriented, moves all extremities    LAB RESULTS:                        10.6   9.74  )-----------( 283      ( 05 Feb 2020 11:30 )             32.7     02-04    137  |  104  |  11  ----------------------------<  104<H>  3.4<L>   |  20<L>  |  0.41<L>    Ca    9.3      04 Feb 2020 06:22  Phos  3.6     02-04  Mg     1.8     02-04          MICROBIOLOGY:  RECENT CULTURES:  02-02 @ 22:05 .Blood Blood     No growth to date.      02-02 @ 21:33 .Blood Blood     No growth to date.      02-02 @ 21:09 .Urine Clean Catch (Midstream)     <10,000 CFU/mL Normal Urogenital Mera          RADIOLOGY REVIEWED:        < from: CT Head No Cont (02.05.20 @ 08:58) >    IMPRESSION:     Left pterionalcraniotomy with interval decrease in left frontotemporal mixed density extra-axial collection as above. No hydrocephalus or midline shift.    New areas of hypoattenuation likely represents streak artifact. A repeat CT can be obtained for further evaluation.   Evolving right cerebellar hemisphere infarct.      < end of copied text >        Assessment:  Patient with sickle cell disease, had recent chest syndrome, found to have new strokes and aneurysms, s/p craniotomy for aneurysm clip.. Had post op fever now resolved, Post op seizures being addressed. No infection apparent at present.   Plan:  monitor off antibiotics

## 2020-02-05 NOTE — PROVIDER CONTACT NOTE (OTHER) - ACTION/TREATMENT ORDERED:
2nd IV placed, Fuid bolus, nonrebreather maintained, oriented patient. IV tylenol for head pain, completed STAT meropenem. taken down for emergent CT to R/O PE vs increase in SDH. tx to NSCU if +
2mg ativan ordered and given. Vimpat and Keppra to be given with 0600 medications. Continue to monitor. Continuous pulse ox remains in place.
Give Tylenol IVPB. No other interventions at this time. Will continue to monitor.
PAN culture set, Chest X-ray, completed as ordered. Tylenol 650mg administered as per order. Refer to eMAR for details. Will continue to monitor closely.
Patient prefers ice packs and not cooling blanket. Ice packs applied as per protocol. Will continue to monitor closely.

## 2020-02-05 NOTE — PROGRESS NOTE ADULT - ATTENDING COMMENTS
19 year old M with Hb SS s/p recent long hospitalization for acute chest syndrome requiring intubation and exchange transfusion c/b stroke who on MRI was found to have multiple aneurysms c/b recurrent headaches, who presents for neurosurgical intervention upon brain aneurysms prior to returning home to [facility]. Hematology consulted for matilda-operative management from perspective of Hb SS disease.    HbSS disease- s/p exchange transfusion in 12/19 and then pRBC transfusion this admission prior to surgery to get hgb > 10  -s/p left Craniotomy with clipping of Left ICA terminus and Left SCA aneurysms s/p aneurysm repair done on 1-   -now with new seizures, left third nerve palsy  - monitor CBC daily  - F/U Hgb electrophoresis -> repeat pending 2/5/2020  -Goal is to maintain Hgb SS <30% to lower risk of stroke  - patient with new seizures, left third nerve palsy post-operatively  - MRI brain pending  - Lovenox adequate for prophylaxis.  -Hematology will continue to follow

## 2020-02-05 NOTE — EEG REPORT - NS EEG TEXT BOX
TIFFANY LUONG [id number]     Study Date: 		  02-04-20 08:00  02-05-20 17:30  11.5h    Continuous EEG    Technical Information:			  		  Placement and Labeling of Electrodes:  The EEG was performed utilizing 20 channels referential EEG connections (coronal over temporal over parasagittal montage) using all standard 10-20 electrode placements with EKG.  Recording was at a sampling rate of 256 samples per second per channel.  Time synchronized digital video recording was done simultaneously with EEG recording.  A low light infrared camera was used for low light recording.  Jose and seizure detection algorithms were utilized.    CSA Technical Component:  Quantitative EEG analysis using a separate Compressed Spectral Array (CSA) software package was conducted in real-time and run at bedside after set up by the technician, digitally displaying the power of electrographic frequencies included in the 1-30Hz band using a graded color map.  This data was reviewed and interpreted independently, and is reported in a separate section below.    --------------------------------------------------------------------------------------------------  History:  CC/ HPI Patient is a 19y old  Male who presents with a chief complaint of intracranial aneurysms (03 Feb 2020 11:26)    MEDICATIONS  (STANDING):  atorvastatin 80 milliGRAM(s) Oral at bedtime  bisacodyl 5 milliGRAM(s) Oral at bedtime  chlorhexidine 4% Liquid 1 Application(s) Topical <User Schedule>  dexMEDEtomidine Infusion 0.3 MICROgram(s)/kG/Hr (4.275 mL/Hr) IV Continuous <Continuous>  enoxaparin Injectable 40 milliGRAM(s) SubCutaneous <User Schedule>  folic acid 1 milliGRAM(s) Oral daily  hydroxyurea 1000 milliGRAM(s) Oral daily  influenza   Vaccine 0.5 milliLiter(s) IntraMuscular once  levETIRAcetam  IVPB 750 milliGRAM(s) IV Intermittent every 12 hours  pantoprazole    Tablet 40 milliGRAM(s) Oral before breakfast  polyethylene glycol 3350 17 Gram(s) Oral two times a day  senna 2 Tablet(s) Oral at bedtime  sodium chloride 0.9%. 1000 milliLiter(s) (100 mL/Hr) IV Continuous <Continuous>    --------------------------------------------------------------------------------------------------  Study Interpretation:    [[[Abbreviation Key:  PDR=alpha rhythm/posterior dominant rhythm. A-P=anterior posterior gradient.  Amplitude: ‘very low’:<20; ‘low’:20-50; ‘medium’:; ‘high’:>200uV.  Persistence for periodic/rhythmic patterns (% of epoch) ‘rare’:<1%; ‘occasional’:1-10%; ‘frequent’:10-50%; ‘abundant’:50-90%; ‘continuous’:>90%.  Persistence for sporadic discharges: ‘rare’:<1/hr; ‘occasional’:1/min-1/hr; ‘frequent’:>1/min; ‘abundant’:>1/10 sec.  GRDA=generalized rhythmic delta activity, LRDA=lateralized rhythmic delta activity, TIRDA=temporal intermittent rhythmic delta activity, FIRDA=frontal intermittent rhythmic activity. LPD=PLED=lateralized periodic discharges, GPD=generalized periodic discharges, BiPDs=BiPLEDs=bilateral independent periodic epileptiform discharges, SIRPID=stimulus induced rhythmic, periodic, or ictal appearing discharges.  Modifiers: +F=with fast component, +S=with spike component, +R=with rhythmic component.  S-B=burst suppression pattern.  Max=maximal. N1-drowsy, N2-stage II sleep, N3-slow wave sleep.  HV=hyperventilation, PS=photic stimulation]]]    FINDINGS:  The background was continuous, spontaneously variable and reactive.  During wakefulness, the posteriorly dominant rhythm was up to 8hz over the right only.  There was diffuse irregular theta and delta activity present, brief runs of rhythmic delta persistently more prominent over the left frontal>temporal region.    Sleep Background:  Drowsiness was characterized by fragmentation, attenuation, and slowing of the background activity.    Stage II sleep transients were characterized by sleep spindles and K complexes.    Epileptiform Activity:   d1 11:29:39		Arousal  d1 11:29:45		seizure onset  d1 11:29:45		left frontal fast activity evolving  d1 11:30:21		seizure spreading to right hemisphere  d1 11:30:27		MD at bedside  d1 11:30:36		arms extend, eyes open. clonus noted per MD  d1 11:30:37		generalization  d1 11:30:41		right arm waving, some deviation to right of head  d1 11:30:58		figure of 4 with left arm extended, right arm flexed  d1 11:31:23		seizure ending over the right posterior head region near offset, wtih periodic 2 hz spiking    Events:  No clinical events were recorded.  No seizures were recorded.    Activation Procedures:   -Hyperventilation was not performed.    -Photic stimulation was not performed.    Artifacts:  Intermittent myogenic and movement artifacts were noted.  Patient pulled off the electrodes after 17:30 sequentially and refused to have them reconnected.    ECG:  The heart rate on single channel ECG at baseline was predominantly near BPM = 60-70  -----------------------------------------------------------------------------------------------------    EEG Classification / Summary:  Abnormal EEG study  Secondarily generalized left frontal onset seizure noted at 11:29.  There was diffuse irregular theta and delta activity present, brief runs of rhythmic delta persistently more prominent over the left frontal>temporal region.  Left sided attenuation of fast activity    -----------------------------------------------------------------------------------------------------    Clinical Impression:  Active seizure focus in the left frontal region  Moderate multifocal cerebral dysfunction, persistently worse in the left hemisphere  There were no epileptiform abnormalities recorded.      Patient pulled off the electrodes after 17:30 sequentially and refused to have them reconnected.    -------------------------------------------------------------------------------------------------------  Dee Arce DO  Epilepsy Fellow, [facility]    MD CATHERINE Duarte  Director, Continuous EEG Monitoring Service, [facility]    and Epilepsy Fellowship , Department of Neurology  [facility]

## 2020-02-05 NOTE — PROGRESS NOTE ADULT - ASSESSMENT
HPI:  19M pmhx SSD several known unruptured aneurysms largest is L ICA s/p DSA 1/14/2020 presents for gradual worsening headache since yesterday, neurointact (27 Jan 2020 14:00)    PROCEDURE:    s/p left craniotomy and clipping of left ica terminus aneurysm and left pca artery aneurysm on 1/29   POD#  6  PLAN:  1 continue eeg   2 increased keppra and added vimpat-monitor for seizure   3 epilepsy consult appreciated   4 prn pain meds   5 room air   6 bp stable   7 regular diet   8 stool softeners   9 continue hydroxyurea -for sickle cell   10 continue iv fluids at 50 cc   11 dvt ppx sql and scds   12 dispo :p likely home once medically stable     Assessment:  Please Check When Present   []  GCS  E   V  M     Heart Failure: []Acute, [] acute on chronic , []chronic  Heart Failure:  [] Diastolic (HFpEF), [] Systolic (HFrEF), []Combined (HFpEF and HFrEF), [] RHF, [] Pulm HTN, [] Other    [] ISABELL, [] ATN, [] AIN, [] other  [] CKD1, [] CKD2, [] CKD 3, [] CKD 4, [] CKD 5, []ESRD    Encephalopathy: [] Metabolic, [] Hepatic, [] toxic, [] Neurological, [] Other    Abnormal Nurtitional Status: [] malnurtition (see nutrition note), [ ]underweight: BMI < 19, [] morbid obesity: BMI >40, [] Cachexia    [] Sepsis  [] hypovolemic shock,[] cardiogenic shock, [] hemorrhagic shock, [] neuogenic shock  [] Acute Respiratory Failure  []Cerebral edema, [] Brain compression/ herniation,   [] Functional quadriplegia  [] Acute blood loss anemia

## 2020-02-05 NOTE — PROVIDER CONTACT NOTE (OTHER) - ASSESSMENT
Pt A&Ox4 at baseline, minimally verbal, asks to be left alone. L head staples MARISOL. Sister reported seizure at bedside, team came in, oxygen and suctioning provided; pulse oximetry in place. Tonic clonic shaking witnessed; 2mg ativan given IVP. Pt began following commands after ~5 min; remains lethargic and minimally verbal.

## 2020-02-05 NOTE — PROGRESS NOTE ADULT - ASSESSMENT
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 Hematology consulted for matilda-operative management from perspective of Hb SS disease.    HbSS disease- s/p left Craniotomy with clipping of L ICA terminus and L SCA aneurysms s/p aneurysm repair done on 1- now with new seizures, left third nerve palsy  - Hgb 10.6; no current role for transfusion  - monitor CBC with diff daily  - F/U Hgb electrophoresis -> repeat pending 2/5/2020, conveyed to primary team member ebony Hill  - patient with new seizures, left third nerve palsy post-operatively, ?weakness -> would consider repeat MRI of brain to a/f new ischemic stroke in consultation with neurology as the etiology of this could potentially be sickling in the brain; please consult transfusion medicine to make them aware in case patient is candidate for exchange transfusion.  - post-operative management should focus on minimizing hypoxia, hypothermia, acidosis, and intravascular volume depletion.  - Please encourage frequent incentive spirometer use if safe from neurosurgical standpoint  - Thrombocytosis: recently started on ASA, would hold matilda-operatively.  - Lovenox adequate for clot prophylaxis.    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 Hematology consulted for matilda-operative management from perspective of Hb SS disease.    HbSS disease- s/p left Craniotomy with clipping of L ICA terminus and L SCA aneurysms s/p aneurysm repair done on 1- now with new seizures, left third nerve palsy  - Hgb 10.6; no current role for transfusion  - monitor CBC with diff daily  - F/U Hgb electrophoresis -> repeat pending 2/5/2020, conveyed to primary team member ebony Hill  - patient with new seizures, left third nerve palsy post-operatively, ?weakness -> would consider repeat MRI of brain to a/f new ischemic stroke in consultation with neurology as the etiology of this could potentially be sickling in the brain; please consult transfusion medicine to make them aware in case patient is candidate for exchange transfusion.  - post-operative management should focus on minimizing hypoxia, hypothermia, acidosis, and intravascular volume depletion.  - Please encourage frequent incentive spirometer use if safe from neurosurgical standpoint  - Thrombocytosis: recently started on ASA, would hold matilda-operatively.  - Lovenox adequate for prophylaxis.    d/w primary neurosurgical team    Hector Herron MD  Internal Medicine Resident  [phone] [facility]  [id number] [facility]

## 2020-02-06 ENCOUNTER — APPOINTMENT (OUTPATIENT)
Dept: NEUROSURGERY | Facility: CLINIC | Age: 20
End: 2020-02-06
Payer: MEDICAID

## 2020-02-06 DIAGNOSIS — K59.00 CONSTIPATION, UNSPECIFIED: ICD-10-CM

## 2020-02-06 LAB
ANION GAP SERPL CALC-SCNC: 15 MMOL/L — SIGNIFICANT CHANGE UP (ref 5–17)
BASOPHILS # BLD AUTO: 0 K/UL — SIGNIFICANT CHANGE UP (ref 0–0.2)
BASOPHILS NFR BLD AUTO: 0 % — SIGNIFICANT CHANGE UP (ref 0–2)
BUN SERPL-MCNC: 12 MG/DL — SIGNIFICANT CHANGE UP (ref 7–23)
CALCIUM SERPL-MCNC: 9.4 MG/DL — SIGNIFICANT CHANGE UP (ref 8.4–10.5)
CHLORIDE SERPL-SCNC: 103 MMOL/L — SIGNIFICANT CHANGE UP (ref 96–108)
CO2 SERPL-SCNC: 20 MMOL/L — LOW (ref 22–31)
CREAT SERPL-MCNC: 0.36 MG/DL — LOW (ref 0.5–1.3)
EOSINOPHIL # BLD AUTO: 0.27 K/UL — SIGNIFICANT CHANGE UP (ref 0–0.5)
EOSINOPHIL NFR BLD AUTO: 3.4 % — SIGNIFICANT CHANGE UP (ref 0–6)
GLUCOSE SERPL-MCNC: 106 MG/DL — HIGH (ref 70–99)
HCT VFR BLD CALC: 31.8 % — LOW (ref 39–50)
HGB BLD-MCNC: 10.2 G/DL — LOW (ref 13–17)
IMM GRANULOCYTES NFR BLD AUTO: 0.4 % — SIGNIFICANT CHANGE UP (ref 0–1.5)
LYMPHOCYTES # BLD AUTO: 2.72 K/UL — SIGNIFICANT CHANGE UP (ref 1–3.3)
LYMPHOCYTES # BLD AUTO: 34.5 % — SIGNIFICANT CHANGE UP (ref 13–44)
MCHC RBC-ENTMCNC: 27.9 PG — SIGNIFICANT CHANGE UP (ref 27–34)
MCHC RBC-ENTMCNC: 32.1 GM/DL — SIGNIFICANT CHANGE UP (ref 32–36)
MCV RBC AUTO: 86.9 FL — SIGNIFICANT CHANGE UP (ref 80–100)
MONOCYTES # BLD AUTO: 0.79 K/UL — SIGNIFICANT CHANGE UP (ref 0–0.9)
MONOCYTES NFR BLD AUTO: 10 % — SIGNIFICANT CHANGE UP (ref 2–14)
NEUTROPHILS # BLD AUTO: 4.08 K/UL — SIGNIFICANT CHANGE UP (ref 1.8–7.4)
NEUTROPHILS NFR BLD AUTO: 51.7 % — SIGNIFICANT CHANGE UP (ref 43–77)
NRBC # BLD: 0 /100 WBCS — SIGNIFICANT CHANGE UP (ref 0–0)
PLATELET # BLD AUTO: 320 K/UL — SIGNIFICANT CHANGE UP (ref 150–400)
POTASSIUM SERPL-MCNC: 3.7 MMOL/L — SIGNIFICANT CHANGE UP (ref 3.5–5.3)
POTASSIUM SERPL-SCNC: 3.7 MMOL/L — SIGNIFICANT CHANGE UP (ref 3.5–5.3)
RBC # BLD: 3.66 M/UL — LOW (ref 4.2–5.8)
RBC # FLD: 19.6 % — HIGH (ref 10.3–14.5)
SODIUM SERPL-SCNC: 138 MMOL/L — SIGNIFICANT CHANGE UP (ref 135–145)
WBC # BLD: 7.89 K/UL — SIGNIFICANT CHANGE UP (ref 3.8–10.5)
WBC # FLD AUTO: 7.89 K/UL — SIGNIFICANT CHANGE UP (ref 3.8–10.5)

## 2020-02-06 PROCEDURE — 36224 PLACE CATH CAROTD ART: CPT | Mod: 50

## 2020-02-06 PROCEDURE — 99232 SBSQ HOSP IP/OBS MODERATE 35: CPT | Mod: GC

## 2020-02-06 PROCEDURE — 36226 PLACE CATH VERTEBRAL ART: CPT | Mod: LT

## 2020-02-06 PROCEDURE — 99233 SBSQ HOSP IP/OBS HIGH 50: CPT

## 2020-02-06 RX ORDER — CARBAMAZEPINE 200 MG
400 TABLET ORAL
Refills: 0 | Status: CANCELLED | OUTPATIENT
Start: 2020-02-21 | End: 2020-02-10

## 2020-02-06 RX ORDER — LACTULOSE 10 G/15ML
20 SOLUTION ORAL
Refills: 0 | Status: DISCONTINUED | OUTPATIENT
Start: 2020-02-06 | End: 2020-02-10

## 2020-02-06 RX ORDER — POTASSIUM CHLORIDE 20 MEQ
40 PACKET (EA) ORAL ONCE
Refills: 0 | Status: COMPLETED | OUTPATIENT
Start: 2020-02-06 | End: 2020-02-06

## 2020-02-06 RX ORDER — CARBAMAZEPINE 200 MG
200 TABLET ORAL
Refills: 0 | Status: DISCONTINUED | OUTPATIENT
Start: 2020-02-06 | End: 2020-02-10

## 2020-02-06 RX ORDER — MULTIVIT WITH MIN/MFOLATE/K2 340-15/3 G
1 POWDER (GRAM) ORAL ONCE
Refills: 0 | Status: COMPLETED | OUTPATIENT
Start: 2020-02-06 | End: 2020-02-07

## 2020-02-06 RX ADMIN — LACOSAMIDE 100 MILLIGRAM(S): 50 TABLET ORAL at 05:00

## 2020-02-06 RX ADMIN — HYDROXYUREA 1000 MILLIGRAM(S): 500 CAPSULE ORAL at 21:50

## 2020-02-06 RX ADMIN — LEVETIRACETAM 400 MILLIGRAM(S): 250 TABLET, FILM COATED ORAL at 17:25

## 2020-02-06 RX ADMIN — POLYETHYLENE GLYCOL 3350 17 GRAM(S): 17 POWDER, FOR SOLUTION ORAL at 05:00

## 2020-02-06 RX ADMIN — POLYETHYLENE GLYCOL 3350 17 GRAM(S): 17 POWDER, FOR SOLUTION ORAL at 17:26

## 2020-02-06 RX ADMIN — OXYCODONE HYDROCHLORIDE 5 MILLIGRAM(S): 5 TABLET ORAL at 06:35

## 2020-02-06 RX ADMIN — ENOXAPARIN SODIUM 40 MILLIGRAM(S): 100 INJECTION SUBCUTANEOUS at 17:26

## 2020-02-06 RX ADMIN — Medication 1 MILLIGRAM(S): at 15:12

## 2020-02-06 RX ADMIN — Medication 650 MILLIGRAM(S): at 04:11

## 2020-02-06 RX ADMIN — ATORVASTATIN CALCIUM 80 MILLIGRAM(S): 80 TABLET, FILM COATED ORAL at 21:50

## 2020-02-06 RX ADMIN — Medication 5 MILLIGRAM(S): at 21:50

## 2020-02-06 RX ADMIN — Medication 650 MILLIGRAM(S): at 03:39

## 2020-02-06 RX ADMIN — LEVETIRACETAM 400 MILLIGRAM(S): 250 TABLET, FILM COATED ORAL at 05:00

## 2020-02-06 RX ADMIN — Medication 40 MILLIEQUIVALENT(S): at 15:13

## 2020-02-06 RX ADMIN — SENNA PLUS 2 TABLET(S): 8.6 TABLET ORAL at 21:50

## 2020-02-06 RX ADMIN — Medication 200 MILLIGRAM(S): at 17:29

## 2020-02-06 RX ADMIN — LACTULOSE 20 GRAM(S): 10 SOLUTION ORAL at 17:26

## 2020-02-06 NOTE — PROGRESS NOTE ADULT - SUBJECTIVE AND OBJECTIVE BOX
CC: f/u for fever    Patient reports  he feels well today, hungry just eye shot and not moving  REVIEW OF SYSTEMS:  All other review of systems negative (Comprehensive ROS)    Antimicrobials Day #  :    Other Medications Reviewed    T(F): 98.8 (02-06-20 @ 19:35), Max: 98.8 (02-06-20 @ 19:35)  HR: 97 (02-06-20 @ 19:35)  BP: 106/58 (02-06-20 @ 19:35)  RR: 20 (02-06-20 @ 19:35)  SpO2: 97% (02-06-20 @ 19:35)  Wt(kg): --    PHYSICAL EXAM:  General: alert, no acute distress  Eyes:  anicteric, no conjunctival injection, no discharge, ptosis left , cannot move medial  Oropharynx: no lesions or injection 	  Neck: supple, without adenopathy  Lungs: clear to auscultation  Heart: regular rate and rhythm; no murmur, rubs or gallops  Abdomen: soft, nondistended, nontender, without mass or organomegaly  Skin: no lesions  Extremities: no clubbing, cyanosis, or edema  Neurologic: alert, oriented, moves all extremities  head wound intact  LAB RESULTS:                        10.2   7.89  )-----------( 320      ( 06 Feb 2020 06:04 )             31.8     02-06    138  |  103  |  12  ----------------------------<  106<H>  3.7   |  20<L>  |  0.36<L>    Ca    9.4      06 Feb 2020 06:04          MICROBIOLOGY:  RECENT CULTURES:  02-02 @ 22:05 .Blood Blood     No growth to date.      02-02 @ 21:33 .Blood Blood     No growth to date.      02-02 @ 21:09 .Urine Clean Catch (Midstream)     <10,000 CFU/mL Normal Urogenital Mera          RADIOLOGY REVIEWED:    < from: CT Head No Cont (02.05.20 @ 08:58) >      PROCEDURE DATE:  02/05/2020            INTERPRETATION:  CLINICAL INFORMATION: Status post craniotomy and clipping of aneurysm.    TECHNIQUE: Noncontrast axial CT images were acquired through the head. Two-dimensional sagittal and coronal reformats were obtained    COMPARISON: CT head 2/4/2020.    FINDINGS:     Redemonstration of left pterional craniotomy and extracalvarial soft tissue swelling which appears similar. Interval decrease in size of underlying postsurgical pneumocephalus and left frontal extra-axial mixed density collection measuring 6 mm, previously 9 mm. The left temporal component has also decreased measuring 1.0 cm, previously 1.5 cm. Persistent left frontal hypoattenuation with adjacent left frontal horn ventricular effacement. Decreasing inferior left frontal hemorrhage. No hydrocephalus or midline shift.    New areas of hypoattenuation involving the left cerebellar hemisphere, likely representing streak artifact.. Evolving infarct in the right cerebellar hemisphere.    2 left parasellar aneurysm clips are again noted.    Near-complete opacification of the right maxillary sinus. Remaining paranasal sinuses and mastoid air cells are clear.     IMPRESSION:     Left pterionalcraniotomy with interval decrease in left frontotemporal mixed density extra-axial collection as above. No hydrocephalus or midline shift.    New areas of hypoattenuation likely represents streak artifact. A repeat CT can be obtained for further evaluation.   Evolving right cerebellar hemisphere infarct.    < end of copied text >            Assessment:  patient with sickle cell disease, recent chest syndrome, found to have cva, aneurysms, returns and had craniotomy for clip, post op transient fever now resolved, postop seizures. No infection apparent at present  Plan:  monitor off antibiotics

## 2020-02-06 NOTE — PROGRESS NOTE ADULT - SUBJECTIVE AND OBJECTIVE BOX
INTERVAL HPI/OVERNIGHT EVENTS:  Patient S&E at bedside. no talking    VITAL SIGNS:  T(F): 98.4 (02-06-20 @ 15:45)  HR: 93 (02-06-20 @ 16:15)  BP: 114/77 (02-06-20 @ 16:15)  RR: 20 (02-06-20 @ 15:45)  SpO2: 96% (02-06-20 @ 16:15)  Wt(kg): --    PHYSICAL EXAM:    Constitutional: NAD  Head: staples in  Eyes: EOMI, sclera non-icteric  Neck: supple, no masses, no JVD  Respiratory: CTA b/l, good air entry b/l  Cardiovascular: RRR, no M/R/G  Gastrointestinal: soft, NTND, no masses palpable, + BS, no hepatosplenomegaly  Extremities: no c/c/e  Neurological: awake. could not assess neurological exam      MEDICATIONS  (STANDING):  atorvastatin 80 milliGRAM(s) Oral at bedtime  bisacodyl 5 milliGRAM(s) Oral at bedtime  carBAMazepine ER Capsule 200 milliGRAM(s) Oral two times a day  enoxaparin Injectable 40 milliGRAM(s) SubCutaneous <User Schedule>  folic acid 1 milliGRAM(s) Oral daily  hydroxyurea 1000 milliGRAM(s) Oral daily  influenza   Vaccine 0.5 milliLiter(s) IntraMuscular once  lactulose Syrup 20 Gram(s) Oral two times a day  levETIRAcetam  IVPB 1500 milliGRAM(s) IV Intermittent every 12 hours  LORazepam   Injectable 1 milliGRAM(s) IV Push once  magnesium citrate Oral Solution 1 Bottle Oral once  pantoprazole    Tablet 40 milliGRAM(s) Oral before breakfast  polyethylene glycol 3350 17 Gram(s) Oral two times a day  senna 2 Tablet(s) Oral at bedtime  sodium chloride 0.9%. 1000 milliLiter(s) (50 mL/Hr) IV Continuous <Continuous>    MEDICATIONS  (PRN):  acetaminophen   Tablet .. 650 milliGRAM(s) Oral every 6 hours PRN Temp greater or equal to 38C (100.4F), Mild Pain (1 - 3)  metoclopramide Injectable 10 milliGRAM(s) IV Push every 6 hours PRN Nausea and/or Vomiting  oxyCODONE    IR 5 milliGRAM(s) Oral every 4 hours PRN Moderate Pain (4 - 6)  sodium chloride 0.9% lock flush 10 milliLiter(s) IV Push every 1 hour PRN Pre/post blood products, medications, blood draw, and to maintain line patency      Allergies    No Known Allergies    Intolerances        LABS:                        10.2   7.89  )-----------( 320      ( 06 Feb 2020 06:04 )             31.8     02-06    138  |  103  |  12  ----------------------------<  106<H>  3.7   |  20<L>  |  0.36<L>    Ca    9.4      06 Feb 2020 06:04            RADIOLOGY & ADDITIONAL TESTS:  Studies reviewed.    < from: CT Head No Cont (02.05.20 @ 08:58) >  FINDINGS:     Redemonstration of left pterional craniotomy and extracalvarial soft tissue swelling which appears similar. Interval decrease in size of underlying postsurgical pneumocephalus and left frontal extra-axial mixed density collection measuring 6 mm, previously 9 mm. The left temporal component has also decreased measuring 1.0 cm, previously 1.5 cm. Persistent left frontal hypoattenuation with adjacent left frontal horn ventricular effacement. Decreasing inferior left frontal hemorrhage. No hydrocephalus or midline shift.    New areas of hypoattenuation involving the left cerebellar hemisphere, likely representing streak artifact.. Evolving infarct in the right cerebellar hemisphere.    2 left parasellar aneurysm clips are again noted.    Near-complete opacification of the right maxillary sinus. Remaining paranasal sinuses and mastoid air cells are clear.     IMPRESSION:     Left pterionalcraniotomy with interval decrease in left frontotemporal mixed density extra-axial collection as above. No hydrocephalus or midline shift.    New areas of hypoattenuation likely represents streak artifact. A repeat CT can be obtained for further evaluation.   Evolving right cerebellar hemisphere infarct. INTERVAL HPI/OVERNIGHT EVENTS:  Patient S&E at bedside. seen in IR post procedure room, not speaking.      VITAL SIGNS:  T(F): 98.4 (02-06-20 @ 15:45)  HR: 93 (02-06-20 @ 16:15)  BP: 114/77 (02-06-20 @ 16:15)  RR: 20 (02-06-20 @ 15:45)  SpO2: 96% (02-06-20 @ 16:15)  Wt(kg): --    PHYSICAL EXAM:    Constitutional: NAD  Head: staples in  Eyes: EOMI, sclera non-icteric  Neck: supple, no masses, no JVD  Respiratory: CTA b/l, good air entry b/l  Cardiovascular: RRR, no M/R/G  Gastrointestinal: soft, NTND, no masses palpable, + BS, no hepatosplenomegaly  Extremities: no c/c/e  Neurological: awake. could not assess neurological exam      MEDICATIONS  (STANDING):  atorvastatin 80 milliGRAM(s) Oral at bedtime  bisacodyl 5 milliGRAM(s) Oral at bedtime  carBAMazepine ER Capsule 200 milliGRAM(s) Oral two times a day  enoxaparin Injectable 40 milliGRAM(s) SubCutaneous <User Schedule>  folic acid 1 milliGRAM(s) Oral daily  hydroxyurea 1000 milliGRAM(s) Oral daily  influenza   Vaccine 0.5 milliLiter(s) IntraMuscular once  lactulose Syrup 20 Gram(s) Oral two times a day  levETIRAcetam  IVPB 1500 milliGRAM(s) IV Intermittent every 12 hours  LORazepam   Injectable 1 milliGRAM(s) IV Push once  magnesium citrate Oral Solution 1 Bottle Oral once  pantoprazole    Tablet 40 milliGRAM(s) Oral before breakfast  polyethylene glycol 3350 17 Gram(s) Oral two times a day  senna 2 Tablet(s) Oral at bedtime  sodium chloride 0.9%. 1000 milliLiter(s) (50 mL/Hr) IV Continuous <Continuous>    MEDICATIONS  (PRN):  acetaminophen   Tablet .. 650 milliGRAM(s) Oral every 6 hours PRN Temp greater or equal to 38C (100.4F), Mild Pain (1 - 3)  metoclopramide Injectable 10 milliGRAM(s) IV Push every 6 hours PRN Nausea and/or Vomiting  oxyCODONE    IR 5 milliGRAM(s) Oral every 4 hours PRN Moderate Pain (4 - 6)  sodium chloride 0.9% lock flush 10 milliLiter(s) IV Push every 1 hour PRN Pre/post blood products, medications, blood draw, and to maintain line patency      Allergies    No Known Allergies    Intolerances        LABS:                        10.2   7.89  )-----------( 320      ( 06 Feb 2020 06:04 )             31.8     02-06    138  |  103  |  12  ----------------------------<  106<H>  3.7   |  20<L>  |  0.36<L>    Ca    9.4      06 Feb 2020 06:04            RADIOLOGY & ADDITIONAL TESTS:  Studies reviewed.    < from: CT Head No Cont (02.05.20 @ 08:58) >  FINDINGS:     Redemonstration of left pterional craniotomy and extracalvarial soft tissue swelling which appears similar. Interval decrease in size of underlying postsurgical pneumocephalus and left frontal extra-axial mixed density collection measuring 6 mm, previously 9 mm. The left temporal component has also decreased measuring 1.0 cm, previously 1.5 cm. Persistent left frontal hypoattenuation with adjacent left frontal horn ventricular effacement. Decreasing inferior left frontal hemorrhage. No hydrocephalus or midline shift.    New areas of hypoattenuation involving the left cerebellar hemisphere, likely representing streak artifact.. Evolving infarct in the right cerebellar hemisphere.    2 left parasellar aneurysm clips are again noted.    Near-complete opacification of the right maxillary sinus. Remaining paranasal sinuses and mastoid air cells are clear.     IMPRESSION:     Left pterionalcraniotomy with interval decrease in left frontotemporal mixed density extra-axial collection as above. No hydrocephalus or midline shift.    New areas of hypoattenuation likely represents streak artifact. A repeat CT can be obtained for further evaluation.   Evolving right cerebellar hemisphere infarct.

## 2020-02-06 NOTE — PROGRESS NOTE ADULT - ASSESSMENT
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

## 2020-02-06 NOTE — PROGRESS NOTE ADULT - ATTENDING COMMENTS
The patient is seen post angiogram to evaluate possible stroke and CNS bleed. The patient has a hemoglobin electrophoresis with sickle HGB percentage of 33% and  HGB A percentage of 64%.  Please continue hydration and exchange transfusion as discussed with blood bank. Total HGB is 10g/dL  Exchange Catheter is to be placed.

## 2020-02-06 NOTE — EEG REPORT - NS EEG TEXT BOX
TIFFANY LUONG [id number]     Study Date: 		  02-04-20 16:19   02-05-20 07:40 15hr and 21 min      Continuous EEG    Technical Information:			  		  Placement and Labeling of Electrodes:  The EEG was performed utilizing 20 channels referential EEG connections (coronal over temporal over parasagittal montage) using all standard 10-20 electrode placements with EKG.  Recording was at a sampling rate of 256 samples per second per channel.  Time synchronized digital video recording was done simultaneously with EEG recording.  A low light infrared camera was used for low light recording.  Jose and seizure detection algorithms were utilized.    CSA Technical Component:  Quantitative EEG analysis using a separate Compressed Spectral Array (CSA) software package was conducted in real-time and run at bedside after set up by the technician, digitally displaying the power of electrographic frequencies included in the 1-30Hz band using a graded color map.  This data was reviewed and interpreted independently, and is reported in a separate section below.    --------------------------------------------------------------------------------------------------  History:  CC/ HPI Patient is a 19y old  Male who presents with a chief complaint of intracranial aneurysms (03 Feb 2020 11:26)    MEDICATIONS  (STANDING):  atorvastatin 80 milliGRAM(s) Oral at bedtime  bisacodyl 5 milliGRAM(s) Oral at bedtime  chlorhexidine 4% Liquid 1 Application(s) Topical <User Schedule>  dexMEDEtomidine Infusion 0.3 MICROgram(s)/kG/Hr (4.275 mL/Hr) IV Continuous <Continuous>  enoxaparin Injectable 40 milliGRAM(s) SubCutaneous <User Schedule>  folic acid 1 milliGRAM(s) Oral daily  hydroxyurea 1000 milliGRAM(s) Oral daily  influenza   Vaccine 0.5 milliLiter(s) IntraMuscular once  levETIRAcetam  IVPB 750 milliGRAM(s) IV Intermittent every 12 hours  pantoprazole    Tablet 40 milliGRAM(s) Oral before breakfast  polyethylene glycol 3350 17 Gram(s) Oral two times a day  senna 2 Tablet(s) Oral at bedtime  sodium chloride 0.9%. 1000 milliLiter(s) (100 mL/Hr) IV Continuous <Continuous>    --------------------------------------------------------------------------------------------------  Study Interpretation:    [[[Abbreviation Key:  PDR=alpha rhythm/posterior dominant rhythm. A-P=anterior posterior gradient.  Amplitude: ‘very low’:<20; ‘low’:20-50; ‘medium’:; ‘high’:>200uV.  Persistence for periodic/rhythmic patterns (% of epoch) ‘rare’:<1%; ‘occasional’:1-10%; ‘frequent’:10-50%; ‘abundant’:50-90%; ‘continuous’:>90%.  Persistence for sporadic discharges: ‘rare’:<1/hr; ‘occasional’:1/min-1/hr; ‘frequent’:>1/min; ‘abundant’:>1/10 sec.  GRDA=generalized rhythmic delta activity, LRDA=lateralized rhythmic delta activity, TIRDA=temporal intermittent rhythmic delta activity, FIRDA=frontal intermittent rhythmic activity. LPD=PLED=lateralized periodic discharges, GPD=generalized periodic discharges, BiPDs=BiPLEDs=bilateral independent periodic epileptiform discharges, SIRPID=stimulus induced rhythmic, periodic, or ictal appearing discharges.  Modifiers: +F=with fast component, +S=with spike component, +R=with rhythmic component.  S-B=burst suppression pattern.  Max=maximal. N1-drowsy, N2-stage II sleep, N3-slow wave sleep.  HV=hyperventilation, PS=photic stimulation]]]    FINDINGS:  The background was continuous, spontaneously variable and reactive.  During wakefulness, the posteriorly dominant rhythm was up to 8hz over the right only.  There was diffuse irregular theta and delta activity present, brief runs of rhythmic delta persistently more prominent over the left frontal>temporal region but can be generalized.    Sleep Background:  Drowsiness was characterized by fragmentation, attenuation, and slowing of the background activity.    Stage II sleep transients were characterized by sleep spindles and K complexes.    Epileptiform Activity:   None.    Events:  No clinical events were recorded.  No seizures were recorded.    Activation Procedures:   -Hyperventilation was not performed.    -Photic stimulation was not performed.    Artifacts:  Intermittent myogenic and movement artifacts were noted.    ECG:  The heart rate on single channel ECG at baseline was predominantly near BPM = 60-70  -----------------------------------------------------------------------------------------------------    EEG Classification / Summary:  Abnormal EEG study  There was diffuse irregular theta and delta activity present, brief runs of rhythmic delta persistently more prominent over the left frontal>temporal region but can be generalized.  Left sided attenuation of fast activity    -----------------------------------------------------------------------------------------------------    Clinical Impression:  Moderate multifocal cerebral dysfunction, persistently worse in the left hemisphere  There were no epileptiform abnormalities recorded.      -------------------------------------------------------------------------------------------------------  Dee Arce DO  Epilepsy Fellow, [facility]    MD CATHERINE Duarte  Director, Continuous EEG Monitoring Service, [facility]    and Epilepsy Fellowship , Department of Neurology  [facility] TIFFANY LUONG [id number]     Study Date: 		  02-04-20 16:19   02-05-20 07:40 15hr and 21 min      Continuous EEG    Technical Information:			  		  Placement and Labeling of Electrodes:  The EEG was performed utilizing 20 channels referential EEG connections (coronal over temporal over parasagittal montage) using all standard 10-20 electrode placements with EKG.  Recording was at a sampling rate of 256 samples per second per channel.  Time synchronized digital video recording was done simultaneously with EEG recording.  A low light infrared camera was used for low light recording.  Jose and seizure detection algorithms were utilized.    CSA Technical Component:  Quantitative EEG analysis using a separate Compressed Spectral Array (CSA) software package was conducted in real-time and run at bedside after set up by the technician, digitally displaying the power of electrographic frequencies included in the 1-30Hz band using a graded color map.  This data was reviewed and interpreted independently, and is reported in a separate section below.    --------------------------------------------------------------------------------------------------  History:  CC/ HPI Patient is a 19y old  Male who presents with a chief complaint of intracranial aneurysms (03 Feb 2020 11:26)    MEDICATIONS  (STANDING):  atorvastatin 80 milliGRAM(s) Oral at bedtime  bisacodyl 5 milliGRAM(s) Oral at bedtime  chlorhexidine 4% Liquid 1 Application(s) Topical <User Schedule>  dexMEDEtomidine Infusion 0.3 MICROgram(s)/kG/Hr (4.275 mL/Hr) IV Continuous <Continuous>  enoxaparin Injectable 40 milliGRAM(s) SubCutaneous <User Schedule>  folic acid 1 milliGRAM(s) Oral daily  hydroxyurea 1000 milliGRAM(s) Oral daily  influenza   Vaccine 0.5 milliLiter(s) IntraMuscular once  levETIRAcetam  IVPB 750 milliGRAM(s) IV Intermittent every 12 hours  pantoprazole    Tablet 40 milliGRAM(s) Oral before breakfast  polyethylene glycol 3350 17 Gram(s) Oral two times a day  senna 2 Tablet(s) Oral at bedtime  sodium chloride 0.9%. 1000 milliLiter(s) (100 mL/Hr) IV Continuous <Continuous>    --------------------------------------------------------------------------------------------------  Study Interpretation:    [[[Abbreviation Key:  PDR=alpha rhythm/posterior dominant rhythm. A-P=anterior posterior gradient.  Amplitude: ‘very low’:<20; ‘low’:20-50; ‘medium’:; ‘high’:>200uV.  Persistence for periodic/rhythmic patterns (% of epoch) ‘rare’:<1%; ‘occasional’:1-10%; ‘frequent’:10-50%; ‘abundant’:50-90%; ‘continuous’:>90%.  Persistence for sporadic discharges: ‘rare’:<1/hr; ‘occasional’:1/min-1/hr; ‘frequent’:>1/min; ‘abundant’:>1/10 sec.  GRDA=generalized rhythmic delta activity, LRDA=lateralized rhythmic delta activity, TIRDA=temporal intermittent rhythmic delta activity, FIRDA=frontal intermittent rhythmic activity. LPD=PLED=lateralized periodic discharges, GPD=generalized periodic discharges, BiPDs=BiPLEDs=bilateral independent periodic epileptiform discharges, SIRPID=stimulus induced rhythmic, periodic, or ictal appearing discharges.  Modifiers: +F=with fast component, +S=with spike component, +R=with rhythmic component.  S-B=burst suppression pattern.  Max=maximal. N1-drowsy, N2-stage II sleep, N3-slow wave sleep.  HV=hyperventilation, PS=photic stimulation]]]    FINDINGS:  The background was continuous, spontaneously variable and reactive.  During wakefulness, the posteriorly dominant rhythm was up to 8hz over the right only.  There was diffuse irregular theta and delta activity present, brief runs of rhythmic delta persistently more prominent over the left frontal>temporal region but can be generalized.    Sleep Background:  Drowsiness was characterized by fragmentation, attenuation, and slowing of the background activity.    Stage II sleep transients were characterized by sleep spindles and K complexes.    Epileptiform Activity:   None.    Events:  No clinical events were recorded.  No seizures were recorded.    Activation Procedures:   -Hyperventilation was not performed.    -Photic stimulation was not performed.    Artifacts:  Intermittent myogenic and movement artifacts were noted.    ECG:  The heart rate on single channel ECG at baseline was predominantly near BPM = 60-70  -----------------------------------------------------------------------------------------------------    EEG Classification / Summary:  Abnormal EEG study  There was diffuse irregular theta and delta activity present, brief runs of rhythmic delta persistently more prominent over the left frontal>temporal region but at times more generalized.  Left sided attenuation of fast activity    -----------------------------------------------------------------------------------------------------    Clinical Impression:  Moderate multifocal cerebral dysfunction, persistently worse in the left hemisphere  There were no epileptiform abnormalities or seizures recorded.      -------------------------------------------------------------------------------------------------------  Dee Arce DO  Epilepsy Fellow, [facility]    MD CATHERINE Duarte  Director, Continuous EEG Monitoring Service, [facility]    and Epilepsy Fellowship , Department of Neurology  [facility]

## 2020-02-06 NOTE — PROGRESS NOTE ADULT - ASSESSMENT
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 Hematology consulted for matilda-operative management from perspective of Hb SS disease.    HbSS disease- s/p left Craniotomy with clipping of L ICA terminus and L SCA aneurysms s/p aneurysm repair done on 1- now with new seizures, left third nerve palsy and new right cerebellar stroke post op  - Hgb 10.2 today; HbS % 33% no current role for transfusion at this time, discussed with blood bank Dr. Bates and Dr. Loyola  - monitor CBC with diff daily  - patient with new seizures, left third nerve palsy post-operatively, ?weakness -> angiogram done today  - post-operative management should focus on minimizing hypoxia, hypothermia, acidosis, and intravascular volume depletion.  - Please encourage frequent incentive spirometer use if safe from neurosurgical standpoint  - appreciate excellent neurosurgical care  - received 2 units of packed rbcs according to brief op note on 1-29. 1- Hb electrophoressis showed HbS 38% and HbA 57.8%   d/w primary neurosurgical team  - blood bank to discuss prophylactic red cell exchanges given CVAs with patient's outpatient hematologist, Dr. 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 Hematology consulted for matilda-operative management from perspective of Hb SS disease.    HbSS disease- s/p left Craniotomy with clipping of L ICA terminus and L SCA aneurysms s/p aneurysm repair done on 1- now with new seizures, left third nerve palsy and new right cerebellar stroke post op  - Hgb 10.2 today; HbS % 33% . discussed with blood bank  Dr. Loyola and patient's outpatient hematologist, Dr. Denia Kelley. we recommend red cell exchange given recent stroke in december 2019, goal will be to keep his HbS % below 30%, his HbS% is 33% now. [facility]7  - monitor CBC with diff daily  - patient with new seizures, left third nerve palsy post-operatively, ?weakness -> angiogram done today  - post-operative management should focus on minimizing hypoxia, hypothermia, acidosis, and intravascular volume depletion.  - Please encourage frequent incentive spirometer use if safe from neurosurgical standpoint  - appreciate excellent neurosurgical care  - received 2 units of packed rbcs according to brief op note on 1-29. 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 Hematology consulted for matilda-operative management for Hb SS disease.    HbSS disease- s/p left Craniotomy with clipping of L ICA terminus and L SCA aneurysms s/p aneurysm repair done on 1- now with new seizures, left third nerve palsy and new right cerebellar stroke post op  - Hgb 10.2 today; HbS % 33% . discussed with blood bank  Dr. Loyola and patient's outpatient hematologist, Dr. Denia Kelley. we recommend red cell exchange at this time. is the right cerebellar infarct a complication from the procedure done on 1-29? current HbS is 33%, we discussed doing a red cell exchange to further lower HbS to attempt to improve neurologic status.  - had recent stroke in december 2019. he was given a red cell exchange on 12-17-19 and then was given simple red blood cell transfusions at [facility] for acute chest  -blood bank will need Shiley placed to do red cell exchange tomorrow 2-7. please place Shiley.  - monitor CBC with diff daily  - patient with new seizures, had rapid response called on  2-2 for AMS, developed left third nerve palsy post-operatively with right cerebellar infarct -> angiogram done today  - post-operative management should focus on minimizing hypoxia, hypothermia, acidosis, and intravascular volume depletion.  - Please encourage frequent incentive spirometer use if safe from neurosurgical standpoint  - appreciate excellent neurosurgical care  - received 2 units of packed rbcs according to brief op note on 1-29.  on 1- Hb electrophoressis showed HbS 38% and HbA 57.8%   d/w primary neurosurgical team

## 2020-02-06 NOTE — CHART NOTE - NSCHARTNOTEFT_GEN_A_CORE
Interventional Neuro- Radiology   Procedure Note      Procedure: Selective Cerebral Angiography   Pre- Procedure Diagnosis: s/p craniotomy for clipping of left ICA terminus and left superior cerebellar aneurysms   Post- Procedure Diagnosis: Complete micro surgical clipping of aneurysms     : Dr. Nico MD  Fellow: Dr. Ashwin MD  Resident: Dr. Lorri MD  Physician Assistant: Marii Arboleda PA-C    RN: Katlin     Anesthesia: Dr. Vasu MD (MAC)       I/Os:  Fluids: 50cc DTV  Contrast: 53cc   Estimated Blood Loss: <10cc    Preliminary Report:  Under MAC using a 5Fr short sheath to the right groin examination of left vertebral artery/ left internal carotid artery/ right internal carotid artery via selective cerebral angiography demonstrates complete micro surgical clipping of left ICA terminus and left superior cerebellar aneurysms. ( Official note to follow).    Patient tolerated procedure well, vital signs stable, hemodynamically stable, no change in neurological status compared to baseline. Results discussed with neurosurgery/ patient and their family. Groin sheath d/c'ed, manual compression held to hemostasis, no active bleeding, no hematoma, quick clot and safeguard balloon dressing applied at 9:30h. Patient transferred to IR recovery for further care/ monitoring.     Marii Arboleda PA-C  [phone]

## 2020-02-06 NOTE — PROGRESS NOTE ADULT - SUBJECTIVE AND OBJECTIVE BOX
Clem Olsen   Pager [phone]  Office [phone]      CC: Patient is a 19y old  Male who presents with a chief complaint of intracranial aneurysms (06 Feb 2020 15:56)      SUBJECTIVE / OVERNIGHT EVENTS: No seizure activity since yest. No BM x 6 days but pt denies N/V/abd pain. +flatus. No joint paisn/cp/sob. no f/c/r.    MEDICATIONS  (STANDING):  atorvastatin 80 milliGRAM(s) Oral at bedtime  bisacodyl 5 milliGRAM(s) Oral at bedtime  carBAMazepine ER Capsule 200 milliGRAM(s) Oral two times a day  enoxaparin Injectable 40 milliGRAM(s) SubCutaneous <User Schedule>  folic acid 1 milliGRAM(s) Oral daily  hydroxyurea 1000 milliGRAM(s) Oral daily  influenza   Vaccine 0.5 milliLiter(s) IntraMuscular once  levETIRAcetam  IVPB 1500 milliGRAM(s) IV Intermittent every 12 hours  LORazepam   Injectable 1 milliGRAM(s) IV Push once  pantoprazole    Tablet 40 milliGRAM(s) Oral before breakfast  polyethylene glycol 3350 17 Gram(s) Oral two times a day  senna 2 Tablet(s) Oral at bedtime  sodium chloride 0.9%. 1000 milliLiter(s) (50 mL/Hr) IV Continuous <Continuous>    MEDICATIONS  (PRN):  acetaminophen   Tablet .. 650 milliGRAM(s) Oral every 6 hours PRN Temp greater or equal to 38C (100.4F), Mild Pain (1 - 3)  metoclopramide Injectable 10 milliGRAM(s) IV Push every 6 hours PRN Nausea and/or Vomiting  oxyCODONE    IR 5 milliGRAM(s) Oral every 4 hours PRN Moderate Pain (4 - 6)  sodium chloride 0.9% lock flush 10 milliLiter(s) IV Push every 1 hour PRN Pre/post blood products, medications, blood draw, and to maintain line patency      Vital Signs Last 24 Hrs  T(C): 36.9 (06 Feb 2020 15:45), Max: 36.9 (05 Feb 2020 19:46)  T(F): 98.4 (06 Feb 2020 15:45), Max: 98.4 (05 Feb 2020 19:46)  HR: 93 (06 Feb 2020 16:15) (61 - 93)  BP: 114/77 (06 Feb 2020 16:15) (99/61 - 117/73)  BP(mean): --  RR: 20 (06 Feb 2020 15:45) (16 - 20)  SpO2: 96% (06 Feb 2020 16:15) (96% - 100%)  CAPILLARY BLOOD GLUCOSE        I&O's Summary    05 Feb 2020 07:01  -  06 Feb 2020 07:00  --------------------------------------------------------  IN: 480 mL / OUT: 2500 mL / NET: -2020 mL    06 Feb 2020 07:01  -  06 Feb 2020 16:31  --------------------------------------------------------  IN: 0 mL / OUT: 1000 mL / NET: -1000 mL          PHYSICAL EXAM:    GENERAL: NAD   HEENT: 3 rd nerve palsy  PULM: Clear to auscultation bilaterally  CV: Regular rate and rhythm; nl S1, S2; No murmurs, rubs, or gallops  ABDOMEN: Soft, Nontender, Nondistended; Bowel sounds present  EXTREMITIES/MSK:  No edema, calf tenderness   PSYCH: AAOx3  NEUROLOGY: non-focal          LABS:                        10.2   7.89  )-----------( 320      ( 06 Feb 2020 06:04 )             31.8     02-06    138  |  103  |  12  ----------------------------<  106<H>  3.7   |  20<L>  |  0.36<L>    Ca    9.4      06 Feb 2020 06:04                  RADIOLOGY & ADDITIONAL TESTS:    Imaging Personally Reviewed:    Consultant(s) Notes Reviewed:      Care Discussed with Consultants/Other Providers: neurosurg regard hemoglobin electrophoresis

## 2020-02-06 NOTE — PROGRESS NOTE ADULT - ASSESSMENT
19 year old male from [place] who has a history of Hb SS disease, brain aneurysm w/ recent admission 12/14-12/31 for pain crisis and subsequent acute chest syndrome as well as stroke requiring intubation and multiple exchange transfusions.   Pt had CT head and MRI/MRA which revealed multiple areas patchy ischemia w/ hemorrhagic transformation in the right superior cerebellar region as well as multiple areas of aneurysm. Pt was d/c home for outpt nsx f/up.  On 1/28/20 patient was admitted to [facility] due to c/o  headache.  He was noted to have  small CNS aneurysms and was admitted for a planned procedure possibly coiling of aneurysm.  On 1/29, he underwent left Craniotomy with clipping of L ICA terminus and L SCA aneurysms. c/w by RRT, post-seizure, for tachypnea/hypoxia 2/2/20. Seizure 2/3/20, 2/4/20. Repeat angio 2/6.

## 2020-02-06 NOTE — PROGRESS NOTE ADULT - SUBJECTIVE AND OBJECTIVE BOX
SUBJECTIVE: PT seen and examined at bedside, doing well no complaints. s/p  repeat angio done today.    Vital Signs Last 24 Hrs  T(C): 36.9 (02-06-20 @ 15:45), Max: 36.9 (02-05-20 @ 19:46)  T(F): 98.4 (02-06-20 @ 15:45), Max: 98.4 (02-05-20 @ 19:46)  HR: 88 (02-06-20 @ 15:45) (61 - 88)  BP: 113/67 (02-06-20 @ 15:45) (99/61 - 117/73)  BP(mean): --  RR: 20 (02-06-20 @ 15:45) (16 - 20)  SpO2: 100% (02-06-20 @ 15:45) (98% - 100%)    PHYSICAL EXAM:    Constitutional: No Acute Distress     Neurological: Awake, alert, right EOMI, persistent left third nerve palsy. REA 5/5, no drift.  SILT throughout.     Incision: scalp incision +staples c/d/i    Pulmonary: Clear to Auscultation, No rales, No rhonchi, No wheezes     Cardiovascular: S1, S2, Regular rate and rhythm     Gastrointestinal: Soft, Non-tender, Non-distended, +bowel sounds x 4    Extremities: No calf tenderness bilaterally, no cyanosis, clubbing or edema          LABS:                   10.2   7.89  )-----------( 320      ( 06 Feb 2020 06:04 )             31.8    02-06    138  |  103  |  12  ----------------------------<  106<H>  3.7   |  20<L>  |  0.36<L>    Ca    9.4      06 Feb 2020 06:04        02-05 @ 07:01  -  02-06 @ 07:00  --------------------------------------------------------  IN: 480 mL / OUT: 2500 mL / NET: -2020 mL    02-06 @ 07:01  -  02-06 @ 15:56  --------------------------------------------------------  IN: 0 mL / OUT: 1000 mL / NET: -1000 mL        IMAGING:     MEDICATIONS:  Antibiotics:    Neuro:  acetaminophen   Tablet .. 650 milliGRAM(s) Oral every 6 hours PRN Temp greater or equal to 38C (100.4F), Mild Pain (1 - 3)  carBAMazepine ER Capsule 200 milliGRAM(s) Oral two times a day  levETIRAcetam  IVPB 1500 milliGRAM(s) IV Intermittent every 12 hours  LORazepam   Injectable 1 milliGRAM(s) IV Push once  metoclopramide Injectable 10 milliGRAM(s) IV Push every 6 hours PRN Nausea and/or Vomiting  oxyCODONE    IR 5 milliGRAM(s) Oral every 4 hours PRN Moderate Pain (4 - 6)    Cardiac:    Pulm:    GI/:  bisacodyl 5 milliGRAM(s) Oral at bedtime  pantoprazole    Tablet 40 milliGRAM(s) Oral before breakfast  polyethylene glycol 3350 17 Gram(s) Oral two times a day  senna 2 Tablet(s) Oral at bedtime    Other:   atorvastatin 80 milliGRAM(s) Oral at bedtime  enoxaparin Injectable 40 milliGRAM(s) SubCutaneous <User Schedule>  folic acid 1 milliGRAM(s) Oral daily  hydroxyurea 1000 milliGRAM(s) Oral daily  influenza   Vaccine 0.5 milliLiter(s) IntraMuscular once  sodium chloride 0.9% lock flush 10 milliLiter(s) IV Push every 1 hour PRN Pre/post blood products, medications, blood draw, and to maintain line patency  sodium chloride 0.9%. 1000 milliLiter(s) IV Continuous <Continuous>        DIET: Regular

## 2020-02-06 NOTE — CHART NOTE - NSCHARTNOTEFT_GEN_A_CORE
Interventional Neuro Radiology  Pre-Procedure Note    This is a 18yo male with several known unruptured aneurysms largest is left ICA s/p diagnostic cerebral angiography on 1/14/2020, presented for gradual worsening headache, s/p left craniotomy and clipping of left ICA terminus aneurysm and left PCA artery aneurysm on 1/29, POD#7, patient presents now to neuro IR for selective cerebral angiography for further evaluation.     Neuro Exam: Awake alert oriented to person place and month, slow to respond, has mild right facial weakness and left 3rd nerve palsy. moving all extremities antigravity.     PAST MEDICAL & SURGICAL HISTORY:  Brain aneurysm  Sickle cell anemia  Sickle cell disease    Social History:   Denies tobacco use    FAMILY HISTORY:  No pertinent family history    Allergies:   No Known Allergies    Current Medications:   acetaminophen   Tablet .. 650 milliGRAM(s) Oral every 6 hours PRN  atorvastatin 80 milliGRAM(s) Oral at bedtime  bisacodyl 5 milliGRAM(s) Oral at bedtime  enoxaparin Injectable 40 milliGRAM(s) SubCutaneous <User Schedule>  folic acid 1 milliGRAM(s) Oral daily  hydroxyurea 1000 milliGRAM(s) Oral daily  influenza   Vaccine 0.5 milliLiter(s) IntraMuscular once  lacosamide 100 milliGRAM(s) Oral every 12 hours  levETIRAcetam  IVPB 1500 milliGRAM(s) IV Intermittent every 12 hours  LORazepam   Injectable 1 milliGRAM(s) IV Push once  metoclopramide Injectable 10 milliGRAM(s) IV Push every 6 hours PRN  oxyCODONE    IR 5 milliGRAM(s) Oral every 4 hours PRN  pantoprazole    Tablet 40 milliGRAM(s) Oral before breakfast  polyethylene glycol 3350 17 Gram(s) Oral two times a day  senna 2 Tablet(s) Oral at bedtime  sodium chloride 0.9% lock flush 10 milliLiter(s) IV Push every 1 hour PRN  sodium chloride 0.9%. 1000 milliLiter(s) IV Continuous <Continuous>      Labs:                         10.2   7.89  )-----------( 320      ( 06 Feb 2020 06:04 )             31.8       02-06    138  |  103  |  12  ----------------------------<  106<H>  3.7   |  20<L>  |  0.36<L>    Ca    9.4      06 Feb 2020 06:04  Phos  3.6     02-04  Mg     1.8     02-04    TPro  7.3  /  Alb  4.0  /  TBili  1.6<H>  /  DBili  x   /  AST  18  /  ALT  15  /  AlkPhos  45  02-02      Blood Bank: 02-05-20  O  --  Positive      Assessment/Plan:   This is a 18yo Male presents s/p clipping of left ICA ans left PCA. Patient presents to neuro-IR for selective cerebral angiography. Procedure/ risks/ benefits/ goals/ alternatives were explained. Risks include but are not limited to stroke/ vessel injury/ hemorrhage/ groin hematoma. All questions answered. Informed content obtained from patient's sister, patient received oxycodone on floor and is sleepy, consent deferred to patient's sister. Consent placed in chart.    Marii Arboleda PA-C  [phone] Interventional Neuro Radiology  Pre-Procedure Note    This is a 18yo male with several known unruptured aneurysms largest is left ICA s/p diagnostic cerebral angiography on 1/14/2020, presented for gradual worsening headache, s/p left craniotomy and clipping of left ICA terminus aneurysm and left SCA artery aneurysm on 1/29, POD#7, patient presents now to neuro IR for selective cerebral angiography for further evaluation.     Neuro Exam: Awake alert oriented to person place and month, slow to respond, has mild right facial weakness and left 3rd nerve palsy. moving all extremities antigravity.     PAST MEDICAL & SURGICAL HISTORY:  Brain aneurysm  Sickle cell anemia  Sickle cell disease    Social History:   Denies tobacco use    FAMILY HISTORY:  No pertinent family history    Allergies:   No Known Allergies    Current Medications:   acetaminophen   Tablet .. 650 milliGRAM(s) Oral every 6 hours PRN  atorvastatin 80 milliGRAM(s) Oral at bedtime  bisacodyl 5 milliGRAM(s) Oral at bedtime  enoxaparin Injectable 40 milliGRAM(s) SubCutaneous <User Schedule>  folic acid 1 milliGRAM(s) Oral daily  hydroxyurea 1000 milliGRAM(s) Oral daily  influenza   Vaccine 0.5 milliLiter(s) IntraMuscular once  lacosamide 100 milliGRAM(s) Oral every 12 hours  levETIRAcetam  IVPB 1500 milliGRAM(s) IV Intermittent every 12 hours  LORazepam   Injectable 1 milliGRAM(s) IV Push once  metoclopramide Injectable 10 milliGRAM(s) IV Push every 6 hours PRN  oxyCODONE    IR 5 milliGRAM(s) Oral every 4 hours PRN  pantoprazole    Tablet 40 milliGRAM(s) Oral before breakfast  polyethylene glycol 3350 17 Gram(s) Oral two times a day  senna 2 Tablet(s) Oral at bedtime  sodium chloride 0.9% lock flush 10 milliLiter(s) IV Push every 1 hour PRN  sodium chloride 0.9%. 1000 milliLiter(s) IV Continuous <Continuous>      Labs:                         10.2   7.89  )-----------( 320      ( 06 Feb 2020 06:04 )             31.8       02-06    138  |  103  |  12  ----------------------------<  106<H>  3.7   |  20<L>  |  0.36<L>    Ca    9.4      06 Feb 2020 06:04  Phos  3.6     02-04  Mg     1.8     02-04    TPro  7.3  /  Alb  4.0  /  TBili  1.6<H>  /  DBili  x   /  AST  18  /  ALT  15  /  AlkPhos  45  02-02      Blood Bank: 02-05-20  O  --  Positive      Assessment/Plan:   This is a 18yo Male presents s/p clipping of left ICA terminus and left SCA. Patient presents to neuro-IR for selective cerebral angiography. Procedure/ risks/ benefits/ goals/ alternatives were explained. Risks include but are not limited to stroke/ vessel injury/ hemorrhage/ groin hematoma. All questions answered. Informed content obtained from patient's sister, patient received oxycodone on floor and is sleepy, consent deferred to patient's sister. Consent placed in chart.    Marii Arboleda PA-C  [phone]

## 2020-02-07 LAB
CULTURE RESULTS: SIGNIFICANT CHANGE UP
CULTURE RESULTS: SIGNIFICANT CHANGE UP
SPECIMEN SOURCE: SIGNIFICANT CHANGE UP
SPECIMEN SOURCE: SIGNIFICANT CHANGE UP

## 2020-02-07 PROCEDURE — 36556 INSERT NON-TUNNEL CV CATH: CPT

## 2020-02-07 PROCEDURE — 77001 FLUOROGUIDE FOR VEIN DEVICE: CPT | Mod: 26

## 2020-02-07 PROCEDURE — 99233 SBSQ HOSP IP/OBS HIGH 50: CPT

## 2020-02-07 PROCEDURE — 99232 SBSQ HOSP IP/OBS MODERATE 35: CPT

## 2020-02-07 PROCEDURE — 36512 APHERESIS RBC: CPT

## 2020-02-07 PROCEDURE — 99253 IP/OBS CNSLTJ NEW/EST LOW 45: CPT | Mod: 25

## 2020-02-07 PROCEDURE — 76937 US GUIDE VASCULAR ACCESS: CPT | Mod: 26

## 2020-02-07 RX ORDER — ACETAMINOPHEN 500 MG
1000 TABLET ORAL ONCE
Refills: 0 | Status: COMPLETED | OUTPATIENT
Start: 2020-02-07 | End: 2020-02-07

## 2020-02-07 RX ADMIN — Medication 650 MILLIGRAM(S): at 14:00

## 2020-02-07 RX ADMIN — Medication 200 MILLIGRAM(S): at 18:28

## 2020-02-07 RX ADMIN — LACTULOSE 20 GRAM(S): 10 SOLUTION ORAL at 18:28

## 2020-02-07 RX ADMIN — Medication 1 MILLIGRAM(S): at 13:10

## 2020-02-07 RX ADMIN — Medication 650 MILLIGRAM(S): at 00:25

## 2020-02-07 RX ADMIN — Medication 400 MILLIGRAM(S): at 01:24

## 2020-02-07 RX ADMIN — LACTULOSE 20 GRAM(S): 10 SOLUTION ORAL at 05:59

## 2020-02-07 RX ADMIN — POLYETHYLENE GLYCOL 3350 17 GRAM(S): 17 POWDER, FOR SOLUTION ORAL at 18:28

## 2020-02-07 RX ADMIN — Medication 10 MILLIGRAM(S): at 00:26

## 2020-02-07 RX ADMIN — ENOXAPARIN SODIUM 40 MILLIGRAM(S): 100 INJECTION SUBCUTANEOUS at 18:28

## 2020-02-07 RX ADMIN — Medication 650 MILLIGRAM(S): at 13:10

## 2020-02-07 RX ADMIN — Medication 1 BOTTLE: at 00:04

## 2020-02-07 RX ADMIN — LEVETIRACETAM 400 MILLIGRAM(S): 250 TABLET, FILM COATED ORAL at 05:59

## 2020-02-07 RX ADMIN — Medication 200 MILLIGRAM(S): at 05:59

## 2020-02-07 RX ADMIN — Medication 5 MILLIGRAM(S): at 23:30

## 2020-02-07 RX ADMIN — HYDROXYUREA 1000 MILLIGRAM(S): 500 CAPSULE ORAL at 23:30

## 2020-02-07 RX ADMIN — POLYETHYLENE GLYCOL 3350 17 GRAM(S): 17 POWDER, FOR SOLUTION ORAL at 05:59

## 2020-02-07 RX ADMIN — ATORVASTATIN CALCIUM 80 MILLIGRAM(S): 80 TABLET, FILM COATED ORAL at 23:30

## 2020-02-07 RX ADMIN — PANTOPRAZOLE SODIUM 40 MILLIGRAM(S): 20 TABLET, DELAYED RELEASE ORAL at 05:59

## 2020-02-07 RX ADMIN — LEVETIRACETAM 400 MILLIGRAM(S): 250 TABLET, FILM COATED ORAL at 18:28

## 2020-02-07 NOTE — PROGRESS NOTE ADULT - SUBJECTIVE AND OBJECTIVE BOX
SUBJECTIVE: PT seen and examined at bedside.    Vital Signs Last 24 Hrs  T(C): 36.3 (02-07-20 @ 13:00), Max: 37.1 (02-06-20 @ 19:35)  T(F): 97.4 (02-07-20 @ 13:00), Max: 98.8 (02-06-20 @ 19:35)  HR: 71 (02-07-20 @ 13:00) (70 - 97)  BP: 122/78 (02-07-20 @ 13:00) (101/62 - 122/78)  BP(mean): --  RR: 18 (02-07-20 @ 13:00) (16 - 20)  SpO2: 100% (02-07-20 @ 13:00) (96% - 100%)    PHYSICAL EXAM:    Constitutional: No Acute Distress     Neurological:     Incision: scalp incision +staples, c/d/i    Pulmonary: Clear to Auscultation, No rales, No rhonchi, No wheezes     Cardiovascular: S1, S2, Regular rate and rhythm     Gastrointestinal: Soft, Non-tender, Non-distended, +bowel sounds x 4    Extremities: No calf tenderness bilaterally, no cyanosis, clubbing or edema          LABS:                            10.2   7.89  )-----------( 320      ( 06 Feb 2020 06:04 )             31.8    02-06    138  |  103  |  12  ----------------------------<  106<H>  3.7   |  20<L>  |  0.36<L>    Ca    9.4      06 Feb 2020 06:04        02-06 @ 07:01  -  02-07 @ 07:00  --------------------------------------------------------  IN: 570 mL / OUT: 3350 mL / NET: -2780 mL        IMAGING:     MEDICATIONS:  Antibiotics:    Neuro:  acetaminophen   Tablet .. 650 milliGRAM(s) Oral every 6 hours PRN Temp greater or equal to 38C (100.4F), Mild Pain (1 - 3)  carBAMazepine ER Capsule 200 milliGRAM(s) Oral two times a day  levETIRAcetam  IVPB 1500 milliGRAM(s) IV Intermittent every 12 hours  LORazepam   Injectable 1 milliGRAM(s) IV Push once  metoclopramide Injectable 10 milliGRAM(s) IV Push every 6 hours PRN Nausea and/or Vomiting  oxyCODONE    IR 5 milliGRAM(s) Oral every 4 hours PRN Moderate Pain (4 - 6)    Cardiac:    Pulm:    GI/:  bisacodyl 5 milliGRAM(s) Oral at bedtime  lactulose Syrup 20 Gram(s) Oral two times a day  pantoprazole    Tablet 40 milliGRAM(s) Oral before breakfast  polyethylene glycol 3350 17 Gram(s) Oral two times a day  senna 2 Tablet(s) Oral at bedtime    Other:   atorvastatin 80 milliGRAM(s) Oral at bedtime  enoxaparin Injectable 40 milliGRAM(s) SubCutaneous <User Schedule>  folic acid 1 milliGRAM(s) Oral daily  hydroxyurea 1000 milliGRAM(s) Oral daily  influenza   Vaccine 0.5 milliLiter(s) IntraMuscular once  sodium chloride 0.9% lock flush 10 milliLiter(s) IV Push every 1 hour PRN Pre/post blood products, medications, blood draw, and to maintain line patency  sodium chloride 0.9%. 1000 milliLiter(s) IV Continuous <Continuous>        DIET: regular SUBJECTIVE: PT seen and examined at bedside at apharesis lab for exchange transfusion. Was very sleepy during encounter, other wise doing well.    Vital Signs Last 24 Hrs  T(C): 36.3 (02-07-20 @ 13:00), Max: 37.1 (02-06-20 @ 19:35)  T(F): 97.4 (02-07-20 @ 13:00), Max: 98.8 (02-06-20 @ 19:35)  HR: 71 (02-07-20 @ 13:00) (70 - 97)  BP: 122/78 (02-07-20 @ 13:00) (101/62 - 122/78)  BP(mean): --  RR: 18 (02-07-20 @ 13:00) (16 - 20)  SpO2: 100% (02-07-20 @ 13:00) (96% - 100%)    PHYSICAL EXAM:    Constitutional: No Acute Distress     Neurological: AOx2, FC, REA 5/5, SILT, persistent L 3rd NP.    Incision: scalp incision +staples, c/d/i    Pulmonary: Clear to Auscultation, No rales, No rhonchi, No wheezes     Cardiovascular: S1, S2, Regular rate and rhythm     Gastrointestinal: Soft, Non-tender, Non-distended, +bowel sounds x 4    Extremities: No calf tenderness bilaterally, no cyanosis, clubbing or edema          LABS:                      10.2   7.89  )-----------( 320      ( 06 Feb 2020 06:04 )             31.8    02-06    138  |  103  |  12  ----------------------------<  106<H>  3.7   |  20<L>  |  0.36<L>    Ca    9.4      06 Feb 2020 06:04        02-06 @ 07:01  -  02-07 @ 07:00  --------------------------------------------------------  IN: 570 mL / OUT: 3350 mL / NET: -2780 mL        IMAGING:     MEDICATIONS:  Antibiotics:    Neuro:  acetaminophen   Tablet .. 650 milliGRAM(s) Oral every 6 hours PRN Temp greater or equal to 38C (100.4F), Mild Pain (1 - 3)  carBAMazepine ER Capsule 200 milliGRAM(s) Oral two times a day  levETIRAcetam  IVPB 1500 milliGRAM(s) IV Intermittent every 12 hours  LORazepam   Injectable 1 milliGRAM(s) IV Push once  metoclopramide Injectable 10 milliGRAM(s) IV Push every 6 hours PRN Nausea and/or Vomiting  oxyCODONE    IR 5 milliGRAM(s) Oral every 4 hours PRN Moderate Pain (4 - 6)    Cardiac:    Pulm:    GI/:  bisacodyl 5 milliGRAM(s) Oral at bedtime  lactulose Syrup 20 Gram(s) Oral two times a day  pantoprazole    Tablet 40 milliGRAM(s) Oral before breakfast  polyethylene glycol 3350 17 Gram(s) Oral two times a day  senna 2 Tablet(s) Oral at bedtime    Other:   atorvastatin 80 milliGRAM(s) Oral at bedtime  enoxaparin Injectable 40 milliGRAM(s) SubCutaneous <User Schedule>  folic acid 1 milliGRAM(s) Oral daily  hydroxyurea 1000 milliGRAM(s) Oral daily  influenza   Vaccine 0.5 milliLiter(s) IntraMuscular once  sodium chloride 0.9% lock flush 10 milliLiter(s) IV Push every 1 hour PRN Pre/post blood products, medications, blood draw, and to maintain line patency  sodium chloride 0.9%. 1000 milliLiter(s) IV Continuous <Continuous>        DIET: regular

## 2020-02-07 NOTE — PROGRESS NOTE ADULT - ASSESSMENT
This patient with sickle cell disease and prior transfusion and treatment has been evaluated for placement of a Shiley catheter for exchange transfusion. His last hemoglobin  electrophoresis  this week showed a HGB  S level of 33 % and a HGB AA level of This patient with sickle cell disease and prior transfusion and treatment has been evaluated for placement of a Shiley catheter for exchange transfusion. His last hemoglobin  electrophoresis  this week showed a HGB  S level of 33 % and a HGB AA level of 64%.  He had a post operative hemorrhage in the region of the aneurysm clipping and we are awaiting recovery of the left eyelid function. No nausea and no vomitng. he tells me that he is not in pain. Management of his complex condition discussed with blood bank services

## 2020-02-07 NOTE — PROGRESS NOTE ADULT - ASSESSMENT
HPI:  19M pmhx SSD several known unruptured aneurysms largest is L ICA s/p DSA 1/14/2020 presents for gradual worsening headache since yesterday, neurointact (27 Jan 2020 14:00)    PROCEDURE: s/p left craniotomy and clipping of left ica terminus aneurysm and left pca artery aneurysm on 1/29   POD#6      PLAN:   Neuro:  -repeat angio yesterday, stable clipping of the left ICA/PCA  -continue  BID until the 20th, after which will be 400 BID  -day 14 staples come out  -continue on 1g keppra  -pain control PRN  Cards:  -vitals stable  -continue lipitor 80 mg  Pulm:  -stable on RA  Renal:  NS @ 50  Heme:  -Hgb S 32.9, Hgb A: 64.0, Shiley will be placed today by IR, exchange transfusion tentative tomorrow morning  -DVT ppx: lovenox 40 SQ  -continue hydroxyurea  PT/OT:  Outpatient PT HPI:  19M pmhx SSD several known unruptured aneurysms largest is L ICA s/p DSA 1/14/2020 presents for gradual worsening headache since yesterday, neurointact (27 Jan 2020 14:00)    PROCEDURE: s/p left craniotomy and clipping of left ica terminus aneurysm and left pca artery aneurysm on 1/29, repeat angio 2/6/2020, stable clipping of aneuyrsms  POD#1     PLAN:   Neuro:  -repeat angio yesterday, stable clipping of the left ICA/PCA  -continue  BID until the 20th, after which will be 400 BID  -day 14 staples come out  -continue on 1g keppra  -pain control PRN  Cards:  -vitals stable  -continue lipitor 80 mg  Pulm:  -stable on RA  Renal:  NS @ 50  Heme:  -Hgb S 32.9, Hgb A: 64.0, Shiley will be placed today by IR, exchange transfusion tentative tomorrow morning  -DVT ppx: lovenox 40 SQ  -continue hydroxyurea  PT/OT:  Outpatient PT HPI:  19M pmhx SSD several known unruptured aneurysms largest is L ICA s/p DSA 1/14/2020 presents for gradual worsening headache since yesterday, neurointact (27 Jan 2020 14:00)    PROCEDURE: s/p left craniotomy and clipping of left ica terminus aneurysm and left pca artery aneurysm on 1/29, repeat angio 2/6/2020, stable clipping of aneuyrsms  POD#1     PLAN:   Neuro:  -repeat angio yesterday, stable clipping of the left ICA/PCA  -continue  BID until the 20th, after which will be 400 BID  -day 14 staples come out  -continue on 1g keppra  -pain control PRN  Cards:  -vitals stable  -continue lipitor 80 mg  Pulm:  -stable on RA  Renal:  NS @ 50  Heme:  -Hgb S 32.9, Hgb A: 64.0, R IJ Shiley placed by IR,  PT received exchange transfusion today.   -DVT ppx: lovenox 40 SQ  -continue hydroxyurea  PT/OT:  Outpatient PT

## 2020-02-07 NOTE — PROGRESS NOTE ADULT - SUBJECTIVE AND OBJECTIVE BOX
Clem Olsen   Pager [phone]  Office [phone]      CC: Patient is a 19y old  Male who presents with a chief complaint of intracranial aneurysms (06 Feb 2020 20:10)      SUBJECTIVE / OVERNIGHT EVENTS: Feels good. Large BM. nO joint pains, CP/SOB, HA.     MEDICATIONS  (STANDING):  atorvastatin 80 milliGRAM(s) Oral at bedtime  bisacodyl 5 milliGRAM(s) Oral at bedtime  carBAMazepine ER Capsule 200 milliGRAM(s) Oral two times a day  enoxaparin Injectable 40 milliGRAM(s) SubCutaneous <User Schedule>  folic acid 1 milliGRAM(s) Oral daily  hydroxyurea 1000 milliGRAM(s) Oral daily  influenza   Vaccine 0.5 milliLiter(s) IntraMuscular once  lactulose Syrup 20 Gram(s) Oral two times a day  levETIRAcetam  IVPB 1500 milliGRAM(s) IV Intermittent every 12 hours  LORazepam   Injectable 1 milliGRAM(s) IV Push once  pantoprazole    Tablet 40 milliGRAM(s) Oral before breakfast  polyethylene glycol 3350 17 Gram(s) Oral two times a day  senna 2 Tablet(s) Oral at bedtime  sodium chloride 0.9%. 1000 milliLiter(s) (50 mL/Hr) IV Continuous <Continuous>    MEDICATIONS  (PRN):  acetaminophen   Tablet .. 650 milliGRAM(s) Oral every 6 hours PRN Temp greater or equal to 38C (100.4F), Mild Pain (1 - 3)  metoclopramide Injectable 10 milliGRAM(s) IV Push every 6 hours PRN Nausea and/or Vomiting  oxyCODONE    IR 5 milliGRAM(s) Oral every 4 hours PRN Moderate Pain (4 - 6)  sodium chloride 0.9% lock flush 10 milliLiter(s) IV Push every 1 hour PRN Pre/post blood products, medications, blood draw, and to maintain line patency      Vital Signs Last 24 Hrs  T(C): 36.6 (07 Feb 2020 09:08), Max: 37.1 (06 Feb 2020 19:35)  T(F): 97.9 (07 Feb 2020 09:08), Max: 98.8 (06 Feb 2020 19:35)  HR: 83 (07 Feb 2020 09:08) (70 - 97)  BP: 106/72 (07 Feb 2020 09:08) (101/62 - 116/77)  BP(mean): --  RR: 18 (07 Feb 2020 09:08) (16 - 20)  SpO2: 97% (07 Feb 2020 09:08) (96% - 100%)  CAPILLARY BLOOD GLUCOSE        I&O's Summary    06 Feb 2020 07:01  -  07 Feb 2020 07:00  --------------------------------------------------------  IN: 570 mL / OUT: 3350 mL / NET: -2780 mL          PHYSICAL EXAM:    GENERAL: NAD   HEENT: left eye palsy  PULM: Clear to auscultation bilaterally  CV: Regular rate and rhythm; nl S1, S2; No murmurs, rubs, or gallops  ABDOMEN: Soft, Nontender, Nondistended; Bowel sounds present  EXTREMITIES/MSK:  No edema, calf tenderness   PSYCH: AAOx3  NEUROLOGY: non-focal          LABS:                        10.2   7.89  )-----------( 320      ( 06 Feb 2020 06:04 )             31.8     02-06    138  |  103  |  12  ----------------------------<  106<H>  3.7   |  20<L>  |  0.36<L>    Ca    9.4      06 Feb 2020 06:04                  RADIOLOGY & ADDITIONAL TESTS:    Imaging Personally Reviewed:    Consultant(s) Notes Reviewed:      Care Discussed with Consultants/Other Providers: neurosurg regard exchange transfusion

## 2020-02-07 NOTE — PROGRESS NOTE ADULT - ASSESSMENT
19 year old male from [place] who has a history of Hb SS disease, brain aneurysm w/ recent admission 12/14-12/31 for pain crisis and subsequent acute chest syndrome as well as stroke requiring intubation and multiple exchange transfusions.   Pt had CT head and MRI/MRA which revealed multiple areas patchy ischemia w/ hemorrhagic transformation in the right superior cerebellar region as well as multiple areas of aneurysm. Pt was d/c home for outpt nsx f/up.  On 1/28/20 patient was admitted to [facility] due to c/o  headache.  He was noted to have  small CNS aneurysms and was admitted for a planned procedure possibly coiling of aneurysm.  On 1/29, he underwent left Craniotomy with clipping of L ICA terminus and L SCA aneurysms. c/w by RRT, post-seizure, for tachypnea/hypoxia 2/2/20. Seizure 2/3/20, 2/4/20. Repeat angio 2/6.

## 2020-02-07 NOTE — CONSULT NOTE ADULT - SUBJECTIVE AND OBJECTIVE BOX
Patient is a 19y old  Male who presents with a chief complaint of intracranial aneurysms (07 Feb 2020 15:17)      HPI:  19 year old [place] male with a history of Hb SS disease (follows with Dr Denia Kelley) recent pain crisis 2 years prior, brain aneurysms with recent admission 12/14-12/31 for acute pain crisis and subsequent acute chest syndrome as well as stroke requiring intubation and multiple transfusions.   Pt initially presented to [facility] on 12/14 for suspected acute stroke and received one red blood cell exchange transfusion.  Subsequently, the patient was transferred to [facility] on 12/18/2019. CT head and MRI/MRA which revealed multiple areas patchy ischemia with hemorrhagic transformation in the right superior cerebellar region as well as multiple areas of aneurysm. While at [facility], the patient received multiple PRBC transfusions.  Pt was discharged home for outpatient neurosurgery follow up.  On 1/27/2020, the patient arrived at [facility] emergency department complaining of a dull headache since 1/26/2020. The patient noted he had this headache since 2 weeks ago and it has gradually progressed since. Denies Fevers, chills, sob, abdominal pain, n/v/d, bleeding, belly pain. An MRI revealed multiple brain aneurysms and was admitted for possible neurosurgical intervention. Hematology was consulted for further management with his previous history of sickle cell disease.   During his hospital course, the patient had a craniotomy for aneurysmal repair on 1/29/2020 which was complicated by intermittent seizures. Currently, the patient is neurologically stable. Hematology and oncology appreciated elevated HbSS at 32.9% and recommended further management with possible red blood cell exchange.   PAST MEDICAL & SURGICAL HISTORY:  Brain aneurysm  Sickle cell anemia  Sickle cell disease  No significant past surgical history      MEDICATIONS  (STANDING):  atorvastatin 80 milliGRAM(s) Oral at bedtime  bisacodyl 5 milliGRAM(s) Oral at bedtime  carBAMazepine ER Capsule 200 milliGRAM(s) Oral two times a day  enoxaparin Injectable 40 milliGRAM(s) SubCutaneous <User Schedule>  folic acid 1 milliGRAM(s) Oral daily  hydroxyurea 1000 milliGRAM(s) Oral daily  influenza   Vaccine 0.5 milliLiter(s) IntraMuscular once  lactulose Syrup 20 Gram(s) Oral two times a day  levETIRAcetam  IVPB 1500 milliGRAM(s) IV Intermittent every 12 hours  LORazepam   Injectable 1 milliGRAM(s) IV Push once  pantoprazole    Tablet 40 milliGRAM(s) Oral before breakfast  polyethylene glycol 3350 17 Gram(s) Oral two times a day  senna 2 Tablet(s) Oral at bedtime  sodium chloride 0.9%. 1000 milliLiter(s) (50 mL/Hr) IV Continuous <Continuous>    MEDICATIONS  (PRN):  acetaminophen   Tablet .. 650 milliGRAM(s) Oral every 6 hours PRN Temp greater or equal to 38C (100.4F), Mild Pain (1 - 3)  metoclopramide Injectable 10 milliGRAM(s) IV Push every 6 hours PRN Nausea and/or Vomiting  oxyCODONE    IR 5 milliGRAM(s) Oral every 4 hours PRN Moderate Pain (4 - 6)  sodium chloride 0.9% lock flush 10 milliLiter(s) IV Push every 1 hour PRN Pre/post blood products, medications, blood draw, and to maintain line patency      Allergies    No Known Allergies    Intolerances  REVIEW OF SYSTEMS:    CONSTITUTIONAL: No weakness, fevers or chills  EYES/ENT: Left eye closed, right eye without visual deficit; No vertigo or throat pain   RESPIRATORY: No cough, wheezing, hemoptysis; No shortness of breath  CARDIOVASCULAR: No chest pain or palpitations  GASTROINTESTINAL: No abdominal or epigastric pain. No nausea, vomiting, or hematemesis; No diarrhea or constipation. No melena or hematochezia.  MUSCULOSKELETAL: Full range of motion  NEUROLOGICAL: See HPI  HEMATOLOGY: No anemia, no bleeding  SKIN: No itching, burning, rashes, petechia, or lesions    Vital Signs Last 24 Hrs  T(C): 36.3 (07 Feb 2020 13:00), Max: 37.1 (06 Feb 2020 19:35)  T(F): 97.4 (07 Feb 2020 13:00), Max: 98.8 (06 Feb 2020 19:35)  HR: 71 (07 Feb 2020 13:00) (70 - 97)  BP: 122/78 (07 Feb 2020 13:00) (101/62 - 122/78)  BP(mean): --  RR: 18 (07 Feb 2020 13:00) (16 - 20)  SpO2: 100% (07 Feb 2020 13:00) (96% - 100%)    Daily     Daily     PHYSICAL EXAM:    General: WD/WN/NAD, lying comfortably in bed   HEENT: MMM, craniotomy staple line L parietal scalp, left eye closed, right eye EOMI, non-icteric  Respiratory: CTA B/L no WRR  Cardiovascular: S1 S2 present  Abdominal: soft NT ND with bowel sounds present all four quadrants  MS: no atrophy  Neuro: no focal deficits noted  Skin: no rashes noted  Lymphatics: No edema LE    Catheters/Tubes/Wires:                          10.7   8.20  )-----------( 390      ( 07 Feb 2020 15:36 )             31.7       Hematocrit: 31.7 % (02-07 @ 15:36)  Hematocrit: 31.8 % (02-06 @ 06:04)  Hematocrit: 32.7 % (02-05 @ 11:30)    02-06    138  |  103  |  12  ----------------------------<  106<H>  3.7   |  20<L>  |  0.36<L>    Ca    9.4      06 Feb 2020 06:04          Lactate Dehydrogenase, Serum: 226 U/L (02-05 @ 11:32)                      Hemoglobin electropheresis 02-06 @ 00:01  32.9

## 2020-02-07 NOTE — PROGRESS NOTE ADULT - SUBJECTIVE AND OBJECTIVE BOX
INTERVAL HPI/OVERNIGHT EVENTS:  Patient S&E at bedside. No o/n events,     VITAL SIGNS:  T(F): 97.4 (02-07-20 @ 13:00)  HR: 71 (02-07-20 @ 13:00)  BP: 122/78 (02-07-20 @ 13:00)  RR: 18 (02-07-20 @ 13:00)  SpO2: 100% (02-07-20 @ 13:00)  Wt(kg): --    PHYSICAL EXAM:    Constitutional: NAD  head: staples on left side of scalp no hemartoma and no infection; healing well.  Eyes: EOMI, sclera non-icteric; left eyelid closed  Neck: supple, no masses, no JVD  Respiratory: CTA b/l, good air entry b/l  Cardiovascular: RRR, no M/R/G  Gastrointestinal: soft, NTND, no masses palpable, + BS, no hepatosplenomegaly  Extremities: no c/c/e  Neurological: AAOx3      MEDICATIONS  (STANDING):  atorvastatin 80 milliGRAM(s) Oral at bedtime  bisacodyl 5 milliGRAM(s) Oral at bedtime  carBAMazepine ER Capsule 200 milliGRAM(s) Oral two times a day  enoxaparin Injectable 40 milliGRAM(s) SubCutaneous <User Schedule>  folic acid 1 milliGRAM(s) Oral daily  hydroxyurea 1000 milliGRAM(s) Oral daily  influenza   Vaccine 0.5 milliLiter(s) IntraMuscular once  lactulose Syrup 20 Gram(s) Oral two times a day  levETIRAcetam  IVPB 1500 milliGRAM(s) IV Intermittent every 12 hours  LORazepam   Injectable 1 milliGRAM(s) IV Push once  pantoprazole    Tablet 40 milliGRAM(s) Oral before breakfast  polyethylene glycol 3350 17 Gram(s) Oral two times a day  senna 2 Tablet(s) Oral at bedtime  sodium chloride 0.9%. 1000 milliLiter(s) (50 mL/Hr) IV Continuous <Continuous>    MEDICATIONS  (PRN):  acetaminophen   Tablet .. 650 milliGRAM(s) Oral every 6 hours PRN Temp greater or equal to 38C (100.4F), Mild Pain (1 - 3)  metoclopramide Injectable 10 milliGRAM(s) IV Push every 6 hours PRN Nausea and/or Vomiting  oxyCODONE    IR 5 milliGRAM(s) Oral every 4 hours PRN Moderate Pain (4 - 6)  sodium chloride 0.9% lock flush 10 milliLiter(s) IV Push every 1 hour PRN Pre/post blood products, medications, blood draw, and to maintain line patency      Allergies    No Known Allergies    Intolerances        LABS:                        10.7   8.20  )-----------( 390      ( 07 Feb 2020 15:36 )             31.7     02-06    138  |  103  |  12  ----------------------------<  106<H>  3.7   |  20<L>  |  0.36<L>    Ca    9.4      06 Feb 2020 06:04            RADIOLOGY & ADDITIONAL TESTS:  Studies reviewed.

## 2020-02-07 NOTE — PROGRESS NOTE ADULT - NSHPATTENDINGPLANDISCUSS_GEN_ALL_CORE
patient and his sister in attendance patient and his sister in attendance; and Dr Tate Christopher GRAHAM: I have personally seen and examined this patient. I have fully participated in the care of this patient. I was present at all key portions of the procedure.

## 2020-02-07 NOTE — PROGRESS NOTE ADULT - SUBJECTIVE AND OBJECTIVE BOX
Vascular & Interventional Radiology Pre-Procedure Note    Procedure Name: non-tunnelled Central venous catheter placement for plasmapheresis     HPI: 19y Male with SCC requiring exchange transfusion in need of access.    Allergies:   Medications (Abx/Cardiac/Anticoagulation/Blood Products)    enoxaparin Injectable: 40 milliGRAM(s) SubCutaneous (02-06 @ 17:26)    Data:    T(C): 36.3  HR: 71  BP: 122/78  RR: 18  SpO2: 100%    -WBC 7.89 / HgB 10.2 / Hct 31.8 / Plt 320  -Na 138 / Cl 103 / BUN 12 / Glucose 106  -K 3.7 / CO2 20 / Cr 0.36  -ALT -- / Alk Phos -- / T.Bili --  -INR1.10    Plan:   -19y Male presents for non-tunneled central venous catheter placement for plasmapheresis   -Risks/Benefits/alternatives explained with the patient and witnessed informed consent obtained. 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    Allergies:   Medications (Abx/Cardiac/Anticoagulation/Blood Products)    enoxaparin Injectable: 40 milliGRAM(s) SubCutaneous (02-06 @ 17:26)    Data:    T(C): 36.3  HR: 71  BP: 122/78  RR: 18  SpO2: 100%    -WBC 7.89 / HgB 10.2 / Hct 31.8 / Plt 320  -Na 138 / Cl 103 / BUN 12 / Glucose 106  -K 3.7 / CO2 20 / Cr 0.36  -ALT -- / Alk Phos -- / T.Bili --  -INR1.10    Plan:   -19y Male presents for non-tunneled central venous catheter placement for exchange transfusion  -Risks/Benefits/alternatives explained with the patient and witnessed informed consent obtained.

## 2020-02-07 NOTE — CONSULT NOTE ADULT - ASSESSMENT
A/P: 19 year old male with history of HbSS disease with recent pain crisis in December 2019, strokes and S/p Craniotomy for multiple aneurysms with current HbS of 32.9%.   - RBC exchange was discussed with patient and sister present. Consent obtained from patient.   -	History of SCD and elevated HbSSà CBC prior to procedure. Will perform RBX exchange today with an FCR of 30% and end HCT of 30%.  Pre-medicate patient with Benadryl 25 mg po, Tylenol 650mg po.  -	Follow up with hematology and oncology Dr Denia Kelley as for outpatient follow up for possible monthly exchange transfusions.   Discussed with heme team and apheresis team. A/P: 19 year old male with history of HbSS disease with recent pain crisis in December 2019, strokes and S/p Craniotomy for multiple aneurysms with current HbS of 32.9%.   - RBC exchange was discussed with patient and sister present. Consent obtained from patient.   -	History of SCD and elevated HbSSà CBC prior to procedure. Will perform RBX exchange today with an FCR of 30% and end HCT of 30%.  Pre-medicate patient with Benadryl 25 mg po, Tylenol 650mg po.  -	Follow up with hematology and oncology Dr Denia Kelley as for outpatient follow up for possible monthly exchange transfusions.   Discussed with heme team and apheresis team.      I have educated the patient regarding the role of monthly RBCX in prevention of recurrent stroke considering his recent history of stroke. Patient agreed with this plan.

## 2020-02-07 NOTE — PROGRESS NOTE ADULT - SUBJECTIVE AND OBJECTIVE BOX
Vascular & Interventional Radiology Post-Procedure Note    Pre-Procedure Diagnosis: SCC  Post-Procedure Diagnosis: Same as pre.  Indications for Procedure: access for exchange transfusion    Attending: Dr. El  Resident: Dr. Herrera    Procedure Details/Findings: Successful placement of non-tunnelled HD catheter for exchange transfusion  Access (if applicable): RIJ    Complications: none  Estimated Blood Loss: Minimal  Specimen: none  Contrast: none  Sedation: none  Patient Condition/Disposition: Stable    Plan:   - Catheter may be used immediately

## 2020-02-08 DIAGNOSIS — I63.9 CEREBRAL INFARCTION, UNSPECIFIED: ICD-10-CM

## 2020-02-08 DIAGNOSIS — Z02.9 ENCOUNTER FOR ADMINISTRATIVE EXAMINATIONS, UNSPECIFIED: ICD-10-CM

## 2020-02-08 PROCEDURE — 99233 SBSQ HOSP IP/OBS HIGH 50: CPT

## 2020-02-08 PROCEDURE — 99231 SBSQ HOSP IP/OBS SF/LOW 25: CPT

## 2020-02-08 RX ADMIN — Medication 200 MILLIGRAM(S): at 17:23

## 2020-02-08 RX ADMIN — ATORVASTATIN CALCIUM 80 MILLIGRAM(S): 80 TABLET, FILM COATED ORAL at 21:49

## 2020-02-08 RX ADMIN — LACTULOSE 20 GRAM(S): 10 SOLUTION ORAL at 17:23

## 2020-02-08 RX ADMIN — POLYETHYLENE GLYCOL 3350 17 GRAM(S): 17 POWDER, FOR SOLUTION ORAL at 17:23

## 2020-02-08 RX ADMIN — Medication 650 MILLIGRAM(S): at 15:17

## 2020-02-08 RX ADMIN — Medication 650 MILLIGRAM(S): at 06:00

## 2020-02-08 RX ADMIN — LEVETIRACETAM 400 MILLIGRAM(S): 250 TABLET, FILM COATED ORAL at 05:07

## 2020-02-08 RX ADMIN — Medication 1 MILLIGRAM(S): at 13:03

## 2020-02-08 RX ADMIN — SENNA PLUS 2 TABLET(S): 8.6 TABLET ORAL at 21:49

## 2020-02-08 RX ADMIN — LEVETIRACETAM 400 MILLIGRAM(S): 250 TABLET, FILM COATED ORAL at 17:23

## 2020-02-08 RX ADMIN — LACTULOSE 20 GRAM(S): 10 SOLUTION ORAL at 05:07

## 2020-02-08 RX ADMIN — Medication 5 MILLIGRAM(S): at 21:49

## 2020-02-08 RX ADMIN — PANTOPRAZOLE SODIUM 40 MILLIGRAM(S): 20 TABLET, DELAYED RELEASE ORAL at 05:06

## 2020-02-08 RX ADMIN — Medication 650 MILLIGRAM(S): at 16:00

## 2020-02-08 RX ADMIN — Medication 650 MILLIGRAM(S): at 22:15

## 2020-02-08 RX ADMIN — POLYETHYLENE GLYCOL 3350 17 GRAM(S): 17 POWDER, FOR SOLUTION ORAL at 05:07

## 2020-02-08 RX ADMIN — ENOXAPARIN SODIUM 40 MILLIGRAM(S): 100 INJECTION SUBCUTANEOUS at 17:23

## 2020-02-08 RX ADMIN — Medication 650 MILLIGRAM(S): at 21:48

## 2020-02-08 RX ADMIN — Medication 200 MILLIGRAM(S): at 05:06

## 2020-02-08 RX ADMIN — Medication 650 MILLIGRAM(S): at 05:05

## 2020-02-08 RX ADMIN — HYDROXYUREA 1000 MILLIGRAM(S): 500 CAPSULE ORAL at 21:49

## 2020-02-08 NOTE — PROGRESS NOTE ADULT - ASSESSMENT
19 year old man with Hb SS  recent long hospitalization for acute chest syndrome requiring intubation and exchange transfusion on 12-17 c/b stroke who on MRI was found to have multiple aneurysms c/b recurrent headaches, who presented for neurosurgical intervention for brain aneurysms prior to returning home to [facility]. Hematology consulted for matilda-operative management for Hb SS disease.    HbSS disease- s/p left Craniotomy with clipping of L ICA terminus and L SCA aneurysms s/p aneurysm repair done on 1- now with new seizures, left third nerve palsy and new right cerebellar stroke post op  - s/p RBC exchange for prophylaxis against further CVA. No Hb yet today.   - had recent stroke in december 2019. he was given a red cell exchange on 12-17-19 and then was given simple red blood cell transfusions at [facility] for acute chest  -blood bank will need Shiley placed to do red cell exchange tomorrow 2-7. please place Shiley.  - monitor CBC with diff daily  - patient with new seizures, had rapid response called on  2-2 for AMS, developed left third nerve palsy post-operatively with right cerebellar infarct -> angiogram done today  - post-operative management should focus on minimizing hypoxia, hypothermia, acidosis, and intravascular volume depletion.  - Please encourage frequent incentive spirometer use if safe from neurosurgical standpoint  - appreciate excellent neurosurgical care  - Shiley to be removed today if no further plans for exchange     d/w primary neurosurgical team

## 2020-02-08 NOTE — PROGRESS NOTE ADULT - SUBJECTIVE AND OBJECTIVE BOX
[facility] Division of Hospital Medicine  Ysabel Blackmon MD  Pager (M-F, 6E-7N): [phone]  Other Times:  [phone]      Patient is a 19y old  Male who presents with a chief complaint of intracranial aneurysms. sickle cell disease (07 Feb 2020 16:40)      SUBJECTIVE / OVERNIGHT EVENTS: No issues overnight. Received exchange transfusion yesterday with no issue. Overall feels well. No fevers, chills, chest pain, SOB, abd pain, n/v/d/c at this time. Asking for Talia to come out.    ADDITIONAL REVIEW OF SYSTEMS:    MEDICATIONS  (STANDING):  atorvastatin 80 milliGRAM(s) Oral at bedtime  bisacodyl 5 milliGRAM(s) Oral at bedtime  carBAMazepine ER Capsule 200 milliGRAM(s) Oral two times a day  enoxaparin Injectable 40 milliGRAM(s) SubCutaneous <User Schedule>  folic acid 1 milliGRAM(s) Oral daily  hydroxyurea 1000 milliGRAM(s) Oral daily  influenza   Vaccine 0.5 milliLiter(s) IntraMuscular once  lactulose Syrup 20 Gram(s) Oral two times a day  levETIRAcetam  IVPB 1500 milliGRAM(s) IV Intermittent every 12 hours  LORazepam   Injectable 1 milliGRAM(s) IV Push once  pantoprazole    Tablet 40 milliGRAM(s) Oral before breakfast  polyethylene glycol 3350 17 Gram(s) Oral two times a day  senna 2 Tablet(s) Oral at bedtime  sodium chloride 0.9%. 1000 milliLiter(s) (50 mL/Hr) IV Continuous <Continuous>    MEDICATIONS  (PRN):  acetaminophen   Tablet .. 650 milliGRAM(s) Oral every 6 hours PRN Temp greater or equal to 38C (100.4F), Mild Pain (1 - 3)  metoclopramide Injectable 10 milliGRAM(s) IV Push every 6 hours PRN Nausea and/or Vomiting  oxyCODONE    IR 5 milliGRAM(s) Oral every 4 hours PRN Moderate Pain (4 - 6)  sodium chloride 0.9% lock flush 10 milliLiter(s) IV Push every 1 hour PRN Pre/post blood products, medications, blood draw, and to maintain line patency      CAPILLARY BLOOD GLUCOSE        I&O's Summary    07 Feb 2020 07:01  -  08 Feb 2020 07:00  --------------------------------------------------------  IN: 430 mL / OUT: 820 mL / NET: -390 mL    08 Feb 2020 07:01  -  08 Feb 2020 12:54  --------------------------------------------------------  IN: 480 mL / OUT: 800 mL / NET: -320 mL        PHYSICAL EXAM:  Vital Signs Last 24 Hrs  T(C): 36.7 (08 Feb 2020 12:15), Max: 37 (07 Feb 2020 19:20)  T(F): 98 (08 Feb 2020 12:15), Max: 98.6 (07 Feb 2020 19:20)  HR: 94 (08 Feb 2020 12:15) (71 - 96)  BP: 103/66 (08 Feb 2020 12:15) (100/66 - 122/78)  BP(mean): --  RR: 20 (08 Feb 2020 12:15) (18 - 20)  SpO2: 100% (08 Feb 2020 12:15) (96% - 100%)    CONSTITUTIONAL: NAD, well-developed, well-groomed  EYES: PERRLA; conjunctiva and sclera clear +L ptosis  ENMT: Moist oral mucosa, no pharyngeal injection or exudates; normal dentition +R IJ shiley catheter in place c/d/i  NECK: Supple, no palpable masses; no thyromegaly  RESPIRATORY: Normal respiratory effort; lungs are clear to auscultation bilaterally  CARDIOVASCULAR: Regular rate and rhythm, normal S1 and S2, no murmur/rub/gallop; No lower extremity edema; Peripheral pulses are 2+ bilaterally  ABDOMEN: Soft, Nondistended,  Nontender to palpation, normoactive bowel sounds  MUSCULOSKELETAL:  Normal gait; no clubbing or cyanosis of digits; no joint swelling or tenderness to palpation  PSYCH: A+O to person, place, and time; affect appropriate  NEUROLOGY: CN 2-12 are intact and symmetric; no gross sensory deficits   SKIN: No rashes; no palpable lesions    LABS:                        9.9    6.27  )-----------( 118      ( 07 Feb 2020 17:40 )             30.0           RADIOLOGY & ADDITIONAL TESTS:  Results Reviewed: H/H stable at 9.9 yesterday  Imaging Personally Reviewed:  Electrocardiogram Personally Reviewed:    COORDINATION OF CARE:  Care Discussed with Consultants/Other Providers [Y]: Neurosurgery ADRIÁN Delgado  Prior or Outpatient Records Reviewed [Y/N]:

## 2020-02-08 NOTE — PROGRESS NOTE ADULT - SUBJECTIVE AND OBJECTIVE BOX
SUBJECTIVE: PT seen and examined this am, sitting in chair. Unhappy about being in hospital, feeling well today.   s/p PRBC exchange yesterday, R con in place    Vital Signs Last 24 Hrs  T(C): 37 (08 Feb 2020 15:00), Max: 37 (07 Feb 2020 19:20)  T(F): 98.6 (08 Feb 2020 15:00), Max: 98.6 (07 Feb 2020 19:20)  HR: 96 (08 Feb 2020 15:00) (72 - 96)  BP: 118/75 (08 Feb 2020 15:00) (100/66 - 118/79)  BP(mean): --  RR: 20 (08 Feb 2020 15:00) (18 - 20)  SpO2: 99% (08 Feb 2020 15:00) (96% - 100%)      PHYSICAL EXAM:    Constitutional: No Acute Distress     Neurological: AOx2-3, FC, REA 5/5, SILT, persistent L 3rd NP.    Incision: scalp incision +staples, c/d/i    Pulmonary: Clear to Auscultation, No rales, No rhonchi, No wheezes     Cardiovascular: S1, S2, Regular rate and rhythm     Gastrointestinal: Soft, Non-tender, Non-distended, +bowel sounds x 4    Extremities: No calf tenderness bilaterally, no cyanosis, clubbing or edema    LABS:                          9.9    6.27  )-----------( 118      ( 07 Feb 2020 17:40 )             30.0     IMAGING:   < from: CT Head No Cont (02.05.20 @ 08:58) >  INTERPRETATION:  CLINICAL INFORMATION: Status post craniotomy and clipping of aneurysm.    TECHNIQUE: Noncontrast axial CT images were acquired through the head. Two-dimensional sagittal and coronal reformats were obtained    COMPARISON: CT head 2/4/2020.    FINDINGS:     Redemonstration of left pterional craniotomy and extracalvarial soft tissue swelling which appears similar. Interval decrease in size of underlying postsurgical pneumocephalus and left frontal extra-axial mixed density collection measuring 6 mm, previously 9 mm. The left temporal component has also decreased measuring 1.0 cm, previously 1.5 cm. Persistent left frontal hypoattenuation with adjacent left frontal horn ventricular effacement. Decreasing inferior left frontal hemorrhage. No hydrocephalus or midline shift.    New areas of hypoattenuation involving the left cerebellar hemisphere, likely representing streak artifact.. Evolving infarct in the right cerebellar hemisphere.    2 left parasellar aneurysm clips are again noted.    Near-complete opacification of the right maxillary sinus. Remaining paranasal sinuses and mastoid air cells are clear.     IMPRESSION:     Left pterionalcraniotomy with interval decrease in left frontotemporal mixed density extra-axial collection as above. No hydrocephalus or midline shift.    New areas of hypoattenuation likely represents streak artifact. A repeat CT can be obtained for further evaluation.   Evolving right cerebellar hemisphere infarct.    < end of copied text >    MEDICATIONS  (STANDING):  atorvastatin 80 milliGRAM(s) Oral at bedtime  bisacodyl 5 milliGRAM(s) Oral at bedtime  carBAMazepine ER Capsule 200 milliGRAM(s) Oral two times a day  enoxaparin Injectable 40 milliGRAM(s) SubCutaneous <User Schedule>  folic acid 1 milliGRAM(s) Oral daily  hydroxyurea 1000 milliGRAM(s) Oral daily  influenza   Vaccine 0.5 milliLiter(s) IntraMuscular once  lactulose Syrup 20 Gram(s) Oral two times a day  levETIRAcetam  IVPB 1500 milliGRAM(s) IV Intermittent every 12 hours  LORazepam   Injectable 1 milliGRAM(s) IV Push once  pantoprazole    Tablet 40 milliGRAM(s) Oral before breakfast  polyethylene glycol 3350 17 Gram(s) Oral two times a day  senna 2 Tablet(s) Oral at bedtime  sodium chloride 0.9%. 1000 milliLiter(s) (50 mL/Hr) IV Continuous <Continuous>    MEDICATIONS  (PRN):  acetaminophen   Tablet .. 650 milliGRAM(s) Oral every 6 hours PRN Temp greater or equal to 38C (100.4F), Mild Pain (1 - 3)  metoclopramide Injectable 10 milliGRAM(s) IV Push every 6 hours PRN Nausea and/or Vomiting  oxyCODONE    IR 5 milliGRAM(s) Oral every 4 hours PRN Moderate Pain (4 - 6)  sodium chloride 0.9% lock flush 10 milliLiter(s) IV Push every 1 hour PRN Pre/post blood products, medications, blood draw, and to maintain line patency    DIET: regular

## 2020-02-08 NOTE — PROGRESS NOTE ADULT - PROBLEM SELECTOR PLAN 3
s/p exchange transfusion as per hematology on 2/7/20.  - c/w folic acid  - c/w hydroxyurea  - will need to f/u with outpatient hematologist Dr. Denia Kelley for outpatient exchange transfusions

## 2020-02-08 NOTE — PROGRESS NOTE ADULT - ASSESSMENT
HPI:  19M pmhx SSD several known unruptured aneurysms largest is L ICA s/p DSA 1/14/2020 presents for gradual worsening headache since yesterday, neurointact (27 Jan 2020 14:00)    PROCEDURE: s/p left craniotomy and clipping of left ica terminus aneurysm and left pca artery aneurysm on 1/29, repeat angio 2/6/2020, stable clipping of aneuyrsms    PLAN:   Neuro:  -repeat angio 2/6- stable clipping of the left ICA/PCA  -continue  2x daily until the 20th, after which will be 400mg 2x daily  -continue on 1500mg keppra 2x daily  -pain control PRN  Cards:  -vitals stable  -continue lipitor 80 mg  Pulm:  -stable on RA  Renal:  NS @ 50  Heme:  -Hgb S 32.9, Hgb A: 64.0, R IJ Shiley placed by IR,  PT received exchange transfusion 2/7- no further transfusions scheduled , will remove shiley in am   -DVT ppx: lovenox 40 SQ  -continue hydroxyurea  PT/OT:  Outpatient PT upon d/c    appreciate hematology and hospitalist medicine follow up  f/u am cbc, bmp  sister updated at bedside- nervous to take him home after seizures- medications adjusted and confirmed current meds availability in [place] per sister.     Will discuss with Dr Nico Phipps # [phone]    Assessment:  Please Check When Present   []  GCS  E   V  M     Heart Failure: []Acute, [] acute on chronic , []chronic  Heart Failure:  [] Diastolic (HFpEF), [] Systolic (HFrEF), []Combined (HFpEF and HFrEF), [] RHF, [] Pulm HTN, [] Other    [] ISABELL, [] ATN, [] AIN, [] other  [] CKD1, [] CKD2, [] CKD 3, [] CKD 4, [] CKD 5, []ESRD    Encephalopathy: [] Metabolic, [] Hepatic, [] toxic, [] Neurological, [] Other    Abnormal Nutritional Status: [] malnutrition (see nutrition note), [ ]underweight: BMI < 19, [] morbid obesity: BMI >40, [] Cachexia    [] Sepsis  [] hypovolemic shock,[] cardiogenic shock, [] hemorrhagic shock, [] neurogenic shock  [] Acute Respiratory Failure  []Cerebral edema, [] Brain compression/ herniation,   [] Functional quadriplegia  [] Acute blood loss anemia

## 2020-02-08 NOTE — PROGRESS NOTE ADULT - PROBLEM SELECTOR PLAN 1
recurrent seizures. appreciate neurology input  - c/w keppra 1500mg BID  - c/w carbamazapine 200mg BID until 2/20/20, after which to be increased to 400 mg BID

## 2020-02-08 NOTE — PROGRESS NOTE ADULT - ASSESSMENT
19 year old male from [place] who has a history of Hb SS disease, brain aneurysm w/ recent admission 12/14-12/31 for pain crisis and subsequent acute chest syndrome as well as stroke requiring intubation and multiple exchange transfusions.   Pt had CT head and MRI/MRA which revealed multiple areas patchy ischemia w/ hemorrhagic transformation in the right superior cerebellar region as well as multiple areas of aneurysm. Pt was d/c home for outpt nsx f/up.  On 1/28/20 patient was admitted to [facility] due to c/o  headache.  He was noted to have  small CNS aneurysms and was admitted for a planned procedure possibly coiling of aneurysm.  On 1/29, he underwent left Craniotomy with clipping of L ICA terminus and L SCA aneurysms. c/w by RRT, post-seizure, for tachypnea/hypoxia 2/2/20. Seizure 2/3/20, 2/4/20. Repeat angio 2/6.

## 2020-02-08 NOTE — PROGRESS NOTE ADULT - SUBJECTIVE AND OBJECTIVE BOX
Chief Complaint: SCD, CVA    INTERVAL HPI/OVERNIGHT EVENTS: received PRBC exchange yesterday without complications. Denies any c/o.     MEDICATIONS  (STANDING):  atorvastatin 80 milliGRAM(s) Oral at bedtime  bisacodyl 5 milliGRAM(s) Oral at bedtime  carBAMazepine ER Capsule 200 milliGRAM(s) Oral two times a day  enoxaparin Injectable 40 milliGRAM(s) SubCutaneous <User Schedule>  folic acid 1 milliGRAM(s) Oral daily  hydroxyurea 1000 milliGRAM(s) Oral daily  influenza   Vaccine 0.5 milliLiter(s) IntraMuscular once  lactulose Syrup 20 Gram(s) Oral two times a day  levETIRAcetam  IVPB 1500 milliGRAM(s) IV Intermittent every 12 hours  LORazepam   Injectable 1 milliGRAM(s) IV Push once  pantoprazole    Tablet 40 milliGRAM(s) Oral before breakfast  polyethylene glycol 3350 17 Gram(s) Oral two times a day  senna 2 Tablet(s) Oral at bedtime  sodium chloride 0.9%. 1000 milliLiter(s) (50 mL/Hr) IV Continuous <Continuous>    MEDICATIONS  (PRN):  acetaminophen   Tablet .. 650 milliGRAM(s) Oral every 6 hours PRN Temp greater or equal to 38C (100.4F), Mild Pain (1 - 3)  metoclopramide Injectable 10 milliGRAM(s) IV Push every 6 hours PRN Nausea and/or Vomiting  oxyCODONE    IR 5 milliGRAM(s) Oral every 4 hours PRN Moderate Pain (4 - 6)  sodium chloride 0.9% lock flush 10 milliLiter(s) IV Push every 1 hour PRN Pre/post blood products, medications, blood draw, and to maintain line patency      Allergies    No Known Allergies    Intolerances        ROS: as above     Vital Signs Last 24 Hrs  T(C): 36.7 (08 Feb 2020 12:15), Max: 37 (07 Feb 2020 19:20)  T(F): 98 (08 Feb 2020 12:15), Max: 98.6 (07 Feb 2020 19:20)  HR: 94 (08 Feb 2020 12:15) (72 - 96)  BP: 103/66 (08 Feb 2020 12:15) (100/66 - 118/79)  BP(mean): --  RR: 20 (08 Feb 2020 12:15) (18 - 20)  SpO2: 100% (08 Feb 2020 12:15) (96% - 100%)    Physical Exam:   AAO x 3, NAD   RRR S1S2  CTA b/l   soft NTNDBS+  no c/c/e    LABS:                        9.9    6.27  )-----------( 118      ( 07 Feb 2020 17:40 )             30.0

## 2020-02-08 NOTE — PROGRESS NOTE ADULT - PROBLEM SELECTOR PLAN 7
Transitions of Care Status:  1.  Name of PCP: no PCP, follows only with Hematologist Dr. Denia Kelley  2.  PCP Contacted on Admission: [x] Y    [ ] N    3.  PCP contacted at Discharge: [ ] Y    [ ] N    [ ] N/A  4.  Post-Discharge Appointment Date and Location:  5.  Summary of Handoff given to PCP:

## 2020-02-09 LAB
ANION GAP SERPL CALC-SCNC: 13 MMOL/L — SIGNIFICANT CHANGE UP (ref 5–17)
BASOPHILS # BLD AUTO: 0.04 K/UL — SIGNIFICANT CHANGE UP (ref 0–0.2)
BASOPHILS NFR BLD AUTO: 0.4 % — SIGNIFICANT CHANGE UP (ref 0–2)
BUN SERPL-MCNC: 9 MG/DL — SIGNIFICANT CHANGE UP (ref 7–23)
CALCIUM SERPL-MCNC: 9.5 MG/DL — SIGNIFICANT CHANGE UP (ref 8.4–10.5)
CHLORIDE SERPL-SCNC: 101 MMOL/L — SIGNIFICANT CHANGE UP (ref 96–108)
CO2 SERPL-SCNC: 23 MMOL/L — SIGNIFICANT CHANGE UP (ref 22–31)
CREAT SERPL-MCNC: 0.41 MG/DL — LOW (ref 0.5–1.3)
EOSINOPHIL # BLD AUTO: 0.26 K/UL — SIGNIFICANT CHANGE UP (ref 0–0.5)
EOSINOPHIL NFR BLD AUTO: 2.6 % — SIGNIFICANT CHANGE UP (ref 0–6)
GLUCOSE SERPL-MCNC: 103 MG/DL — HIGH (ref 70–99)
HCT VFR BLD CALC: 28.3 % — LOW (ref 39–50)
HGB BLD-MCNC: 9.3 G/DL — LOW (ref 13–17)
IMM GRANULOCYTES NFR BLD AUTO: 0.3 % — SIGNIFICANT CHANGE UP (ref 0–1.5)
LYMPHOCYTES # BLD AUTO: 4.1 K/UL — HIGH (ref 1–3.3)
LYMPHOCYTES # BLD AUTO: 41.7 % — SIGNIFICANT CHANGE UP (ref 13–44)
MCHC RBC-ENTMCNC: 28.3 PG — SIGNIFICANT CHANGE UP (ref 27–34)
MCHC RBC-ENTMCNC: 32.9 GM/DL — SIGNIFICANT CHANGE UP (ref 32–36)
MCV RBC AUTO: 86 FL — SIGNIFICANT CHANGE UP (ref 80–100)
MONOCYTES # BLD AUTO: 1.05 K/UL — HIGH (ref 0–0.9)
MONOCYTES NFR BLD AUTO: 10.7 % — SIGNIFICANT CHANGE UP (ref 2–14)
NEUTROPHILS # BLD AUTO: 4.35 K/UL — SIGNIFICANT CHANGE UP (ref 1.8–7.4)
NEUTROPHILS NFR BLD AUTO: 44.3 % — SIGNIFICANT CHANGE UP (ref 43–77)
NRBC # BLD: 0 /100 WBCS — SIGNIFICANT CHANGE UP (ref 0–0)
PLATELET # BLD AUTO: 260 K/UL — SIGNIFICANT CHANGE UP (ref 150–400)
POTASSIUM SERPL-MCNC: 4.2 MMOL/L — SIGNIFICANT CHANGE UP (ref 3.5–5.3)
POTASSIUM SERPL-SCNC: 4.2 MMOL/L — SIGNIFICANT CHANGE UP (ref 3.5–5.3)
RBC # BLD: 3.29 M/UL — LOW (ref 4.2–5.8)
RBC # FLD: 17.6 % — HIGH (ref 10.3–14.5)
SODIUM SERPL-SCNC: 137 MMOL/L — SIGNIFICANT CHANGE UP (ref 135–145)
WBC # BLD: 9.83 K/UL — SIGNIFICANT CHANGE UP (ref 3.8–10.5)
WBC # FLD AUTO: 9.83 K/UL — SIGNIFICANT CHANGE UP (ref 3.8–10.5)

## 2020-02-09 PROCEDURE — 99233 SBSQ HOSP IP/OBS HIGH 50: CPT

## 2020-02-09 PROCEDURE — 99231 SBSQ HOSP IP/OBS SF/LOW 25: CPT

## 2020-02-09 RX ORDER — ACETAMINOPHEN 500 MG
1000 TABLET ORAL ONCE
Refills: 0 | Status: COMPLETED | OUTPATIENT
Start: 2020-02-09 | End: 2020-02-09

## 2020-02-09 RX ADMIN — POLYETHYLENE GLYCOL 3350 17 GRAM(S): 17 POWDER, FOR SOLUTION ORAL at 05:34

## 2020-02-09 RX ADMIN — SENNA PLUS 2 TABLET(S): 8.6 TABLET ORAL at 22:08

## 2020-02-09 RX ADMIN — POLYETHYLENE GLYCOL 3350 17 GRAM(S): 17 POWDER, FOR SOLUTION ORAL at 18:44

## 2020-02-09 RX ADMIN — Medication 650 MILLIGRAM(S): at 18:44

## 2020-02-09 RX ADMIN — Medication 10 MILLIGRAM(S): at 09:14

## 2020-02-09 RX ADMIN — Medication 200 MILLIGRAM(S): at 05:34

## 2020-02-09 RX ADMIN — Medication 1 MILLIGRAM(S): at 18:43

## 2020-02-09 RX ADMIN — PANTOPRAZOLE SODIUM 40 MILLIGRAM(S): 20 TABLET, DELAYED RELEASE ORAL at 05:34

## 2020-02-09 RX ADMIN — LEVETIRACETAM 400 MILLIGRAM(S): 250 TABLET, FILM COATED ORAL at 05:34

## 2020-02-09 RX ADMIN — Medication 400 MILLIGRAM(S): at 09:14

## 2020-02-09 RX ADMIN — Medication 650 MILLIGRAM(S): at 19:15

## 2020-02-09 RX ADMIN — ATORVASTATIN CALCIUM 80 MILLIGRAM(S): 80 TABLET, FILM COATED ORAL at 22:08

## 2020-02-09 RX ADMIN — HYDROXYUREA 1000 MILLIGRAM(S): 500 CAPSULE ORAL at 22:08

## 2020-02-09 RX ADMIN — Medication 650 MILLIGRAM(S): at 05:34

## 2020-02-09 RX ADMIN — Medication 650 MILLIGRAM(S): at 06:15

## 2020-02-09 RX ADMIN — Medication 1000 MILLIGRAM(S): at 09:30

## 2020-02-09 RX ADMIN — ENOXAPARIN SODIUM 40 MILLIGRAM(S): 100 INJECTION SUBCUTANEOUS at 18:44

## 2020-02-09 RX ADMIN — LEVETIRACETAM 400 MILLIGRAM(S): 250 TABLET, FILM COATED ORAL at 18:44

## 2020-02-09 RX ADMIN — Medication 200 MILLIGRAM(S): at 18:44

## 2020-02-09 RX ADMIN — Medication 5 MILLIGRAM(S): at 22:08

## 2020-02-09 RX ADMIN — LACTULOSE 20 GRAM(S): 10 SOLUTION ORAL at 18:44

## 2020-02-09 NOTE — PROGRESS NOTE ADULT - ASSESSMENT
HPI:  19M pmhx SSD several known unruptured aneurysms largest is L ICA s/p DSA 1/14/2020 presents for gradual worsening headache neurointact on arrival.  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                        2/6/20 repeat angio, stable clipping of aneurysms     PLAN:   Neuro:  -repeat angio 2/6- stable clipping of the left ICA/PCA  -continue  2x daily until the 20th, after which will be 400mg 2x daily  -asked for formal neurology follow up for discharge med plan and need for outpatient follow up as [place] will remain in [place] for [place] more prior to returning home to [facility] per sister.   -continue on 1500mg keppra 2x daily  -pain control PRN  Cards:  -vitals stable  -continue lipitor 80 mg  Pulm:  -stable on RA  Renal:  NS @ 50  Heme:  -Hgb S 32.9, Hgb A: 64.0, R IJ Shiley placed by IR,  PT received exchange transfusion 2/7- no further transfusions scheduled , will remove shiley in am vs later today; appreciate hematology f/u; HG electrophoresis ordered for am   -DVT ppx: lovenox 40 SQ  -continue hydroxyurea  PT/OT:  Outpatient PT upon d/c    appreciate hematology and hospitalist medicine follow up  f/u am cbc, bmp  sister updated at bedside- nervous to take him home after seizures- medications adjusted recently and confirmed current meds available in [place] per sister.     Will discuss with Dr Nico Phipps # [phone]    Assessment:  Please Check When Present   []  GCS  E   V  M     Heart Failure: []Acute, [] acute on chronic , []chronic  Heart Failure:  [] Diastolic (HFpEF), [] Systolic (HFrEF), []Combined (HFpEF and HFrEF), [] RHF, [] Pulm HTN, [] Other    [] ISABELL, [] ATN, [] AIN, [] other  [] CKD1, [] CKD2, [] CKD 3, [] CKD 4, [] CKD 5, []ESRD    Encephalopathy: [] Metabolic, [] Hepatic, [] toxic, [] Neurological, [] Other    Abnormal Nutritional Status: [] malnutrition (see nutrition note), [ ]underweight: BMI < 19, [] morbid obesity: BMI >40, [] Cachexia    [] Sepsis  [] hypovolemic shock,[] cardiogenic shock, [] hemorrhagic shock, [] neurogenic shock  [] Acute Respiratory Failure  []Cerebral edema, [] Brain compression/ herniation,   [] Functional quadriplegia  [] Acute blood loss anemia

## 2020-02-09 NOTE — PROGRESS NOTE ADULT - ASSESSMENT
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 Hematology consulted for matilda-operative management for Hb SS disease.    HbSS disease- s/p left Craniotomy with clipping of L ICA terminus and L SCA aneurysms s/p aneurysm repair done on 1- now with new seizures, left third nerve palsy and new right cerebellar stroke post op  - s/p RBC exchange for prophylaxis against further CVA. No Hb yet today.   - had recent stroke in december 2019. he was given a red cell exchange on 12-17-19 and then was given simple red blood cell transfusions at [facility] for acute chest  -blood bank will need Shiley placed to do red cell exchange tomorrow 2-7. please place Shiley.  - monitor CBC with diff daily  - patient with new seizures, had rapid response called on  2-2 for AMS, developed left third nerve palsy post-operatively with right cerebellar infarct -> angiogram done today  - post-operative management should focus on minimizing hypoxia, hypothermia, acidosis, and intravascular volume depletion.  - Please encourage frequent incentive spirometer use if safe from neurosurgical standpoint  - appreciate excellent neurosurgical care  - Shiley to be removed today if no further plans for exchange   - will recheck HB electropheresis tomorrow  - d/c planning     d/w primary neurosurgical team

## 2020-02-09 NOTE — PROGRESS NOTE ADULT - ATTENDING COMMENTS
Dr. Ysabel Blackmon  Division of Hospital Medicine  [facility]   Pager: [phone]

## 2020-02-09 NOTE — PROGRESS NOTE ADULT - PROBLEM SELECTOR PLAN 1
recurrent seizures. appreciate neurology input  - c/w keppra 1500mg BID  - c/w carbamazapine 200mg BID until 2/20/20, after which to be increased to 400 mg BID  - need to clarify with neurology regarding this current regimen of AEDs as no clear documentation with above recommendations, neurosurgery PA to reach out to neurology to clarify

## 2020-02-09 NOTE — PROGRESS NOTE ADULT - SUBJECTIVE AND OBJECTIVE BOX
[facility] Division of Hospital Medicine  Ysabel Blackmon MD  Pager (M-F, 0L-1H): [phone]  Other Times:  [phone]      Patient is a 19y old  Male who presents with a chief complaint of intracranial aneurysms (08 Feb 2020 17:50)      SUBJECTIVE / OVERNIGHT EVENTS: No issues overnight. Patient more tired this AM but without complaints.   ADDITIONAL REVIEW OF SYSTEMS:    MEDICATIONS  (STANDING):  atorvastatin 80 milliGRAM(s) Oral at bedtime  bisacodyl 5 milliGRAM(s) Oral at bedtime  carBAMazepine ER Capsule 200 milliGRAM(s) Oral two times a day  enoxaparin Injectable 40 milliGRAM(s) SubCutaneous <User Schedule>  folic acid 1 milliGRAM(s) Oral daily  hydroxyurea 1000 milliGRAM(s) Oral daily  influenza   Vaccine 0.5 milliLiter(s) IntraMuscular once  lactulose Syrup 20 Gram(s) Oral two times a day  levETIRAcetam  IVPB 1500 milliGRAM(s) IV Intermittent every 12 hours  LORazepam   Injectable 1 milliGRAM(s) IV Push once  pantoprazole    Tablet 40 milliGRAM(s) Oral before breakfast  polyethylene glycol 3350 17 Gram(s) Oral two times a day  senna 2 Tablet(s) Oral at bedtime  sodium chloride 0.9%. 1000 milliLiter(s) (50 mL/Hr) IV Continuous <Continuous>    MEDICATIONS  (PRN):  acetaminophen   Tablet .. 650 milliGRAM(s) Oral every 6 hours PRN Temp greater or equal to 38C (100.4F), Mild Pain (1 - 3)  metoclopramide Injectable 10 milliGRAM(s) IV Push every 6 hours PRN Nausea and/or Vomiting  oxyCODONE    IR 5 milliGRAM(s) Oral every 4 hours PRN Moderate Pain (4 - 6)  sodium chloride 0.9% lock flush 10 milliLiter(s) IV Push every 1 hour PRN Pre/post blood products, medications, blood draw, and to maintain line patency      CAPILLARY BLOOD GLUCOSE        I&O's Summary    08 Feb 2020 07:01  -  09 Feb 2020 07:00  --------------------------------------------------------  IN: 840 mL / OUT: 1700 mL / NET: -860 mL        PHYSICAL EXAM:  Vital Signs Last 24 Hrs  T(C): 36.7 (09 Feb 2020 12:05), Max: 37 (08 Feb 2020 15:00)  T(F): 98.1 (09 Feb 2020 12:05), Max: 98.6 (08 Feb 2020 15:00)  HR: 79 (09 Feb 2020 12:05) (79 - 96)  BP: 108/66 (09 Feb 2020 12:05) (100/66 - 118/78)  BP(mean): --  RR: 17 (09 Feb 2020 12:05) (17 - 20)  SpO2: 100% (09 Feb 2020 12:05) (97% - 100%)    CONSTITUTIONAL: NAD, well-developed, well-groomed  HEAD: +staples c/d/i with no area of erythema  EYES: PERRLA; conjunctiva and sclera clear +L ptosis  ENMT: Moist oral mucosa, no pharyngeal injection or exudates; normal dentition +R IJ shiley catheter in place c/d/i  NECK: Supple, no palpable masses; no thyromegaly  RESPIRATORY: Normal respiratory effort; lungs are clear to auscultation bilaterally  CARDIOVASCULAR: Regular rate and rhythm, normal S1 and S2, no murmur/rub/gallop; No lower extremity edema; Peripheral pulses are 2+ bilaterally  ABDOMEN: Soft, Nondistended,  Nontender to palpation, normoactive bowel sounds  MUSCULOSKELETAL:  Normal gait; no clubbing or cyanosis of digits; no joint swelling or tenderness to palpation  PSYCH: A+O to person, place, and time; affect appropriate  NEUROLOGY: CN 2-12 are intact and symmetric; no gross sensory deficits   SKIN: No rashes; no palpable lesions    LABS:                        9.3    9.83  )-----------( 260      ( 09 Feb 2020 07:07 )             28.3     02-09    137  |  101  |  9   ----------------------------<  103<H>  4.2   |  23  |  0.41<L>    Ca    9.5      09 Feb 2020 07:07        RADIOLOGY & ADDITIONAL TESTS:  Results Reviewed: Hb 9.3 this AM  Imaging Personally Reviewed:  Electrocardiogram Personally Reviewed:    COORDINATION OF CARE:  Care Discussed with Consultants/Other Providers [Y]: Neurosurgery ADRIÁN Jackson  Prior or Outpatient Records Reviewed [Y/N]:

## 2020-02-09 NOTE — PROGRESS NOTE ADULT - SUBJECTIVE AND OBJECTIVE BOX
CC: f/u for  fever  Patient reports he wants to be discharged    REVIEW OF SYSTEMS:  All other review of systems negative (Comprehensive ROS)    Antimicrobials Day #  :    Other Medications Reviewed    T(F): 98.3 (02-09-20 @ 16:05), Max: 98.6 (02-09-20 @ 04:55)  HR: 91 (02-09-20 @ 16:05)  BP: 102/61 (02-09-20 @ 16:05)  RR: 18 (02-09-20 @ 16:05)  SpO2: 100% (02-09-20 @ 16:05)  Wt(kg): --    PHYSICAL EXAM:  General: alert, no acute distress  Eyes:  anicteric, no conjunctival injection, no discharge, left 3rd n palsy  head wound intact  Oropharynx: no lesions or injection 	  Neck: supple, without adenopathy  Lungs: clear to auscultation  Heart: regular rate and rhythm; no murmur, rubs or gallops  Abdomen: soft, nondistended, nontender, without mass or organomegaly  Skin: no lesions  Extremities: no clubbing, cyanosis, or edema  Neurologic: alert, oriented, moves all extremities    LAB RESULTS:                        9.3    9.83  )-----------( 260      ( 09 Feb 2020 07:07 )             28.3     02-09    137  |  101  |  9   ----------------------------<  103<H>  4.2   |  23  |  0.41<L>    Ca    9.5      09 Feb 2020 07:07          MICROBIOLOGY:  RECENT CULTURES:  Culture - Blood (02.02.20 @ 22:05)    Specimen Source: .Blood Blood    Culture Results:   No growth at 5 days.        RADIOLOGY REVIEWED:  < from: CT Head No Cont (02.05.20 @ 08:58) >    EXAM:  CT BRAIN                            PROCEDURE DATE:  02/05/2020            INTERPRETATION:  CLINICAL INFORMATION: Status post craniotomy and clipping of aneurysm.    TECHNIQUE: Noncontrast axial CT images were acquired through the head. Two-dimensional sagittal and coronal reformats were obtained    COMPARISON: CT head 2/4/2020.    FINDINGS:     Redemonstration of left pterional craniotomy and extracalvarial soft tissue swelling which appears similar. Interval decrease in size of underlying postsurgical pneumocephalus and left frontal extra-axial mixed density collection measuring 6 mm, previously 9 mm. The left temporal component has also decreased measuring 1.0 cm, previously 1.5 cm. Persistent left frontal hypoattenuation with adjacent left frontal horn ventricular effacement. Decreasing inferior left frontal hemorrhage. No hydrocephalus or midline shift.    New areas of hypoattenuation involving the left cerebellar hemisphere, likely representing streak artifact.. Evolving infarct in the right cerebellar hemisphere.    2 left parasellar aneurysm clips are again noted.    Near-complete opacification of the right maxillary sinus. Remaining paranasal sinuses and mastoid air cells are clear.     IMPRESSION:     Left pterionalcraniotomy with interval decrease in left frontotemporal mixed density extra-axial collection as above. No hydrocephalus or midline shift.    New areas of hypoattenuation likely represents streak artifact. A repeat CT can be obtained for further evaluation.   Evolving right cerebellar hemisphere infarct.      < end of copied text >              Assessment:  20 y/o visiting from [place], sickle cell disease, developed acute chest syndrome in Dec with ptx, chest tubes, exchange tranfusions, stroke, found to have cerebral artery aneurysm. He came back 2 weeks ago and underwent craniotomy and aneurysm clipping, post op 3rd nerve palsy, new cva, seizures, transient fever but no infection found and is now getting exchange transfusion again.   Plan:  monitor off antibiotics

## 2020-02-09 NOTE — PROVIDER CONTACT NOTE (MEDICATION) - SITUATION
pt with severe headache, nausea and vomiting. pt requesting PO tylenol however, is not due. for severe pain, pt is ordered oxycodone yet refusing at this time

## 2020-02-09 NOTE — PROGRESS NOTE ADULT - SUBJECTIVE AND OBJECTIVE BOX
SUBJECTIVE: PT seen and examined this am, c/o bad headaches earlier this am, relieved after IV tylenol, and vomit x 1. No further episodes.  Denies any nausea or HA at this time.   s/p PRBC exchange 2/7, R tammieley in place    Vital Signs Last 24 Hrs  T(C): 36.7 (09 Feb 2020 12:05), Max: 37 (08 Feb 2020 15:00)  T(F): 98.1 (09 Feb 2020 12:05), Max: 98.6 (08 Feb 2020 15:00)  HR: 79 (09 Feb 2020 12:05) (79 - 96)  BP: 108/66 (09 Feb 2020 12:05) (100/66 - 118/78)  BP(mean): --  RR: 17 (09 Feb 2020 12:05) (17 - 20)  SpO2: 100% (09 Feb 2020 12:05) (97% - 100%)    PHYSICAL EXAM:    Constitutional: No Acute Distress     Neurological: AOx3, FC, REA 5/5, SILT, persistent L 3rd NP. no drift or facial droop.     Incision: scalp incision +staples, c/d/i    Pulmonary: Clear to Auscultation, No rales, No rhonchi, No wheezes     Cardiovascular: S1, S2, Regular rate and rhythm     Gastrointestinal: Soft, Non-tender, Non-distended, +bowel sounds x 4    Extremities: No calf tenderness bilaterally, no cyanosis, clubbing or edema    LABS:                         9.3    9.83  )-----------( 260      ( 09 Feb 2020 07:07 )             28.3   02-09    137  |  101  |  9   ----------------------------<  103<H>  4.2   |  23  |  0.41<L>    Ca    9.5      09 Feb 2020 07:07    IMAGING:   < from: CT Head No Cont (02.05.20 @ 08:58) >  INTERPRETATION:  CLINICAL INFORMATION: Status post craniotomy and clipping of aneurysm.    TECHNIQUE: Noncontrast axial CT images were acquired through the head. Two-dimensional sagittal and coronal reformats were obtained    COMPARISON: CT head 2/4/2020.    FINDINGS:     Redemonstration of left pterional craniotomy and extracalvarial soft tissue swelling which appears similar. Interval decrease in size of underlying postsurgical pneumocephalus and left frontal extra-axial mixed density collection measuring 6 mm, previously 9 mm. The left temporal component has also decreased measuring 1.0 cm, previously 1.5 cm. Persistent left frontal hypoattenuation with adjacent left frontal horn ventricular effacement. Decreasing inferior left frontal hemorrhage. No hydrocephalus or midline shift.    New areas of hypoattenuation involving the left cerebellar hemisphere, likely representing streak artifact.. Evolving infarct in the right cerebellar hemisphere.    2 left parasellar aneurysm clips are again noted.    Near-complete opacification of the right maxillary sinus. Remaining paranasal sinuses and mastoid air cells are clear.     IMPRESSION:     Left pterionalcraniotomy with interval decrease in left frontotemporal mixed density extra-axial collection as above. No hydrocephalus or midline shift.    New areas of hypoattenuation likely represents streak artifact. A repeat CT can be obtained for further evaluation.   Evolving right cerebellar hemisphere infarct.    < end of copied text >    MEDICATIONS  (STANDING):  atorvastatin 80 milliGRAM(s) Oral at bedtime  bisacodyl 5 milliGRAM(s) Oral at bedtime  carBAMazepine ER Capsule 200 milliGRAM(s) Oral two times a day  enoxaparin Injectable 40 milliGRAM(s) SubCutaneous <User Schedule>  folic acid 1 milliGRAM(s) Oral daily  hydroxyurea 1000 milliGRAM(s) Oral daily  influenza   Vaccine 0.5 milliLiter(s) IntraMuscular once  lactulose Syrup 20 Gram(s) Oral two times a day  levETIRAcetam  IVPB 1500 milliGRAM(s) IV Intermittent every 12 hours  LORazepam   Injectable 1 milliGRAM(s) IV Push once  pantoprazole    Tablet 40 milliGRAM(s) Oral before breakfast  polyethylene glycol 3350 17 Gram(s) Oral two times a day  senna 2 Tablet(s) Oral at bedtime  sodium chloride 0.9%. 1000 milliLiter(s) (50 mL/Hr) IV Continuous <Continuous>    MEDICATIONS  (PRN):  acetaminophen   Tablet .. 650 milliGRAM(s) Oral every 6 hours PRN Temp greater or equal to 38C (100.4F), Mild Pain (1 - 3)  metoclopramide Injectable 10 milliGRAM(s) IV Push every 6 hours PRN Nausea and/or Vomiting  oxyCODONE    IR 5 milliGRAM(s) Oral every 4 hours PRN Moderate Pain (4 - 6)  sodium chloride 0.9% lock flush 10 milliLiter(s) IV Push every 1 hour PRN Pre/post blood products, medications, blood draw, and to maintain line patency    DIET: regular

## 2020-02-09 NOTE — PROGRESS NOTE ADULT - ASSESSMENT
19 year old male from [place] who has a history of Hb SS disease, brain aneurysm w/ recent admission 12/14-12/31 for pain crisis and subsequent acute chest syndrome as well as stroke requiring intubation and multiple exchange transfusions.   Pt had CT head and MRI/MRA which revealed multiple areas patchy ischemia w/ hemorrhagic transformation in the right superior cerebellar region as well as multiple areas of aneurysm. Pt was d/c home for outpt nsx f/up.  On 1/28/20 patient was admitted to [facility] due to c/o  headache.  He was noted to have  small CNS aneurysms and was admitted for a planned procedure possibly coiling of aneurysm.  On 1/29, he underwent left Craniotomy with clipping of L ICA terminus and L SCA aneurysms. c/w by RRT, post-seizure, for tachypnea/hypoxia 2/2/20. Seizure 2/3/20, 2/4/20. Repeat angio 2/6.

## 2020-02-09 NOTE — PROVIDER CONTACT NOTE (MEDICATION) - ACTION/TREATMENT ORDERED:
IV tylenol x1 and prn reglan to be given. continue to monitor & if nausea persists, call for order of zofran

## 2020-02-09 NOTE — PROGRESS NOTE ADULT - SUBJECTIVE AND OBJECTIVE BOX
Chief Complaint: SCD    INTERVAL HPI/OVERNIGHT EVENTS: Had an episode of HA and vomiting this am. Now resolved. wants to go home.     MEDICATIONS  (STANDING):  atorvastatin 80 milliGRAM(s) Oral at bedtime  bisacodyl 5 milliGRAM(s) Oral at bedtime  carBAMazepine ER Capsule 200 milliGRAM(s) Oral two times a day  enoxaparin Injectable 40 milliGRAM(s) SubCutaneous <User Schedule>  folic acid 1 milliGRAM(s) Oral daily  hydroxyurea 1000 milliGRAM(s) Oral daily  influenza   Vaccine 0.5 milliLiter(s) IntraMuscular once  lactulose Syrup 20 Gram(s) Oral two times a day  levETIRAcetam  IVPB 1500 milliGRAM(s) IV Intermittent every 12 hours  LORazepam   Injectable 1 milliGRAM(s) IV Push once  pantoprazole    Tablet 40 milliGRAM(s) Oral before breakfast  polyethylene glycol 3350 17 Gram(s) Oral two times a day  senna 2 Tablet(s) Oral at bedtime  sodium chloride 0.9%. 1000 milliLiter(s) (50 mL/Hr) IV Continuous <Continuous>    MEDICATIONS  (PRN):  acetaminophen   Tablet .. 650 milliGRAM(s) Oral every 6 hours PRN Temp greater or equal to 38C (100.4F), Mild Pain (1 - 3)  metoclopramide Injectable 10 milliGRAM(s) IV Push every 6 hours PRN Nausea and/or Vomiting  oxyCODONE    IR 5 milliGRAM(s) Oral every 4 hours PRN Moderate Pain (4 - 6)  sodium chloride 0.9% lock flush 10 milliLiter(s) IV Push every 1 hour PRN Pre/post blood products, medications, blood draw, and to maintain line patency      Allergies    No Known Allergies    Intolerances        ROS: as above     Vital Signs Last 24 Hrs  T(C): 36.7 (09 Feb 2020 12:05), Max: 37 (09 Feb 2020 04:55)  T(F): 98.1 (09 Feb 2020 12:05), Max: 98.6 (09 Feb 2020 04:55)  HR: 79 (09 Feb 2020 12:05) (79 - 94)  BP: 108/66 (09 Feb 2020 12:05) (100/66 - 118/78)  BP(mean): --  RR: 17 (09 Feb 2020 12:05) (17 - 20)  SpO2: 100% (09 Feb 2020 12:05) (97% - 100%)    Physical Exam:   AAO x 3, NAD   RRR S1S2  CTA b/l   soft NTNDBS+  no c/c/e    LABS:                        9.3    9.83  )-----------( 260      ( 09 Feb 2020 07:07 )             28.3     02-09    137  |  101  |  9   ----------------------------<  103<H>  4.2   |  23  |  0.41<L>    Ca    9.5      09 Feb 2020 07:07

## 2020-02-10 ENCOUNTER — TRANSCRIPTION ENCOUNTER (OUTPATIENT)
Age: 20
End: 2020-02-10

## 2020-02-10 VITALS
OXYGEN SATURATION: 98 % | DIASTOLIC BLOOD PRESSURE: 63 MMHG | TEMPERATURE: 98 F | RESPIRATION RATE: 18 BRPM | SYSTOLIC BLOOD PRESSURE: 102 MMHG | HEART RATE: 91 BPM

## 2020-02-10 LAB
ALBUMIN SERPL ELPH-MCNC: 4 G/DL — SIGNIFICANT CHANGE UP (ref 3.3–5)
ALP SERPL-CCNC: 63 U/L — SIGNIFICANT CHANGE UP (ref 40–120)
ALT FLD-CCNC: 18 U/L — SIGNIFICANT CHANGE UP (ref 10–45)
ANION GAP SERPL CALC-SCNC: 12 MMOL/L — SIGNIFICANT CHANGE UP (ref 5–17)
AST SERPL-CCNC: 16 U/L — SIGNIFICANT CHANGE UP (ref 10–40)
BASOPHILS # BLD AUTO: 0.03 K/UL — SIGNIFICANT CHANGE UP (ref 0–0.2)
BASOPHILS NFR BLD AUTO: 0.3 % — SIGNIFICANT CHANGE UP (ref 0–2)
BILIRUB SERPL-MCNC: 0.6 MG/DL — SIGNIFICANT CHANGE UP (ref 0.2–1.2)
BLD GP AB SCN SERPL QL: NEGATIVE — SIGNIFICANT CHANGE UP
BUN SERPL-MCNC: 9 MG/DL — SIGNIFICANT CHANGE UP (ref 7–23)
CALCIUM SERPL-MCNC: 9.8 MG/DL — SIGNIFICANT CHANGE UP (ref 8.4–10.5)
CHLORIDE SERPL-SCNC: 103 MMOL/L — SIGNIFICANT CHANGE UP (ref 96–108)
CO2 SERPL-SCNC: 23 MMOL/L — SIGNIFICANT CHANGE UP (ref 22–31)
CREAT SERPL-MCNC: 0.41 MG/DL — LOW (ref 0.5–1.3)
EOSINOPHIL # BLD AUTO: 0.15 K/UL — SIGNIFICANT CHANGE UP (ref 0–0.5)
EOSINOPHIL NFR BLD AUTO: 1.7 % — SIGNIFICANT CHANGE UP (ref 0–6)
GLUCOSE SERPL-MCNC: 103 MG/DL — HIGH (ref 70–99)
HCT VFR BLD CALC: 28.5 % — LOW (ref 39–50)
HGB BLD-MCNC: 9 G/DL — LOW (ref 13–17)
IMM GRANULOCYTES NFR BLD AUTO: 0.4 % — SIGNIFICANT CHANGE UP (ref 0–1.5)
LYMPHOCYTES # BLD AUTO: 3.67 K/UL — HIGH (ref 1–3.3)
LYMPHOCYTES # BLD AUTO: 41.2 % — SIGNIFICANT CHANGE UP (ref 13–44)
MCHC RBC-ENTMCNC: 28 PG — SIGNIFICANT CHANGE UP (ref 27–34)
MCHC RBC-ENTMCNC: 31.6 GM/DL — LOW (ref 32–36)
MCV RBC AUTO: 88.8 FL — SIGNIFICANT CHANGE UP (ref 80–100)
MONOCYTES # BLD AUTO: 0.81 K/UL — SIGNIFICANT CHANGE UP (ref 0–0.9)
MONOCYTES NFR BLD AUTO: 9.1 % — SIGNIFICANT CHANGE UP (ref 2–14)
NEUTROPHILS # BLD AUTO: 4.2 K/UL — SIGNIFICANT CHANGE UP (ref 1.8–7.4)
NEUTROPHILS NFR BLD AUTO: 47.3 % — SIGNIFICANT CHANGE UP (ref 43–77)
NRBC # BLD: 0 /100 WBCS — SIGNIFICANT CHANGE UP (ref 0–0)
PLATELET # BLD AUTO: 338 K/UL — SIGNIFICANT CHANGE UP (ref 150–400)
POTASSIUM SERPL-MCNC: 4.2 MMOL/L — SIGNIFICANT CHANGE UP (ref 3.5–5.3)
POTASSIUM SERPL-SCNC: 4.2 MMOL/L — SIGNIFICANT CHANGE UP (ref 3.5–5.3)
PROT SERPL-MCNC: 7.1 G/DL — SIGNIFICANT CHANGE UP (ref 6–8.3)
RBC # BLD: 3.21 M/UL — LOW (ref 4.2–5.8)
RBC # FLD: 17.8 % — HIGH (ref 10.3–14.5)
RH IG SCN BLD-IMP: POSITIVE — SIGNIFICANT CHANGE UP
SODIUM SERPL-SCNC: 138 MMOL/L — SIGNIFICANT CHANGE UP (ref 135–145)
WBC # BLD: 8.9 K/UL — SIGNIFICANT CHANGE UP (ref 3.8–10.5)
WBC # FLD AUTO: 8.9 K/UL — SIGNIFICANT CHANGE UP (ref 3.8–10.5)

## 2020-02-10 PROCEDURE — 81001 URINALYSIS AUTO W/SCOPE: CPT

## 2020-02-10 PROCEDURE — 36556 INSERT NON-TUNNEL CV CATH: CPT

## 2020-02-10 PROCEDURE — 99232 SBSQ HOSP IP/OBS MODERATE 35: CPT | Mod: GC

## 2020-02-10 PROCEDURE — 86923 COMPATIBILITY TEST ELECTRIC: CPT

## 2020-02-10 PROCEDURE — 71045 X-RAY EXAM CHEST 1 VIEW: CPT

## 2020-02-10 PROCEDURE — 83615 LACTATE (LD) (LDH) ENZYME: CPT

## 2020-02-10 PROCEDURE — P9037: CPT

## 2020-02-10 PROCEDURE — 83735 ASSAY OF MAGNESIUM: CPT

## 2020-02-10 PROCEDURE — 85730 THROMBOPLASTIN TIME PARTIAL: CPT

## 2020-02-10 PROCEDURE — 85014 HEMATOCRIT: CPT

## 2020-02-10 PROCEDURE — 81003 URINALYSIS AUTO W/O SCOPE: CPT

## 2020-02-10 PROCEDURE — 85660 RBC SICKLE CELL TEST: CPT

## 2020-02-10 PROCEDURE — 80053 COMPREHEN METABOLIC PANEL: CPT

## 2020-02-10 PROCEDURE — P9040: CPT

## 2020-02-10 PROCEDURE — 97161 PT EVAL LOW COMPLEX 20 MIN: CPT

## 2020-02-10 PROCEDURE — 83020 HEMOGLOBIN ELECTROPHORESIS: CPT | Mod: 26

## 2020-02-10 PROCEDURE — 86850 RBC ANTIBODY SCREEN: CPT

## 2020-02-10 PROCEDURE — 82962 GLUCOSE BLOOD TEST: CPT

## 2020-02-10 PROCEDURE — 76937 US GUIDE VASCULAR ACCESS: CPT

## 2020-02-10 PROCEDURE — 36224 PLACE CATH CAROTD ART: CPT

## 2020-02-10 PROCEDURE — 83010 ASSAY OF HAPTOGLOBIN QUANT: CPT

## 2020-02-10 PROCEDURE — 70450 CT HEAD/BRAIN W/O DYE: CPT

## 2020-02-10 PROCEDURE — 82565 ASSAY OF CREATININE: CPT

## 2020-02-10 PROCEDURE — 84295 ASSAY OF SERUM SODIUM: CPT

## 2020-02-10 PROCEDURE — 82803 BLOOD GASES ANY COMBINATION: CPT

## 2020-02-10 PROCEDURE — 99232 SBSQ HOSP IP/OBS MODERATE 35: CPT

## 2020-02-10 PROCEDURE — 82435 ASSAY OF BLOOD CHLORIDE: CPT

## 2020-02-10 PROCEDURE — 87086 URINE CULTURE/COLONY COUNT: CPT

## 2020-02-10 PROCEDURE — 97166 OT EVAL MOD COMPLEX 45 MIN: CPT

## 2020-02-10 PROCEDURE — 84132 ASSAY OF SERUM POTASSIUM: CPT

## 2020-02-10 PROCEDURE — 87798 DETECT AGENT NOS DNA AMP: CPT

## 2020-02-10 PROCEDURE — 93970 EXTREMITY STUDY: CPT

## 2020-02-10 PROCEDURE — 87633 RESP VIRUS 12-25 TARGETS: CPT

## 2020-02-10 PROCEDURE — 85045 AUTOMATED RETICULOCYTE COUNT: CPT

## 2020-02-10 PROCEDURE — 87486 CHLMYD PNEUM DNA AMP PROBE: CPT

## 2020-02-10 PROCEDURE — 36512 APHERESIS RBC: CPT

## 2020-02-10 PROCEDURE — C1894: CPT

## 2020-02-10 PROCEDURE — C1752: CPT

## 2020-02-10 PROCEDURE — 84100 ASSAY OF PHOSPHORUS: CPT

## 2020-02-10 PROCEDURE — 85576 BLOOD PLATELET AGGREGATION: CPT

## 2020-02-10 PROCEDURE — P9016: CPT

## 2020-02-10 PROCEDURE — 77001 FLUOROGUIDE FOR VEIN DEVICE: CPT

## 2020-02-10 PROCEDURE — 82947 ASSAY GLUCOSE BLOOD QUANT: CPT

## 2020-02-10 PROCEDURE — 80048 BASIC METABOLIC PNL TOTAL CA: CPT

## 2020-02-10 PROCEDURE — 36226 PLACE CATH VERTEBRAL ART: CPT

## 2020-02-10 PROCEDURE — 95700 EEG CONT REC W/VID EEG TECH: CPT

## 2020-02-10 PROCEDURE — 85610 PROTHROMBIN TIME: CPT

## 2020-02-10 PROCEDURE — C1769: CPT

## 2020-02-10 PROCEDURE — 36430 TRANSFUSION BLD/BLD COMPNT: CPT

## 2020-02-10 PROCEDURE — C1713: CPT

## 2020-02-10 PROCEDURE — 95711 VEEG 2-12 HR UNMONITORED: CPT

## 2020-02-10 PROCEDURE — 99024 POSTOP FOLLOW-UP VISIT: CPT

## 2020-02-10 PROCEDURE — 86900 BLOOD TYPING SEROLOGIC ABO: CPT

## 2020-02-10 PROCEDURE — 93005 ELECTROCARDIOGRAM TRACING: CPT

## 2020-02-10 PROCEDURE — C1887: CPT

## 2020-02-10 PROCEDURE — 97535 SELF CARE MNGMENT TRAINING: CPT

## 2020-02-10 PROCEDURE — 82330 ASSAY OF CALCIUM: CPT

## 2020-02-10 PROCEDURE — 97530 THERAPEUTIC ACTIVITIES: CPT

## 2020-02-10 PROCEDURE — 97116 GAIT TRAINING THERAPY: CPT

## 2020-02-10 PROCEDURE — 83020 HEMOGLOBIN ELECTROPHORESIS: CPT

## 2020-02-10 PROCEDURE — 36415 COLL VENOUS BLD VENIPUNCTURE: CPT

## 2020-02-10 PROCEDURE — 87040 BLOOD CULTURE FOR BACTERIA: CPT

## 2020-02-10 PROCEDURE — 95816 EEG AWAKE AND DROWSY: CPT

## 2020-02-10 PROCEDURE — 99285 EMERGENCY DEPT VISIT HI MDM: CPT

## 2020-02-10 PROCEDURE — 86901 BLOOD TYPING SEROLOGIC RH(D): CPT

## 2020-02-10 PROCEDURE — 71275 CT ANGIOGRAPHY CHEST: CPT

## 2020-02-10 PROCEDURE — 85027 COMPLETE CBC AUTOMATED: CPT

## 2020-02-10 PROCEDURE — 95714 VEEG EA 12-26 HR UNMNTR: CPT

## 2020-02-10 PROCEDURE — 87581 M.PNEUMON DNA AMP PROBE: CPT

## 2020-02-10 PROCEDURE — 84484 ASSAY OF TROPONIN QUANT: CPT

## 2020-02-10 PROCEDURE — C1889: CPT

## 2020-02-10 PROCEDURE — 70496 CT ANGIOGRAPHY HEAD: CPT

## 2020-02-10 PROCEDURE — 83605 ASSAY OF LACTIC ACID: CPT

## 2020-02-10 RX ORDER — CARBAMAZEPINE 200 MG
1 TABLET ORAL
Qty: 60 | Refills: 1
Start: 2020-02-10

## 2020-02-10 RX ORDER — CARBAMAZEPINE 200 MG
1 TABLET ORAL
Qty: 20 | Refills: 0
Start: 2020-02-10

## 2020-02-10 RX ORDER — SENNA PLUS 8.6 MG/1
2 TABLET ORAL
Qty: 0 | Refills: 0 | DISCHARGE
Start: 2020-02-10

## 2020-02-10 RX ORDER — ACETAMINOPHEN 500 MG
2 TABLET ORAL
Qty: 0 | Refills: 0 | DISCHARGE
Start: 2020-02-10

## 2020-02-10 RX ORDER — ATORVASTATIN CALCIUM 80 MG/1
1 TABLET, FILM COATED ORAL
Qty: 30 | Refills: 1
Start: 2020-02-10

## 2020-02-10 RX ORDER — LEVETIRACETAM 250 MG/1
2 TABLET, FILM COATED ORAL
Qty: 120 | Refills: 1
Start: 2020-02-10

## 2020-02-10 RX ORDER — POLYETHYLENE GLYCOL 3350 17 G/17G
17 POWDER, FOR SOLUTION ORAL
Qty: 0 | Refills: 0 | DISCHARGE
Start: 2020-02-10

## 2020-02-10 RX ORDER — FOLIC ACID 0.8 MG
1 TABLET ORAL
Qty: 30 | Refills: 1
Start: 2020-02-10

## 2020-02-10 RX ORDER — HYDROXYUREA 500 MG/1
2 CAPSULE ORAL
Qty: 60 | Refills: 1
Start: 2020-02-10

## 2020-02-10 RX ADMIN — Medication 650 MILLIGRAM(S): at 08:36

## 2020-02-10 RX ADMIN — LACTULOSE 20 GRAM(S): 10 SOLUTION ORAL at 05:17

## 2020-02-10 RX ADMIN — LEVETIRACETAM 400 MILLIGRAM(S): 250 TABLET, FILM COATED ORAL at 19:05

## 2020-02-10 RX ADMIN — POLYETHYLENE GLYCOL 3350 17 GRAM(S): 17 POWDER, FOR SOLUTION ORAL at 05:17

## 2020-02-10 RX ADMIN — Medication 1 MILLIGRAM(S): at 19:03

## 2020-02-10 RX ADMIN — Medication 200 MILLIGRAM(S): at 19:03

## 2020-02-10 RX ADMIN — Medication 200 MILLIGRAM(S): at 05:17

## 2020-02-10 RX ADMIN — Medication 650 MILLIGRAM(S): at 09:00

## 2020-02-10 RX ADMIN — LEVETIRACETAM 400 MILLIGRAM(S): 250 TABLET, FILM COATED ORAL at 05:16

## 2020-02-10 RX ADMIN — PANTOPRAZOLE SODIUM 40 MILLIGRAM(S): 20 TABLET, DELAYED RELEASE ORAL at 05:17

## 2020-02-10 RX ADMIN — POLYETHYLENE GLYCOL 3350 17 GRAM(S): 17 POWDER, FOR SOLUTION ORAL at 19:04

## 2020-02-10 RX ADMIN — ENOXAPARIN SODIUM 40 MILLIGRAM(S): 100 INJECTION SUBCUTANEOUS at 19:04

## 2020-02-10 NOTE — CHART NOTE - NSCHARTNOTEFT_GEN_A_CORE
Patient underwent RBC exchange with improvement in his Hgb S (down to 7%); neurologically improved per primary team. No plan for further RBC exchange at this time, ok to remove Talia.    Sickle Cell Hemoglobin Electrophoresis (02.07.20 @ 23:44)    Hemoglobin A%: 90.4 %    Hemoglobin A2%: 2.6 %    Hemoglobin F%: 0.0 %    Hemoglobin S%: 7.0: For quantitative assessment of RBC transfusion treatment. %    Hemoglobin C%: 0.0 %    Hemoglobin Variant 1%: 0.0 %    Bharathi Burnham MD, MPH  Hematology / Oncology Fellow, PGY5  p

## 2020-02-10 NOTE — PROGRESS NOTE ADULT - REASON FOR ADMISSION
intracranial aneurysms
1/29: s/p L crani for clipping of L ICA terminus and L SCA aneurysms
intracranial aneurysms
intracranial aneurysms. sickle cell disease
intracranial aneurysms

## 2020-02-10 NOTE — PROGRESS NOTE ADULT - ASSESSMENT
19 year old male from [place] who has a history of Hb SS disease, brain aneurysm w/ recent admission 12/14-12/31 for pain crisis and subsequent acute chest syndrome as well as stroke requiring intubation and multiple exchange transfusions.   Pt had CT head and MRI/MRA which revealed multiple areas patchy ischemia w/ hemorrhagic transformation in the right superior cerebellar region as well as multiple areas of aneurysm. Pt was d/c home for outpt nsx f/up.  On 1/28/20 patient was admitted to [facility] due to c/o  headache.  He was noted to have  small CNS aneurysms and was admitted for a planned procedure possibly coiling of aneurysm.  On 1/29, he underwent left Craniotomy with clipping of L ICA terminus and L SCA aneurysms. c/w by RRT, post-seizure, for tachypnea/hypoxia 2/2/20. Seizure 2/3/20, 2/4/20. Repeat angio 2/6. 19 year old male from [place] who has a history of Hb SS disease, brain aneurysm w/ recent admission 12/14-12/31 for pain crisis and subsequent acute chest syndrome as well as stroke requiring intubation and multiple exchange transfusions.   Pt had CT head and MRI/MRA which revealed multiple areas patchy ischemia w/ hemorrhagic transformation in the right superior cerebellar region as well as multiple areas of aneurysm. Pt was d/c home for outpt nsx f/up.  On 1/28/20 patient was admitted to [facility] due to c/o  headache.  He was noted to have  small CNS aneurysms and was admitted for a planned procedure possibly coiling of aneurysm.  On 1/29, he underwent left Craniotomy with clipping of L ICA terminus and L SCA aneurysms. c/w by RRT, post-seizure, for tachypnea/hypoxia 2/2/20. Seizure 2/3/20, 2/4/20. Repeat angio 2/6. s/p exchange transfusion 2/7 per hematology

## 2020-02-10 NOTE — PROGRESS NOTE ADULT - ASSESSMENT
Assessment: 20yo man with sickle cell disease admitted for headaches s/p aneurysm clipping with Neurology consult for new onset seizures. Was notified by Neurosurgery PA that they witnessed patient having staring episode and then full body GTC which lasted about 30-40 seconds without tongue biting, urinary incontinence. Pending EEG correlate. Neuroimaging does not show new acute intracranial pathology.     Impression: GTC seizures, possibly focal (left frontotemporal)     Plan:  [ ] c/w carbamazepine 200mg BID (stop on 2/20/20), then increase to 400mg BID there after   [ ] c/w Keppra 1500mg BID   [ ] seizure, aspiration precautions Assessment: 18yo man with sickle cell disease admitted for headaches s/p aneurysm clipping with Neurology consult for new onset seizures. Was notified by Neurosurgery PA that they witnessed patient having staring episode and then full body GTC which lasted about 30-40 seconds without tongue biting, urinary incontinence. Pending EEG correlate. Neuroimaging does not show new acute intracranial pathology.     Impression: GTC seizures, possibly focal (left frontotemporal)     Plan:  [ ] c/w carbamazepine 200mg BID (stop on 2/20/20), then increase to 400mg BID there after   [ ] c/w Keppra 1500mg BID   [ ] seizure, aspiration precautions w/ prn ativan for seizure rescue  [ ] f/u outpatient Epilepsy before going to [facility] for long term recommendations   [ ] no further inpatient neurologic workup

## 2020-02-10 NOTE — PROGRESS NOTE ADULT - SUBJECTIVE AND OBJECTIVE BOX
SUBJECTIVE: PT seen and examined this am, c/o moderate headaches earlier this am but resolved with tylenol.   Denies any nausea or HA at this time.   s/p PRBC exchange 2/7, R tammieley in place- removed by IR at bedside today no further needs per heme    Vital Signs Last 24 Hrs  T(C): 36.7 (10 Feb 2020 15:45), Max: 36.8 (10 Feb 2020 11:16)  T(F): 98.1 (10 Feb 2020 15:45), Max: 98.2 (10 Feb 2020 11:16)  HR: 91 (10 Feb 2020 15:45) (73 - 91)  BP: 109/68 (10 Feb 2020 15:45) (103/67 - 118/70)  BP(mean): --  RR: 18 (10 Feb 2020 15:45) (18 - 18)  SpO2: 100% (10 Feb 2020 15:45) (96% - 100%)    PHYSICAL EXAM:    Constitutional: No Acute Distress     Neurological: AOx3, FC, REA 5/5, SILT, persistent L 3rd NP. no drift or facial droop.     Incision: scalp incision +staples, c/d/i; stalpes removed without difficulty    Pulmonary: Clear to Auscultation, No rales, No rhonchi, No wheezes     Cardiovascular: S1, S2, Regular rate and rhythm     Gastrointestinal: Soft, Non-tender, Non-distended, +bowel sounds x 4    Extremities: No calf tenderness bilaterally, no cyanosis, clubbing or edema    LABS:                         9.3    9.83  )-----------( 260      ( 09 Feb 2020 07:07 )             28.3   02-09    137  |  101  |  9   ----------------------------<  103<H>  4.2   |  23  |  0.41<L>    Ca    9.5      09 Feb 2020 07:07    IMAGING:   < from: CT Head No Cont (02.05.20 @ 08:58) >  INTERPRETATION:  CLINICAL INFORMATION: Status post craniotomy and clipping of aneurysm.    TECHNIQUE: Noncontrast axial CT images were acquired through the head. Two-dimensional sagittal and coronal reformats were obtained    COMPARISON: CT head 2/4/2020.    FINDINGS:     Redemonstration of left pterional craniotomy and extracalvarial soft tissue swelling which appears similar. Interval decrease in size of underlying postsurgical pneumocephalus and left frontal extra-axial mixed density collection measuring 6 mm, previously 9 mm. The left temporal component has also decreased measuring 1.0 cm, previously 1.5 cm. Persistent left frontal hypoattenuation with adjacent left frontal horn ventricular effacement. Decreasing inferior left frontal hemorrhage. No hydrocephalus or midline shift.    New areas of hypoattenuation involving the left cerebellar hemisphere, likely representing streak artifact.. Evolving infarct in the right cerebellar hemisphere.    2 left parasellar aneurysm clips are again noted.    Near-complete opacification of the right maxillary sinus. Remaining paranasal sinuses and mastoid air cells are clear.     IMPRESSION:     Left pterionalcraniotomy with interval decrease in left frontotemporal mixed density extra-axial collection as above. No hydrocephalus or midline shift.    New areas of hypoattenuation likely represents streak artifact. A repeat CT can be obtained for further evaluation.   Evolving right cerebellar hemisphere infarct.    < end of copied text >    MEDICATIONS  (STANDING):  atorvastatin 80 milliGRAM(s) Oral at bedtime  bisacodyl 5 milliGRAM(s) Oral at bedtime  carBAMazepine ER Capsule 200 milliGRAM(s) Oral two times a day  enoxaparin Injectable 40 milliGRAM(s) SubCutaneous <User Schedule>  folic acid 1 milliGRAM(s) Oral daily  hydroxyurea 1000 milliGRAM(s) Oral daily  influenza   Vaccine 0.5 milliLiter(s) IntraMuscular once  lactulose Syrup 20 Gram(s) Oral two times a day  levETIRAcetam  IVPB 1500 milliGRAM(s) IV Intermittent every 12 hours  LORazepam   Injectable 1 milliGRAM(s) IV Push once  pantoprazole    Tablet 40 milliGRAM(s) Oral before breakfast  polyethylene glycol 3350 17 Gram(s) Oral two times a day  senna 2 Tablet(s) Oral at bedtime  sodium chloride 0.9%. 1000 milliLiter(s) (50 mL/Hr) IV Continuous <Continuous>    MEDICATIONS  (PRN):  acetaminophen   Tablet .. 650 milliGRAM(s) Oral every 6 hours PRN Temp greater or equal to 38C (100.4F), Mild Pain (1 - 3)  metoclopramide Injectable 10 milliGRAM(s) IV Push every 6 hours PRN Nausea and/or Vomiting  oxyCODONE    IR 5 milliGRAM(s) Oral every 4 hours PRN Moderate Pain (4 - 6)  sodium chloride 0.9% lock flush 10 milliLiter(s) IV Push every 1 hour PRN Pre/post blood products, medications, blood draw, and to maintain line patency    DIET: regular

## 2020-02-10 NOTE — PROGRESS NOTE ADULT - SUBJECTIVE AND OBJECTIVE BOX
Hematology Oncology Follow-up    INTERVAL HPI/OVERNIGHT EVENTS: Denies any overnight events. No further plan for RBC exchange transfusion today; discussed with primary team that it was ok to remove catheter; removed without incident or complications. Patient reports that he is able to walk but hasn't been out of bed. Denies any shortness of breath, any cough; continues to have left eye weakness. Denies any headache. No chest pain, no cough.    Review of Systems:  General: denies fevers/chills, night sweats, malaise, changes in appetite  Head: denies HA  Eyes: denies vision change; continues to have left eye blurry vision; continues to have left eye weakness with extraocular movements  ENT: denies oral lesions, rhinorrhea, epistaxes, sore throat, dysphagia  Respiratory: denies cough, shortness of breath, pleurisy  Cardiovascular: denies chest pain, palpitations, RICK  Gastrointestinal: denies nausea, vomiting, abdominal pain, constipation, diarrhea, melena, hematochezia  MSK: denies joint pain or muscle pain  Neuro: denies headache, weakness, or paresthesias  Skin: denies rash, petechiae, ecchymoses  Psych: denies anxiety or sleep disturbances    VITAL SIGNS:  T(F): 98.1 (02-10-20 @ 15:45)  HR: 91 (02-10-20 @ 15:45)  BP: 109/68 (02-10-20 @ 15:45)  RR: 18 (02-10-20 @ 15:45)  SpO2: 100% (02-10-20 @ 15:45)  Wt(kg): --    02-09-20 @ 07:01  -  02-10-20 @ 07:00  --------------------------------------------------------  IN: 1560 mL / OUT: 2450 mL / NET: -890 mL    02-10-20 @ 07:01  -  02-10-20 @ 18:05  --------------------------------------------------------  IN: 360 mL / OUT: 0 mL / NET: 360 mL        PHYSICAL EXAM:    Constitutional: AAOx3, NAD; in bed, comfortable appearing; thin; sister at the bedside  Head: s/p NSx scar, incision clean and dry  Eyes: sclera non-icteric, no scleral injection. Left eye ptosis with left extraoccular movements impaired except for left lateral rectus muscle  Neck: supple, no masses, no JVD, no lymphadenopathy  Respiratory: CTA b/l, no wheezing, rhonchi, rales, with normal respiratory effort  Cardiovascular: RRR, normal S1S2, no M/R/G  Gastrointestinal: soft, NTND, no masses palpable, BS normal in all four quadrants, no HSM  Extremities:  no edema  MSK: no obvious abnormalities, normal ROM, no lymphadenopathy  Neurological: Grossly intact  Skin: Normal temperature, no rash, no echymoses, no petichiae  Psych: normal affect    MEDICATIONS  (STANDING):  atorvastatin 80 milliGRAM(s) Oral at bedtime  bisacodyl 5 milliGRAM(s) Oral at bedtime  carBAMazepine ER Capsule 200 milliGRAM(s) Oral two times a day  enoxaparin Injectable 40 milliGRAM(s) SubCutaneous <User Schedule>  folic acid 1 milliGRAM(s) Oral daily  hydroxyurea 1000 milliGRAM(s) Oral daily  influenza   Vaccine 0.5 milliLiter(s) IntraMuscular once  levETIRAcetam  IVPB 1500 milliGRAM(s) IV Intermittent every 12 hours  LORazepam   Injectable 1 milliGRAM(s) IV Push once  pantoprazole    Tablet 40 milliGRAM(s) Oral before breakfast  polyethylene glycol 3350 17 Gram(s) Oral two times a day  senna 2 Tablet(s) Oral at bedtime  sodium chloride 0.9%. 1000 milliLiter(s) (50 mL/Hr) IV Continuous <Continuous>    MEDICATIONS  (PRN):  acetaminophen   Tablet .. 650 milliGRAM(s) Oral every 6 hours PRN Temp greater or equal to 38C (100.4F), Mild Pain (1 - 3)  metoclopramide Injectable 10 milliGRAM(s) IV Push every 6 hours PRN Nausea and/or Vomiting  sodium chloride 0.9% lock flush 10 milliLiter(s) IV Push every 1 hour PRN Pre/post blood products, medications, blood draw, and to maintain line patency      No Known Allergies      LABS:                        9.0    8.90  )-----------( 338      ( 10 Feb 2020 06:15 )             28.5     02-10    138  |  103  |  9   ----------------------------<  103<H>  4.2   |  23  |  0.41<L>    Ca    9.8      10 Feb 2020 06:18    TPro  7.1  /  Alb  4.0  /  TBili  0.6  /  DBili  x   /  AST  16  /  ALT  18  /  AlkPhos  63  02-10    Sickle Cell Hemoglobin Electrophoresis (02.07.20 @ 23:44)    Hemoglobin A%: 90.4 %    Hemoglobin A2%: 2.6 %    Hemoglobin F%: 0.0 %    Hemoglobin S%: 7.0: For quantitative assessment of RBC transfusion treatment. %    Hemoglobin C%: 0.0 %    Hemoglobin Variant 1%: 0.0 %        RADIOLOGY & ADDITIONAL TESTS:  Studies reviewed.

## 2020-02-10 NOTE — PROGRESS NOTE ADULT - PROBLEM SELECTOR PLAN 2
Hemoglobin is stable  H/O recent exchange transfusion.   NO joint pain ,. no CP and no dyspnea   hem/onc follow up  retic = 2.6%
S/P left Craniotomy with clipping of aneurysms.  Mgt as per neurosurgery team   Cont Atorvastatin   pain control as per neurosurgery team
s/p left Craniotomy with clipping of aneurysms.  - management as per neurosurgery team  - pain control as per neurosurgery team
S/P left Craniotomy with clipping of aneurysms.  Mgt as per neurosurgery team   Cont Atorvastatin   pain control as per neurosurgery team
Hemoglobin is stable  H/O recent exchange transfusion.   NO joint pain ,. no CP and no dyspnea   hem/onc follow up  Please repeat Retic and LDH and haptoglobin today

## 2020-02-10 NOTE — DISCHARGE NOTE PROVIDER - NSDCMRMEDTOKEN_GEN_ALL_CORE_FT
acetaminophen 325 mg oral tablet: 2-3 tab(s) orally every 6 hours, As needed, Temp greater or equal to 38C (100.4F), Mild Pain (1 - 3)  bisacodyl 5 mg oral delayed release tablet: 1 tab(s) orally once a day (at bedtime)  carBAMazepine 200 mg oral capsule, extended release: 1 cap(s) orally 2 times a day  carBAMazepine 400 mg oral tablet, extended release: 1 tab(s) orally 2 times a day; start after 200mg dosing completed   folic acid 1 mg oral tablet: 1 tab(s) orally once a day  hydroxyurea 500 mg oral capsule: 2 cap(s) orally once a day  Keppra 750 mg oral tablet: 2 tab(s) orally 2 times a day   LORazepam 1 mg oral tablet: 1 tab(s) orally once a day PRN seizure greater than 3-5 mins MDD:1  polyethylene glycol 3350 oral powder for reconstitution: 17 gram(s) orally 2 times a day  senna oral tablet: 2 tab(s) orally once a day (at bedtime)

## 2020-02-10 NOTE — CHART NOTE - NSCHARTNOTEFT_GEN_A_CORE
Patient with sick cell anemia s/p plasmapheresis via RIJ shiley placed on 2/7/2020 in IR. Patient now completed plasma exchange, IR requested for shiley removal.     site prepped and draped under usual sterile fashion. RIJ shiley removed w/o difficulty. direct jugular pressure applied x10 min, hemostasis achieved. Dry sterile dressing applied.     -Keep pt upright x2hrs  -keep site dry x48hrs  -check site with vitals q15min x 1hr  -please contact IR with any questions/ concerns at [phone]

## 2020-02-10 NOTE — DISCHARGE NOTE PROVIDER - NSDCFUSCHEDAPPT_GEN_ALL_CORE_FT
TIFFANY LUONG ; 02/24/2020 ; NPP NeuroSurg [address] TIFFANY Ivan ; 03/12/2020 ; NPP Neurology [address]

## 2020-02-10 NOTE — PROGRESS NOTE ADULT - PROBLEM SELECTOR PLAN 1
recurrent seizures. appreciate neurology input  - c/w keppra 1500mg BID  - c/w carbamazapine 200mg BID until 2/20/20, after which to be increased to 400 mg BID  - need to clarify with neurology regarding this current regimen of AEDs as no clear documentation with above recommendations, neurosurgery PA to reach out to neurology to clarify recurrent seizures. appreciate neurology input  - c/w keppra 1500mg BID  - c/w carbamazapine 200mg BID until 2/20/20, after which to be increased to 400 mg BID per neurology

## 2020-02-10 NOTE — DISCHARGE NOTE PROVIDER - REASON FOR ADMISSION
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 2/2/20 post op seizure, no seizures noted on EEG monitoring. 2/6/20 repeat angio, stable clipping of aneurysms. 2/7/20 s/p plasma exchange via shiley placement, remove on 2/10/20.

## 2020-02-10 NOTE — DISCHARGE NOTE NURSING/CASE MANAGEMENT/SOCIAL WORK - NSDCFUADDAPPT_GEN_ALL_CORE_FT
Dr Walsh- neurosurgeon- 2/24/20 @ 1:45pm on 9 [facility]  Neurology Clinic- 3/12/20 @ 1:30pm on 3rd floor [facility] [phone]  Dr Kelley- hematologist- please call office for appointment in 1-2 weeks.

## 2020-02-10 NOTE — DISCHARGE NOTE NURSING/CASE MANAGEMENT/SOCIAL WORK - PATIENT PORTAL LINK FT
You can access the FollowMyHealth Patient Portal offered by [facility] by registering at the following website: [email or web address]. By joining [employer]’s FollowMyHealth portal, you will also be able to view your health information using other applications (apps) compatible with our system.

## 2020-02-10 NOTE — PROGRESS NOTE ADULT - PROBLEM SELECTOR PLAN 5
Unclear etiology. Apprec ID input. monitor off abx
Unclear etiology. Apprec ID input. monitor off abx
replete potassium and magnesium
resolved. monitor on current regimen
replete potassium and magnesium

## 2020-02-10 NOTE — PROGRESS NOTE ADULT - SUBJECTIVE AND OBJECTIVE BOX
Patient is a 19y old  Male who presents with a chief complaint of intracranial aneurysms (09 Feb 2020 17:31)        SUBJECTIVE / OVERNIGHT EVENTS:      MEDICATIONS  (STANDING):  atorvastatin 80 milliGRAM(s) Oral at bedtime  bisacodyl 5 milliGRAM(s) Oral at bedtime  carBAMazepine ER Capsule 200 milliGRAM(s) Oral two times a day  enoxaparin Injectable 40 milliGRAM(s) SubCutaneous <User Schedule>  folic acid 1 milliGRAM(s) Oral daily  hydroxyurea 1000 milliGRAM(s) Oral daily  influenza   Vaccine 0.5 milliLiter(s) IntraMuscular once  levETIRAcetam  IVPB 1500 milliGRAM(s) IV Intermittent every 12 hours  LORazepam   Injectable 1 milliGRAM(s) IV Push once  pantoprazole    Tablet 40 milliGRAM(s) Oral before breakfast  polyethylene glycol 3350 17 Gram(s) Oral two times a day  senna 2 Tablet(s) Oral at bedtime  sodium chloride 0.9%. 1000 milliLiter(s) (50 mL/Hr) IV Continuous <Continuous>    MEDICATIONS  (PRN):  acetaminophen   Tablet .. 650 milliGRAM(s) Oral every 6 hours PRN Temp greater or equal to 38C (100.4F), Mild Pain (1 - 3)  metoclopramide Injectable 10 milliGRAM(s) IV Push every 6 hours PRN Nausea and/or Vomiting  sodium chloride 0.9% lock flush 10 milliLiter(s) IV Push every 1 hour PRN Pre/post blood products, medications, blood draw, and to maintain line patency      Vital Signs Last 24 Hrs  T(C): 36.8 (10 Feb 2020 11:16), Max: 36.8 (09 Feb 2020 16:05)  T(F): 98.2 (10 Feb 2020 11:16), Max: 98.3 (09 Feb 2020 16:05)  HR: 73 (10 Feb 2020 11:16) (73 - 91)  BP: 106/65 (10 Feb 2020 11:16) (102/61 - 118/70)  BP(mean): --  RR: 18 (10 Feb 2020 11:16) (18 - 18)  SpO2: 96% (10 Feb 2020 11:16) (96% - 100%)  CAPILLARY BLOOD GLUCOSE        I&O's Summary    09 Feb 2020 07:01  -  10 Feb 2020 07:00  --------------------------------------------------------  IN: 1560 mL / OUT: 2450 mL / NET: -890 mL    10 Feb 2020 07:01  -  10 Feb 2020 12:24  --------------------------------------------------------  IN: 360 mL / OUT: 0 mL / NET: 360 mL        PHYSICAL EXAM:  CONSTITUTIONAL: NAD, well-developed, well-groomed  HEAD: +staples c/d/i with no area of erythema  EYES: PERRLA; conjunctiva and sclera clear +L ptosis  ENMT: Moist oral mucosa, no pharyngeal injection or exudates; normal dentition +R IJ shiley catheter in place c/d/i  NECK: Supple, no palpable masses; no thyromegaly  RESPIRATORY: Normal respiratory effort; lungs are clear to auscultation bilaterally  CARDIOVASCULAR: Regular rate and rhythm, normal S1 and S2, no murmur/rub/gallop; No lower extremity edema; Peripheral pulses are 2+ bilaterally  ABDOMEN: Soft, Nondistended,  Nontender to palpation, normoactive bowel sounds  MUSCULOSKELETAL:  Normal gait; no clubbing or cyanosis of digits; no joint swelling or tenderness to palpation  PSYCH: A+O to person, place, and time; affect appropriate  NEUROLOGY: CN 2-12 are intact and symmetric; no gross sensory deficits   SKIN: No rashes; no palpable lesions      LABS:                        9.0    8.90  )-----------( 338      ( 10 Feb 2020 06:15 )             28.5     02-10    138  |  103  |  9   ----------------------------<  103<H>  4.2   |  23  |  0.41<L>    Ca    9.8      10 Feb 2020 06:18    TPro  7.1  /  Alb  4.0  /  TBili  0.6  /  DBili  x   /  AST  16  /  ALT  18  /  AlkPhos  63  02-10              RADIOLOGY & ADDITIONAL TESTS:    Imaging Personally Reviewed:  Consultant(s) Notes Reviewed:    Care Discussed with Consultants/Other Providers: Patient is a 19y old  Male who presents with a chief complaint of intracranial aneurysms (09 Feb 2020 17:31)        SUBJECTIVE / OVERNIGHT EVENTS: no chest pain, no sob, no headache      MEDICATIONS  (STANDING):  atorvastatin 80 milliGRAM(s) Oral at bedtime  bisacodyl 5 milliGRAM(s) Oral at bedtime  carBAMazepine ER Capsule 200 milliGRAM(s) Oral two times a day  enoxaparin Injectable 40 milliGRAM(s) SubCutaneous <User Schedule>  folic acid 1 milliGRAM(s) Oral daily  hydroxyurea 1000 milliGRAM(s) Oral daily  influenza   Vaccine 0.5 milliLiter(s) IntraMuscular once  levETIRAcetam  IVPB 1500 milliGRAM(s) IV Intermittent every 12 hours  LORazepam   Injectable 1 milliGRAM(s) IV Push once  pantoprazole    Tablet 40 milliGRAM(s) Oral before breakfast  polyethylene glycol 3350 17 Gram(s) Oral two times a day  senna 2 Tablet(s) Oral at bedtime  sodium chloride 0.9%. 1000 milliLiter(s) (50 mL/Hr) IV Continuous <Continuous>    MEDICATIONS  (PRN):  acetaminophen   Tablet .. 650 milliGRAM(s) Oral every 6 hours PRN Temp greater or equal to 38C (100.4F), Mild Pain (1 - 3)  metoclopramide Injectable 10 milliGRAM(s) IV Push every 6 hours PRN Nausea and/or Vomiting  sodium chloride 0.9% lock flush 10 milliLiter(s) IV Push every 1 hour PRN Pre/post blood products, medications, blood draw, and to maintain line patency      Vital Signs Last 24 Hrs  T(C): 36.8 (10 Feb 2020 11:16), Max: 36.8 (09 Feb 2020 16:05)  T(F): 98.2 (10 Feb 2020 11:16), Max: 98.3 (09 Feb 2020 16:05)  HR: 73 (10 Feb 2020 11:16) (73 - 91)  BP: 106/65 (10 Feb 2020 11:16) (102/61 - 118/70)  BP(mean): --  RR: 18 (10 Feb 2020 11:16) (18 - 18)  SpO2: 96% (10 Feb 2020 11:16) (96% - 100%)  CAPILLARY BLOOD GLUCOSE        I&O's Summary    09 Feb 2020 07:01  -  10 Feb 2020 07:00  --------------------------------------------------------  IN: 1560 mL / OUT: 2450 mL / NET: -890 mL    10 Feb 2020 07:01  -  10 Feb 2020 12:24  --------------------------------------------------------  IN: 360 mL / OUT: 0 mL / NET: 360 mL        PHYSICAL EXAM:  CONSTITUTIONAL: NAD, well-developed, well-groomed  HEAD: +staples c/d/i with no area of erythema  EYES: PERRLA; conjunctiva and sclera clear +L ptosis  ENMT: Moist oral mucosa, no pharyngeal injection or exudates; normal dentition +R IJ shiley catheter in place c/d/i  NECK: Supple, no palpable masses; no thyromegaly  RESPIRATORY: Normal respiratory effort; lungs are clear to auscultation bilaterally  CARDIOVASCULAR: Regular rate and rhythm, normal S1 and S2, no murmur/rub/gallop; No lower extremity edema; Peripheral pulses are 2+ bilaterally  ABDOMEN: Soft, Nondistended,  Nontender to palpation, normoactive bowel sounds  MUSCULOSKELETAL:  Normal gait; no clubbing or cyanosis of digits; no joint swelling or tenderness to palpation  PSYCH: A+O to person, place, and time; affect appropriate  NEUROLOGY: CN 2-12 are intact and symmetric; no gross sensory deficits   SKIN: No rashes; no palpable lesions      LABS:                        9.0    8.90  )-----------( 338      ( 10 Feb 2020 06:15 )             28.5     02-10    138  |  103  |  9   ----------------------------<  103<H>  4.2   |  23  |  0.41<L>    Ca    9.8      10 Feb 2020 06:18    TPro  7.1  /  Alb  4.0  /  TBili  0.6  /  DBili  x   /  AST  16  /  ALT  18  /  AlkPhos  63  02-10              RADIOLOGY & ADDITIONAL TESTS:    Imaging Personally Reviewed:   Consultant(s) Notes Reviewed:    Care Discussed with Consultants/Other Providers: d/w Neurosurgery PA - Renetta regarding hemoglobin

## 2020-02-10 NOTE — PROGRESS NOTE ADULT - PROBLEM SELECTOR PROBLEM 4
CVA (cerebral vascular accident)
Constipation
Constipation
Fever, unspecified fever cause
Fever, unspecified fever cause

## 2020-02-10 NOTE — DISCHARGE NOTE NURSING/CASE MANAGEMENT/SOCIAL WORK - NSDCPEPTSTRK_GEN_ALL_CORE
Prescribed medications/Need for follow up after discharge/Risk factors for stroke/Stroke warning signs and symptoms/Signs and symptoms of stroke/Call 911 for stroke/Stroke support groups for patients, families, and friends/Stroke education booklet

## 2020-02-10 NOTE — PROGRESS NOTE ADULT - SUBJECTIVE AND OBJECTIVE BOX
SUBJECTIVE:     INTERVAL HISTORY:    PAST MEDICAL & SURGICAL HISTORY:  Brain aneurysm  Sickle cell anemia  Sickle cell disease  No significant past surgical history    FAMILY HISTORY:    MEDICATIONS (HOME):  Home Medications:  acetaminophen 325 mg oral tablet: 2 tab(s) orally every 6 hours, As needed, Mild Pain (1 - 3) (15 Truman 2020 15:46)  Aspir 81 oral delayed release tablet: 1 tab(s) orally once a day (15 Truman 2020 10:22)    MEDICATIONS  (STANDING):  atorvastatin 80 milliGRAM(s) Oral at bedtime  bisacodyl 5 milliGRAM(s) Oral at bedtime  carBAMazepine ER Capsule 200 milliGRAM(s) Oral two times a day  enoxaparin Injectable 40 milliGRAM(s) SubCutaneous <User Schedule>  folic acid 1 milliGRAM(s) Oral daily  hydroxyurea 1000 milliGRAM(s) Oral daily  influenza   Vaccine 0.5 milliLiter(s) IntraMuscular once  levETIRAcetam  IVPB 1500 milliGRAM(s) IV Intermittent every 12 hours  LORazepam   Injectable 1 milliGRAM(s) IV Push once  pantoprazole    Tablet 40 milliGRAM(s) Oral before breakfast  polyethylene glycol 3350 17 Gram(s) Oral two times a day  senna 2 Tablet(s) Oral at bedtime  sodium chloride 0.9%. 1000 milliLiter(s) (50 mL/Hr) IV Continuous <Continuous>    MEDICATIONS  (PRN):  acetaminophen   Tablet .. 650 milliGRAM(s) Oral every 6 hours PRN Temp greater or equal to 38C (100.4F), Mild Pain (1 - 3)  metoclopramide Injectable 10 milliGRAM(s) IV Push every 6 hours PRN Nausea and/or Vomiting  sodium chloride 0.9% lock flush 10 milliLiter(s) IV Push every 1 hour PRN Pre/post blood products, medications, blood draw, and to maintain line patency    ALLERGIES/INTOLERANCES:  Allergies  No Known Allergies    VITALS & EXAMINATION:  Vital Signs Last 24 Hrs  T(C): 36.8 (10 Feb 2020 11:16), Max: 36.8 (09 Feb 2020 16:05)  T(F): 98.2 (10 Feb 2020 11:16), Max: 98.3 (09 Feb 2020 16:05)  HR: 73 (10 Feb 2020 11:16) (73 - 91)  BP: 106/65 (10 Feb 2020 11:16) (102/61 - 118/70)  RR: 18 (10 Feb 2020 11:16) (18 - 18)  SpO2: 96% (10 Feb 2020 11:16) (96% - 100%)    Neurological (>12):  MS: Awake, alert, patient refuses to participate in the mental status portion of the examination.     Language: From the few words he uttered, patient appears to have clear speech, he refuses to name, repeat, but comprehension grossly appears intact as he is able to squeeze my fingers and let go of my fingers when asked.     CNs: btt intact, mild right nasolabial flattening (appears to be a baseline finding), hearing appears to be intact, furrows eyebrows symmetrically (though has the EEG monitoring head gear on)    Motor: moves all extremities equally, squeezes fingers with equal strength, no drift appreciated b/l    Sensation: intact sensation to light touch and temperature upon upper and lower extremities b/l    LABORATORY:  CBC                       9.0    8.90  )-----------( 338      ( 10 Feb 2020 06:15 )             28.5     Chem 02-10    138  |  103  |  9   ----------------------------<  103<H>  4.2   |  23  |  0.41<L>    Ca    9.8      10 Feb 2020 06:18    TPro  7.1  /  Alb  4.0  /  TBili  0.6  /  DBili  x   /  AST  16  /  ALT  18  /  AlkPhos  63  02-10    LFTs LIVER FUNCTIONS - ( 10 Feb 2020 06:18 )  Alb: 4.0 g/dL / Pro: 7.1 g/dL / ALK PHOS: 63 U/L / ALT: 18 U/L / AST: 16 U/L / GGT: x           Coagulopathy   Lipid Panel   A1c   Cardiac enzymes     U/A   CSF  Immunological  Other    STUDIES & IMAGING:  Studies (EKG, EEG, EMG, etc):     Radiology (XR, CT, MR, U/S, TTE/SHEKHAR):    < from: CT Head No Cont (02.05.20 @ 08:58) >  IMPRESSION:     Left pterionalcraniotomy with interval decrease in left frontotemporal mixed density extra-axial collection as above. No hydrocephalus or midline shift.    New areas of hypoattenuation likely represents streak artifact. A repeat CT can be obtained for further evaluation.   Evolving right cerebellar hemisphere infarct.    < end of copied text >    EEG Classification / Summary 2/6/20   Abnormal EEG study  There was diffuse irregular theta and delta activity present, brief runs of rhythmic delta persistently more prominent over the left frontal>temporal region but at times more generalized.  Left sided attenuation of fast activity    -----------------------------------------------------------------------------------------------------    Clinical Impression:  Moderate multifocal cerebral dysfunction, persistently worse in the left hemisphere  There were no epileptiform abnormalities or seizures recorded. SUBJECTIVE: Patient seen at bedside w/ family in room. No acute concerns     INTERVAL HISTORY: No active seizures (or EEG correlates) since 2/6/20.     PAST MEDICAL & SURGICAL HISTORY:  Brain aneurysm  Sickle cell anemia  Sickle cell disease  No significant past surgical history    FAMILY HISTORY:    MEDICATIONS (HOME):  Home Medications:  acetaminophen 325 mg oral tablet: 2 tab(s) orally every 6 hours, As needed, Mild Pain (1 - 3) (15 Truman 2020 15:46)  Aspir 81 oral delayed release tablet: 1 tab(s) orally once a day (15 Truman 2020 10:22)    MEDICATIONS  (STANDING):  atorvastatin 80 milliGRAM(s) Oral at bedtime  bisacodyl 5 milliGRAM(s) Oral at bedtime  carBAMazepine ER Capsule 200 milliGRAM(s) Oral two times a day  enoxaparin Injectable 40 milliGRAM(s) SubCutaneous <User Schedule>  folic acid 1 milliGRAM(s) Oral daily  hydroxyurea 1000 milliGRAM(s) Oral daily  influenza   Vaccine 0.5 milliLiter(s) IntraMuscular once  levETIRAcetam  IVPB 1500 milliGRAM(s) IV Intermittent every 12 hours  LORazepam   Injectable 1 milliGRAM(s) IV Push once  pantoprazole    Tablet 40 milliGRAM(s) Oral before breakfast  polyethylene glycol 3350 17 Gram(s) Oral two times a day  senna 2 Tablet(s) Oral at bedtime  sodium chloride 0.9%. 1000 milliLiter(s) (50 mL/Hr) IV Continuous <Continuous>    MEDICATIONS  (PRN):  acetaminophen   Tablet .. 650 milliGRAM(s) Oral every 6 hours PRN Temp greater or equal to 38C (100.4F), Mild Pain (1 - 3)  metoclopramide Injectable 10 milliGRAM(s) IV Push every 6 hours PRN Nausea and/or Vomiting  sodium chloride 0.9% lock flush 10 milliLiter(s) IV Push every 1 hour PRN Pre/post blood products, medications, blood draw, and to maintain line patency    ALLERGIES/INTOLERANCES:  Allergies  No Known Allergies    VITALS & EXAMINATION:  Vital Signs Last 24 Hrs  T(C): 36.8 (10 Feb 2020 11:16), Max: 36.8 (09 Feb 2020 16:05)  T(F): 98.2 (10 Feb 2020 11:16), Max: 98.3 (09 Feb 2020 16:05)  HR: 73 (10 Feb 2020 11:16) (73 - 91)  BP: 106/65 (10 Feb 2020 11:16) (102/61 - 118/70)  RR: 18 (10 Feb 2020 11:16) (18 - 18)  SpO2: 96% (10 Feb 2020 11:16) (96% - 100%)    Neurological (>12):  MS: Awake, alert, oriented to person, place, time. Fluent speech, follows commands     CNs: right eyelid closed, mild right nasolabial flattening (appears to be a baseline finding), hearing appears to be intact,    Motor: moves all extremities equally, squeezes fingers with equal strength, no drift appreciated b/l    Sensation: intact sensation to light touch and temperature upon upper and lower extremities b/l    LABORATORY:  CBC                       9.0    8.90  )-----------( 338      ( 10 Feb 2020 06:15 )             28.5     Chem 02-10    138  |  103  |  9   ----------------------------<  103<H>  4.2   |  23  |  0.41<L>    Ca    9.8      10 Feb 2020 06:18    TPro  7.1  /  Alb  4.0  /  TBili  0.6  /  DBili  x   /  AST  16  /  ALT  18  /  AlkPhos  63  02-10    LFTs LIVER FUNCTIONS - ( 10 Feb 2020 06:18 )  Alb: 4.0 g/dL / Pro: 7.1 g/dL / ALK PHOS: 63 U/L / ALT: 18 U/L / AST: 16 U/L / GGT: x           Coagulopathy   Lipid Panel   A1c   Cardiac enzymes     U/A   CSF  Immunological  Other    STUDIES & IMAGING:  Studies (EKG, EEG, EMG, etc):     Radiology (XR, CT, MR, U/S, TTE/SHEKHAR):    < from: CT Head No Cont (02.05.20 @ 08:58) >  IMPRESSION:     Left pterionalcraniotomy with interval decrease in left frontotemporal mixed density extra-axial collection as above. No hydrocephalus or midline shift.    New areas of hypoattenuation likely represents streak artifact. A repeat CT can be obtained for further evaluation.   Evolving right cerebellar hemisphere infarct.    < end of copied text >    EEG Classification / Summary 2/6/20   Abnormal EEG study  There was diffuse irregular theta and delta activity present, brief runs of rhythmic delta persistently more prominent over the left frontal>temporal region but at times more generalized.  Left sided attenuation of fast activity    -----------------------------------------------------------------------------------------------------    Clinical Impression:  Moderate multifocal cerebral dysfunction, persistently worse in the left hemisphere  There were no epileptiform abnormalities or seizures recorded.

## 2020-02-10 NOTE — DISCHARGE NOTE PROVIDER - NSDCFUADDAPPT_GEN_ALL_CORE_FT
Dr Walsh- neurosurgeon- 2/24/20 @ 1:45pm on 9 [facility]  Neurology Clinic- 3/12/20 @ 1:30pm on 3rd floor [facility] [phone]  Dr Kelley- hematologist- please call office for appointment in 1-2 weeks.

## 2020-02-10 NOTE — DISCHARGE NOTE PROVIDER - HOSPITAL COURSE
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 2/2/20 post op seizure, no seizures noted on EEG monitoring. 2/6/20 repeat angio, stable clipping of aneurysms. 2/7/20 s/p plasma exchange via shiley placement, remove on 2/10/20. 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 Post op cared for in NSCU     and transferred to floor. He had post op fevers with negative workup and completed empiric antibiotics per ID consult. Hematology followed in consult     given his history of sickle cell. On 2/2/20 he had mental status change and taken to CT for CTH where he was noted to have generalized tonic clonic seizure     and taken to NSCU. EEG showed possible left frontal seizure focus. He was started on multiple AEDs and adjustments made per neurology recommendations     given he lives in [facility] for medication availability. He returned to floor and did well. 2/2/20 post op seizure, no seizures noted on EEG monitoring. 2/6/20     repeat angio, stable clipping of aneurysms. 2/7/20 s/p plasma exchange via shiley placement, remove on 2/10/20. Staples removed on 2/10/20. PT evaluated     him and recommended outpatient PT. Follow up appointment for Dr aden and neurology clinic made, sister will call for hematology appointment with Dr Kelley.     On the day of discharge he was medically and neurologically stable for discharge to home.

## 2020-02-10 NOTE — PROGRESS NOTE ADULT - PROBLEM SELECTOR PROBLEM 2
Brain aneurysm
Hb-SS disease without crisis
Brain aneurysm
Hb-SS disease without crisis

## 2020-02-10 NOTE — PROGRESS NOTE ADULT - PROBLEM SELECTOR PROBLEM 5
Constipation
Fever, unspecified fever cause
Fever, unspecified fever cause
Hypokalemia
Hypokalemia

## 2020-02-10 NOTE — PROGRESS NOTE ADULT - PROBLEM SELECTOR PROBLEM 3
Fever, unspecified fever cause
Hb-SS disease without crisis
Fever, unspecified fever cause

## 2020-02-10 NOTE — PROGRESS NOTE ADULT - PROBLEM SELECTOR PLAN 3
s/p exchange transfusion as per hematology on 2/7/20.  - c/w folic acid  - c/w hydroxyurea  - will need to f/u with outpatient hematologist Dr. Denia Kelley for outpatient exchange transfusions s/p exchange transfusion as per hematology on 2/7/20 with improvement in hemoglobin S  - c/w folic acid  - c/w hydroxyurea  - will need to f/u with outpatient hematologist Dr. Denia Kelley for outpatient exchange transfusions

## 2020-02-10 NOTE — PROGRESS NOTE ADULT - PROBLEM SELECTOR PLAN 4
Unclear etiology. Apprec ID input. monitor off abx
add mg citrate and lactulose. pt currently refusing meds per rectum or disimpaction, understanding risks
hx of CVA on 12/2019  - c/w atorvastatin 80mg qHS
resolved. monitor on current regimen
Unclear etiology. Apprec ID input. monitor off abx

## 2020-02-10 NOTE — PROGRESS NOTE ADULT - ASSESSMENT
19 year old man with Hb SS  recent long hospitalization for acute chest syndrome requiring intubation and exchange transfusion on 12-17 c/b stroke who on MRI was found to have multiple aneurysms c/b recurrent headaches, who presented for neurosurgical intervention for brain aneurysms prior to returning home to [facility]. Hematology consulted for matilda-operative management for Hb SS disease.    #HbSS disease- s/p left Craniotomy with clipping of L ICA terminus and L SCA aneurysms s/p aneurysm repair done on 1- now with new seizures, left third nerve palsy and new right cerebellar stroke post op  - seizures, left third nerve palsy post-operatively w/ R cerebellar infarct (2/2/20); being followed by NSx and neurology  - post-operative management should focus on minimizing hypoxia, hypothermia, acidosis, and intravascular volume depletion. Also counseled today to avoid dehydration or abrupt temperature changes when traveling in the future.  - appreciate excellent neurosurgical care  - Please encourage frequent incentive spirometer use if safe from neurosurgical standpoint  - s/p RBC exchange for prophylaxis against further CVA, performed on 2/7/20; Hgb has be stable and near goal of 10; no plan for further exchange transfusions, Shiley removed on 2/10/20  - ok to continue with hydroxyurea 1000 mg QD as an outpatient (both for previous episode of reactive thrombocytosis as well as crisis prophylaxis)  - Repeat Hgb electrophoresis pending, but patient does not need to stay for result, can be follow up as an outpatient  - ok to follow up as an outpatient with Dr. Denia Kelley; emailed her office at [facility], provided patient and patient's sister with [facility] main number    Bharathi Burnham MD, MPH  Hematology / Oncology Fellow, PGY5  p

## 2020-02-10 NOTE — DISCHARGE NOTE PROVIDER - NSDCACTIVITY_GEN_ALL_CORE
No heavy lifting/straining/Do not drive or operate machinery/Do not make important decisions/Showering allowed/Walking - Indoors allowed/Walking - Outdoors allowed/Bathing allowed/Stairs allowed

## 2020-02-10 NOTE — PROGRESS NOTE ADULT - ATTENDING COMMENTS
Agree with fellow note with the follow addendums/exceptions. Briefly, 19 year old man with Hb SS  recent long hospitalization for acute chest syndrome requiring intubation and exchange transfusion c/b stroke who on MRI was found to have multiple aneurysms c/b recurrent headaches, who presented for neurosurgical intervention for brain aneurysms complicated by post-operative sright cerebellar stroke. Unclear if this was a complication of procedure vs. related to underlying HbSS disease. Received exchange transfusion on 2/7, HbSS now 7%, Hb ~10 (goal).   - appreciate excellent neurosurgical care  - ok to continue with hydroxyurea 1000 mg QD as an outpatient   - follow up with Dr. Kelley while in the [place]

## 2020-02-10 NOTE — DISCHARGE NOTE PROVIDER - NSDCFUADDINST_GEN_ALL_CORE_FT
please notify physician if fevers, bleeding, swelling, pain not relieved by medication, increased sluggishness or irritability, increased nausea or vomiting, inability to tolerate foods or liquids, new or increasing weakness/numbness/tingling.   please do not engage in strenuous activity, heavy lifting, drive, or return to work or school until cleared by surgeon.   please keep incision clean and dry, do not submerge wound in water for prolonged periods of time, pat dry after showering, and do not use any creams or ointments to incision.   You can remove plastic dressing on chest after shower the day after discharge.

## 2020-02-10 NOTE — PROGRESS NOTE ADULT - ASSESSMENT
HPI:  19M pmhx SSD several known unruptured aneurysms largest is L ICA s/p DSA 1/14/2020 presents for gradual worsening headache neurointact on arrival.  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                        2/6/20 repeat angio, stable clipping of aneurysms     PLAN:   Neuro:  -repeat angio 2/6- stable clipping of the left ICA/PCA  -continue  2x daily until the 20th, after which will be 400mg 2x daily  -appreciate formal neurology follow up for discharge med plan and need for outpatient follow up appointment made for 3/12/20 as he will remain in [place] for several weeks more prior to returning home to [facility] per sister.   -continue on 1500mg keppra 2x daily  -pain control PRN  Cards:  -vitals stable  -continue lipitor 80 mg  Pulm:  -stable on RA  Renal:  NS @ 50  Heme:  -Hgb S 32.9, Hgb A: 64.0, R IJ Shiley placed by IR,  PT received exchange transfusion 2/7- no further transfusions scheduled;   appreciate hematology f/u; HG electrophoresis sent this am can f/u outpt with Dr Kelley  -DVT ppx: lovenox 40 SQ  -continue hydroxyurea  PT/OT:  Outpatient PT upon d/c    appreciate hematology and hospitalist medicine follow up  sister updated at bedside  Discussed with patient and family wound care, follow up plans, activity, and medications. Questions answered, and they verbalized understanding.  RXs sent to [facility], clinic prices given.   d/c IVL and d/c home today    Will discuss with Dr Nico Phipps # [phone]    Assessment:  Please Check When Present   []  GCS  E   V  M     Heart Failure: []Acute, [] acute on chronic , []chronic  Heart Failure:  [] Diastolic (HFpEF), [] Systolic (HFrEF), []Combined (HFpEF and HFrEF), [] RHF, [] Pulm HTN, [] Other    [] ISABELL, [] ATN, [] AIN, [] other  [] CKD1, [] CKD2, [] CKD 3, [] CKD 4, [] CKD 5, []ESRD    Encephalopathy: [] Metabolic, [] Hepatic, [] toxic, [] Neurological, [] Other    Abnormal Nutritional Status: [] malnutrition (see nutrition note), [ ]underweight: BMI < 19, [] morbid obesity: BMI >40, [] Cachexia    [] Sepsis  [] hypovolemic shock,[] cardiogenic shock, [] hemorrhagic shock, [] neurogenic shock  [] Acute Respiratory Failure  []Cerebral edema, [] Brain compression/ herniation,   [] Functional quadriplegia  [] Acute blood loss anemia

## 2020-02-10 NOTE — DISCHARGE NOTE PROVIDER - NSDCCPCAREPLAN_GEN_ALL_CORE_FT
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  Dr Walsh- neurosurgeon- please call office to confirm appointment on 2/24/20 @ 1:45Pm on 9 [place] at [facility]      SECONDARY DISCHARGE DIAGNOSES  Diagnosis: Seizure  Assessment and Plan of Treatment: Continue Keppra 1500 mg 2 x daily, carbamazepine 200mg 2 x daily for 10days and then 400mg 2x daily. Neurology clinic appointment 3/12/20 @ 1:30pm please call to confirm [phone]. Lorazepam 1mg if seizure >3-5 mins and call 911.    Diagnosis: Fever, unspecified fever cause  Assessment and Plan of Treatment: Resolved, completed empiric antibiotics without defined source.    Diagnosis: Hb-SS disease without crisis  Assessment and Plan of Treatment: Please follow up with hematology Dr Kelley within 1-2 weeks. Please call office for appointment.

## 2020-02-11 LAB
HEMOGLOBIN INTERPRETATION: SIGNIFICANT CHANGE UP
HGB A MFR BLD: 89.2 % — LOW (ref 95.8–98)
HGB A2 MFR BLD: 2.7 % — SIGNIFICANT CHANGE UP (ref 2–3.2)
HGB S MFR BLD: 8.1 % — HIGH

## 2020-02-12 ENCOUNTER — OUTPATIENT (OUTPATIENT)
Dept: OUTPATIENT SERVICES | Facility: HOSPITAL | Age: 20
LOS: 1 days | Discharge: HOME | End: 2020-02-12

## 2020-02-12 ENCOUNTER — EMERGENCY (EMERGENCY)
Facility: HOSPITAL | Age: 20
LOS: 1 days | Discharge: HOME | End: 2020-02-12
Attending: EMERGENCY MEDICINE
Payer: MEDICAID

## 2020-02-12 VITALS
OXYGEN SATURATION: 99 % | HEART RATE: 78 BPM | RESPIRATION RATE: 18 BRPM | DIASTOLIC BLOOD PRESSURE: 78 MMHG | TEMPERATURE: 98 F | SYSTOLIC BLOOD PRESSURE: 117 MMHG

## 2020-02-12 VITALS
RESPIRATION RATE: 18 BRPM | DIASTOLIC BLOOD PRESSURE: 76 MMHG | TEMPERATURE: 98 F | HEIGHT: 74 IN | WEIGHT: 125.66 LBS | OXYGEN SATURATION: 100 % | SYSTOLIC BLOOD PRESSURE: 110 MMHG | HEART RATE: 87 BPM

## 2020-02-12 DIAGNOSIS — D57.1 SICKLE-CELL DISEASE WITHOUT CRISIS: ICD-10-CM

## 2020-02-12 LAB
ALBUMIN SERPL ELPH-MCNC: 4.5 G/DL — SIGNIFICANT CHANGE UP (ref 3.3–5)
ALP SERPL-CCNC: 68 U/L — SIGNIFICANT CHANGE UP (ref 40–120)
ALT FLD-CCNC: 17 U/L — SIGNIFICANT CHANGE UP (ref 10–45)
ANION GAP SERPL CALC-SCNC: 14 MMOL/L — SIGNIFICANT CHANGE UP (ref 5–17)
AST SERPL-CCNC: 17 U/L — SIGNIFICANT CHANGE UP (ref 10–40)
BASOPHILS # BLD AUTO: 0.05 K/UL — SIGNIFICANT CHANGE UP (ref 0–0.2)
BASOPHILS NFR BLD AUTO: 0.6 % — SIGNIFICANT CHANGE UP (ref 0–2)
BILIRUB SERPL-MCNC: 0.6 MG/DL — SIGNIFICANT CHANGE UP (ref 0.2–1.2)
BUN SERPL-MCNC: 7 MG/DL — SIGNIFICANT CHANGE UP (ref 7–23)
CALCIUM SERPL-MCNC: 10.2 MG/DL — SIGNIFICANT CHANGE UP (ref 8.4–10.5)
CHLORIDE SERPL-SCNC: 96 MMOL/L — SIGNIFICANT CHANGE UP (ref 96–108)
CO2 SERPL-SCNC: 24 MMOL/L — SIGNIFICANT CHANGE UP (ref 22–31)
CREAT SERPL-MCNC: 0.37 MG/DL — LOW (ref 0.5–1.3)
EOSINOPHIL # BLD AUTO: 0.17 K/UL — SIGNIFICANT CHANGE UP (ref 0–0.5)
EOSINOPHIL NFR BLD AUTO: 2.2 % — SIGNIFICANT CHANGE UP (ref 0–6)
GLUCOSE SERPL-MCNC: 102 MG/DL — HIGH (ref 70–99)
HCT VFR BLD CALC: 29.3 % — LOW (ref 39–50)
HGB BLD-MCNC: 9.5 G/DL — LOW (ref 13–17)
IMM GRANULOCYTES NFR BLD AUTO: 0.4 % — SIGNIFICANT CHANGE UP (ref 0–1.5)
LYMPHOCYTES # BLD AUTO: 2.84 K/UL — SIGNIFICANT CHANGE UP (ref 1–3.3)
LYMPHOCYTES # BLD AUTO: 36.3 % — SIGNIFICANT CHANGE UP (ref 13–44)
MCHC RBC-ENTMCNC: 28.9 PG — SIGNIFICANT CHANGE UP (ref 27–34)
MCHC RBC-ENTMCNC: 32.4 GM/DL — SIGNIFICANT CHANGE UP (ref 32–36)
MCV RBC AUTO: 89.1 FL — SIGNIFICANT CHANGE UP (ref 80–100)
MONOCYTES # BLD AUTO: 0.61 K/UL — SIGNIFICANT CHANGE UP (ref 0–0.9)
MONOCYTES NFR BLD AUTO: 7.8 % — SIGNIFICANT CHANGE UP (ref 2–14)
NEUTROPHILS # BLD AUTO: 4.13 K/UL — SIGNIFICANT CHANGE UP (ref 1.8–7.4)
NEUTROPHILS NFR BLD AUTO: 52.7 % — SIGNIFICANT CHANGE UP (ref 43–77)
NRBC # BLD: 0 /100 WBCS — SIGNIFICANT CHANGE UP (ref 0–0)
PLATELET # BLD AUTO: 453 K/UL — HIGH (ref 150–400)
POTASSIUM SERPL-MCNC: 4.5 MMOL/L — SIGNIFICANT CHANGE UP (ref 3.5–5.3)
POTASSIUM SERPL-SCNC: 4.5 MMOL/L — SIGNIFICANT CHANGE UP (ref 3.5–5.3)
PROT SERPL-MCNC: 8.1 G/DL — SIGNIFICANT CHANGE UP (ref 6–8.3)
RBC # BLD: 3.29 M/UL — LOW (ref 4.2–5.8)
RBC # FLD: 17.3 % — HIGH (ref 10.3–14.5)
SODIUM SERPL-SCNC: 134 MMOL/L — LOW (ref 135–145)
WBC # BLD: 7.83 K/UL — SIGNIFICANT CHANGE UP (ref 3.8–10.5)
WBC # FLD AUTO: 7.83 K/UL — SIGNIFICANT CHANGE UP (ref 3.8–10.5)

## 2020-02-12 PROCEDURE — 85027 COMPLETE CBC AUTOMATED: CPT

## 2020-02-12 PROCEDURE — 99284 EMERGENCY DEPT VISIT MOD MDM: CPT

## 2020-02-12 PROCEDURE — 99232 SBSQ HOSP IP/OBS MODERATE 35: CPT

## 2020-02-12 PROCEDURE — 80053 COMPREHEN METABOLIC PANEL: CPT

## 2020-02-12 RX ORDER — ONDANSETRON 8 MG/1
1 TABLET, FILM COATED ORAL
Qty: 15 | Refills: 0
Start: 2020-02-12 | End: 2020-02-16

## 2020-02-12 NOTE — ED PROVIDER NOTE - OBJECTIVE STATEMENT
20yo Male pmhx sickle cell, recent prolonged hospital stay for pain crisis and acute chest complicated by intracranial aneurism and craniotomy on 1/29/20, discharged Monday, presents to ED with vomiting. States he has had occasional episodes of vomiting since the surgery. Vomited day prior to discharge. Last episode was this AM. Pt. denies nausea at this time. Is able to tolerate PO. Denies fever chills cp sob diarrhea abd pain.

## 2020-02-12 NOTE — ED PROVIDER NOTE - ATTENDING CONTRIBUTION TO CARE
Attending MD Lujan:  I personally have seen and examined this patient.  Resident note reviewed and agree on plan of care and except where noted.

## 2020-02-12 NOTE — ED ADULT NURSE NOTE - OBJECTIVE STATEMENT
18 yo male presents to ED fom home c/o headache, n/v sp craniotomy on 1/29 for brain aneurysm repair. Patient reports 4-5 episodes of vomiting since last night. Patient presents with "a little headache" took Tylenol 0700 this morning, has L sided facial droop present since surgery. Patient has hx of SCC with acute chest requiring intubation then craniotomy. Patient denies SOB, CP, diarrhea, fever/chills, sick contacts, falls/loc, vision changes. Patient A&Ox3, breathing spontaneously, airway patent, bl clear lungs, abdomen nontender, +pulses, cap refill <2 seconds. Patient moving all extremities without difficulty, only complaining of weakness. Patient resting in bed, side rails up, plan of care explained.

## 2020-02-12 NOTE — ED PROVIDER NOTE - NSFOLLOWUPINSTRUCTIONS_ED_ALL_ED_FT
Vomiting, Adult  Vomiting occurs when stomach contents are thrown up and out of the mouth. Many people notice nausea before vomiting. Vomiting can make you feel weak and cause you to become dehydrated. Dehydration can make you feel tired and thirsty, cause you to have a dry mouth, and decrease how often you urinate. Older adults and people who have other diseases or a weak body defense system (immune system) are at higher risk for dehydration. It is important to treat vomiting as told by your health care provider.  Follow these instructions at home:     Eating and drinking               Follow these recommendations as told by your health care provider:  Take an oral rehydration solution (ORS). This is a drink that is sold at pharmacies and retail stores.Eat bland, easy-to-digest foods in small amounts as you are able. These foods include bananas, applesauce, rice, lean meats, toast, and crackers.Drink clear fluids slowly and in small amounts as you are able. Clear fluids include water, ice chips, low-calorie sports drinks, and fruit juice that has water added (diluted fruit juice).Avoid drinking fluids that contain a lot of sugar or caffeine, such as energy drinks, sports drinks, and soda.Avoid alcohol.Avoid spicy or fatty foods.General instructions     Wash your hands often using soap and water. If soap and water are not available, use hand . Make sure that everyone in your household washes their hands frequently.Take over-the-counter and prescription medicines only as told by your health care provider.Rest at home while you recover.Watch your condition for any changes.Keep all follow-up visits as told by your health care provider. This is important.Contact a health care provider if:  Your vomiting gets worse.You have new symptoms.You have a fever.You cannot drink fluids without vomiting.You feel light-headed or dizzy.You have a headache.You have muscle cramps.You have a rash.You have pain while urinating.Get help right away if:  You have pain in your chest, neck, arm, or jaw.You feel extremely weak or you faint.You have persistent vomiting.You have vomit that is bright red or looks like black coffee grounds.You have stools that are bloody or black, or stools that look like tar.You have a severe headache, a stiff neck, or both.You have severe pain, cramping, or bloating in your abdomen.You have trouble breathing or you are breathing very quickly.Your heart is beating very quickly.Your skin feels cold and clammy.You feel confused.You have signs of dehydration, such as:  Dark urine, very little urine, or no urine.Cracked lips.Dry mouth.Sunken eyes.Sleepiness.Weakness.These symptoms may represent a serious problem that is an emergency. Do not wait to see if the symptoms will go away. Get medical help right away. Call your local emergency services (911 in the [place] Do not drive yourself to the hospital.   Summary  Vomiting occurs when stomach contents are thrown up and out of the mouth. Vomiting can cause you to become dehydrated. Older adults and people who have other diseases or a weak immune system are at higher risk for dehydration.It is important to treat vomiting as told by your health care provider. Follow your health care provider's instructions about eating and drinking.Wash your hands often using soap and water. If soap and water are not available, use hand . Make sure that everyone in your household washes their hands frequently.Watch your condition for any changes and for signs of dehydration.Keep all follow-up visits as told by your health care provider. This is important.This information is not intended to replace advice given to you by your health care provider. Make sure you discuss any questions you have with your health care provider.    Take zofran half hour prior to keppra to decrease nausea.  Follow up with [facility].  Return to ED if you experience any new or worsening symptoms.

## 2020-02-12 NOTE — ED PROVIDER NOTE - PROGRESS NOTE DETAILS
NSx saw pt at bedside, does not want head CT at this time, believes vomiting likely 2/2 keppra, will check labs and reassess. pt. declining nausea meds at this time NS resident discussed case with attending, Dr. Walsh, they are ok with pt. being discharged home and following up outpt. Will DC with simon HERRMANN.

## 2020-02-12 NOTE — CONSULT NOTE ADULT - SUBJECTIVE AND OBJECTIVE BOX
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) post op course c/b GTCs presents for continued vomiting post op, unchanged symptoms from time of discharge    Imaging:    Exam:  AOx3, FC, baseline L 3NP, able to half open eyelid with effort, otherwise complete  5/5 throughout, no drift  SILT  no clonus      --Anticoagulation:    =====================  PAST MEDICAL HISTORY   Brain aneurysm  Sickle cell anemia  Sickle cell disease    PAST SURGICAL HISTORY   No significant past surgical history        MEDICATIONS:  Antibiotics:    Neuro:    Other:      SOCIAL HISTORY:   Occupation:   Marital Status:     FAMILY HISTORY:  No pertinent family history in first degree relatives      ROS: Negative except per HPI    LABS:

## 2020-02-12 NOTE — ED ADULT NURSE NOTE - NSIMPLEMENTINTERV_GEN_ALL_ED
Implemented All Fall with Harm Risk Interventions:  [place] to call system. Call bell, personal items and telephone within reach. Instruct patient to call for assistance. Room bathroom lighting operational. Non-slip footwear when patient is off stretcher. Physically safe environment: no spills, clutter or unnecessary equipment. Stretcher in lowest position, wheels locked, appropriate side rails in place. Provide visual cue, wrist band, yellow gown, etc. Monitor gait and stability. Monitor for mental status changes and reorient to person, place, and time. Review medications for side effects contributing to fall risk. Reinforce activity limits and safety measures with patient and family. Provide visual clues: red socks.

## 2020-02-12 NOTE — ED PROVIDER NOTE - PATIENT PORTAL LINK FT
You can access the FollowMyHealth Patient Portal offered by [facility] by registering at the following website: [email or web address]. By joining [facility]’s FollowMyHealth portal, you will also be able to view your health information using other applications (apps) compatible with our system.

## 2020-02-12 NOTE — CONSULT NOTE ADULT - ASSESSMENT
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

## 2020-02-12 NOTE — ED PROVIDER NOTE - CLINICAL SUMMARY MEDICAL DECISION MAKING FREE TEXT BOX
18yo M w/ sickle cell, recent craniotomy p/w vomiting since the surgery, will send labs, neurosurg consult, reassess. 20yo M w/ sickle cell, recent craniotomy p/w vomiting since the surgery, will send labs, neurosurg consult, reassess.    Attending MD Lujan:  20yo Male pmhx sickle cell, recent prolonged hospital stay for pain crisis and acute chest complicated by intracranial aneurism and craniotomy on 1/29/20,  Presents with complaint of several episodes of vomiting.  Called neurosurgery office and told to come to ED for evaluation.  On exam patient with baseline left eye ptosis and remainder of exam normal.  Neurosurgery consulted and dispo plan per NS.

## 2020-02-12 NOTE — ED PROVIDER NOTE - NSFOLLOWUPCLINICS_GEN_ALL_ED_FT
[facility]  Neurology Epilepsy  [address] [place] [address]  Phone: [phone]  Fax: [phone]  Follow Up Time:

## 2020-02-12 NOTE — ED ADULT TRIAGE NOTE - CHIEF COMPLAINT QUOTE
HA, nausea, vomiting x the passed few days with weakness and lethargy s/p discharge from hospital 2 days ago after having craniotomy for brain aneurysm repair on January 29, 2019

## 2020-02-13 ENCOUNTER — RESULT REVIEW (OUTPATIENT)
Age: 20
End: 2020-02-13

## 2020-02-13 ENCOUNTER — APPOINTMENT (OUTPATIENT)
Dept: HEMATOLOGY ONCOLOGY | Facility: CLINIC | Age: 20
End: 2020-02-13
Payer: SELF-PAY

## 2020-02-13 VITALS
DIASTOLIC BLOOD PRESSURE: 71 MMHG | SYSTOLIC BLOOD PRESSURE: 110 MMHG | OXYGEN SATURATION: 100 % | RESPIRATION RATE: 16 BRPM | WEIGHT: 112.41 LBS | TEMPERATURE: 97.8 F | HEART RATE: 90 BPM

## 2020-02-13 LAB
ANISOCYTOSIS BLD QL: SLIGHT — SIGNIFICANT CHANGE UP
BASOPHILS # BLD AUTO: 0.1 K/UL — SIGNIFICANT CHANGE UP (ref 0–0.2)
BASOPHILS NFR BLD AUTO: 2 % — SIGNIFICANT CHANGE UP (ref 0–2)
EOSINOPHIL # BLD AUTO: 0 K/UL — SIGNIFICANT CHANGE UP (ref 0–0.5)
HCT VFR BLD CALC: 30.5 % — LOW (ref 39–50)
HGB BLD-MCNC: 10.3 G/DL — LOW (ref 13–17)
HYPOCHROMIA BLD QL: SLIGHT — SIGNIFICANT CHANGE UP
LYMPHOCYTES # BLD AUTO: 4.1 K/UL — HIGH (ref 1–3.3)
LYMPHOCYTES # BLD AUTO: 44 % — SIGNIFICANT CHANGE UP (ref 13–44)
MACROCYTES BLD QL: SLIGHT — SIGNIFICANT CHANGE UP
MCHC RBC-ENTMCNC: 30 PG — SIGNIFICANT CHANGE UP (ref 27–34)
MCHC RBC-ENTMCNC: 33.7 G/DL — SIGNIFICANT CHANGE UP (ref 32–36)
MCV RBC AUTO: 89.1 FL — SIGNIFICANT CHANGE UP (ref 80–100)
MICROCYTES BLD QL: SLIGHT — SIGNIFICANT CHANGE UP
MONOCYTES # BLD AUTO: 0.6 K/UL — SIGNIFICANT CHANGE UP (ref 0–0.9)
MONOCYTES NFR BLD AUTO: 8 % — SIGNIFICANT CHANGE UP (ref 2–14)
NEUTROPHILS # BLD AUTO: 4.2 K/UL — SIGNIFICANT CHANGE UP (ref 1.8–7.4)
NEUTROPHILS NFR BLD AUTO: 46 % — SIGNIFICANT CHANGE UP (ref 43–77)
PLAT MORPH BLD: NORMAL — SIGNIFICANT CHANGE UP
PLATELET # BLD AUTO: 539 K/UL — HIGH (ref 150–400)
POIKILOCYTOSIS BLD QL AUTO: SLIGHT — SIGNIFICANT CHANGE UP
POLYCHROMASIA BLD QL SMEAR: SLIGHT — SIGNIFICANT CHANGE UP
RBC # BLD: 3.42 M/UL — LOW (ref 4.2–5.8)
RBC # FLD: 16.3 % — HIGH (ref 10.3–14.5)
RBC BLD AUTO: ABNORMAL
WBC # BLD: 9 K/UL — SIGNIFICANT CHANGE UP (ref 3.8–10.5)
WBC # FLD AUTO: 9 K/UL — SIGNIFICANT CHANGE UP (ref 3.8–10.5)

## 2020-02-13 PROCEDURE — 99499A: CUSTOM | Mod: NC

## 2020-02-21 NOTE — PATIENT PROFILE ADULT - ...
Quality 111:Pneumonia Vaccination Status For Older Adults: Pneumococcal Vaccination Administered
Quality 110: Preventive Care And Screening: Influenza Immunization: Influenza Immunization Administered during Influenza season
Detail Level: Detailed
Quality 474: Zoster Vaccination Status: Shingrix Vaccination Administered or Previously Received
14-Jan-2020 22:40:33

## 2020-02-24 ENCOUNTER — APPOINTMENT (OUTPATIENT)
Dept: NEUROSURGERY | Facility: CLINIC | Age: 20
End: 2020-02-24
Payer: MEDICAID

## 2020-02-24 VITALS
HEIGHT: 71 IN | DIASTOLIC BLOOD PRESSURE: 79 MMHG | TEMPERATURE: 99.1 F | RESPIRATION RATE: 17 BRPM | SYSTOLIC BLOOD PRESSURE: 124 MMHG | BODY MASS INDEX: 15.68 KG/M2 | HEART RATE: 100 BPM | OXYGEN SATURATION: 99 % | WEIGHT: 112 LBS

## 2020-02-24 PROCEDURE — 99024 POSTOP FOLLOW-UP VISIT: CPT

## 2020-02-24 RX ORDER — CARBAMAZEPINE 200 MG/1
200 CAPSULE, EXTENDED RELEASE ORAL
Refills: 0 | Status: ACTIVE | COMMUNITY

## 2020-02-24 RX ORDER — LEVETIRACETAM 750 MG/1
750 TABLET, FILM COATED ORAL
Refills: 0 | Status: ACTIVE | COMMUNITY

## 2020-02-24 RX ORDER — CARBAMAZEPINE 200 MG/1
200 CAPSULE, EXTENDED RELEASE ORAL
Qty: 60 | Refills: 11 | Status: ACTIVE | COMMUNITY
Start: 2020-02-24 | End: 1900-01-01

## 2020-02-24 NOTE — REASON FOR VISIT
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

## 2020-02-24 NOTE — ASSESSMENT
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 \par Follow up in our office July 2020 \par

## 2020-02-24 NOTE — HISTORY OF PRESENT ILLNESS
[FreeTextEntry1] : Hospital Course: \par Discharge Date	10-Feb-2020 \par Admission Date	27-Jan-2020 13:56 \par Reason for Admission	1/29/20 s/p left craniotomy and clipping of left ICA \par terminus aneurysm and left superior cerebellar artery aneurysm. 2/2/20 post op seizure, no seizures \par noted on EEG monitoring. 2/6/20 repeat angio, stable clipping of aneurysms. \par 2/7/20 s/p plasma exchange via shiley placement, removed on 2/10/20. \par \par Hospital Course	 \par 19M pmhx SSD several known unruptured aneurysms largest is L ICA s/p DSA \par 1/14/2020 presents for gradual worsening headache since yesterday, neurointact \par (27 Jan 2020 14:00) \par \par Patient admitted and on 1/29/20 s/p left craniotomy and clipping of left ICA \par terminus aneurysm and left PCA aneurysm. Post op cared for in NSCU \par and transferred to floor. He had post op fevers with negative workup and \par completed empiric antibiotics per ID consult. Hematology followed in consult \par given his history of sickle cell. On 2/2/20 he had mental status change and \par taken to CT for CTH where he was noted to have generalized tonic clonic seizure \par \par and taken to NSCU. EEG showed possible left frontal seizure focus. He was \par started on multiple AEDs and adjustments made per neurology recommendations \par given he lives in [place] for medication availability. He returned to floor and \par did well. 2/2/20 post op seizure, no seizures noted on EEG monitoring. 2/6/20 \par repeat angio, stable clipping of aneurysms. 2/7/20 s/p plasma exchange via \par shiley placement, remove on 2/10/20. Staples removed on 2/10/20. PT evaluated \par him and recommended outpatient PT. Follow up appointment for Dr aden and \par neurology clinic made, sister will call for hematology appointment with  \inder Kelley. \par On the day of discharge he was medically and neurologically stable for \par discharge to home. \par \par Since he has been home he feels well, He denies any recent seizure on current regimen. His left eye remains closed. The surgical incision is healing well without any signs or symptoms of infection. He plans on returning to [facility] in March 2020.

## 2020-02-24 NOTE — REVIEW OF SYSTEMS
[As Noted in HPI] : as noted in HPI [Negative] : Heme/Lymph [FreeTextEntry3] : left eye will not open on its own

## 2020-02-24 NOTE — PHYSICAL EXAM
[General Appearance - Alert] : alert [General Appearance - In No Acute Distress] : in no acute distress [Clean] : clean [Dry] : dry [Healing Well] : healing well [No Drainage] : without drainage [Cranial Nerves Trigeminal (V)] : facial sensation intact symmetrically [Cranial Nerves Facial (VII)] : face symmetrical [Cranial Nerves Glossopharyngeal (IX)] : tongue and palate midline [Cranial Nerves Vestibulocochlear (VIII)] : hearing was intact bilaterally [Cranial Nerves Accessory (XI - Cranial And Spinal)] : head turning and shoulder shrug symmetric [Cranial Nerves Hypoglossal (XII)] : there was no tongue deviation with protrusion [Motor Strength] : muscle strength was normal in all four extremities [Involuntary Movements] : no involuntary movements were seen [] : no respiratory distress [Exaggerated Use Of Accessory Muscles For Inspiration] : no accessory muscle use [Abnormal Walk] : normal gait [Warm] : not warm [Erythema] : not erythematous [FreeTextEntry1] : left crani [FreeTextEntry5] : left eye ptosis

## 2020-03-02 ENCOUNTER — RESULT REVIEW (OUTPATIENT)
Age: 20
End: 2020-03-02

## 2020-03-02 ENCOUNTER — OUTPATIENT (OUTPATIENT)
Dept: OUTPATIENT SERVICES | Facility: HOSPITAL | Age: 20
LOS: 1 days | End: 2020-03-02
Payer: MEDICAID

## 2020-03-02 ENCOUNTER — APPOINTMENT (OUTPATIENT)
Dept: HEMATOLOGY ONCOLOGY | Facility: CLINIC | Age: 20
End: 2020-03-02

## 2020-03-02 DIAGNOSIS — D57.01 HB-SS DISEASE WITH ACUTE CHEST SYNDROME: ICD-10-CM

## 2020-03-02 LAB
ANISOCYTOSIS BLD QL: SLIGHT — SIGNIFICANT CHANGE UP
BASOPHILS # BLD AUTO: 0.1 K/UL — SIGNIFICANT CHANGE UP (ref 0–0.2)
BLD GP AB SCN SERPL QL: NEGATIVE — SIGNIFICANT CHANGE UP
DACRYOCYTES BLD QL SMEAR: SLIGHT — SIGNIFICANT CHANGE UP
ELLIPTOCYTES BLD QL SMEAR: SLIGHT — SIGNIFICANT CHANGE UP
EOSINOPHIL # BLD AUTO: 0.1 K/UL — SIGNIFICANT CHANGE UP (ref 0–0.5)
EOSINOPHIL NFR BLD AUTO: 2 % — SIGNIFICANT CHANGE UP (ref 0–6)
HCT VFR BLD CALC: 33.1 % — LOW (ref 39–50)
HGB BLD-MCNC: 11 G/DL — LOW (ref 13–17)
HYPOCHROMIA BLD QL: SLIGHT — SIGNIFICANT CHANGE UP
LYMPHOCYTES # BLD AUTO: 3.9 K/UL — HIGH (ref 1–3.3)
LYMPHOCYTES # BLD AUTO: 47 % — HIGH (ref 13–44)
MACROCYTES BLD QL: SLIGHT — SIGNIFICANT CHANGE UP
MCHC RBC-ENTMCNC: 31.6 PG — SIGNIFICANT CHANGE UP (ref 27–34)
MCHC RBC-ENTMCNC: 33.1 G/DL — SIGNIFICANT CHANGE UP (ref 32–36)
MCV RBC AUTO: 95.3 FL — SIGNIFICANT CHANGE UP (ref 80–100)
MICROCYTES BLD QL: SLIGHT — SIGNIFICANT CHANGE UP
MONOCYTES # BLD AUTO: 0.8 K/UL — SIGNIFICANT CHANGE UP (ref 0–0.9)
MONOCYTES NFR BLD AUTO: 9 % — SIGNIFICANT CHANGE UP (ref 2–14)
NEUTROPHILS # BLD AUTO: 2.4 K/UL — SIGNIFICANT CHANGE UP (ref 1.8–7.4)
NEUTROPHILS NFR BLD AUTO: 42 % — LOW (ref 43–77)
OVALOCYTES BLD QL SMEAR: SLIGHT — SIGNIFICANT CHANGE UP
PLAT MORPH BLD: NORMAL — SIGNIFICANT CHANGE UP
PLATELET # BLD AUTO: 267 K/UL — SIGNIFICANT CHANGE UP (ref 150–400)
POIKILOCYTOSIS BLD QL AUTO: SLIGHT — SIGNIFICANT CHANGE UP
POLYCHROMASIA BLD QL SMEAR: SLIGHT — SIGNIFICANT CHANGE UP
RBC # BLD: 3.47 M/UL — LOW (ref 4.2–5.8)
RBC # FLD: 19.2 % — HIGH (ref 10.3–14.5)
RBC BLD AUTO: ABNORMAL
RH IG SCN BLD-IMP: POSITIVE — SIGNIFICANT CHANGE UP
SICKLE CELLS BLD QL SMEAR: SLIGHT — SIGNIFICANT CHANGE UP
TARGETS BLD QL SMEAR: SLIGHT — SIGNIFICANT CHANGE UP
WBC # BLD: 6.1 K/UL — SIGNIFICANT CHANGE UP (ref 3.8–10.5)
WBC # FLD AUTO: 6.1 K/UL — SIGNIFICANT CHANGE UP (ref 3.8–10.5)

## 2020-03-02 PROCEDURE — 86850 RBC ANTIBODY SCREEN: CPT

## 2020-03-02 PROCEDURE — 86901 BLOOD TYPING SEROLOGIC RH(D): CPT

## 2020-03-02 PROCEDURE — 86900 BLOOD TYPING SEROLOGIC ABO: CPT

## 2020-03-02 RX ORDER — HYDROXYUREA 500 MG/1
500 CAPSULE ORAL
Qty: 60 | Refills: 4 | Status: ACTIVE | COMMUNITY
Start: 2020-01-09 | End: 1900-01-01

## 2020-04-03 PROBLEM — D57.1 SICKLE-CELL DISEASE WITHOUT CRISIS: Chronic | Status: ACTIVE | Noted: 2019-12-15

## 2020-04-03 RX ORDER — OXYCODONE HYDROCHLORIDE 5 MG/1
0 TABLET ORAL
Qty: 0 | Refills: 0 | DISCHARGE

## 2020-04-03 RX ORDER — OXYCODONE HYDROCHLORIDE 5 MG/1
0.5 TABLET ORAL
Qty: 0 | Refills: 0 | DISCHARGE

## 2020-04-03 RX ORDER — ATORVASTATIN CALCIUM 80 MG/1
1 TABLET, FILM COATED ORAL
Qty: 0 | Refills: 0 | DISCHARGE

## 2020-04-03 RX ORDER — HYDROXYUREA 500 MG/1
1 CAPSULE ORAL
Qty: 0 | Refills: 0 | DISCHARGE

## 2020-04-03 RX ORDER — ASPIRIN/CALCIUM CARB/MAGNESIUM 324 MG
1 TABLET ORAL
Qty: 0 | Refills: 0 | DISCHARGE

## 2020-04-03 RX ORDER — FOLIC ACID 0.8 MG
1 TABLET ORAL
Qty: 0 | Refills: 0 | DISCHARGE

## 2020-04-10 LAB
ALBUMIN SERPL ELPH-MCNC: 4.8 G/DL
ALP BLD-CCNC: 69 U/L
ALT SERPL-CCNC: 15 U/L
ANION GAP SERPL CALC-SCNC: 13 MMOL/L
AST SERPL-CCNC: 19 U/L
BILIRUB SERPL-MCNC: 0.6 MG/DL
BUN SERPL-MCNC: 9 MG/DL
CALCIUM SERPL-MCNC: 10.5 MG/DL
CHLORIDE SERPL-SCNC: 97 MMOL/L
CO2 SERPL-SCNC: 26 MMOL/L
CREAT SERPL-MCNC: 0.48 MG/DL
GLUCOSE SERPL-MCNC: 109 MG/DL
POTASSIUM SERPL-SCNC: 4.8 MMOL/L
PROT SERPL-MCNC: 7.6 G/DL
SODIUM SERPL-SCNC: 136 MMOL/L

## 2020-08-16 NOTE — ED PROVIDER NOTE - EYES NEGATIVE STATEMENT, MLM
CK Total 1082. Pt's  Nurse notified to follw-up with MD. Writer paged Zack Galaviz   no discharge, no irritation, no pain, no redness, and no visual changes.

## 2020-08-19 PROBLEM — D57.1 SICKLE-CELL DISEASE WITHOUT CRISIS: Chronic | Status: ACTIVE | Noted: 2020-01-14

## 2020-08-19 PROBLEM — I67.1 CEREBRAL ANEURYSM, NONRUPTURED: Chronic | Status: ACTIVE | Noted: 2020-01-27

## 2020-08-19 PROBLEM — D57.1 SICKLE-CELL DISEASE WITHOUT CRISIS: Chronic | Status: ACTIVE | Noted: 2019-12-19

## 2020-08-20 ENCOUNTER — OUTPATIENT (OUTPATIENT)
Dept: OUTPATIENT SERVICES | Facility: HOSPITAL | Age: 20
LOS: 1 days | End: 2020-08-20
Payer: SELF-PAY

## 2020-08-20 ENCOUNTER — TRANSCRIPTION ENCOUNTER (OUTPATIENT)
Age: 20
End: 2020-08-20

## 2020-08-20 ENCOUNTER — APPOINTMENT (OUTPATIENT)
Dept: NEUROLOGY | Facility: HOSPITAL | Age: 20
End: 2020-08-20

## 2020-08-20 VITALS
HEIGHT: 71 IN | DIASTOLIC BLOOD PRESSURE: 65 MMHG | BODY MASS INDEX: 17.78 KG/M2 | HEART RATE: 71 BPM | WEIGHT: 127 LBS | SYSTOLIC BLOOD PRESSURE: 111 MMHG | TEMPERATURE: 99.1 F

## 2020-08-20 DIAGNOSIS — R56.9 UNSPECIFIED CONVULSIONS: ICD-10-CM

## 2020-08-20 DIAGNOSIS — I67.1 CEREBRAL ANEURYSM, NONRUPTURED: ICD-10-CM

## 2020-08-20 DIAGNOSIS — D57.01 HB-SS DISEASE WITH ACUTE CHEST SYNDROME: ICD-10-CM

## 2020-08-20 PROCEDURE — G0463: CPT

## 2020-08-20 RX ORDER — ATORVASTATIN CALCIUM 20 MG/1
20 TABLET, FILM COATED ORAL DAILY
Qty: 30 | Refills: 4 | Status: ACTIVE | COMMUNITY
Start: 2020-01-09 | End: 1900-01-01

## 2020-08-20 RX ORDER — LEVETIRACETAM 750 MG/1
750 TABLET, FILM COATED ORAL TWICE DAILY
Qty: 60 | Refills: 6 | Status: ACTIVE | COMMUNITY
Start: 2020-02-24 | End: 1900-01-01

## 2020-08-27 ENCOUNTER — APPOINTMENT (OUTPATIENT)
Dept: NEUROSURGERY | Facility: CLINIC | Age: 20
End: 2020-08-27

## 2020-08-27 VITALS
DIASTOLIC BLOOD PRESSURE: 60 MMHG | HEIGHT: 71 IN | WEIGHT: 128 LBS | HEART RATE: 82 BPM | OXYGEN SATURATION: 97 % | RESPIRATION RATE: 18 BRPM | SYSTOLIC BLOOD PRESSURE: 101 MMHG | TEMPERATURE: 98 F | BODY MASS INDEX: 17.92 KG/M2

## 2020-08-27 DIAGNOSIS — D57.01 HB-SS DISEASE WITH ACUTE CHEST SYNDROME: ICD-10-CM

## 2020-08-27 DIAGNOSIS — I67.1 CEREBRAL ANEURYSM, NONRUPTURED: ICD-10-CM

## 2020-08-27 PROBLEM — R56.9 SEIZURE: Status: ACTIVE | Noted: 2020-02-24

## 2020-08-27 NOTE — PHYSICAL EXAM
[General Appearance - In No Acute Distress] : in no acute distress [General Appearance - Well-Appearing] : healthy appearing [General Appearance - Alert] : alert [] : normal voice and communication [Oriented To Time, Place, And Person] : oriented to person, place, and time [Impaired Insight] : insight and judgment were intact [Mood] : the mood was normal [Memory Recent] : recent memory was not impaired [Person] : oriented to person [Memory Remote] : remote memory was not impaired [Place] : oriented to place [Time] : oriented to time [Concentration Intact] : normal concentrating ability [Visual Intact] : visual attention was ~T not ~L decreased [Naming Objects] : no difficulty naming common objects [Repeating Phrases] : no difficulty repeating a phrase [Fluency] : fluency intact [Comprehension] : comprehension intact [Past History] : adequate knowledge of personal past history [Cranial Nerves Optic (II)] : visual acuity intact bilaterally,  visual fields full to confrontation, pupils equal round and reactive to light [Cranial Nerves Trigeminal (V)] : facial sensation intact symmetrically [Cranial Nerves Facial (VII)] : face symmetrical [Cranial Nerves Glossopharyngeal (IX)] : tongue and palate midline [Cranial Nerves Vestibulocochlear (VIII)] : hearing was intact bilaterally [Cranial Nerves Hypoglossal (XII)] : there was no tongue deviation with protrusion [Motor Tone] : muscle tone was normal in all four extremities [Cranial Nerves Accessory (XI - Cranial And Spinal)] : head turning and shoulder shrug symmetric [Motor Strength] : muscle strength was normal in all four extremities [No Muscle Atrophy] : normal bulk in all four extremities [Sensation Tactile Decrease] : light touch was intact [Abnormal Walk] : normal gait [Balance] : balance was intact [2+] : Ankle jerk right 2+ [___] : [unfilled] ~Ubeats present on the left [Sclera] : the sclera and conjunctiva were normal [Extraocular Movements] : extraocular movements were intact [Full Visual Field] : full visual field [Past-pointing] : there was no past-pointing [Romberg's Sign] : Romberg's sign was negtive [Plantar Reflex Right Only] : normal on the right [Tremor] : no tremor present [Plantar Reflex Left Only] : normal on the left [FreeTextEntry1] : L pupil 4 mm, R pupil 2 mm

## 2020-08-27 NOTE — DISCUSSION/SUMMARY
[FreeTextEntry1] : Patient is a 18yo right handed gentleman who presents to the clinic on the afternoon of 8/20/20 regarding questions of continuing his antiepileptic medications. 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 EEG performed inpatient demonstrated secondarily generalized left frontal onset seizures. Will continue Keppra until patient goes 1 year seizure free.\par \par \par Plan:\par \par Continue and Refill Keppra 750mg bid po\par F/U with establish Hematologist in [place], will provide referral here if needed\par Follow up in 6 months\par

## 2020-08-27 NOTE — ASSESSMENT
[FreeTextEntry1] : Plan:\par \par \par Continue and Refill Keppra 750mg bid po\par F/U with establish Hematologist in [place], will provide referral here if needed\par Follow up in 6 months

## 2020-08-27 NOTE — HISTORY OF PRESENT ILLNESS
[FreeTextEntry1] : Patient is a 18yo right handed gentleman who presents to the clinic on the afternoon of 8/20/20 regarding questions of continuing his antiepileptic medications. 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 EEG performed inpatient demonstrated secondarily generalized left frontal onset seizures. Patient was placed on Keppra 1500mg bid which was quickly titrated down to 750mg bid with the addition of Carbamazepine 750mg bid. Notes in 5/20 indicated that patient was developing a minor rash whilst taking both medications and Carbamazepine was discontinued. Patient has not had a seizure since his inpatient stay in February. Patient follows with a Hematologist in [facility] who he saw at the beginning of the month and made no further changes to his medication regiment. Patient acknowledges he has been compliant with Keppra, Aspirin, Hydroxyurea, but has not been taking his Atorvastatin. Patient denies any acute complaints. Denies vision changes, confusion, headaches, focal weakness, numbness, tingling, nausea, vomiting, chest pain, or speech disturbances.

## 2020-09-02 NOTE — PHYSICAL THERAPY INITIAL EVALUATION ADULT - LEVEL OF INDEPENDENCE: SUPINE/SIT, REHAB EVAL
moderate assist (50% patients effort) Dupixent Pregnancy And Lactation Text: This medication likely crosses the placenta but the risk for the fetus is uncertain. This medication is excreted in breast milk.

## 2020-12-31 PROBLEM — D57.01 HB-SS DISEASE WITH ACUTE CHEST SYNDROME: Status: ACTIVE | Noted: 2020-01-09

## 2021-04-09 NOTE — ED PROVIDER NOTE - CLINICAL SUMMARY MEDICAL DECISION MAKING FREE TEXT BOX
31yo Male, single, domiciled with mother, unemployed, w/ PMHx of Bipolar disorder vs. antisocial/borderline personality disorder, multiple prior hospitalizations (most recently 6/30-7/9/2020 for SA by swallowing a razor), follows with [facility] and receives Abilify injectable 400mg (last received 03/26/21), history of SI/SA/self harm, anxiety/depression, polysubstance abuse, controlled asthma presents with depressed mood and SI.  Readmitted after transfer to [facility] one week saul when he swallowed a screw because he felt nursing was not listening to his complaints of "opiate withdrawal"       Patient states he is upset that he had to come back to [facility] but feels he is still anxious and depressed.      CLINICAL INFORMATION FROM INITIAL [facility] ADMISSION:   Patient states that he has been feeling more depressed over the past few weeks, with SI.   Patient states he has had thoughts of overdosing on heroin because he feels hopeless, stating that he is worried he will never get better and will never accomplish his goals. Patient does not use heroin, but has been thinking about it because he knows it is something that can kill him via overdose. Patient endorses worsening mood, anhedonia, hopelessness, worthlessness. Patient states that he uses cocaine and marijuana daily, last used about 12 hours prior to presentation.    Patient is currently denying manic symptoms, stating he has not been having any elevated mood or insomnia. Patient denying hallucinations, delusions, paranoia upon writer's evaluation. Per ED Hospitalist, "Patient states that he has been depressed for 25 years, recently has been having more auditory hallucinations. States that the voices/beliefs told him Anish Adler would be waiting for him in [place]. Travelled to [place], found that was not the case. Spent a week in [place] drinking, using marijuana and cocaine." Patient does endorse using marijuana and cocaine and going to [place], but states that he returned 03/18 (verified with collateral from mom) and that he was visiting friends there. Patient is denying auditory/visual hallucinations.     Collateral obtained from mother, Sherry Salcido [phone]: Patient lives with mother, but its not the ideal situation. They have been trying to find him housing. His  (Patricia, [phone]) has been trying to get him housing, and he receives injections every month. He has been seeming more depressed, withdrawn, hasn't been leaving the house or doing much. He went to [place], mom says he got back on 03/18.     Collateral obtained from  Patricia, [phone]: Patient got his injectable on Friday 03/26/21 in his home, Abilify 400mg every 4 weeks. He has been more withdrawn the past week, seemed more depressed and not engaging as much with the team. Patient follows with Dr. Zayda Tinajero, [phone], [facility], Intensive mobile treatment. 19 year old male patient with past medical history of Sickle Cell Disease and brain aneurysm present to the ED with a headache since last night. Patient shows no neurological deficit, no neck pain or stiffness, no nausea or vomiting, and can ambulate independently. CTA was suggested but the patient wanted to speak to Neurosurgery first because the patient recently had extensive radiologic studies done. -Cooper CASILLAS 19 year old male patient with past medical history of Sickle Cell Disease and brain aneurysm present to the ED with a sudden onset headache since last night. Patient shows no neurological deficit, no neck pain or stiffness, no nausea or vomiting, and can ambulate independently. CTA was suggested but the patient wanted to speak to Neurosurgery first because the patient recently had extensive radiologic studies done. -Cooper CASILLAS

## 2021-08-03 NOTE — PROGRESS NOTE ADULT - ASSESSMENT
Received call from lab regarding + RPR titer 1:128   This is elevated since last titer in June 2020 was 1:8  Spoke with Dr Jodi Avila- reinfection. IM injections of 2.4 million units Bicillin x 3    Call placed to pt to discuss. Pt was out of [facility] visiting family. Partner \"a few weeks ago\"   Discussed + test result. No symptoms. Reviewed Dr Harmony Linn orders     Rn and pt reviewed additional transport options and assistance. It is determined he will use [facility] as last report for help. Aware of policy and  time. [facility] notified. All appts are related to medical treatment and are necessary for compliance      Apt scheduled for 8.5.21, 8.12.21, 8.19.21 all at 10 AM at [address] [facility] office    Denies any needs at this time. Denies any issues that need Doctor attention.  Will call with concerns 19M visiting from [place] presented to [facility] 12/14/19 with bone pain, admitted for treatement of VOC, 12/16 developed ACS, was intubated, underwent exchange transfusion (again 12/17 and 12/18); 12/17 reintubated and found to have bilat PTX, bilat chest tubes placed; transferred to [facility] 12/18 on levophed, fentanyl, propofol gtt.  At [facility] had exchange transfusions x 3, last on 12/21. Extubated 12/22, chest tubes removed 12/23.  completed 7 days cefepime/vanco, but has persistent fevers and zosyn was resumed 12/22.  Due to persistent encephalopathy pain was treated with fentanyl patch with dilaudid IV push for breakthrough pain since pt could not use PCA. 12/24 CThead demonstrated right superior cerebellar CVA.  Pt received 1 U PRBC.  He was stable for transfer to medical floor 12/26.

## 2022-02-14 NOTE — DISCHARGE NOTE PROVIDER - CARE PROVIDER_API CALL
Is Retinoid Dermatitis Present?: Yes - Normal Treatment Next Month's Dosage: 40mg BID Myalgia Monitoring: I explained this is common when taking isotretinoin. If this worsens they will contact us. Kilograms Preamble Statement (Weight Entered In Details Tab): Reported Weight in kilograms: Any Hypertriglyceridemia?: No Patient Weight (Optional But Required For Cumulative Dose-Numbers And Decimals Only): 190 Trae Walsh)  Neurological Surgery  [address] [place] [address]  Phone: [phone]  Fax: [phone]  Follow Up Time:     Denia Kelley)  Hematology; Internal Medicine; Medical Oncology  [address] [place] [address]  Phone: [phone]  Fax: [phone]  Follow Up Time: Hypertriglyceridemia Treatment: I explained this is common when taking isotretinoin. If this worsens they will contact us. They may try OTC ibuprofen. Nosebleeds Normal Treatment: I explained this is common when taking isotretinoin. I recommended saline mist in each nostril multiple times a day. If this worsens they will contact us. Months Of Therapy Completed: 3 Female Completion Statement: After discussing her treatment course we decided to discontinue isotretinoin therapy at this time. I explained that she would need to continue her birth control methods for at least one month after the last dosage. She should also get a pregnancy test one month after the last dose. She shouldn't donate blood for one month after the last dose. She should call with any new symptoms of depression. Lower Range (In Mg/Kg): 120 Weight Units: pounds Any Headache: Yes - Monitoring Upper Range (In Mg/Kg): 150 Cheilitis Normal Treatment: I recommended application of Vaseline or Aquaphor numerous times a day (as often as every hour) and before going to bed. Xerosis Aggressive Treatment: I recommended application of Cetaphil or CeraVe numerous times a day and before going to bed to all dry areas. I also prescribed a topical steroid for twice daily use. Hypercholesterolemia Monitoring: I explained this is common when taking isotretinoin. We will monitor closely. Dosing Month 1 (Required For Cumulative Dosing): 40mg Daily Counseling Text: I reviewed the side effect in detail. Patient should get monthly blood tests, not donate blood, not drive at night if vision affected, and not share medication. Target Cumulative Dosage (In Mg/Kg): 135 Male Completion Statement: After discussing his treatment course we decided to discontinue isotretinoin therapy at this time. He shouldn't donate blood for one month after the last dose. He should call with any new symptoms of depression. Xerosis Aggressive Treatment: I recommended application of Cetaphil or CeraVe numerous times a day going to bed to all dry areas. I also prescribed a topical steroid for twice daily use. Headache Monitoring: I recommended monitoring the headaches for now. There is no evidence of increased intracranial pressure. They were instructed to call if the headaches are worsening. Detail Level: Zone Retinoid Dermatitis Normal Treatment: I recommended more frequent application of [place], Cetaphil or CeraVe to the areas of dermatitis. Female Pregnancy Counseling Text: Female patients should also be on two forms of birth control while taking this medication and for one month after their last dose. Show Text Field For Brand Names Of Contraception?: Yes Xerosis Normal Treatment: I recommended application of Cetaphil or CeraVe numerous times a day and before going to bed to all dry areas. Retinoid Dermatitis Aggressive Treatment: I recommended more frequent application of Cetaphil or CeraVe to the areas of dermatitis. I also prescribed a topical steroid for twice daily use until the dermatitis resolves. Pounds Preamble Statement (Weight Entered In Details Tab): Reported Weight in pounds: Cheilitis Aggressive Treatment: I recommended application of Vaseline or Aquaphor numerous times a day (as often as every hour) and before going to bed. I also prescribed a topical steroid for twice daily use. Retinoid Dermatitis Normal Treatment: I recommended more frequent application of Cetaphil or CeraVe to the areas of dermatitis. What Is The Patient's Gender: Male Xerosis Normal Treatment: I recommended application of Cetaphil or CeraVe numerous times a day going to bed to all dry areas. Are Labs Available For Review?: No- Not Drawn Yet Use Therapeutic Ranged Or Therapeutic Target: please select Range or Target

## 2022-06-17 NOTE — CHART NOTE - NSCHARTNOTEFT_GEN_A_CORE
SLEEP EVALUATION NOTE     Neal Nolasco is a 63 year old male presenting for evaluation of: Sleep Problem and Consultation   .    Referring provider: No ref. provider found  PCP: Miah Mahan MD       HPI     New patient, with past medical history of HTN and KASH is reporting to sleep center for establishment of care.  Per patient, has been on a CPAP about 10 years.  Was using it religiously until about 2 years ago.  He has followed up with an ENT.  He started an antacid which has helped with the breathing.  He wakes up frequently in the night to take it off where he cannot fall asleep with it on.    Bedtime: 2100  Awake time: 0400  Sleep position: lateral  Sleep onset latency: 5-30 minutes  Sleeping aid medications: No  Awakenings: 5-6    [x]Snoring  [x]Witnessed apneas  []Dyspneic arousal  [x]Morning dry mouth  [x]Morning headache - sometimes     [x]Non-restorative sleep  []Excessive daytime sleepiness or tired during the day  [x]Naps    []Urge to move legs and/or uncomfortable tingling or burning in legs  []Cramping or jerking of the legs during sleep    []Cataplexy  []Sleep paralysis  []Hypnogognic or hypnopompnic hallucinations  []Sleep attacks    []Parasomnias:    STOP BANG SCREENING  STOP  S: Do you snore loudly?: Yes  P: Do you have or are you being treated for high blood pressure?: Yes  BANG  B: BMI more than 35 kg/m2?: No  A: Is age over 50 years old?: Yes  N: Neck circumfrence >16 inches (40 cm)?: Yes  G: Is gender Male?: Yes                 CURRENT CO-MORBIDITIES:    [x] HTN     [] AF/ARRHYTHMIA    [] HYPOTHYROIDISM   [x]OTHER: KASH  [] CAD     [x] OBESITY                  [] INSOMNIA  [] CHF     [] DM                         [] STROKE/TIA          [place] Sleepiness Scale:     0 = would never doze  1 = slight chance of dozing  2 = moderate chance of dozing  3 = high chance of dozing    Situation     Chance of Dozing       1. Sitting and reading   0    2. Watching TV    0    3. Sitting,  Nutrition Follow Up Note  Patient seen for: follow up assessment    Source: comprehensive chart review, patient    Diet: Regular PO diet    Interval events reviewed. 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 Hx of sickle cell anemia, s/p plasmapheresis via RIJ shiley placed 2/7. RIJ shiley removed today 2/10.    Patient reports: Feeling tired, eager to be discharged. Reports fair/good appetite and intake, consuming ~75% of meals. Denies N/V/D or constipation. Per initial RD assessment, pt drinks ensure at home sometimes to supplement needs. Offered ensure supplements while inpatient, pt declined need for oral nutrition supplements at this time. Encouraged continued good PO intake of nutrient dense meals and snacks. Pt made aware RD remains available.     PO intake: fair/good     Source for PO intake: patient     Enteral /Parenteral Nutrition: N/A      Daily   % Weight Change    Pertinent Medications: MEDICATIONS  (STANDING):  atorvastatin 80 milliGRAM(s) Oral at bedtime  bisacodyl 5 milliGRAM(s) Oral at bedtime  carBAMazepine ER Capsule 200 milliGRAM(s) Oral two times a day  enoxaparin Injectable 40 milliGRAM(s) SubCutaneous <User Schedule>  folic acid 1 milliGRAM(s) Oral daily  hydroxyurea 1000 milliGRAM(s) Oral daily  influenza   Vaccine 0.5 milliLiter(s) IntraMuscular once  levETIRAcetam  IVPB 1500 milliGRAM(s) IV Intermittent every 12 hours  LORazepam   Injectable 1 milliGRAM(s) IV Push once  pantoprazole    Tablet 40 milliGRAM(s) Oral before breakfast  polyethylene glycol 3350 17 Gram(s) Oral two times a day  senna 2 Tablet(s) Oral at bedtime  sodium chloride 0.9%. 1000 milliLiter(s) (50 mL/Hr) IV Continuous <Continuous>    MEDICATIONS  (PRN):  acetaminophen   Tablet .. 650 milliGRAM(s) Oral every 6 hours PRN Temp greater or equal to 38C (100.4F), Mild Pain (1 - 3)  metoclopramide Injectable 10 milliGRAM(s) IV Push every 6 hours PRN Nausea and/or Vomiting  sodium chloride 0.9% lock flush 10 milliLiter(s) IV Push every 1 hour PRN Pre/post blood products, medications, blood draw, and to maintain line patency    Pertinent Labs: 02-10 @ 06:18: Na 138, BUN 9, Cr 0.41<L>, <H>, K+ 4.2, Phos --, Mg --, Alk Phos 63, ALT/SGPT 18, AST/SGOT 16, HbA1c --      Skin per nursing documentation: no pressure injuries  Edema: +2 left face/periorbital    Estimated Needs: Based on dosing weight 125.6 pounds  [x] no change since previous assessment  Energy Needs: 5132-8092 kcal/d (30-35 kcal/kg)  Protein Needs: 80-91 g/d (1.4-1.6 g/kg)    Previous Nutrition Diagnosis: increased nutrient needs  Nutrition Diagnosis is: ongoing- being addressed through good oral intake of nutrient dense, protein rich meals and snacks      Recommend  1) Continue diet as ordered  2) Provide encouragement and assistance at meal times  3) Pt made aware RD remains available  4) Re-weigh patient when feasible please    Continue to monitor Nutritional intake, Tolerance to diet prescription, weights, labs, skin integrity    RD remains available upon request and will follow up per protocol    Kamilah Ruiz, MS, RD, CDN  Pager [phone] inactive in a public place       (e.g. a theatre or a meeting)  0    4. As a passenger in a car for an hour       without a break    0    5. Lying down to rest in the afternoon      when circumstances permit  0    6. Sitting and talking to someone  0    7. Sitting quietly after lunch without      Alcohol     0    8. In a car, while stopped for a few      Minutes in traffic    0      TOTAL 0      Past Medical History:     History reviewed. No pertinent past medical history.  History reviewed. No pertinent surgical history.  History reviewed. No pertinent family history.  Social History     Tobacco Use   • Smoking status: Never Smoker   • Smokeless tobacco: Never Used   Vaping Use   • Vaping Use: never used       ALLERGIES:  No Known Allergies  No current outpatient medications on file.     No current facility-administered medications for this visit.        Review of Systems:     Review of Systems   Constitutional: Negative for chills, fever and weight loss.   HENT: Negative for congestion, ear discharge and sore throat.    Eyes: Negative for blurred vision and photophobia.   Cardiovascular: Negative for chest pain and palpitations.   Respiratory: Positive for sleep disturbances due to breathing and snoring.    Skin: Negative for rash.   Musculoskeletal: Negative for back pain, joint swelling, muscle weakness and neck pain.   Gastrointestinal: Negative for constipation, diarrhea, nausea and vomiting.   Genitourinary: Negative for nocturia.   Neurological: Positive for excessive daytime sleepiness and headaches. Negative for numbness and paresthesias.        Physical Examination:     Vitals:    06/17/22 1414   BP: 132/82   Pulse: 70   SpO2: 90%   Weight: 99.8 kg (220 lb)   Height: 5' 11\" (1.803 m)     Body mass index is 30.68 kg/m².      Neck Circumference: 19 in  Nasal Passages: [x]Clear   [] Congested  Palate: Le [] I  [] II   [] III   [x] IV              Eyrtherna/edema [x]none []+1  []+2  []+3  []+4  Neck:   No enlarged lymph nodes  Gen: No acute distress. Pleasant and interactive.  Head:  Normocephalic and atraumatic.  Eyes: Conjunctiva and sclera normal.   Heart:  Normal rate and regular rhythm, no murmur.  Lungs:  Normal respiratory rate and effort, breath sounds equal.  Extremities:  No edema in lower extremities.   Skin: Warm and dry, no rash.  Musculoskeletal:  No joint swelling or tenderness  Psych:  Affect was appropriate to situation and mood was normal.  Neuro:  Alert and oriented, attention and recall preserved, cranial nerves intact, motor strength preserved, coordination intact, sensation preserved, reflexes symmetric, gait normal.     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 .  Patient is to undergo a Polysomnogram to confirm the severity of their KASH.  If apnea-hypopnea index is greater than 15 events, but less then 65 per hour, will proceed with referral to ENT specialist for consultation and sleep endoscopy for Inspire device.      Comments: In lab PSG to be ordered to see if qualify for Inspire  Discussed the nature of sleep apnea and the cardiovascular risk factors of not treating it.   Explained the treatment option of Inspire.  All questions and concerns were addressed and answered.  Miah Mahan MD received this note via [employer].    Patient Counseling:     • We reviewed the  nature of sleep apnea, the elevated cardiovascular risks and other health consequences associated with this condition and reviewed treatment options in detail.  • Pt. to undergo polysomnogram  • The patient was cautioned not to drive while sleepy.    KEILY Bose
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
## 2022-12-31 NOTE — ED PROVIDER NOTE - CPE EDP ENMT NORM
[facility] Progress Note      Patient Name: Watson Lisa  : 1955  MRN: [id number]  Primary Care Physician:  Checo Dunham MD  Date of admission: 2022    Subjective     Interval History:   F/u DEEP    Review of Systems:   Coughing some but denies dyspnea     Objective     Vitals:   Temp:  [97.5 °F (36.4 °C)-98 °F (36.7 °C)] 98 °F (36.7 °C)  Heart Rate:  [] 93  Resp:  [18] 18  BP: (146-182)/() 161/94    Intake/Output Summary (Last 24 hours) at 2022 0715  Last data filed at 2022 0322  Gross per 24 hour   Intake 600 ml   Output 300 ml   Net 300 ml       Physical Exam:    General Appearance: tremulous but no acute distress  Neck: supple, no JVD  Lungs: CTA bilat no rales  Heart: RRR, normal S1 and S2  Abdomen: soft, nontender, nondistended  : no palpable bladder  Extremities: 1+ LLE > RLE ankle edema, no cyanosis or clubbing    Scheduled Meds:     chlordiazePOXIDE, 25 mg, Oral, Q8H  folic acid, 1 mg, Oral, Daily  hydrocortisone-bacitracin-zinc oxide-nystatin, 1 application, Topical, TID  ipratropium-albuterol, 3 mL, Nebulization, 4x Daily - RT  metoprolol tartrate, 2.5 mg, Intravenous, Q6H  multivitamin, 1 tablet, Oral, Daily  mupirocin, 1 application, Topical, Q12H  spironolactone, 25 mg, Oral, Daily  thiamine, 100 mg, Oral, Daily      IV Meds:        Results Reviewed:   I have personally reviewed the results from the time of this admission to 2022 07:15 EST     Results from last 7 days   Lab Units 22  0607 22  1758 22  0539 22  1709   SODIUM mmol/L 137 138 141 140   POTASSIUM mmol/L 3.4* 3.3* 3.6 4.3   CHLORIDE mmol/L 97* 101 99 98   CO2 mmol/L 24.8 20.2* 22.0 20.0*   BUN mg/dL 22 32* 37* 37*   CREATININE mg/dL 0.91 1.17 1.55* 2.14*   CALCIUM mg/dL 8.2* 8.2* 8.1* 8.2*   BILIRUBIN mg/dL 0.7 0.6  --  1.6*   ALK PHOS U/L 77 74  --  77   ALT (SGPT) U/L 115* 113*  --  107*   AST (SGOT) U/L 292* 350*  --  385*   GLUCOSE  mg/dL 93 110* 92 89     Estimated Creatinine Clearance: 113.2 mL/min (by C-G formula based on SCr of 0.91 mg/dL).  Results from last 7 days   Lab Units 12/30/22  0607 12/29/22  1758 12/28/22  1709   MAGNESIUM mg/dL 1.4* 1.7 1.5*   PHOSPHORUS mg/dL 2.2* 2.4*  --          Results from last 7 days   Lab Units 12/30/22  0607 12/29/22  0539 12/28/22  1746   WBC 10*3/mm3 8.55 9.90 13.53*   HEMOGLOBIN g/dL 10.2* 9.9* 10.8*   PLATELETS 10*3/mm3 96* 75* 82*           Assessment / Plan     ASSESSMENT:  1. Non olig DEEP - due to rhabdo, resolved Cr 0.9 (peak 2.1).  UA with discrepant large blood but no RBCs due to myoglobinuria.  Chronic Left ankle edema but noted to have mild swelling RLE too so added aldactone 25mg on 12/30 (rather than loop diuretic due to hypokalemia)  2. Rhabdomyolysis - suspect was immobilized some period after fall; CK down to 5K, off IVF  3. Left patella fracture - ortho eval noted, planning non-op mgmt   4. HypoMg , hypokalemia - latter replaced, former not yet  5. HTN - BP labile, better this afternoon, added toprol XL in place of ARB but may need to resume that tomorrow if similar readings present  6. Hx ETOH abuse + e/o withdrawal - on CIWA protocol  7. Anemia, TCP - hgb 10.2, PLT up 96K    PLAN:  Replace K/Mg  Continue aldactone   D/W POP Addison MD  12/31/22  07:15 [facility]    [facility]  [phone]   normal...

## 2023-01-31 NOTE — ED PROVIDER NOTE - CROS ED ROS STATEMENT
Date of Procedure:  January 31, 2023    Procedure:  Transrectal ultrasound, ultrasoundGuidance, prostate needle biopsy    Surgeon:   MARIZA Donahue MD     Assisting Surgeon: None    Pre-Operative Diagnosis:  Elevated PSA    Post-Operative Diagnosis:  Elevated PSA    Anesthesia: General    Technical Procedures Used:  Ultrasound-guided biopsy    Description of the Findings of the Procedure:  86.1 mL gland    Complications: None    Estimated Blood Loss (EBL):  None      Specimens:  Prostate biopsy    Indications:  67-year-old with elevated PSA of 5.1.  He is scheduled for prostate needle biopsy.    Procedure in Detail:  After informed consent was obtained he was brought to the operating room.  He was given preoperative antibiotics.  He was placed in the left lateral decubitus position. On digital rectal exam the prostate was smooth symmetrical and anodular.  The ultrasound probe was placed in the rectum and dynamic images were obtained of the entire prostate.  There was homogeneous echotexture throughout.  The prostate volume was calculated to be 86.1 ml.  I then performed a standard prostate block by injecting 10 cc 1% lidocaine and the periprosthetic space.  A standard template prostate biopsy was then performed.  Twelve cores were obtained in total.  The biopsy specimens were fully submerged in formalin labeled with the patient's name and sent for pathological evaluation.  I completion of the procedure I removed the ultrasound probe.  Hemostasis appeared to be adequate.  He tolerated the procedure well and there were no complications.     all other ROS negative except as per HPI

## 2023-02-28 NOTE — PHYSICAL THERAPY INITIAL EVALUATION ADULT - NAME OF CLINICIAN
Quality 110: Preventive Care And Screening: Influenza Immunization: Influenza Immunization Administered during Influenza season Quality 111:Pneumonia Vaccination Status For Older Adults: Pneumococcal vaccine (PPSV23) administered on or after patient’s 60th birthday and before the end of the measurement period Quality 226: Preventive Care And Screening: Tobacco Use: Screening And Cessation Intervention: Patient screened for tobacco use and is an ex/non-smoker Detail Level: Detailed KARLOS Rodriguez aware of session

## 2023-06-02 NOTE — BRIEF OPERATIVE NOTE - IV INFUSIONS - CRYSTALLOIDS
2500 Spray Paint Technique: No Spray Paint Text: The liquid nitrogen was applied to the skin utilizing a spray paint frosting technique. Medical Necessity Clause: This procedure was medically necessary because the lesions that were treated were: Number Of Freeze-Thaw Cycles: 2 freeze-thaw cycles Post-Care Instructions: I reviewed with the patient in detail post-care instructions. Patient is to wear sunprotection, and avoid picking at any of the treated lesions. Pt may apply Vaseline to crusted or scabbing areas. Detail Level: Detailed Consent: The patient's consent was obtained including but not limited to risks of crusting, scabbing, blistering, scarring, darker or lighter pigmentary change, recurrence, incomplete removal and infection. Verbal consent was obtained. Medical Necessity Information: It is in your best interest to select a reason for this procedure from the list below. All of these items fulfill various CMS LCD requirements except the new and changing color options. Show Spray Paint Technique Variable?: Yes

## 2023-07-17 NOTE — CONSULT NOTE ADULT - ASSESSMENT
18 y/o AA male with past medical history of Sickle Cell disease presented to [facility] from [facility] as transfer for management of acute chest syndrome with direct admission MICU on 12/19.   Initial reason for presenting to [facility] was chest pain and bone pain. Patient treated at [facility] for sickle cell crisis and pneumonia with hospital course complicated by acute hypoxic respiratory failure requiring mechanical ventilation with subsequent re-intubation due to desaturation further complicated by bilateral pneumothoraces requiring bilateral pigtail catheter placement and ARDS. Patient also received plasma exchange on 12/17 and 12/18.   In [facility] patient received 3u simple transfusion and extubated on 12/22 after which patient had displayed episodic periods of agitation. CT head done on 12/24 demonstrates acute R Superior cerebellar infarct. Neurology consulted for this reason.     Neurologic exam limited by lack of cooperation and severe agitation from the patient. Remarkable for AAOx3 mental status requiring frequent redirection and questioning. +perseveration. Unable to name. Patient notably shouting profanities and displaying gestures inappropriate to the situation. Follows simple commands after repeated prompting. L mute plantar reflex. Bilateral LE at least 2/5. Bilateral UE at least 3/5.     Impression: 20 y/o AA male with past medical history of Sickle Cell disease presented to [facility] from [facility] as transfer for management of acute chest syndrome with direct admission MICU on 12/19.   Initial reason for presenting to [facility] was chest pain and bone pain. Patient treated at [facility] for sickle cell crisis and pneumonia with hospital course complicated by acute hypoxic respiratory failure requiring mechanical ventilation with subsequent re-intubation due to desaturation further complicated by bilateral pneumothoraces requiring bilateral pigtail catheter placement and ARDS. Patient also received plasma exchange on 12/17 and 12/18.   In [facility] patient received 3u simple transfusion and extubated on 12/22 after which patient had displayed episodic periods of agitation. CT head done on 12/24 demonstrates acute R Superior cerebellar infarct. Neurology consulted for this reason.     Neurologic exam limited by lack of cooperation and severe agitation from the patient. Remarkable for AAOx3 mental status requiring frequent redirection and questioning. +perseveration. Unable to name. Patient notably shouting profanities and displaying gestures inappropriate to the situation. Follows simple commands after repeated prompting. L mute plantar reflex. Bilateral LE at least 2/5. Bilateral UE at least 3/5.     Labs remarkable for HbS% of 31.3 and Hgb of 9.4. Ammonia of 64.     Impression: R cerebellar infarct 2/2 cryptogenic etiology; possibly due to vaso-occlusive crisis in the setting of sickle cell disease (stroke of other determined etiology)  AMS 2/2 unknown etiology; would r/o additional ischemia via more sensitive imaging modalities; ICU delirium would be a diagnosis of exclusion    Recommendations:   MRI brain without contrast  MRA head without contrast and neck with contrast  TTE with bubble study for possible valvular heart disease  Repeat Blood cultures  ASA 81 mg PO QD OR  OR   Lipitor 80 mg PO QHS  HbA1c, LDL and continue with aggressive vascular risk factors modifications   Tele monitor  PT/OT/S&S eval  Would defer to Hematology regarding need for simple transfusion vs PLEX    Plan discussed with Stroke Attending on Call. Biopsy Type: H and E
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
## 2023-08-30 NOTE — PATIENT PROFILE ADULT - FALL HARM RISK TYPE OF ASSESSMENT
[No Acute Distress] : no acute distress [Well Nourished] : well nourished [Well Developed] : well developed [Normal Outer Ear/Nose] : the outer ears and nose were normal in appearance [No Respiratory Distress] : no respiratory distress  [Clear to Auscultation] : lungs were clear to auscultation bilaterally [Normal Rate] : normal rate  [Normal S1, S2] : normal S1 and S2 [Soft] : abdomen soft [Non Tender] : non-tender [Non-distended] : non-distended [No HSM] : no HSM [de-identified] : Malampati score was III-IV admission

## 2023-10-16 NOTE — DISCHARGE NOTE PROVIDER - NSDCDCMDCOMP_GEN_ALL_CORE
Problem: Urinary - Renal Perfusion  Goal: Ability to achieve and maintain adequate renal perfusion and functioning will improve  Outcome: Progressing     Problem: Pain - Standard  Goal: Alleviation of pain or a reduction in pain to the patient’s comfort goal  Outcome: Progressing     Problem: Skin Integrity  Goal: Skin integrity is maintained or improved  Outcome: Progressing     Problem: Fall Risk  Goal: Patient will remain free from falls  Outcome: Progressing   The patient is Watcher - Medium risk of patient condition declining or worsening    Shift Goals  Clinical Goals: pain mgmt; q2 turns; maintain skin integrity  Patient Goals: pain control; rest  Family Goals: not currently present    Progress made toward(s) clinical / shift goals:  yes   This document is complete and the patient is ready for discharge.

## 2024-12-20 NOTE — BEHAVIORAL HEALTH ASSESSMENT NOTE - NSBHREFERDETAILS_PSY_A_CORE_FT
Is the patient due for a refill? Yes    Was the patient seen the past year? No    Date of last office visit: 9/21/22    Does the patient have an upcoming appointment?  No       Provider to refill:JA    Does the patient have care home Plus and need 100-day supply? (This applies to ALL medications) Patient does not have SCP      
delirium vs depression

## 2024-12-27 ENCOUNTER — APPOINTMENT (OUTPATIENT)
Dept: UROLOGY | Facility: CLINIC | Age: 24
End: 2024-12-27

## 2025-01-27 ENCOUNTER — APPOINTMENT (OUTPATIENT)
Dept: UROLOGY | Facility: CLINIC | Age: 25
End: 2025-01-27